# Patient Record
Sex: FEMALE | Race: WHITE | NOT HISPANIC OR LATINO | Employment: FULL TIME | URBAN - METROPOLITAN AREA
[De-identification: names, ages, dates, MRNs, and addresses within clinical notes are randomized per-mention and may not be internally consistent; named-entity substitution may affect disease eponyms.]

---

## 2017-02-13 ENCOUNTER — HOSPITAL ENCOUNTER (OUTPATIENT)
Dept: RADIOLOGY | Facility: HOSPITAL | Age: 52
Discharge: HOME | End: 2017-02-13
Attending: SPECIALIST
Payer: COMMERCIAL

## 2017-02-13 DIAGNOSIS — Z12.31 VISIT FOR SCREENING MAMMOGRAM: ICD-10-CM

## 2017-02-13 PROCEDURE — 77067 SCR MAMMO BI INCL CAD: CPT | Mod: TC

## 2017-02-13 PROCEDURE — 77067 SCR MAMMO BI INCL CAD: CPT | Mod: 26,,, | Performed by: RADIOLOGY

## 2017-02-13 PROCEDURE — 77063 BREAST TOMOSYNTHESIS BI: CPT | Mod: 26,,, | Performed by: RADIOLOGY

## 2017-05-04 ENCOUNTER — DOCUMENTATION ONLY (OUTPATIENT)
Dept: FAMILY MEDICINE | Facility: CLINIC | Age: 52
End: 2017-05-04

## 2017-05-04 NOTE — PROGRESS NOTES
Pre-Visit Chart Review  For Appointment Scheduled on 05/05/2017      Health Maintenance Due   Topic Date Due    Pap Smear  06/03/1986

## 2017-05-05 ENCOUNTER — OFFICE VISIT (OUTPATIENT)
Dept: FAMILY MEDICINE | Facility: CLINIC | Age: 52
End: 2017-05-05
Payer: COMMERCIAL

## 2017-05-05 ENCOUNTER — HOSPITAL ENCOUNTER (OUTPATIENT)
Dept: RADIOLOGY | Facility: HOSPITAL | Age: 52
Discharge: HOME | End: 2017-05-05
Attending: ORTHOPAEDIC SURGERY
Payer: COMMERCIAL

## 2017-05-05 ENCOUNTER — OFFICE VISIT (OUTPATIENT)
Dept: ORTHOPEDICS | Facility: CLINIC | Age: 52
End: 2017-05-05
Payer: COMMERCIAL

## 2017-05-05 VITALS
WEIGHT: 181 LBS | BODY MASS INDEX: 36.49 KG/M2 | DIASTOLIC BLOOD PRESSURE: 76 MMHG | HEART RATE: 62 BPM | SYSTOLIC BLOOD PRESSURE: 131 MMHG | HEIGHT: 59 IN

## 2017-05-05 VITALS
SYSTOLIC BLOOD PRESSURE: 122 MMHG | TEMPERATURE: 99 F | HEIGHT: 59 IN | HEART RATE: 62 BPM | DIASTOLIC BLOOD PRESSURE: 80 MMHG | BODY MASS INDEX: 36.49 KG/M2 | WEIGHT: 181 LBS

## 2017-05-05 DIAGNOSIS — M25.561 RIGHT KNEE PAIN, UNSPECIFIED CHRONICITY: ICD-10-CM

## 2017-05-05 DIAGNOSIS — M17.11 ARTHRITIS OF KNEE, RIGHT: Primary | ICD-10-CM

## 2017-05-05 DIAGNOSIS — L72.3 SEBACEOUS CYST: Primary | ICD-10-CM

## 2017-05-05 DIAGNOSIS — E66.01 SEVERE OBESITY (BMI 35.0-39.9): ICD-10-CM

## 2017-05-05 DIAGNOSIS — M25.561 RIGHT KNEE PAIN, UNSPECIFIED CHRONICITY: Primary | ICD-10-CM

## 2017-05-05 DIAGNOSIS — F41.8 MIXED ANXIETY DEPRESSIVE DISORDER: ICD-10-CM

## 2017-05-05 PROCEDURE — 1160F RVW MEDS BY RX/DR IN RCRD: CPT | Mod: S$GLB,,, | Performed by: ORTHOPAEDIC SURGERY

## 2017-05-05 PROCEDURE — 99999 PR PBB SHADOW E&M-EST. PATIENT-LVL III: CPT | Mod: PBBFAC,,, | Performed by: ORTHOPAEDIC SURGERY

## 2017-05-05 PROCEDURE — 73564 X-RAY EXAM KNEE 4 OR MORE: CPT | Mod: TC,PN,RT

## 2017-05-05 PROCEDURE — 73564 X-RAY EXAM KNEE 4 OR MORE: CPT | Mod: 26,RT,, | Performed by: RADIOLOGY

## 2017-05-05 PROCEDURE — 1160F RVW MEDS BY RX/DR IN RCRD: CPT | Mod: S$GLB,,, | Performed by: PHYSICIAN ASSISTANT

## 2017-05-05 PROCEDURE — 73562 X-RAY EXAM OF KNEE 3: CPT | Mod: 26,LT,, | Performed by: RADIOLOGY

## 2017-05-05 PROCEDURE — 99999 PR PBB SHADOW E&M-EST. PATIENT-LVL III: CPT | Mod: PBBFAC,,, | Performed by: PHYSICIAN ASSISTANT

## 2017-05-05 PROCEDURE — 99213 OFFICE O/P EST LOW 20 MIN: CPT | Mod: S$GLB,,, | Performed by: PHYSICIAN ASSISTANT

## 2017-05-05 PROCEDURE — 99203 OFFICE O/P NEW LOW 30 MIN: CPT | Mod: S$GLB,,, | Performed by: ORTHOPAEDIC SURGERY

## 2017-05-05 RX ORDER — VENLAFAXINE HYDROCHLORIDE 37.5 MG/1
37.5 CAPSULE, EXTENDED RELEASE ORAL DAILY
Qty: 30 CAPSULE | Refills: 0 | Status: SHIPPED | OUTPATIENT
Start: 2017-05-05 | End: 2017-06-01 | Stop reason: SDUPTHER

## 2017-05-05 NOTE — PATIENT INSTRUCTIONS
Weight Management: Getting Started  Healthy bodies come in all shapes and sizes. Not all bodies are made to be thin. For some people, a healthy weight is higher than the average weight listed on weight charts. Your healthcare provider can help you decide on a healthy weight for you.    Reasons to lose weight  Losing weight can help with some health problems, such as high blood pressure, heart disease, diabetes, sleep apnea, and arthritis. You may also feel more energy.  Set your long-term goal  Your goal doesn't even have to be a specific weight. You may decide on a fitness goal (such as being able to walk 10 miles a week), or a health goal (such as lowering your blood pressure). Choose a goal that is measurable and reasonable, so you know when you've reached it. A goal of reaching a BMI of less than 25 is not always reasonable (or possible).   Make an action plan  Habits dont change overnight. Setting your goals too high can leave you feeling discouraged if you cant reach them. Be realistic. Choose one or two small changes you can make now. Set an action plan for how you are going to make these changes. When you can stick to this plan, keep making a few more small changes. Taking small steps will help you stay on the path to success.  Track your progress  Write down your goals. Then, keep a daily record of your progress. Write down what you eat and how active you are. This record lets you look back on how much youve done. It may also help when youre feeling frustrated. Reward yourself for success. Even if you dont reach every goal, give yourself credit for what you do get done.  Get support  Encouragement from others can help make losing weight easier. Ask your family members and friends for support. They may even want to join you. Also look to your healthcare provider, registered dietitian, and  for help. Your local hospital can give you more information about nutrition, exercise, and  weight loss.  Date Last Reviewed: 1/31/2016 © 2000-2016 [employer]. [address] [place] [address], [place] [address]. All rights reserved. This information is not intended as a substitute for professional medical care. Always follow your healthcare professional's instructions.        Walking for Fitness  Fitness walking has something for everyone, even people who are already fit. Walking is one of the safest ways to condition your body aerobically. It can boost energy, help you lose weight, and reduce stress.    Physical benefits  · Walking strengthens your heart and lungs, and tones your muscles.  · When walking, your feet land with less impact than in other sports. This reduces chances of muscle, bone, and joint injury.  · Regular walking improves your cholesterol levels and lowers your risk of heart disease. And it helps you control your blood sugar if you have diabetes.  · Walking is a weight-bearing activity, which helps maintain bone density. This can help prevent osteoporosis.  Personal rewards  · Taking walks can help you relax and manage stress. And fitness walking may make you feel better about yourself.  · Walking can help you sleep better at night and make you less likely to be depressed.  · Regular walking may help maintain your memory as you get older.  · Walking is a great way to spend extra time with friends and family members. Be sure to invite your dog along!  Q&A about fitness walking  Q: Will walking keep me fit?  A: Yes. Regular walking at the right pace gives you all the benefits of other aerobic activities, such as jogging and swimming.  Q: Will walking help me lose weight and keep it off?  A: Yes. Per mile, walking can burn as many calories as jogging. Your health care provider can help work walking into your weight-loss plan.  Q: Is walking safe for my health?  A: Yes. Walking is safe if you have high blood pressure, diabetes, heart disease, or other conditions. Talk to your health  care provider before you start.  Date Last Reviewed: 5/9/2015  © 9390-4498 [employer]. [address] [place] [address], [place] [address]. All rights reserved. This information is not intended as a substitute for professional medical care. Always follow your healthcare professional's instructions.

## 2017-05-05 NOTE — PROGRESS NOTES
Subjective:       Patient ID: Faith Byers is a 51 y.o. female.    Chief Complaint: Depression and Mass (on scalp)    HPI   Patient is a 51 year old  female presenting to the clinic with concerns of increased agitation, stress, anxiety. She reports family members have urged her to get on an anti-depressant because she has been more easily agitated lately. Patient admits to having a lot on her plate- working three jobs, going to school part time, etc. She denies any concerns of anhedonia, increased sadness. She is more fatigued, but is only sleep 5-6 hours on average & reports this is restless. She reports having tried effexor in the past with good relief. She would like to try this again. She sees Dr. Lyons for her thyroid. She reports having recent labs done at [facility] & will bring them to her f/u appointment.    She is also concerned about small lesion on left frontal scalp area. She reports small increase in size. Her daughter has tried to drain the area a couple of times. There is no associated pain, drainage.  Review of Systems   Constitutional: Negative for activity change and unexpected weight change.   HENT: Negative for hearing loss, rhinorrhea and trouble swallowing.    Eyes: Negative for discharge and visual disturbance.   Respiratory: Negative for chest tightness and wheezing.    Cardiovascular: Negative for chest pain and palpitations.   Gastrointestinal: Negative for blood in stool, constipation, diarrhea and vomiting.   Endocrine: Negative for polydipsia and polyuria.   Genitourinary: Negative for difficulty urinating, dysuria, hematuria and menstrual problem.   Musculoskeletal: Positive for arthralgias and joint swelling.   Neurological: Negative for weakness and headaches.   Psychiatric/Behavioral: Positive for agitation, behavioral problems and sleep disturbance. Negative for confusion, decreased concentration, dysphoric mood, hallucinations, self-injury and suicidal ideas. The  patient is not hyperactive.        Objective:      Physical Exam   Constitutional: Vital signs are normal. She appears well-developed and well-nourished. No distress.   Cardiovascular: Normal rate, regular rhythm, S1 normal, S2 normal and normal heart sounds.  Exam reveals no gallop.    No murmur heard.  Pulses:       Radial pulses are 2+ on the right side, and 2+ on the left side.   <2sec cap refill fingers bilat     Pulmonary/Chest: Effort normal and breath sounds normal. No respiratory distress. She has no wheezes. She has no rhonchi.   Skin: Skin is warm and dry. She is not diaphoretic.        Appropriate skin turgor   Psychiatric: She has a normal mood and affect. Her speech is normal and behavior is normal. Judgment and thought content normal. Cognition and memory are normal.       Assessment:       1. Sebaceous cyst    2. Mixed anxiety depressive disorder    3. Severe obesity (BMI 35.0-39.9)        Plan:   Faith was seen today for depression and mass.    Diagnoses and all orders for this visit:    Sebaceous cyst  No evidence of infeciton  If cyst increases in size, drainage occurs, redness, warmth, needs to let us know for apt or possible gen surgery referral    Mixed anxiety depressive disorder  Trial of effexor; 4 week f/u scheduled  Patient will let us know if effexor not covered by insurance.  -     venlafaxine (EFFEXOR-XR) 37.5 MG 24 hr capsule; Take 1 capsule (37.5 mg total) by mouth once daily.    Severe obesity (BMI 35.0-39.9)  Patient readiness: acceptance and barriers:none    During the course of the visit the patient was educated and counseled about the following:     Obesity:   Regular aerobic exercise program discussed.    Goals: Obesity: Reduce calorie intake and BMI    Did patient meet goals/outcomes: No    The following self management tools provided: declined    Patient Instructions (the written plan) was given to the patient/family.     Time spent with patient: 20 minutes

## 2017-05-05 NOTE — MR AVS SNAPSHOT
[facility]  [address]  [place] [address]  Phone: [phone]  Fax: [phone]                  Faith Byers   2017 7:00 AM   Office Visit    Description:  Female : 1965   Provider:  GIANA Rojo   Department:  [facility]           Reason for Visit     Depression     Mass           Diagnoses this Visit        Comments    Sebaceous cyst    -  Primary     Mixed anxiety depressive disorder         Severe obesity (BMI 35.0-39.9)                To Do List           Future Appointments        Provider Department Dept Phone    2017 11:15 AM Huey Kiran MD [place] [facility] [phone]    2017 7:00 AM GIANA Rojo [facility] [phone]      Goals (5 Years of Data)     None      Follow-Up and Disposition     Return for 4 week f/u.       These Medications        Disp Refills Start End    venlafaxine (EFFEXOR-XR) 37.5 MG 24 hr capsule 30 capsule 0 2017    Take 1 capsule (37.5 mg total) by mouth once daily. - Oral    Pharmacy: [facility]/pharmacy #[id number] - [place], [place] - [address]  #: [phone]         [facility]     [facility] -  Assistance  Unless otherwise directed by your provider, please contact [facility], our nurse care line that is available for  assistance.     Registered nurses in the [facility] provide: appointment scheduling, clinical advisement, health education, and other advisory services.  Call: [phone] (toll free)               Medications           Message regarding Medications     Verify the changes and/or additions to your medication regime listed below are the same as discussed with your clinician today.  If any of these changes or additions are incorrect, please notify your healthcare provider.        START taking these NEW medications        Refills    venlafaxine (EFFEXOR-XR) 37.5 MG 24 hr capsule 0     "Sig: Take 1 capsule (37.5 mg total) by mouth once daily.    Class: Normal    Route: Oral           Verify that the below list of medications is an accurate representation of the medications you are currently taking.  If none reported, the list may be blank. If incorrect, please contact your healthcare provider. Carry this list with you in case of emergency.           Current Medications     ibuprofen (ADVIL,MOTRIN) 600 MG tablet Take 600 mg by mouth every 8 (eight) hours as needed.     levothyroxine (SYNTHROID) 112 MCG tablet Take 112 mcg by mouth once daily.      venlafaxine (EFFEXOR-XR) 37.5 MG 24 hr capsule Take 1 capsule (37.5 mg total) by mouth once daily.           Clinical Reference Information           Your Vitals Were     BP Pulse Temp Height Weight BMI    122/80 (BP Location: Right arm, Patient Position: Sitting, BP Method: Manual) 62 98.5 °F (36.9 °C) (Oral) 4' 11" (1.499 m) 82.1 kg (181 lb) 36.56 kg/m2      Blood Pressure          Most Recent Value    BP  122/80      Allergies as of 5/5/2017     No Known Allergies      Immunizations Administered on Date of Encounter - 5/5/2017     None      Instructions      Weight Management: Getting Started  Healthy bodies come in all shapes and sizes. Not all bodies are made to be thin. For some people, a healthy weight is higher than the average weight listed on weight charts. Your healthcare provider can help you decide on a healthy weight for you.    Reasons to lose weight  Losing weight can help with some health problems, such as high blood pressure, heart disease, diabetes, sleep apnea, and arthritis. You may also feel more energy.  Set your long-term goal  Your goal doesn't even have to be a specific weight. You may decide on a fitness goal (such as being able to walk 10 miles a week), or a health goal (such as lowering your blood pressure). Choose a goal that is measurable and reasonable, so you know when you've reached it. A goal of reaching a BMI of less " than 25 is not always reasonable (or possible).   Make an action plan  Habits dont change overnight. Setting your goals too high can leave you feeling discouraged if you cant reach them. Be realistic. Choose one or two small changes you can make now. Set an action plan for how you are going to make these changes. When you can stick to this plan, keep making a few more small changes. Taking small steps will help you stay on the path to success.  Track your progress  Write down your goals. Then, keep a daily record of your progress. Write down what you eat and how active you are. This record lets you look back on how much youve done. It may also help when youre feeling frustrated. Reward yourself for success. Even if you dont reach every goal, give yourself credit for what you do get done.  Get support  Encouragement from others can help make losing weight easier. Ask your family members and friends for support. They may even want to join you. Also look to your healthcare provider, registered dietitian, and  for help. Your local hospital can give you more information about nutrition, exercise, and weight loss.  Date Last Reviewed: 1/31/2016  © 4957-0338 [employer]. [address] [place] [address], [place] [address]. All rights reserved. This information is not intended as a substitute for professional medical care. Always follow your healthcare professional's instructions.        Walking for Fitness  Fitness walking has something for everyone, even people who are already fit. Walking is one of the safest ways to condition your body aerobically. It can boost energy, help you lose weight, and reduce stress.    Physical benefits  · Walking strengthens your heart and lungs, and tones your muscles.  · When walking, your feet land with less impact than in other sports. This reduces chances of muscle, bone, and joint injury.  · Regular walking improves your cholesterol levels and lowers your  risk of heart disease. And it helps you control your blood sugar if you have diabetes.  · Walking is a weight-bearing activity, which helps maintain bone density. This can help prevent osteoporosis.  Personal rewards  · Taking walks can help you relax and manage stress. And fitness walking may make you feel better about yourself.  · Walking can help you sleep better at night and make you less likely to be depressed.  · Regular walking may help maintain your memory as you get older.  · Walking is a great way to spend extra time with friends and family members. Be sure to invite your dog along!  Q&A about fitness walking  Q: Will walking keep me fit?  A: Yes. Regular walking at the right pace gives you all the benefits of other aerobic activities, such as jogging and swimming.  Q: Will walking help me lose weight and keep it off?  A: Yes. Per mile, walking can burn as many calories as jogging. Your health care provider can help work walking into your weight-loss plan.  Q: Is walking safe for my health?  A: Yes. Walking is safe if you have high blood pressure, diabetes, heart disease, or other conditions. Talk to your health care provider before you start.  Date Last Reviewed: 5/9/2015  © 2631-7086 [employer]. [address] [place] [address], [place] [address]. All rights reserved. This information is not intended as a substitute for professional medical care. Always follow your healthcare professional's instructions.             Language Assistance Services     ATTENTION: Language assistance services are available, free of charge. Please call [phone].      ATENCIÓN: Si habla español, tiene a valencia disposición servicios gratuitos de asistencia lingüística. Llame al [phone].     [facility] Ý: N?u b?n nói Ti?ng Vi?t, có các d?ch v? h? tr? ngôn ng? mi?n phí dành cho b?n. G?i s? [phone].         [facility] complies with applicable Federal civil rights laws and does not discriminate on the  basis of race, color, national origin, age, disability, or sex.

## 2017-05-08 DIAGNOSIS — M25.561 RIGHT KNEE PAIN, UNSPECIFIED CHRONICITY: Primary | ICD-10-CM

## 2017-05-08 NOTE — PROGRESS NOTES
Past Medical History:   Diagnosis Date    Thyroid disease        Past Surgical History:   Procedure Laterality Date    BREAST SURGERY  2001    right biopsy, benign    KNEE ARTHROSCOPY Right     THYROIDECTOMY  2011    complete       Current Outpatient Prescriptions   Medication Sig    ibuprofen (ADVIL,MOTRIN) 600 MG tablet Take 600 mg by mouth every 8 (eight) hours as needed.     levothyroxine (SYNTHROID) 112 MCG tablet Take 112 mcg by mouth once daily.      venlafaxine (EFFEXOR-XR) 37.5 MG 24 hr capsule Take 1 capsule (37.5 mg total) by mouth once daily.     No current facility-administered medications for this visit.        Review of patient's allergies indicates:  No Known Allergies    Family History   Problem Relation Age of Onset    Cancer Mother      breast and bone     Hyperlipidemia Father     ADD / ADHD Daughter      autism    Crohn's disease Paternal Uncle     Heart disease Paternal Uncle      heart transplant       Social History     Social History    Marital status:      Spouse name: N/A    Number of children: N/A    Years of education: N/A     Occupational History    Not on file.     Social History Main Topics    Smoking status: Never Smoker    Smokeless tobacco: Never Used    Alcohol use No    Drug use: No    Sexual activity: Not Currently     Other Topics Concern    Not on file     Social History Narrative       Chief Complaint:   Chief Complaint   Patient presents with    Right Knee - Pain       Consulting Physician: No ref. provider found    History of present illness:    This is a 51 y.o. year old female who complains of right knee pain.  She states that her pain is a 5 out of 10 and is worse with exercise or sitting and standing for long periods of time.  She's had this pain for years.  She reports having a knee scope done approximately 20 years ago.  It is worse when she bends and squats.  She does wear a brace when she does some exercises and reports that it  "helps.    Review of Systems:    Constitution: Denies chills, fever, and sweats.  HENT: Denies headaches or blurry vision.  Cardiovascular: Denies chest pain or irregular heart beat.  Respiratory: Denies cough or shortness of breath.  Gastrointestinal: Denies abdominal pain, nausea, or vomiting.  Musculoskeletal:  Denies muscle cramps.  Neurological: Denies dizziness or focal weakness.  Psychiatric/Behavioral: Normal mental status.  Hematologic/Lymphatic: Denies bleeding problem or easy bruising/bleeding.  Skin: Denies rash or suspicious lesions.    Examination:    Vital Signs:    Vitals:    05/05/17 1116   BP: 131/76   Pulse: 62   Weight: 82.1 kg (181 lb)   Height: 4' 11" (1.499 m)   PainSc:   5   PainLoc: Knee       Body mass index is 36.56 kg/(m^2).    This a well-developed, well nourished patient in no acute distress.    Alert and oriented and cooperative to examination.       Physical Exam: Right Knee Exam    Gait   Antalgic    Skin  Rash:   None  Scars:   None    Inspection  Erythema:  None  Bruising:  None  Effusion:  None  Masses:  None  Lymphadenopathy: None    Range of Motion: 0 to 130°    Medial Joint : n  Lateral Joint : y    Patellofemoral Tenderness: Yes  Patellofemoral Crepitus: Yes    Lachman:  Normal  Anterior Drawer: Normal  Posterior Drawer: Normal    Raffi's:  Negative  Apley's:  Negative    Varus Stress:  Stable  Valgus Stress:  Stable    Strength:  5/5    Pulses:  Palpable  Sensation:  Intact          Imaging: X-rays ordered and reviewed today of the right knee showed degenerative joint changes along the lateral aspect of the knee.        Assessment: Arthritis of knee, right        Plan:  We discussed treatment options today.  She like to proceed with Euflexxa series which we will get approved and start in 2 weeks.  She would also like to consider a lateral  brace.      DISCLAIMER: This note may have been dictated using voice recognition software and may contain " grammatical errors.     NOTE: Consult report sent to referring provider via [facility] EMR.

## 2017-06-01 RX ORDER — VENLAFAXINE HYDROCHLORIDE 37.5 MG/1
37.5 CAPSULE, EXTENDED RELEASE ORAL DAILY
Qty: 30 CAPSULE | Refills: 6 | Status: SHIPPED | OUTPATIENT
Start: 2017-06-01 | End: 2017-06-05 | Stop reason: DRUGHIGH

## 2017-06-05 ENCOUNTER — OFFICE VISIT (OUTPATIENT)
Dept: FAMILY MEDICINE | Facility: CLINIC | Age: 52
End: 2017-06-05
Payer: COMMERCIAL

## 2017-06-05 ENCOUNTER — PATIENT MESSAGE (OUTPATIENT)
Dept: FAMILY MEDICINE | Facility: CLINIC | Age: 52
End: 2017-06-05

## 2017-06-05 VITALS
HEIGHT: 59 IN | HEART RATE: 58 BPM | DIASTOLIC BLOOD PRESSURE: 71 MMHG | BODY MASS INDEX: 36.08 KG/M2 | TEMPERATURE: 98 F | SYSTOLIC BLOOD PRESSURE: 113 MMHG | WEIGHT: 179 LBS

## 2017-06-05 DIAGNOSIS — F41.1 GAD (GENERALIZED ANXIETY DISORDER): Primary | ICD-10-CM

## 2017-06-05 DIAGNOSIS — E66.9 OBESITY (BMI 30-39.9): ICD-10-CM

## 2017-06-05 PROCEDURE — 99213 OFFICE O/P EST LOW 20 MIN: CPT | Mod: S$GLB,,, | Performed by: PHYSICIAN ASSISTANT

## 2017-06-05 PROCEDURE — 99999 PR PBB SHADOW E&M-EST. PATIENT-LVL III: CPT | Mod: PBBFAC,,, | Performed by: PHYSICIAN ASSISTANT

## 2017-06-05 RX ORDER — VENLAFAXINE HYDROCHLORIDE 75 MG/1
75 CAPSULE, EXTENDED RELEASE ORAL DAILY
Qty: 30 CAPSULE | Refills: 2 | Status: SHIPPED | OUTPATIENT
Start: 2017-06-05 | End: 2017-09-07 | Stop reason: SDUPTHER

## 2017-06-05 NOTE — PATIENT INSTRUCTIONS
Walking for Fitness  Fitness walking has something for everyone, even people who are already fit. Walking is one of the safest ways to condition your body aerobically. It can boost energy, help you lose weight, and reduce stress.    Physical benefits  · Walking strengthens your heart and lungs, and tones your muscles.  · When walking, your feet land with less impact than in other sports. This reduces chances of muscle, bone, and joint injury.  · Regular walking improves your cholesterol levels and lowers your risk of heart disease. And it helps you control your blood sugar if you have diabetes.  · Walking is a weight-bearing activity, which helps maintain bone density. This can help prevent osteoporosis.  Personal rewards  · Taking walks can help you relax and manage stress. And fitness walking may make you feel better about yourself.  · Walking can help you sleep better at night and make you less likely to be depressed.  · Regular walking may help maintain your memory as you get older.  · Walking is a great way to spend extra time with friends and family members. Be sure to invite your dog along!  Q&A about fitness walking  Q: Will walking keep me fit?  A: Yes. Regular walking at the right pace gives you all the benefits of other aerobic activities, such as jogging and swimming.  Q: Will walking help me lose weight and keep it off?  A: Yes. Per mile, walking can burn as many calories as jogging. Your health care provider can help work walking into your weight-loss plan.  Q: Is walking safe for my health?  A: Yes. Walking is safe if you have high blood pressure, diabetes, heart disease, or other conditions. Talk to your health care provider before you start.  Date Last Reviewed: 5/9/2015  © 9535-8242 [employer]. [address] [place] [address], [place] [address]. All rights reserved. This information is not intended as a substitute for professional medical care. Always follow your healthcare professional's  instructions.

## 2017-06-05 NOTE — PROGRESS NOTES
Subjective:       Patient ID: Faith Byers is a 52 y.o. female.    Chief Complaint: Follow-up    HPI   Patient is a 52 year old  female presenting to the clinic for 1 month f/u after starting effexor-xr 37.5mg daily. She reports some improvement with her agitation, however, she feels like it could still be improved. She denies any adverse side effects. She is otherwise doing well with the medication, but willing to increase it some.     She is seeing Dr. Lyons for thyroid disease & has had other recent labs done for employment. She will get these scanned to us. We are requesting pap smear result from Dr. Moreno.  Review of Systems   Constitutional: Negative for activity change, appetite change, chills, diaphoresis, fatigue and fever.   HENT: Negative for congestion, postnasal drip and rhinorrhea.    Respiratory: Negative.  Negative for cough, shortness of breath and wheezing.    Cardiovascular: Negative.  Negative for chest pain.   Gastrointestinal: Negative for abdominal pain, blood in stool, constipation, diarrhea, nausea and vomiting.   Genitourinary: Negative for dysuria, frequency, hematuria and urgency.   Musculoskeletal: Negative.    Skin: Negative.  Negative for color change and rash.   Neurological: Negative for dizziness and syncope.   Psychiatric/Behavioral: Positive for agitation. Negative for behavioral problems and confusion.       Objective:      Physical Exam   Constitutional: Vital signs are normal. She appears well-developed and well-nourished. No distress.   Cardiovascular: Normal rate, regular rhythm, S1 normal, S2 normal and normal heart sounds.  Exam reveals no gallop.    No murmur heard.  Pulses:       Radial pulses are 2+ on the right side, and 2+ on the left side.   <2sec cap refill fingers bilat     Pulmonary/Chest: Effort normal and breath sounds normal. No respiratory distress. She has no wheezes. She has no rhonchi.   Skin: Skin is warm and dry. She is not  diaphoretic.   Appropriate skin turgor   Psychiatric: She has a normal mood and affect. Her speech is normal and behavior is normal. Judgment and thought content normal. Cognition and memory are normal.       Assessment:       1. CARINA (generalized anxiety disorder)    2. Obesity (BMI 30-39.9)        Plan:   Faith was seen today for follow-up.    Diagnoses and all orders for this visit:    CARINA (generalized anxiety disorder)  Improvement, but would like to try higher dose  -     venlafaxine (EFFEXOR-XR) 75 MG 24 hr capsule; Take 1 capsule (75 mg total) by mouth once daily.  I've explained to her that drugs of the SSRI class can have side effects such as weight gain, sexual dysfunction, insomnia, headache, nausea. These medications are generally effective at alleviating symptoms of anxiety and/or depression. Let me know if significant side effects do occur.      Obesity (BMI 30-39.9)  Patient readiness: acceptance and barriers:none    During the course of the visit the patient was educated and counseled about the following:     Obesity:   Regular aerobic exercise program discussed.    Goals: Obesity: Reduce calorie intake and BMI    Did patient meet goals/outcomes: No    The following self management tools provided: declined    Patient Instructions (the written plan) was given to the patient/family.     Time spent with patient: 20 minutes

## 2017-09-13 RX ORDER — VENLAFAXINE HYDROCHLORIDE 37.5 MG/1
37.5 CAPSULE, EXTENDED RELEASE ORAL DAILY
Qty: 30 CAPSULE | Refills: 2 | Status: SHIPPED | OUTPATIENT
Start: 2017-09-13 | End: 2017-12-14 | Stop reason: SDUPTHER

## 2017-09-13 NOTE — TELEPHONE ENCOUNTER
Patient advised she stopped the 75mg of effexor she advised that she was not caring about things. She would like to go back to the 37.5mg. Please send in rx.

## 2017-10-23 ENCOUNTER — OFFICE VISIT (OUTPATIENT)
Dept: FAMILY MEDICINE | Facility: CLINIC | Age: 52
End: 2017-10-23
Payer: COMMERCIAL

## 2017-10-23 ENCOUNTER — DOCUMENTATION ONLY (OUTPATIENT)
Dept: FAMILY MEDICINE | Facility: CLINIC | Age: 52
End: 2017-10-23

## 2017-10-23 VITALS
WEIGHT: 175.69 LBS | BODY MASS INDEX: 35.42 KG/M2 | HEART RATE: 87 BPM | HEIGHT: 59 IN | SYSTOLIC BLOOD PRESSURE: 118 MMHG | TEMPERATURE: 98 F | OXYGEN SATURATION: 97 % | DIASTOLIC BLOOD PRESSURE: 72 MMHG

## 2017-10-23 DIAGNOSIS — J06.9 URI, ACUTE: Primary | ICD-10-CM

## 2017-10-23 DIAGNOSIS — E66.9 OBESITY (BMI 30-39.9): ICD-10-CM

## 2017-10-23 PROCEDURE — 99999 PR PBB SHADOW E&M-EST. PATIENT-LVL III: CPT | Mod: PBBFAC,,, | Performed by: PHYSICIAN ASSISTANT

## 2017-10-23 PROCEDURE — 99213 OFFICE O/P EST LOW 20 MIN: CPT | Mod: 25,S$GLB,, | Performed by: PHYSICIAN ASSISTANT

## 2017-10-23 PROCEDURE — 96372 THER/PROPH/DIAG INJ SC/IM: CPT | Mod: S$GLB,,, | Performed by: PHYSICIAN ASSISTANT

## 2017-10-23 RX ORDER — AZITHROMYCIN 250 MG/1
TABLET, FILM COATED ORAL
Qty: 6 TABLET | Refills: 0 | Status: SHIPPED | OUTPATIENT
Start: 2017-10-23 | End: 2017-10-28

## 2017-10-23 RX ORDER — LEVOTHYROXINE SODIUM 125 UG/1
125 TABLET ORAL
Status: ON HOLD | COMMUNITY
End: 2019-02-26 | Stop reason: HOSPADM

## 2017-10-23 RX ORDER — BETAMETHASONE SODIUM PHOSPHATE AND BETAMETHASONE ACETATE 3; 3 MG/ML; MG/ML
6 INJECTION, SUSPENSION INTRA-ARTICULAR; INTRALESIONAL; INTRAMUSCULAR; SOFT TISSUE
Status: COMPLETED | OUTPATIENT
Start: 2017-10-23 | End: 2017-10-23

## 2017-10-23 RX ADMIN — BETAMETHASONE SODIUM PHOSPHATE AND BETAMETHASONE ACETATE 6 MG: 3; 3 INJECTION, SUSPENSION INTRA-ARTICULAR; INTRALESIONAL; INTRAMUSCULAR; SOFT TISSUE at 09:10

## 2017-10-23 NOTE — PROGRESS NOTES
Subjective:       Patient ID: Faith Byers is a 52 y.o. female.    Chief Complaint: head congestion and Cough    URI    This is a new problem. The current episode started in the past 7 days. Maximum temperature: 99.9F highest temp. Associated symptoms include congestion, coughing, headaches, rhinorrhea, sinus pain and a sore throat. Pertinent negatives include no abdominal pain, chest pain, diarrhea, dysuria, ear pain, joint pain, joint swelling, nausea, neck pain, plugged ear sensation, rash, sneezing, vomiting or wheezing. She has tried decongestant and NSAIDs for the symptoms. The treatment provided mild relief.     Review of Systems   Constitutional: Positive for fatigue. Negative for activity change and appetite change.   HENT: Positive for congestion, rhinorrhea, sinus pain and sore throat. Negative for ear pain and sneezing.    Eyes: Negative for visual disturbance.   Respiratory: Positive for cough. Negative for shortness of breath, wheezing and stridor.    Cardiovascular: Negative for chest pain.   Gastrointestinal: Negative for abdominal pain, diarrhea, nausea and vomiting.   Genitourinary: Negative for dysuria.   Musculoskeletal: Negative for joint pain and neck pain.   Skin: Negative for rash.   Neurological: Positive for headaches.       Objective:      Physical Exam   Constitutional: Vital signs are normal. She appears well-developed and well-nourished. No distress.   HENT:   Head: Normocephalic and atraumatic.   Right Ear: Hearing, tympanic membrane, external ear and ear canal normal.   Left Ear: Hearing, tympanic membrane, external ear and ear canal normal.   Nose: Rhinorrhea present.   Mouth/Throat: Uvula is midline and mucous membranes are normal. Posterior oropharyngeal erythema present.   Cardiovascular: Normal rate, regular rhythm, S1 normal, S2 normal and normal heart sounds.  Exam reveals no gallop.    No murmur heard.  Pulses:       Radial pulses are 2+ on the right side, and 2+  on the left side.   Pulmonary/Chest: Effort normal and breath sounds normal. No respiratory distress. She has no wheezes. She has no rhonchi.   Skin: Skin is warm and dry. She is not diaphoretic.   Psychiatric: She has a normal mood and affect. Her speech is normal and behavior is normal. Judgment and thought content normal. Cognition and memory are normal.       Assessment:       1. URI, acute    2. Obesity (BMI 30-39.9)        Plan:   Faith was seen today for head congestion and cough.    Diagnoses and all orders for this visit:    URI, acute  -     betamethasone acetate-betamethasone sodium phosphate injection 6 mg; Inject 1 mL (6 mg total) into the muscle one time.  Start OTC antistamine; can continue sudafed PRN    If no improvement in 3-5 days; start -     azithromycin (Z-MIRANDA) 250 MG tablet; Take 2 tablets by mouth on day 1; Take 1 tablet by mouth on days 2-5  Take antibiotics with food.  Increase fluid intake.  Call the clinic if symptoms worsen, new symptoms develop or if you are not any better after completion of your antibiotics.        Obesity (BMI 30-39.9)  Patient readiness: acceptance and barriers:none    During the course of the visit the patient was educated and counseled about the following:     Obesity:   Regular aerobic exercise program discussed.    Goals: Obesity: Reduce calorie intake and BMI    Did patient meet goals/outcomes: No    The following self management tools provided: declined    Patient Instructions (the written plan) was given to the patient/family.     Time spent with patient: 15 minutes

## 2017-10-23 NOTE — PROGRESS NOTES
Pre-Visit Chart Review  For Appointment Scheduled on 10/23/17    Health Maintenance Due   Topic Date Due    Influenza Vaccine  08/01/2017

## 2017-12-14 RX ORDER — VENLAFAXINE HYDROCHLORIDE 37.5 MG/1
CAPSULE, EXTENDED RELEASE ORAL
Qty: 30 CAPSULE | Refills: 2 | Status: SHIPPED | OUTPATIENT
Start: 2017-12-14 | End: 2018-03-23 | Stop reason: SDUPTHER

## 2017-12-14 NOTE — TELEPHONE ENCOUNTER
Called pt--she did increase to 75 mg, but did not like how she felt. She states that it was lowered at her last visit-she said that she does better on the 37.5 mg dose. Thanks, Stacey

## 2017-12-14 NOTE — TELEPHONE ENCOUNTER
We received refill request for effexor 37.5mg daily, but according to my last note, we were supposed to increase to 75mg daily. Did we go back down to 37.5mg? Please let me know so I can refill.

## 2018-01-15 ENCOUNTER — DOCUMENTATION ONLY (OUTPATIENT)
Dept: FAMILY MEDICINE | Facility: CLINIC | Age: 53
End: 2018-01-15

## 2018-01-15 ENCOUNTER — OFFICE VISIT (OUTPATIENT)
Dept: FAMILY MEDICINE | Facility: CLINIC | Age: 53
End: 2018-01-15
Payer: COMMERCIAL

## 2018-01-15 VITALS
DIASTOLIC BLOOD PRESSURE: 78 MMHG | WEIGHT: 181.88 LBS | TEMPERATURE: 98 F | HEIGHT: 59 IN | SYSTOLIC BLOOD PRESSURE: 133 MMHG | HEART RATE: 92 BPM | BODY MASS INDEX: 36.67 KG/M2

## 2018-01-15 DIAGNOSIS — J06.9 URI, ACUTE: Primary | ICD-10-CM

## 2018-01-15 DIAGNOSIS — E66.9 OBESITY (BMI 30-39.9): ICD-10-CM

## 2018-01-15 PROCEDURE — 99213 OFFICE O/P EST LOW 20 MIN: CPT | Mod: S$GLB,,, | Performed by: PHYSICIAN ASSISTANT

## 2018-01-15 PROCEDURE — 99999 PR PBB SHADOW E&M-EST. PATIENT-LVL III: CPT | Mod: PBBFAC,,, | Performed by: PHYSICIAN ASSISTANT

## 2018-01-15 NOTE — PROGRESS NOTES
Pre-Visit Chart Review  For Appointment Scheduled on 01/15/18    Health Maintenance Due   Topic Date Due    Influenza Vaccine  08/01/2017

## 2018-01-15 NOTE — PROGRESS NOTES
Subjective:       Patient ID: Faith Byers is a 52 y.o. female.    Chief Complaint: Cough; Sore Throat; and Sinus Problem    Cough   This is a new problem. The current episode started yesterday. The problem has been unchanged. Associated symptoms include ear congestion, nasal congestion and postnasal drip. Pertinent negatives include no chest pain, chills, ear pain, fever, headaches, rhinorrhea, sore throat, shortness of breath, weight loss or wheezing. Nothing aggravates the symptoms. Treatments tried: wal-angelique, advil, allergy medicine. The treatment provided no relief.     Review of Systems   Constitutional: Negative for chills, fever and weight loss.   HENT: Positive for congestion, postnasal drip, sinus pain, sinus pressure and trouble swallowing. Negative for drooling, ear discharge, ear pain, rhinorrhea and sore throat.    Eyes: Negative for visual disturbance.   Respiratory: Positive for cough and stridor. Negative for choking, shortness of breath and wheezing.    Cardiovascular: Negative for chest pain and palpitations.   Gastrointestinal: Negative for abdominal pain, diarrhea and vomiting.   Musculoskeletal: Negative for neck pain.   Neurological: Negative for headaches.       Objective:      Physical Exam   Constitutional: Vital signs are normal. She appears well-developed and well-nourished. No distress.   HENT:   Head: Normocephalic and atraumatic.   Right Ear: Hearing, tympanic membrane, external ear and ear canal normal.   Left Ear: Hearing, tympanic membrane, external ear and ear canal normal.   Nose: Rhinorrhea present.   Mouth/Throat: Uvula is midline, oropharynx is clear and moist and mucous membranes are normal.   Cardiovascular: Normal rate, regular rhythm, S1 normal, S2 normal and normal heart sounds.  Exam reveals no gallop.    No murmur heard.  Pulses:       Radial pulses are 2+ on the right side, and 2+ on the left side.   Pulmonary/Chest: Effort normal and breath sounds normal.  No respiratory distress. She has no wheezes. She has no rhonchi.   Skin: Skin is warm and dry. She is not diaphoretic.   Psychiatric: She has a normal mood and affect. Her speech is normal and behavior is normal. Judgment and thought content normal. Cognition and memory are normal.       Assessment:       1. URI, acute    2. Obesity (BMI 30-39.9)        Plan:       Faith was seen today for cough, sore throat and sinus problem.    Diagnoses and all orders for this visit:    URI, acute  Recommended OTC cough/cold preparations  Can take tylenol or advil PRN fever if warranted. Be careful not to duplicate tylenol if it is already in the cold preparation mediation you choose.  Increase water intake. Get plenty of rest.  Return to clinic if symptoms worsen or do not improve with treatment      Obesity (BMI 30-39.9)  Patient readiness: acceptance and barriers:none    During the course of the visit the patient was educated and counseled about the following:     Obesity:   Regular aerobic exercise program discussed.    Goals: Obesity: Reduce calorie intake and BMI    Did patient meet goals/outcomes: No    The following self management tools provided: declined    Patient Instructions (the written plan) was given to the patient/family.     Time spent with patient: 15 minutes    Barriers to medications present (no )    Adverse reactions to current medications (no)    Over the counter medications reviewed (Yes)

## 2018-01-17 ENCOUNTER — PATIENT MESSAGE (OUTPATIENT)
Dept: FAMILY MEDICINE | Facility: CLINIC | Age: 53
End: 2018-01-17

## 2018-01-17 RX ORDER — AMOXICILLIN 500 MG/1
500 TABLET, FILM COATED ORAL EVERY 12 HOURS
Qty: 20 TABLET | Refills: 0 | Status: SHIPPED | OUTPATIENT
Start: 2018-01-17 | End: 2018-01-27

## 2018-02-08 DIAGNOSIS — Z12.31 VISIT FOR SCREENING MAMMOGRAM: Primary | ICD-10-CM

## 2018-02-15 ENCOUNTER — HOSPITAL ENCOUNTER (OUTPATIENT)
Dept: RADIOLOGY | Facility: HOSPITAL | Age: 53
Discharge: HOME | End: 2018-02-15
Attending: SPECIALIST
Payer: COMMERCIAL

## 2018-02-15 DIAGNOSIS — Z12.31 VISIT FOR SCREENING MAMMOGRAM: ICD-10-CM

## 2018-02-15 PROCEDURE — 77067 SCR MAMMO BI INCL CAD: CPT | Mod: TC

## 2018-03-23 RX ORDER — VENLAFAXINE HYDROCHLORIDE 37.5 MG/1
CAPSULE, EXTENDED RELEASE ORAL
Qty: 30 CAPSULE | Refills: 2 | Status: SHIPPED | OUTPATIENT
Start: 2018-03-23 | End: 2018-06-01 | Stop reason: SDUPTHER

## 2018-05-17 ENCOUNTER — PATIENT MESSAGE (OUTPATIENT)
Dept: FAMILY MEDICINE | Facility: CLINIC | Age: 53
End: 2018-05-17

## 2018-05-24 ENCOUNTER — TELEPHONE (OUTPATIENT)
Dept: ORTHOPEDICS | Facility: CLINIC | Age: 53
End: 2018-05-24

## 2018-05-24 DIAGNOSIS — M25.561 RIGHT KNEE PAIN, UNSPECIFIED CHRONICITY: Primary | ICD-10-CM

## 2018-05-30 ENCOUNTER — OFFICE VISIT (OUTPATIENT)
Dept: ORTHOPEDICS | Facility: CLINIC | Age: 53
End: 2018-05-30
Payer: COMMERCIAL

## 2018-05-30 ENCOUNTER — HOSPITAL ENCOUNTER (OUTPATIENT)
Dept: RADIOLOGY | Facility: HOSPITAL | Age: 53
Discharge: HOME | End: 2018-05-30
Attending: ORTHOPAEDIC SURGERY
Payer: COMMERCIAL

## 2018-05-30 VITALS
HEIGHT: 59 IN | WEIGHT: 181.88 LBS | BODY MASS INDEX: 36.67 KG/M2 | DIASTOLIC BLOOD PRESSURE: 58 MMHG | SYSTOLIC BLOOD PRESSURE: 121 MMHG | HEART RATE: 62 BPM

## 2018-05-30 DIAGNOSIS — M25.561 RIGHT KNEE PAIN, UNSPECIFIED CHRONICITY: ICD-10-CM

## 2018-05-30 DIAGNOSIS — M25.561 RIGHT KNEE PAIN, UNSPECIFIED CHRONICITY: Primary | ICD-10-CM

## 2018-05-30 DIAGNOSIS — M17.11 ARTHRITIS OF KNEE, RIGHT: ICD-10-CM

## 2018-05-30 PROCEDURE — 3008F BODY MASS INDEX DOCD: CPT | Mod: CPTII,S$GLB,, | Performed by: ORTHOPAEDIC SURGERY

## 2018-05-30 PROCEDURE — 99999 PR PBB SHADOW E&M-EST. PATIENT-LVL III: CPT | Mod: PBBFAC,,, | Performed by: ORTHOPAEDIC SURGERY

## 2018-05-30 PROCEDURE — 73562 X-RAY EXAM OF KNEE 3: CPT | Mod: TC,PN,LT

## 2018-05-30 PROCEDURE — 73562 X-RAY EXAM OF KNEE 3: CPT | Mod: 26,XS,LT, | Performed by: RADIOLOGY

## 2018-05-30 PROCEDURE — 99213 OFFICE O/P EST LOW 20 MIN: CPT | Mod: S$GLB,,, | Performed by: ORTHOPAEDIC SURGERY

## 2018-05-30 PROCEDURE — 73564 X-RAY EXAM KNEE 4 OR MORE: CPT | Mod: 26,RT,, | Performed by: RADIOLOGY

## 2018-06-01 ENCOUNTER — LAB VISIT (OUTPATIENT)
Dept: LAB | Facility: HOSPITAL | Age: 53
End: 2018-06-01
Attending: NURSE PRACTITIONER
Payer: COMMERCIAL

## 2018-06-01 ENCOUNTER — OFFICE VISIT (OUTPATIENT)
Dept: FAMILY MEDICINE | Facility: CLINIC | Age: 53
End: 2018-06-01
Payer: COMMERCIAL

## 2018-06-01 VITALS
DIASTOLIC BLOOD PRESSURE: 69 MMHG | TEMPERATURE: 98 F | HEART RATE: 64 BPM | SYSTOLIC BLOOD PRESSURE: 118 MMHG | HEIGHT: 59 IN | WEIGHT: 189.13 LBS | BODY MASS INDEX: 38.13 KG/M2

## 2018-06-01 DIAGNOSIS — Z00.00 ANNUAL PHYSICAL EXAM: Primary | ICD-10-CM

## 2018-06-01 DIAGNOSIS — Z00.00 ANNUAL PHYSICAL EXAM: ICD-10-CM

## 2018-06-01 DIAGNOSIS — F41.1 GAD (GENERALIZED ANXIETY DISORDER): ICD-10-CM

## 2018-06-01 DIAGNOSIS — E03.9 HYPOTHYROIDISM, UNSPECIFIED TYPE: ICD-10-CM

## 2018-06-01 LAB
ALBUMIN SERPL BCP-MCNC: 3.5 G/DL
ALP SERPL-CCNC: 55 U/L
ALT SERPL W/O P-5'-P-CCNC: 25 U/L
ANION GAP SERPL CALC-SCNC: 6 MMOL/L
AST SERPL-CCNC: 23 U/L
BASOPHILS # BLD AUTO: 0.05 K/UL
BASOPHILS NFR BLD: 0.9 %
BILIRUB SERPL-MCNC: 0.4 MG/DL
BUN SERPL-MCNC: 21 MG/DL
CALCIUM SERPL-MCNC: 9.4 MG/DL
CHLORIDE SERPL-SCNC: 109 MMOL/L
CHOLEST SERPL-MCNC: 195 MG/DL
CHOLEST/HDLC SERPL: 3.1 {RATIO}
CO2 SERPL-SCNC: 25 MMOL/L
CREAT SERPL-MCNC: 0.7 MG/DL
DIFFERENTIAL METHOD: NORMAL
EOSINOPHIL # BLD AUTO: 0.1 K/UL
EOSINOPHIL NFR BLD: 1.6 %
ERYTHROCYTE [DISTWIDTH] IN BLOOD BY AUTOMATED COUNT: 12.8 %
EST. GFR  (AFRICAN AMERICAN): >60 ML/MIN/1.73 M^2
EST. GFR  (NON AFRICAN AMERICAN): >60 ML/MIN/1.73 M^2
ESTIMATED AVG GLUCOSE: 97 MG/DL
GLUCOSE SERPL-MCNC: 85 MG/DL
HBA1C MFR BLD HPLC: 5 %
HCT VFR BLD AUTO: 40.4 %
HDLC SERPL-MCNC: 63 MG/DL
HDLC SERPL: 32.3 %
HGB BLD-MCNC: 13 G/DL
IMM GRANULOCYTES # BLD AUTO: 0.01 K/UL
IMM GRANULOCYTES NFR BLD AUTO: 0.2 %
LDLC SERPL CALC-MCNC: 119.6 MG/DL
LYMPHOCYTES # BLD AUTO: 2.3 K/UL
LYMPHOCYTES NFR BLD: 40.3 %
MCH RBC QN AUTO: 30.2 PG
MCHC RBC AUTO-ENTMCNC: 32.2 G/DL
MCV RBC AUTO: 94 FL
MONOCYTES # BLD AUTO: 0.4 K/UL
MONOCYTES NFR BLD: 6 %
NEUTROPHILS # BLD AUTO: 3 K/UL
NEUTROPHILS NFR BLD: 51 %
NONHDLC SERPL-MCNC: 132 MG/DL
NRBC BLD-RTO: 0 /100 WBC
PLATELET # BLD AUTO: 235 K/UL
PMV BLD AUTO: 11 FL
POTASSIUM SERPL-SCNC: 4.6 MMOL/L
PROT SERPL-MCNC: 7 G/DL
RBC # BLD AUTO: 4.31 M/UL
SODIUM SERPL-SCNC: 140 MMOL/L
TRIGL SERPL-MCNC: 62 MG/DL
WBC # BLD AUTO: 5.8 K/UL

## 2018-06-01 PROCEDURE — 85025 COMPLETE CBC W/AUTO DIFF WBC: CPT

## 2018-06-01 PROCEDURE — 80053 COMPREHEN METABOLIC PANEL: CPT

## 2018-06-01 PROCEDURE — 83036 HEMOGLOBIN GLYCOSYLATED A1C: CPT

## 2018-06-01 PROCEDURE — 36415 COLL VENOUS BLD VENIPUNCTURE: CPT | Mod: PO

## 2018-06-01 PROCEDURE — 99396 PREV VISIT EST AGE 40-64: CPT | Mod: S$GLB,,, | Performed by: NURSE PRACTITIONER

## 2018-06-01 PROCEDURE — 80061 LIPID PANEL: CPT

## 2018-06-01 PROCEDURE — 99999 PR PBB SHADOW E&M-EST. PATIENT-LVL III: CPT | Mod: PBBFAC,,, | Performed by: NURSE PRACTITIONER

## 2018-06-01 RX ORDER — LEVOTHYROXINE SODIUM 112 UG/1
TABLET ORAL
Refills: 3 | COMMUNITY
Start: 2018-04-22 | End: 2018-09-27

## 2018-06-01 RX ORDER — VENLAFAXINE HYDROCHLORIDE 37.5 MG/1
37.5 CAPSULE, EXTENDED RELEASE ORAL DAILY
Qty: 30 CAPSULE | Refills: 11 | Status: SHIPPED | OUTPATIENT
Start: 2018-06-01 | End: 2019-01-23 | Stop reason: SDUPTHER

## 2018-06-01 NOTE — PROGRESS NOTES
Subjective:       Patient ID: Faith Byers is a 52 y.o. female.    Chief Complaint: Annual Exam    Ms. Lizama presents to the clinic today for annual exam.  She is due for labs.  She is having right knee pain which is being treated by Dr. Kiran.  She has no other new complaints today.  She sees Dr. Lyons for hypothyroidism.  She was not exercising up until 3 weeks ago. She eats Weight Watchers diet.       Review of Systems   Constitutional: Negative for chills and fever.   HENT: Negative for congestion, ear pain and sinus pressure.    Respiratory: Negative for cough and shortness of breath.    Cardiovascular: Negative for chest pain, palpitations and leg swelling.   Gastrointestinal: Negative for abdominal pain, constipation and diarrhea.   Musculoskeletal: Positive for arthralgias.   Psychiatric/Behavioral: Negative for dysphoric mood. The patient is not nervous/anxious.        Objective:      Physical Exam   Constitutional: She is oriented to person, place, and time. She appears well-nourished. No distress.   HENT:   Head: Normocephalic and atraumatic.   Right Ear: External ear normal.   Left Ear: External ear normal.   Mouth/Throat: Oropharynx is clear and moist. No oropharyngeal exudate.   Eyes: Pupils are equal, round, and reactive to light. Right eye exhibits no discharge. Left eye exhibits no discharge.   Neck: Neck supple. No thyromegaly present.   Cardiovascular: Normal rate and regular rhythm.  Exam reveals no gallop and no friction rub.    No murmur heard.  Pulmonary/Chest: Effort normal and breath sounds normal. No respiratory distress. She has no wheezes. She has no rales.   Abdominal: Soft. She exhibits no distension. There is no tenderness.   Lymphadenopathy:     She has no cervical adenopathy.   Neurological: She is alert and oriented to person, place, and time. Coordination normal.   Skin: Skin is warm and dry.   Psychiatric: She has a normal mood and affect. Her behavior is normal.  Thought content normal.   Vitals reviewed.          Current Outpatient Prescriptions:     levothyroxine (SYNTHROID) 125 MCG tablet, Take 125 mcg by mouth once daily., Disp: , Rfl:     SYNTHROID 112 mcg tablet, TAKE 1 TAB BY MOUTH ON TUES, THURS, SAT & SUN, Disp: , Rfl: 3    venlafaxine (EFFEXOR-XR) 37.5 MG 24 hr capsule, Take 1 capsule (37.5 mg total) by mouth once daily., Disp: 30 capsule, Rfl: 11  Assessment:       1. Annual physical exam    2. Hypothyroidism, unspecified type    3. CARINA (generalized anxiety disorder)        Plan:       Annual physical exam  Continue exercise and Weight Watchers.  -     CBC auto differential; Future; Expected date: 06/01/2018  -     Comprehensive metabolic panel; Future; Expected date: 06/01/2018  -     Lipid panel; Future; Expected date: 06/01/2018  -     Hemoglobin A1c; Future; Expected date: 06/01/2018  -     venlafaxine (EFFEXOR-XR) 37.5 MG 24 hr capsule; Take 1 capsule (37.5 mg total) by mouth once daily.  Dispense: 30 capsule; Refill: 11    Hypothyroidism, unspecified type  Stable, follow up Dr. Lyons.    CARINA (generalized anxiety disorder)  Stable, continue current medication.

## 2018-06-01 NOTE — PROGRESS NOTES
Past Medical History:   Diagnosis Date    Thyroid disease        Past Surgical History:   Procedure Laterality Date    BREAST BIOPSY      BREAST SURGERY  2001    right biopsy, benign    KNEE ARTHROSCOPY Right     THYROIDECTOMY  2011    complete       Current Outpatient Prescriptions   Medication Sig    levothyroxine (SYNTHROID) 125 MCG tablet Take 125 mcg by mouth once daily.    venlafaxine (EFFEXOR-XR) 37.5 MG 24 hr capsule TAKE ONE CAPSULE BY MOUTH EVERY DAY     No current facility-administered medications for this visit.        Review of patient's allergies indicates:  No Known Allergies    Family History   Problem Relation Age of Onset    Cancer Mother         breast and bone     Breast cancer Mother     Hyperlipidemia Father     ADD / ADHD Daughter         autism    Crohn's disease Paternal Uncle     Heart disease Paternal Uncle         heart transplant       Social History     Social History    Marital status:      Spouse name: N/A    Number of children: N/A    Years of education: N/A     Occupational History    Not on file.     Social History Main Topics    Smoking status: Never Smoker    Smokeless tobacco: Never Used    Alcohol use No    Drug use: No    Sexual activity: Not Currently     Other Topics Concern    Not on file     Social History Narrative    No narrative on file       Chief Complaint:   Chief Complaint   Patient presents with    Right Knee - Pain       Consulting Physician: No ref. provider found    History of present illness:    This is a 52 y.o. year old female who complains of right knee pain.  She states that her pain is a 6 out of 10 and is worse with exercise or sitting and standing for long periods of time.  She's had this pain for years.  She reports having a knee scope done approximately 20 years ago.  It is worse when she bends and squats.  She does wear a brace when she does some exercises and reports that it helps. Previous injections  "helpful.    Review of Systems:    Constitution: Denies chills, fever, and sweats.  HENT: Denies headaches or blurry vision.  Cardiovascular: Denies chest pain or irregular heart beat.  Respiratory: Denies cough or shortness of breath.  Gastrointestinal: Denies abdominal pain, nausea, or vomiting.  Musculoskeletal:  Denies muscle cramps.  Neurological: Denies dizziness or focal weakness.  Psychiatric/Behavioral: Normal mental status.  Hematologic/Lymphatic: Denies bleeding problem or easy bruising/bleeding.  Skin: Denies rash or suspicious lesions.    Examination:    Vital Signs:    Vitals:    05/30/18 1455   BP: (!) 121/58   Pulse: 62   Weight: 82.5 kg (181 lb 14.1 oz)   Height: 4' 11" (1.499 m)   PainSc: 0-No pain   PainLoc: Knee       Body mass index is 36.74 kg/m².    This a well-developed, well nourished patient in no acute distress.    Alert and oriented and cooperative to examination.       Physical Exam: Right Knee Exam    Gait   Antalgic    Skin  Rash:   None  Scars:   None    Inspection  Erythema:  None  Bruising:  None  Effusion:  None  Masses:  None  Lymphadenopathy: None    Range of Motion: 0 to 130°    Medial Joint : n  Lateral Joint : y    Patellofemoral Tenderness: Yes  Patellofemoral Crepitus: Yes    Lachman:  Normal  Anterior Drawer: Normal  Posterior Drawer: Normal    Raffi's:  Negative  Apley's:  Negative    Varus Stress:  Stable  Valgus Stress:  Stable    Strength:  5/5    Pulses:  Palpable  Sensation:  Intact          Imaging: X-rays ordered and reviewed today of the right knee showed degenerative joint changes along the lateral aspect of the knee.        Assessment: Right knee pain, unspecified chronicity    Arthritis of knee, right        Plan:  We discussed treatment options today. Since she continues to have pain we will MRI for likely lateral meniscal tear. Will also start Euflexxa next visit. Declined steroid.    DISCLAIMER: This note may have been dictated using " voice recognition software and may contain grammatical errors.     NOTE: Consult report sent to referring provider via [employer].

## 2018-06-04 ENCOUNTER — HOSPITAL ENCOUNTER (OUTPATIENT)
Dept: RADIOLOGY | Facility: HOSPITAL | Age: 53
Discharge: HOME | End: 2018-06-04
Attending: ORTHOPAEDIC SURGERY
Payer: COMMERCIAL

## 2018-06-04 DIAGNOSIS — M25.561 RIGHT KNEE PAIN, UNSPECIFIED CHRONICITY: ICD-10-CM

## 2018-06-04 PROCEDURE — 73721 MRI JNT OF LWR EXTRE W/O DYE: CPT | Mod: TC,RT

## 2018-06-04 PROCEDURE — 73721 MRI JNT OF LWR EXTRE W/O DYE: CPT | Mod: 26,RT,, | Performed by: RADIOLOGY

## 2018-06-14 DIAGNOSIS — M17.11 ARTHRITIS OF RIGHT KNEE: Primary | ICD-10-CM

## 2018-06-15 ENCOUNTER — OFFICE VISIT (OUTPATIENT)
Dept: ORTHOPEDICS | Facility: CLINIC | Age: 53
End: 2018-06-15
Payer: COMMERCIAL

## 2018-06-15 VITALS — HEIGHT: 59 IN | BODY MASS INDEX: 38.13 KG/M2 | WEIGHT: 189.13 LBS

## 2018-06-15 DIAGNOSIS — M25.561 RIGHT KNEE PAIN: ICD-10-CM

## 2018-06-15 DIAGNOSIS — M25.561 RIGHT KNEE PAIN, UNSPECIFIED CHRONICITY: Primary | ICD-10-CM

## 2018-06-15 PROCEDURE — 99999 PR PBB SHADOW E&M-EST. PATIENT-LVL III: CPT | Mod: PBBFAC,,, | Performed by: ORTHOPAEDIC SURGERY

## 2018-06-15 PROCEDURE — 3008F BODY MASS INDEX DOCD: CPT | Mod: CPTII,S$GLB,, | Performed by: ORTHOPAEDIC SURGERY

## 2018-06-15 PROCEDURE — 99214 OFFICE O/P EST MOD 30 MIN: CPT | Mod: S$GLB,,, | Performed by: ORTHOPAEDIC SURGERY

## 2018-06-15 RX ORDER — MUPIROCIN 20 MG/G
OINTMENT TOPICAL
Status: CANCELLED | OUTPATIENT
Start: 2018-06-15

## 2018-06-15 RX ORDER — SODIUM CHLORIDE 9 MG/ML
INJECTION, SOLUTION INTRAVENOUS CONTINUOUS
Status: CANCELLED | OUTPATIENT
Start: 2018-06-15

## 2018-06-17 RX ORDER — VENLAFAXINE HYDROCHLORIDE 37.5 MG/1
CAPSULE, EXTENDED RELEASE ORAL
Qty: 30 CAPSULE | Refills: 2 | OUTPATIENT
Start: 2018-06-17

## 2018-06-18 NOTE — PROGRESS NOTES
Past Medical History:   Diagnosis Date    Thyroid disease        Past Surgical History:   Procedure Laterality Date    BREAST BIOPSY      BREAST SURGERY  2001    right biopsy, benign    KNEE ARTHROSCOPY Right     THYROIDECTOMY  2011    complete       Current Outpatient Prescriptions   Medication Sig    levothyroxine (SYNTHROID) 125 MCG tablet Take 125 mcg by mouth once daily.    SYNTHROID 112 mcg tablet TAKE 1 TAB BY MOUTH ON TUES, THURS, SAT & SUN    venlafaxine (EFFEXOR-XR) 37.5 MG 24 hr capsule Take 1 capsule (37.5 mg total) by mouth once daily.     No current facility-administered medications for this visit.        Review of patient's allergies indicates:  No Known Allergies    Family History   Problem Relation Age of Onset    Cancer Mother         breast and bone     Breast cancer Mother     Hyperlipidemia Father     ADD / ADHD Daughter         autism    Crohn's disease Paternal Uncle     Heart disease Paternal Uncle         heart transplant       Social History     Social History    Marital status:      Spouse name: N/A    Number of children: N/A    Years of education: N/A     Occupational History    Not on file.     Social History Main Topics    Smoking status: Never Smoker    Smokeless tobacco: Never Used    Alcohol use No    Drug use: No    Sexual activity: Not Currently     Other Topics Concern    Not on file     Social History Narrative    No narrative on file       Chief Complaint:   Chief Complaint   Patient presents with    Injections     EUFLEXXA 1/3 RIGHT KNEE       Consulting Physician: Huey Kiran MD    History of present illness:    This is a 53 y.o. year old female who complains of right knee pain.  She states that her pain is a 6 out of 10 and is worse with exercise or sitting and standing for long periods of time.  She's had this pain for years.  She reports having a knee scope done approximately 20 years ago.  It is worse when she bends and squats.  She  "does wear a brace when she does some exercises and reports that it helps. Previous injections helpful.    Review of Systems:    Constitution: Denies chills, fever, and sweats.  HENT: Denies headaches or blurry vision.  Cardiovascular: Denies chest pain or irregular heart beat.  Respiratory: Denies cough or shortness of breath.  Gastrointestinal: Denies abdominal pain, nausea, or vomiting.  Musculoskeletal:  Denies muscle cramps.  Neurological: Denies dizziness or focal weakness.  Psychiatric/Behavioral: Normal mental status.  Hematologic/Lymphatic: Denies bleeding problem or easy bruising/bleeding.  Skin: Denies rash or suspicious lesions.    Examination:    Vital Signs:    Vitals:    06/15/18 0914   Weight: 85.8 kg (189 lb 2.5 oz)   Height: 4' 11" (1.499 m)   PainSc:   2   PainLoc: Knee       Body mass index is 38.2 kg/m².    This a well-developed, well nourished patient in no acute distress.    Alert and oriented and cooperative to examination.       Physical Exam: Right Knee Exam    Gait   Antalgic    Skin  Rash:   None  Scars:   None    Inspection  Erythema:  None  Bruising:  None  Effusion:  None  Masses:  None  Lymphadenopathy: None    Range of Motion: 0 to 130°    Medial Joint : n  Lateral Joint : y    Patellofemoral Tenderness: Yes  Patellofemoral Crepitus: Yes    Lachman:  Normal  Anterior Drawer: Normal  Posterior Drawer: Normal    Raffi's:  Negative  Apley's:  Negative    Varus Stress:  Stable  Valgus Stress:  Stable    Strength:  5/5    Pulses:  Palpable  Sensation:  Intact          Imaging: X-rays of the right knee showed degenerative joint changes along the lateral aspect of the knee. MRI reviewed with pt today shows complex lateral meniscal tear.       Assessment: Right knee pain, unspecified chronicity  -     Vital signs; Standing  -     Cleanse with Chlorhexidine (CHG); Standing  -     Diet NPO; Standing  -     0.9%  NaCl infusion; Inject into the vein continuous.  -     IP " VTE LOW RISK PATIENT; Standing  -     Place APRIL hose; Standing  -     Place sequential compression device; Standing  -     Chlorohexidine Gluconate Bath; Standing  -     mupirocin 2 % ointment; by Nasal route On call Procedure (surgery).  -     Case Request Operating Room: ARTHROSCOPY, KNEE, WITH MENISCECTOMY  -     Place in Outpatient; Standing  -     CEFAZOLIN 2G/20 ML SYRINGE (PYXIS) IV syringe 2 g 20 mL; Inject 20 mLs (2 g total) into the vein On call Procedure (Surgery).  -     Basic metabolic panel; Standing  -     CBC auto differential; Standing  -     Pregnancy, urine rapid; Standing  -     EKG 12-lead; Standing    Right knee pain        Plan:  We discussed MRI and treatment options today and she elected to proceed with a knee scope and cleanup. The risks, benefits, and alternatives to the procedure were explained to the patient including, but not limited to: incomplete pain relief, surgical failure, hardware failure, need for further procedures, damage to nerves, arteries, blood vessels and other structures, infection, Deep Vein Thrombosis (DVT), Pulmonary Embolus (PE), Complex Regional Pain Syndrome as well as general anesthetic complications including seizure, stroke, heart attack and even death. The patient understood these risks and wished to proceed and signed the informed consent. All questions were answered. No guarantees were implied or stated.    We will obtain the necessary clearances and schedule the patient for surgery.          DISCLAIMER: This note may have been dictated using voice recognition software and may contain grammatical errors.     NOTE: Consult report sent to referring provider via [employer].

## 2018-06-19 ENCOUNTER — TELEPHONE (OUTPATIENT)
Dept: ORTHOPEDICS | Facility: CLINIC | Age: 53
End: 2018-06-19

## 2018-06-19 NOTE — TELEPHONE ENCOUNTER
----- Message from Melvi Gibbs MA sent at 6/19/2018  9:47 AM CDT -----  Contact: Self  Surgery originally scheduled for June 28th and would like to reschedule for July 12th if possible.  Call Back# [phone]  Thanks

## 2018-06-29 ENCOUNTER — HOSPITAL ENCOUNTER (OUTPATIENT)
Dept: PREADMISSION TESTING | Facility: HOSPITAL | Age: 53
Discharge: HOME | End: 2018-06-29
Attending: ORTHOPAEDIC SURGERY
Payer: COMMERCIAL

## 2018-06-29 VITALS — WEIGHT: 180 LBS | BODY MASS INDEX: 37.79 KG/M2 | HEIGHT: 58 IN

## 2018-06-29 DIAGNOSIS — M25.561 RIGHT KNEE PAIN, UNSPECIFIED CHRONICITY: ICD-10-CM

## 2018-06-29 LAB
ANION GAP SERPL CALC-SCNC: 10 MMOL/L
BASOPHILS # BLD AUTO: 0 K/UL
BASOPHILS NFR BLD: 0.4 %
BUN SERPL-MCNC: 14 MG/DL
CALCIUM SERPL-MCNC: 9.5 MG/DL
CHLORIDE SERPL-SCNC: 108 MMOL/L
CO2 SERPL-SCNC: 25 MMOL/L
CREAT SERPL-MCNC: 0.8 MG/DL
DIFFERENTIAL METHOD: ABNORMAL
EOSINOPHIL # BLD AUTO: 0.1 K/UL
EOSINOPHIL NFR BLD: 1.4 %
ERYTHROCYTE [DISTWIDTH] IN BLOOD BY AUTOMATED COUNT: 13.7 %
EST. GFR  (AFRICAN AMERICAN): >60 ML/MIN/1.73 M^2
EST. GFR  (NON AFRICAN AMERICAN): >60 ML/MIN/1.73 M^2
GLUCOSE SERPL-MCNC: 81 MG/DL
HCT VFR BLD AUTO: 38.1 %
HGB BLD-MCNC: 12.9 G/DL
LYMPHOCYTES # BLD AUTO: 2.9 K/UL
LYMPHOCYTES NFR BLD: 42.2 %
MCH RBC QN AUTO: 30.3 PG
MCHC RBC AUTO-ENTMCNC: 33.8 G/DL
MCV RBC AUTO: 89 FL
MONOCYTES # BLD AUTO: 0.3 K/UL
MONOCYTES NFR BLD: 4.4 %
NEUTROPHILS # BLD AUTO: 3.6 K/UL
NEUTROPHILS NFR BLD: 51.6 %
PLATELET # BLD AUTO: 247 K/UL
PMV BLD AUTO: 8.9 FL
POTASSIUM SERPL-SCNC: 4.5 MMOL/L
RBC # BLD AUTO: 4.26 M/UL
SODIUM SERPL-SCNC: 143 MMOL/L
WBC # BLD AUTO: 6.9 K/UL

## 2018-06-29 PROCEDURE — 93010 ELECTROCARDIOGRAM REPORT: CPT | Mod: ,,, | Performed by: INTERNAL MEDICINE

## 2018-06-29 PROCEDURE — 99900104 DSU ONLY-NO CHARGE-EA ADD'L HR (STAT)

## 2018-06-29 PROCEDURE — 85025 COMPLETE CBC W/AUTO DIFF WBC: CPT

## 2018-06-29 PROCEDURE — 93005 ELECTROCARDIOGRAM TRACING: CPT

## 2018-06-29 PROCEDURE — 36415 COLL VENOUS BLD VENIPUNCTURE: CPT

## 2018-06-29 PROCEDURE — 99900103 DSU ONLY-NO CHARGE-INITIAL HR (STAT)

## 2018-06-29 PROCEDURE — 80048 BASIC METABOLIC PNL TOTAL CA: CPT

## 2018-06-29 RX ORDER — MULTIVITAMIN
1 TABLET ORAL DAILY
COMMUNITY
End: 2019-02-04

## 2018-07-11 ENCOUNTER — ANESTHESIA EVENT (OUTPATIENT)
Dept: SURGERY | Facility: HOSPITAL | Age: 53
End: 2018-07-11
Payer: COMMERCIAL

## 2018-07-12 ENCOUNTER — HOSPITAL ENCOUNTER (OUTPATIENT)
Facility: HOSPITAL | Age: 53
Discharge: HOME | End: 2018-07-12
Attending: ORTHOPAEDIC SURGERY | Admitting: ORTHOPAEDIC SURGERY
Payer: COMMERCIAL

## 2018-07-12 ENCOUNTER — SURGERY (OUTPATIENT)
Age: 53
End: 2018-07-12

## 2018-07-12 ENCOUNTER — ANESTHESIA (OUTPATIENT)
Dept: SURGERY | Facility: HOSPITAL | Age: 53
End: 2018-07-12
Payer: COMMERCIAL

## 2018-07-12 VITALS
OXYGEN SATURATION: 97 % | BODY MASS INDEX: 39.47 KG/M2 | DIASTOLIC BLOOD PRESSURE: 55 MMHG | HEART RATE: 89 BPM | WEIGHT: 188 LBS | SYSTOLIC BLOOD PRESSURE: 110 MMHG | RESPIRATION RATE: 16 BRPM | TEMPERATURE: 97 F | HEIGHT: 58 IN

## 2018-07-12 DIAGNOSIS — M25.561 RIGHT KNEE PAIN, UNSPECIFIED CHRONICITY: ICD-10-CM

## 2018-07-12 DIAGNOSIS — M25.561 RIGHT KNEE PAIN: ICD-10-CM

## 2018-07-12 PROCEDURE — 25000003 PHARM REV CODE 250: Performed by: ANESTHESIOLOGY

## 2018-07-12 PROCEDURE — D9220A PRA ANESTHESIA: Mod: CRNA,,,

## 2018-07-12 PROCEDURE — 63600175 PHARM REV CODE 636 W HCPCS: Performed by: ORTHOPAEDIC SURGERY

## 2018-07-12 PROCEDURE — 71000015 HC POSTOP RECOV 1ST HR: Performed by: ORTHOPAEDIC SURGERY

## 2018-07-12 PROCEDURE — 25000003 PHARM REV CODE 250: Performed by: ORTHOPAEDIC SURGERY

## 2018-07-12 PROCEDURE — 27201423 OPTIME MED/SURG SUP & DEVICES STERILE SUPPLY: Performed by: ORTHOPAEDIC SURGERY

## 2018-07-12 PROCEDURE — 99900104 DSU ONLY-NO CHARGE-EA ADD'L HR (STAT): Performed by: ORTHOPAEDIC SURGERY

## 2018-07-12 PROCEDURE — 63600175 PHARM REV CODE 636 W HCPCS: Performed by: NURSE ANESTHETIST, CERTIFIED REGISTERED

## 2018-07-12 PROCEDURE — 99900103 DSU ONLY-NO CHARGE-INITIAL HR (STAT): Performed by: ORTHOPAEDIC SURGERY

## 2018-07-12 PROCEDURE — 71000033 HC RECOVERY, INTIAL HOUR: Performed by: ORTHOPAEDIC SURGERY

## 2018-07-12 PROCEDURE — 37000009 HC ANESTHESIA EA ADD 15 MINS: Performed by: ORTHOPAEDIC SURGERY

## 2018-07-12 PROCEDURE — D9220A PRA ANESTHESIA: Mod: ANES,,, | Performed by: ANESTHESIOLOGY

## 2018-07-12 PROCEDURE — 29881 ARTHRS KNE SRG MNISECTMY M/L: CPT | Mod: RT,,, | Performed by: ORTHOPAEDIC SURGERY

## 2018-07-12 PROCEDURE — S0020 INJECTION, BUPIVICAINE HYDRO: HCPCS | Performed by: ORTHOPAEDIC SURGERY

## 2018-07-12 PROCEDURE — 71000039 HC RECOVERY, EACH ADD'L HOUR: Performed by: ORTHOPAEDIC SURGERY

## 2018-07-12 PROCEDURE — 36000710: Performed by: ORTHOPAEDIC SURGERY

## 2018-07-12 PROCEDURE — 37000008 HC ANESTHESIA 1ST 15 MINUTES: Performed by: ORTHOPAEDIC SURGERY

## 2018-07-12 PROCEDURE — 36000711: Performed by: ORTHOPAEDIC SURGERY

## 2018-07-12 RX ORDER — LIDOCAINE HYDROCHLORIDE 10 MG/ML
0.5 INJECTION, SOLUTION EPIDURAL; INFILTRATION; INTRACAUDAL; PERINEURAL ONCE
Status: COMPLETED | OUTPATIENT
Start: 2018-07-12 | End: 2018-07-12

## 2018-07-12 RX ORDER — OXYCODONE HYDROCHLORIDE 5 MG/1
5 TABLET ORAL
Status: DISCONTINUED | OUTPATIENT
Start: 2018-07-12 | End: 2018-07-12 | Stop reason: HOSPADM

## 2018-07-12 RX ORDER — OXYCODONE AND ACETAMINOPHEN 5; 325 MG/1; MG/1
1-2 TABLET ORAL EVERY 6 HOURS PRN
Qty: 30 TABLET | Refills: 0 | Status: SHIPPED | OUTPATIENT
Start: 2018-07-12 | End: 2018-10-19

## 2018-07-12 RX ORDER — IBUPROFEN 800 MG/1
800 TABLET ORAL 3 TIMES DAILY
Qty: 60 TABLET | Refills: 0 | Status: SHIPPED | OUTPATIENT
Start: 2018-07-12 | End: 2018-08-03 | Stop reason: SDUPTHER

## 2018-07-12 RX ORDER — SODIUM CHLORIDE, SODIUM LACTATE, POTASSIUM CHLORIDE, CALCIUM CHLORIDE 600; 310; 30; 20 MG/100ML; MG/100ML; MG/100ML; MG/100ML
75 INJECTION, SOLUTION INTRAVENOUS CONTINUOUS
Status: DISCONTINUED | OUTPATIENT
Start: 2018-07-12 | End: 2018-07-12 | Stop reason: HOSPADM

## 2018-07-12 RX ORDER — MEPERIDINE HYDROCHLORIDE 25 MG/ML
12.5 INJECTION INTRAMUSCULAR; INTRAVENOUS; SUBCUTANEOUS ONCE AS NEEDED
Status: DISCONTINUED | OUTPATIENT
Start: 2018-07-12 | End: 2018-07-12 | Stop reason: HOSPADM

## 2018-07-12 RX ORDER — FENTANYL CITRATE 50 UG/ML
INJECTION, SOLUTION INTRAMUSCULAR; INTRAVENOUS
Status: DISCONTINUED | OUTPATIENT
Start: 2018-07-12 | End: 2018-07-12

## 2018-07-12 RX ORDER — SODIUM CHLORIDE 0.9 % (FLUSH) 0.9 %
3 SYRINGE (ML) INJECTION
Status: DISCONTINUED | OUTPATIENT
Start: 2018-07-12 | End: 2018-07-12 | Stop reason: HOSPADM

## 2018-07-12 RX ORDER — DIPHENHYDRAMINE HYDROCHLORIDE 50 MG/ML
25 INJECTION INTRAMUSCULAR; INTRAVENOUS EVERY 6 HOURS PRN
Status: DISCONTINUED | OUTPATIENT
Start: 2018-07-12 | End: 2018-07-12 | Stop reason: HOSPADM

## 2018-07-12 RX ORDER — KETOROLAC TROMETHAMINE 30 MG/ML
INJECTION, SOLUTION INTRAMUSCULAR; INTRAVENOUS
Status: DISCONTINUED | OUTPATIENT
Start: 2018-07-12 | End: 2018-07-12

## 2018-07-12 RX ORDER — HYDROCODONE BITARTRATE AND ACETAMINOPHEN 10; 325 MG/1; MG/1
1 TABLET ORAL EVERY 4 HOURS PRN
Status: DISCONTINUED | OUTPATIENT
Start: 2018-07-12 | End: 2018-07-12 | Stop reason: HOSPADM

## 2018-07-12 RX ORDER — HYDROCODONE BITARTRATE AND ACETAMINOPHEN 5; 325 MG/1; MG/1
1 TABLET ORAL EVERY 4 HOURS PRN
Status: DISCONTINUED | OUTPATIENT
Start: 2018-07-12 | End: 2018-07-12 | Stop reason: HOSPADM

## 2018-07-12 RX ORDER — ONDANSETRON 2 MG/ML
INJECTION INTRAMUSCULAR; INTRAVENOUS
Status: DISCONTINUED | OUTPATIENT
Start: 2018-07-12 | End: 2018-07-12

## 2018-07-12 RX ORDER — EPINEPHRINE 1 MG/ML
INJECTION, SOLUTION INTRACARDIAC; INTRAMUSCULAR; INTRAVENOUS; SUBCUTANEOUS
Status: DISCONTINUED | OUTPATIENT
Start: 2018-07-12 | End: 2018-07-12 | Stop reason: HOSPADM

## 2018-07-12 RX ORDER — ONDANSETRON 2 MG/ML
4 INJECTION INTRAMUSCULAR; INTRAVENOUS ONCE
Status: DISCONTINUED | OUTPATIENT
Start: 2018-07-12 | End: 2018-07-12 | Stop reason: HOSPADM

## 2018-07-12 RX ORDER — IBUPROFEN 400 MG/1
800 TABLET ORAL 3 TIMES DAILY
Status: DISCONTINUED | OUTPATIENT
Start: 2018-07-12 | End: 2018-07-12 | Stop reason: HOSPADM

## 2018-07-12 RX ORDER — HYDROMORPHONE HYDROCHLORIDE 1 MG/ML
0.2 INJECTION, SOLUTION INTRAMUSCULAR; INTRAVENOUS; SUBCUTANEOUS EVERY 5 MIN PRN
Status: DISCONTINUED | OUTPATIENT
Start: 2018-07-12 | End: 2018-07-12 | Stop reason: HOSPADM

## 2018-07-12 RX ORDER — SODIUM CHLORIDE, SODIUM LACTATE, POTASSIUM CHLORIDE, CALCIUM CHLORIDE 600; 310; 30; 20 MG/100ML; MG/100ML; MG/100ML; MG/100ML
INJECTION, SOLUTION INTRAVENOUS CONTINUOUS
Status: DISCONTINUED | OUTPATIENT
Start: 2018-07-12 | End: 2018-07-12 | Stop reason: HOSPADM

## 2018-07-12 RX ORDER — LIDOCAINE HCL/PF 100 MG/5ML
SYRINGE (ML) INTRAVENOUS
Status: DISCONTINUED | OUTPATIENT
Start: 2018-07-12 | End: 2018-07-12

## 2018-07-12 RX ORDER — DEXAMETHASONE SODIUM PHOSPHATE 4 MG/ML
INJECTION, SOLUTION INTRA-ARTICULAR; INTRALESIONAL; INTRAMUSCULAR; INTRAVENOUS; SOFT TISSUE
Status: DISCONTINUED | OUTPATIENT
Start: 2018-07-12 | End: 2018-07-12

## 2018-07-12 RX ORDER — FENTANYL CITRATE 50 UG/ML
25 INJECTION, SOLUTION INTRAMUSCULAR; INTRAVENOUS EVERY 5 MIN PRN
Status: DISCONTINUED | OUTPATIENT
Start: 2018-07-12 | End: 2018-07-12 | Stop reason: HOSPADM

## 2018-07-12 RX ORDER — BUPIVACAINE HYDROCHLORIDE 5 MG/ML
INJECTION, SOLUTION EPIDURAL; INTRACAUDAL
Status: DISCONTINUED | OUTPATIENT
Start: 2018-07-12 | End: 2018-07-12 | Stop reason: HOSPADM

## 2018-07-12 RX ORDER — CEFAZOLIN SODIUM 2 G/50ML
2 SOLUTION INTRAVENOUS
Status: COMPLETED | OUTPATIENT
Start: 2018-07-12 | End: 2018-07-12

## 2018-07-12 RX ORDER — MIDAZOLAM HYDROCHLORIDE 1 MG/ML
INJECTION, SOLUTION INTRAMUSCULAR; INTRAVENOUS
Status: DISCONTINUED | OUTPATIENT
Start: 2018-07-12 | End: 2018-07-12

## 2018-07-12 RX ORDER — PROPOFOL 10 MG/ML
VIAL (ML) INTRAVENOUS
Status: DISCONTINUED | OUTPATIENT
Start: 2018-07-12 | End: 2018-07-12

## 2018-07-12 RX ORDER — SODIUM CHLORIDE 9 MG/ML
INJECTION, SOLUTION INTRAVENOUS CONTINUOUS
Status: DISCONTINUED | OUTPATIENT
Start: 2018-07-12 | End: 2018-07-12 | Stop reason: HOSPADM

## 2018-07-12 RX ORDER — MUPIROCIN 20 MG/G
OINTMENT TOPICAL
Status: DISCONTINUED | OUTPATIENT
Start: 2018-07-12 | End: 2018-07-12 | Stop reason: HOSPADM

## 2018-07-12 RX ADMIN — MIDAZOLAM 2 MG: 1 INJECTION INTRAMUSCULAR; INTRAVENOUS at 12:07

## 2018-07-12 RX ADMIN — OXYCODONE HYDROCHLORIDE 5 MG: 5 TABLET ORAL at 02:07

## 2018-07-12 RX ADMIN — FENTANYL CITRATE 50 MCG: 50 INJECTION, SOLUTION INTRAMUSCULAR; INTRAVENOUS at 01:07

## 2018-07-12 RX ADMIN — CEFAZOLIN SODIUM 2 G: 2 SOLUTION INTRAVENOUS at 01:07

## 2018-07-12 RX ADMIN — ONDANSETRON 4 MG: 2 INJECTION, SOLUTION INTRAMUSCULAR; INTRAVENOUS at 01:07

## 2018-07-12 RX ADMIN — KETOROLAC TROMETHAMINE 30 MG: 30 INJECTION, SOLUTION INTRAMUSCULAR; INTRAVENOUS at 01:07

## 2018-07-12 RX ADMIN — MUPIROCIN: 20 OINTMENT TOPICAL at 12:07

## 2018-07-12 RX ADMIN — EPINEPHRINE 0.5 MG: 1 INJECTION, SOLUTION INTRAMUSCULAR; SUBCUTANEOUS at 01:07

## 2018-07-12 RX ADMIN — SODIUM CHLORIDE, SODIUM LACTATE, POTASSIUM CHLORIDE, AND CALCIUM CHLORIDE: .6; .31; .03; .02 INJECTION, SOLUTION INTRAVENOUS at 11:07

## 2018-07-12 RX ADMIN — FENTANYL CITRATE 50 MCG: 50 INJECTION, SOLUTION INTRAMUSCULAR; INTRAVENOUS at 02:07

## 2018-07-12 RX ADMIN — DEXAMETHASONE SODIUM PHOSPHATE 4 MG: 4 INJECTION, SOLUTION INTRAMUSCULAR; INTRAVENOUS at 01:07

## 2018-07-12 RX ADMIN — LIDOCAINE HYDROCHLORIDE 5 MG: 10 INJECTION, SOLUTION EPIDURAL; INFILTRATION; INTRACAUDAL; PERINEURAL at 11:07

## 2018-07-12 RX ADMIN — BUPIVACAINE HYDROCHLORIDE 30 ML: 5 INJECTION, SOLUTION EPIDURAL; INTRACAUDAL; PERINEURAL at 01:07

## 2018-07-12 RX ADMIN — LIDOCAINE HYDROCHLORIDE 75 MG: 20 INJECTION, SOLUTION INTRAVENOUS at 01:07

## 2018-07-12 RX ADMIN — PROPOFOL 200 MG: 10 INJECTION, EMULSION INTRAVENOUS at 01:07

## 2018-07-12 NOTE — ANESTHESIA PREPROCEDURE EVALUATION
07/12/2018  Faith Byers is a 53 y.o., female.    Pre-op Assessment    I have reviewed the Patient Summary Reports.     I have reviewed the Nursing Notes.   I have reviewed the Medications.     Review of Systems  Anesthesia Hx:  No problems with previous Anesthesia    Social:  Smoking Status: Never Smoker  Smokeless Tobacco Status: Never Used  Alcohol use: Yes, unspecified volume  Drug use: No       Endocrine:   Hypothyroidism    Psych:   Psychiatric History anxiety               Anesthesia Plan  Type of Anesthesia, risks & benefits discussed:  Anesthesia Type:  general  Patient's Preference:   Intra-op Monitoring Plan: standard ASA monitors  Intra-op Monitoring Plan Comments:   Post Op Pain Control Plan:   Post Op Pain Control Plan Comments:   Induction:   IV  Beta Blocker:  Patient is not currently on a Beta-Blocker (No further documentation required).       Informed Consent: Patient understands risks and agrees with Anesthesia plan.  Questions answered. Anesthesia consent signed with patient.  ASA Score: 2     Day of Surgery Review of History & Physical: I have interviewed and examined the patient. I have reviewed the patient's H&P dated:  There are no significant changes.          Ready For Surgery From Anesthesia Perspective.

## 2018-07-12 NOTE — TRANSFER OF CARE
"Anesthesia Transfer of Care Note    Patient: Faith Byers    Procedure(s) Performed: Procedure(s) (LRB):  ARTHROSCOPY, KNEE, WITH Partial lateral MENISCECTOMY (Right)  CHONDROPLASTY,KNEE Medial Femoral (Right)    Patient location: PACU    Anesthesia Type: general    Transport from OR: Transported from OR on 2-3 L/min O2 by NC with adequate spontaneous ventilation    Post pain: adequate analgesia    Post assessment: no apparent anesthetic complications    Post vital signs: stable    Level of consciousness: awake    Nausea/Vomiting: no nausea/vomiting    Complications: none    Transfer of care protocol was followed      Last vitals:   Visit Vitals  /71 (BP Location: Left arm, Patient Position: Lying)   Pulse 66   Temp 36.9 °C (98.4 °F) (Skin)   Resp 18   Ht 4' 10" (1.473 m)   Wt 85.3 kg (188 lb)   SpO2 99%   Breastfeeding? No   BMI 39.29 kg/m²     "

## 2018-07-12 NOTE — BRIEF OP NOTE
[facility]  Brief Operative Note     SUMMARY     Surgery Date: 7/12/2018     Surgeon(s) and Role:     * Huey Kiran MD - Primary    Assisting Surgeon: None    Pre-op Diagnosis:  Right knee pain, unspecified chronicity [M25.561]    Post-op Diagnosis:  Post-Op Diagnosis Codes:     * Right knee pain, unspecified chronicity [M25.561]    Procedure(s) (LRB):  ARTHROSCOPY, KNEE, WITH Partial lateral MENISCECTOMY (Right)  CHONDROPLASTY,KNEE Medial Femoral (Right)    Anesthesia: General    Description of the findings of the procedure: right knee arthroscopy with medial chondroplasty and partial lateral menisectomy.    Findings/Key Components: See Dictation     Estimated Blood Loss: 5 mL         Specimens:   Specimen (12h ago through future)    None          Discharge Note    SUMMARY     Admit Date: 7/12/2018    Discharge Date and Time: 7/12/2018     Hospital Course : Patient Tolerated Procedure Well      Final Diagnosis: Post-Op Diagnosis Codes:     * Right knee pain, unspecified chronicity [M25.561]    Disposition: Home or Self Care    Follow Up/Patient Instructions:     Medications:  Reconciled Home Medications: Current Discharge Medication List      START taking these medications    Details   ibuprofen (ADVIL,MOTRIN) 800 MG tablet Take 1 tablet (800 mg total) by mouth 3 (three) times daily.  Qty: 60 tablet, Refills: 0      oxyCODONE-acetaminophen (PERCOCET) 5-325 mg per tablet Take 1-2 tablets by mouth every 6 (six) hours as needed for Pain.  Qty: 30 tablet, Refills: 0         CONTINUE these medications which have NOT CHANGED    Details   calcium carbonate (CALCIUM 600 ORAL) Take 1,200 mg by mouth every evening.      !! levothyroxine (SYNTHROID) 125 MCG tablet Take 125 mcg by mouth once daily.      multivitamin (ONE DAILY MULTIVITAMIN) per tablet Take 1 tablet by mouth once daily.      !! SYNTHROID 112 mcg tablet TAKE 1 TAB BY MOUTH ON TUES, THURS, SAT & SUN  Refills: 3      venlafaxine  "(EFFEXOR-XR) 37.5 MG 24 hr capsule Take 1 capsule (37.5 mg total) by mouth once daily.  Qty: 30 capsule, Refills: 11    Associated Diagnoses: CARINA (generalized anxiety disorder)       !! - Potential duplicate medications found. Please discuss with provider.          Discharge Procedure Orders  CRUTCHES FOR HOME USE   Order Specific Question Answer Comments   Type: Axillary    Height: 4' 10" (1.473 m)    Weight: 85.3 kg (188 lb)    Length of need (1-99 months): 1      Diet general     Ice to affected area     No driving, operating heavy equipment or signing legal documents while taking pain medication     Call MD for:  temperature >100.4     Call MD for:  persistent nausea and vomiting     Call MD for:  severe uncontrolled pain     Call MD for:  difficulty breathing, headache or visual disturbances     Call MD for:  redness, tenderness, or signs of infection (pain, swelling, redness, odor or green/yellow discharge around incision site)     Call MD for:  hives     Call MD for:  persistent dizziness or light-headedness     Call MD for:  extreme fatigue     Remove dressing in 48 hours     Shower on day dressing removed (No bath)       Follow-up Information     Huey Kiran MD In 2 weeks.    Specialties:  Sports Medicine, Orthopedic Surgery  Contact information:  [address] [place] [id number]  [phone]                   Dictation #1  MRN:[id number]  CSN:[id number]  [id number]  "

## 2018-07-12 NOTE — PLAN OF CARE
Discharge instructions given to pt and father who voice understanding.  Taking po fluids without nausea.  Pain 4/10 and tolerable per pt.  Surgical dressing, ace wrap, ice to RLE, intact.

## 2018-07-12 NOTE — ANESTHESIA POSTPROCEDURE EVALUATION
"Anesthesia Post Evaluation    Patient: Faith Byers    Procedure(s) Performed: Procedure(s) (LRB):  ARTHROSCOPY, KNEE, WITH Partial lateral MENISCECTOMY (Right)  CHONDROPLASTY,KNEE Medial Femoral (Right)    Final Anesthesia Type: general  Patient location during evaluation: PACU  Patient participation: Yes- Able to Participate  Level of consciousness: awake and alert and oriented  Post-procedure vital signs: reviewed and stable  Pain management: adequate  Airway patency: patent  PONV status at discharge: No PONV  Anesthetic complications: no      Cardiovascular status: blood pressure returned to baseline  Respiratory status: unassisted, spontaneous ventilation and room air  Hydration status: euvolemic  Follow-up not needed.        Visit Vitals  /71 (BP Location: Left arm, Patient Position: Lying)   Pulse 66   Temp 36.9 °C (98.4 °F) (Skin)   Resp 18   Ht 4' 10" (1.473 m)   Wt 85.3 kg (188 lb)   SpO2 99%   Breastfeeding? No   BMI 39.29 kg/m²       Pain/Deena Score: Pain Assessment Performed: Yes (7/12/2018 11:20 AM)  Presence of Pain: denies (7/12/2018 11:20 AM)      "

## 2018-07-12 NOTE — H&P (VIEW-ONLY)
Past Medical History:   Diagnosis Date    Thyroid disease        Past Surgical History:   Procedure Laterality Date    BREAST BIOPSY      BREAST SURGERY  2001    right biopsy, benign    KNEE ARTHROSCOPY Right     THYROIDECTOMY  2011    complete       Current Outpatient Prescriptions   Medication Sig    levothyroxine (SYNTHROID) 125 MCG tablet Take 125 mcg by mouth once daily.    SYNTHROID 112 mcg tablet TAKE 1 TAB BY MOUTH ON TUES, THURS, SAT & SUN    venlafaxine (EFFEXOR-XR) 37.5 MG 24 hr capsule Take 1 capsule (37.5 mg total) by mouth once daily.     No current facility-administered medications for this visit.        Review of patient's allergies indicates:  No Known Allergies    Family History   Problem Relation Age of Onset    Cancer Mother         breast and bone     Breast cancer Mother     Hyperlipidemia Father     ADD / ADHD Daughter         autism    Crohn's disease Paternal Uncle     Heart disease Paternal Uncle         heart transplant       Social History     Social History    Marital status:      Spouse name: N/A    Number of children: N/A    Years of education: N/A     Occupational History    Not on file.     Social History Main Topics    Smoking status: Never Smoker    Smokeless tobacco: Never Used    Alcohol use No    Drug use: No    Sexual activity: Not Currently     Other Topics Concern    Not on file     Social History Narrative    No narrative on file       Chief Complaint:   Chief Complaint   Patient presents with    Injections     EUFLEXXA 1/3 RIGHT KNEE       Consulting Physician: Huey Kiran MD    History of present illness:    This is a 53 y.o. year old female who complains of right knee pain.  She states that her pain is a 6 out of 10 and is worse with exercise or sitting and standing for long periods of time.  She's had this pain for years.  She reports having a knee scope done approximately 20 years ago.  It is worse when she bends and squats.  She  "does wear a brace when she does some exercises and reports that it helps. Previous injections helpful.    Review of Systems:    Constitution: Denies chills, fever, and sweats.  HENT: Denies headaches or blurry vision.  Cardiovascular: Denies chest pain or irregular heart beat.  Respiratory: Denies cough or shortness of breath.  Gastrointestinal: Denies abdominal pain, nausea, or vomiting.  Musculoskeletal:  Denies muscle cramps.  Neurological: Denies dizziness or focal weakness.  Psychiatric/Behavioral: Normal mental status.  Hematologic/Lymphatic: Denies bleeding problem or easy bruising/bleeding.  Skin: Denies rash or suspicious lesions.    Examination:    Vital Signs:    Vitals:    06/15/18 0914   Weight: 85.8 kg (189 lb 2.5 oz)   Height: 4' 11" (1.499 m)   PainSc:   2   PainLoc: Knee       Body mass index is 38.2 kg/m².    This a well-developed, well nourished patient in no acute distress.    Alert and oriented and cooperative to examination.       Physical Exam: Right Knee Exam    Gait   Antalgic    Skin  Rash:   None  Scars:   None    Inspection  Erythema:  None  Bruising:  None  Effusion:  None  Masses:  None  Lymphadenopathy: None    Range of Motion: 0 to 130°    Medial Joint : n  Lateral Joint : y    Patellofemoral Tenderness: Yes  Patellofemoral Crepitus: Yes    Lachman:  Normal  Anterior Drawer: Normal  Posterior Drawer: Normal    Raffi's:  Negative  Apley's:  Negative    Varus Stress:  Stable  Valgus Stress:  Stable    Strength:  5/5    Pulses:  Palpable  Sensation:  Intact          Imaging: X-rays of the right knee showed degenerative joint changes along the lateral aspect of the knee. MRI reviewed with pt today shows complex lateral meniscal tear.       Assessment: Right knee pain, unspecified chronicity  -     Vital signs; Standing  -     Cleanse with Chlorhexidine (CHG); Standing  -     Diet NPO; Standing  -     0.9%  NaCl infusion; Inject into the vein continuous.  -     IP " VTE LOW RISK PATIENT; Standing  -     Place APRIL hose; Standing  -     Place sequential compression device; Standing  -     Chlorohexidine Gluconate Bath; Standing  -     mupirocin 2 % ointment; by Nasal route On call Procedure (surgery).  -     Case Request Operating Room: ARTHROSCOPY, KNEE, WITH MENISCECTOMY  -     Place in Outpatient; Standing  -     CEFAZOLIN 2G/20 ML SYRINGE (PYXIS) IV syringe 2 g 20 mL; Inject 20 mLs (2 g total) into the vein On call Procedure (Surgery).  -     Basic metabolic panel; Standing  -     CBC auto differential; Standing  -     Pregnancy, urine rapid; Standing  -     EKG 12-lead; Standing    Right knee pain        Plan:  We discussed MRI and treatment options today and she elected to proceed with a knee scope and cleanup. The risks, benefits, and alternatives to the procedure were explained to the patient including, but not limited to: incomplete pain relief, surgical failure, hardware failure, need for further procedures, damage to nerves, arteries, blood vessels and other structures, infection, Deep Vein Thrombosis (DVT), Pulmonary Embolus (PE), Complex Regional Pain Syndrome as well as general anesthetic complications including seizure, stroke, heart attack and even death. The patient understood these risks and wished to proceed and signed the informed consent. All questions were answered. No guarantees were implied or stated.    We will obtain the necessary clearances and schedule the patient for surgery.          DISCLAIMER: This note may have been dictated using voice recognition software and may contain grammatical errors.     NOTE: Consult report sent to referring provider via Feedback.

## 2018-07-12 NOTE — DISCHARGE INSTRUCTIONS
Discharge Instructions for Knee Arthroscopy  You had knee arthroscopy. This surgical procedure uses small incisions to locate, identify, and treat problems inside the knee. These problems include loose bodies, meniscal tears, bone spurs, osteochondritis dissecans (OCD), and synovitis. Below are tips to help speed your recovery from surgery.  Activity  · Dont drive until your doctor says its OK. And never drive while taking opioid pain medicine.  · Remember to take pain medicines as directed; dont wait for the pain to get bad. And don't drink alcohol while taking pain medicines.  · Follow weight-bearing instructions given by your doctor. He or she may require you to use crutches to keep weight off your knee.  · Unless your doctor tells you otherwise, begin using the affected knee as much as you can tolerate 3 days after surgery.  · Slowly bend and straighten your affected leg as far as you can, unless your doctor tells you otherwise. Do this several times a day.  · Rest your knee by lying down and putting pillows under it for the first 3 days after surgery. Keep your ankle elevated above the level of your heart. This helps keep swelling down.  · Follow your doctors instructions about wearing and caring for a brace, immobilizer, or elastic dressing.  · Point and flex your foot, and rotate your ankle as much as possible during the first few weeks following surgery. Also, wiggle your toes as much as possible.  Incision care  · Check your incision daily for redness, tenderness, or drainage.  · Dont be alarmed if there is some bruising, slight swelling of the knee, or a small amount of blood on the bandage.  · Adjust the bandage or brace as needed. It should feel supportive on your knee, but not too tight.   · Dont soak your incision in water (no hot tubs, bathtubs, swimming pools) until your doctor says its OK.  · Wait 2 day(s) after your surgery to begin showering. Then shower as needed. Cover your knee with  plastic to keep the dressing or brace dry. Once your dressing is removed, follow your doctors instructions for care of the wound. And sit on a shower stool so that you dont fall while showering.  · Use an ice pack or bag of frozen peas--or something similar--wrapped in a thin towel to reduce the swelling. Keep the foot elevated while you ice the knee. Apply the ice pack for 20 minutes; then remove it for 20 minutes. Repeat as needed. Icing helps reduce swelling.  Other precautions  · Arrange your household to keep the items you need within reach.  · Remove throw rugs, electrical cords, and anything else that may cause you to fall.  · Use nonslip bath mats, grab bars, an elevated toilet seat, and a shower chair in your bathroom.  · Use a cane, crutches, a walker, or handrails until your balance, flexibility, and strength improve, and you can put weight on your leg. And remember to ask for help from others when you need it.  · Free up your hands so that you can use them to keep balance. Use a erin pack, apron, or pockets to carry things.  Follow-up  Make a follow-up appointment as directed by your doctor.     When to seek medical attention  Call 911 right away if you have any of the following:  · Chest pain  · Shortness of breath  · Severe nausea  Otherwise, call your doctor immediately if you have any of the following:  · Pain that is not relieved by medicine or rest  · Continued bleeding through the bandage  · Tingling, numbness, or coldness in your foot or leg  · Fever above 100.4°F (38.0°C) or shaking chills  · Excessive swelling, increased redness, or any drainage around the incision  · Swelling, tenderness, or pain in your leg      Date Last Reviewed: 11/16/2015  © 8143-1908 [employer]. [address] [place] [address], [place] [address]. All rights reserved. This information is not intended as a substitute for professional medical care. Always follow your healthcare professional's  instructions.      General Information:    1.  Do not drink alcoholic beverages including beer for 24 hours or as long as you are on pain medication..  2.  Do not drive a motor vehicle, operate machinery or power tools, or signs legal papers for 24 hours or as long as you are on pain medication.   3.  You may experience light-headedness, dizziness, and sleepiness following surgery. Please do not stay alone. A responsible adult should be with you for this 24 hour period.  4.  Go home and rest.    5. Progress slowly to a normal diet unless instructed.  Otherwise, begin with liquids such as soft drinks, then soup and crackers working up to solid foods. Drink plenty of nonalcoholic fluids.  6.  Certain anesthetics and pain medications produce nausea and vomiting in certain       individuals. If nausea becomes a problem at home, call you doctor.    7. A nurse will be calling you sometime after surgery. Do not be alarmed. This is our way of finding out how you are doing.    8. Several times every hour while you are awake, take 2-3 deep breaths and cough. If you had stomach surgery hold a pillow or rolled towel firmly against your stomach before you cough. This will help with any pain the cough might cause.  9. Several times every hour while you are awake, pump and flex your feet 5-6 times and do foot circles. This will help prevent blood clots.    10.Call your doctor for severe pain, bleeding, fever, or signs or symptoms of infection (pain, swelling, redness, foul odor, drainage).      Discharge Instructions: After Your Surgery/Procedure  Youve just had surgery. During surgery you were given medicine called anesthesia to keep you relaxed and free of pain. After surgery you may have some pain or nausea. This is common. Here are some tips for feeling better and getting well after surgery.     Stay on schedule with your medication.   Going home  Your doctor or nurse will show you how to take care of yourself when you go  "home. He or she will also answer your questions. Have an adult family member or friend drive you home.      For your safety we recommend these precaution for the first 24 hours after your procedure:  · Do not drive or use heavy equipment.  · Do not make important decisions or sign legal papers.  · Do not drink alcohol.  · Have someone stay with you, if needed. He or she can watch for problems and help keep you safe.  · Your concentration, balance, coordination, and judgement may be impaired for many hours after anesthesia.  Use caution when ambulating or standing up.     · You may feel weak and "washed out" after anesthesia and surgery.      Subtle residual effects of general anesthesia or sedation with regional / local anesthesia can last more than 24 hours.  Rest for the remainder of the day or longer if your Doctor/Surgeon has advised you to do so.  Although you may feel normal within the first 24 hours, your reflexes and mental ability may be impaired without you realizing it.  You may feel dizzy, lightheaded or sleepy for 24 hours or longer.      Be sure to go to all follow-up visits with your doctor. And rest after your surgery for as long as your doctor tells you to.  Coping with pain  If you have pain after surgery, pain medicine will help you feel better. Take it as told, before pain becomes severe. Also, ask your doctor or pharmacist about other ways to control pain. This might be with heat, ice, or relaxation. And follow any other instructions your surgeon or nurse gives you.  Tips for taking pain medicine  To get the best relief possible, remember these points:  · Pain medicines can upset your stomach. Taking them with a little food may help.  · Most pain relievers taken by mouth need at least 20 to 30 minutes to start to work.  · Taking medicine on a schedule can help you remember to take it. Try to time your medicine so that you can take it before starting an activity. This might be before you get " dressed, go for a walk, or sit down for dinner.  · Constipation is a common side effect of pain medicines. Call your doctor before taking any medicines such as laxatives or stool softeners to help ease constipation. Also ask if you should skip any foods. Drinking lots of fluids and eating foods such as fruits and vegetables that are high in fiber can also help. Remember, do not take laxatives unless your surgeon has prescribed them.  · Drinking alcohol and taking pain medicine can cause dizziness and slow your breathing. It can even be deadly. Do not drink alcohol while taking pain medicine.  · Pain medicine can make you react more slowly to things. Do not drive or run machinery while taking pain medicine.  Your health care provider may tell you to take acetaminophen to help ease your pain. Ask him or her how much you are supposed to take each day. Acetaminophen or other pain relievers may interact with your prescription medicines or other over-the-counter (OTC) drugs. Some prescription medicines have acetaminophen and other ingredients. Using both prescription and OTC acetaminophen for pain can cause you to overdose. Read the labels on your OTC medicines with care. This will help you to clearly know the list of ingredients, how much to take, and any warnings. It may also help you not take too much acetaminophen. If you have questions or do not understand the information, ask your pharmacist or health care provider to explain it to you before you take the OTC medicine.  Managing nausea  Some people have an upset stomach after surgery. This is often because of anesthesia, pain, or pain medicine, or the stress of surgery. These tips will help you handle nausea and eat healthy foods as you get better. If you were on a special food plan before surgery, ask your doctor if you should follow it while you get better. These tips may help:  · Do not push yourself to eat. Your body will tell you when to eat and how  much.  · Start off with clear liquids and soup. They are easier to digest.  · Next try semi-solid foods, such as mashed potatoes, applesauce, and gelatin, as you feel ready.  · Slowly move to solid foods. Dont eat fatty, rich, or spicy foods at first.  · Do not force yourself to have 3 large meals a day. Instead eat smaller amounts more often.  · Take pain medicines with a small amount of solid food, such as crackers or toast, to avoid nausea.     Call your surgeon if  · You still have pain an hour after taking medicine. The medicine may not be strong enough.  · You feel too sleepy, dizzy, or groggy. The medicine may be too strong.  · You have side effects like nausea, vomiting, or skin changes, such as rash, itching, or hives.       If you have obstructive sleep apnea  You were given anesthesia medicine during surgery to keep you comfortable and free of pain. After surgery, you may have more apnea spells because of this medicine and other medicines you were given. The spells may last longer than usual.   At home:  · Keep using the continuous positive airway pressure (CPAP) device when you sleep. Unless your health care provider tells you not to, use it when you sleep, day or night. CPAP is a common device used to treat obstructive sleep apnea.  · Talk with your provider before taking any pain medicine, muscle relaxants, or sedatives. Your provider will tell you about the possible dangers of taking these medicines.  © 3124-1429 [employer]. [address] [place] [address], [place] [address]. All rights reserved. This information is not intended as a substitute for professional medical care. Always follow your healthcare professional's instructions.        Post op instructions for prevention of DVT  What is deep vein thrombosis?  Deep vein thrombosis (DVT) is the medical term for blood clots in the deep veins of the leg.  These blood clots can be dangerous.  A DVT can block a blood vessel and keep blood from  getting where it needs to go.  Another problem is that the clot can travel to other parts of the body such as the lungs.  A clot that travels to the lungs is called a pulmonary embolus (PE) and can cause serious problems with breathing which can lead to death.  Am I at risk for DVT/PE?  If you are not very active, you are at risk of DVT.  Anyone confined to bed, sitting for long periods of time, recovering from surgery, etc. increases the risk of DVT.  Other risk factors are cancer diagnosis, certain medications, estrogen replacement in any form,older age, obesity, pregnancy, smoking, history of clotting disorders, and dehydration.  How will I know if I have a DVT?   Swelling in the lower leg   Pain   Warmth, redness, hardness or bulging of the vein  If you have any of these symptoms, call your doctors office right away.  Some people will not have any symptoms until the clot moves to the lungs.  What are the symptoms of a PE?   Panting, shortness of breath, or trouble breathing   Sharp, knife-like chest pain when you breathe   Coughing or coughing up blood   Rapid heartbeat  If you have any of these symptoms or get worse quickly, call 911 for emergency treatment.  How can I prevent a DVT?   Avoid long periods of inactivity and dont cross your legs--get up and walk around every hour or so.   Stay active--walking after surgery is highly encouraged.  This means you should get out of the house and walk in the neighborhood.  Going up and down stairs will not impair healing (unless advised against such activity by your doctor).     Drink plenty of noncaffeinated, nonalcoholic fluids each day to prevent dehydration.   Wear special support stockings, if they have been advised by your doctor.   If you travel, stop at least once an hour and walk around.   Avoid smoking (assistance with stopping is available through your healthcare provider)  Always notify your doctor if you are not able to follow the post  operative instructions that are given to you at the time of discharge.  It may be necessary to prescribe one of the medications available to prevent DVT.      Using Opioids for Pain Management     Your doctor has given instructions for you to take an opioid.  This is a drug for bad pain.  It helps control pain without causing bleeding and kidney problems.  Common opioid names are morphine, hydromorphone, oxycodone, and methadone. These drugs are called narcotics.    There are several safety concerns you need to know.     · It is against the law to give or sell this drug to another person.  You must keep this medicine safely locked.    · You may have side effects from taking this medication.  These include nausea, itching, sweating, sleepiness, a change in your ability to breathe, and depression.  · Do not take alcohol or sleeping pills opioids.    · Long-term opoid use may no longer giver you relief from pain.  It can cause you stomach pain, mental anxiety, and headaches.  Long-term opoid use can potentially lead to unlawful street drug abuse and reduce your ability to stay employed.    · Your body may become opioid tolerant if you need to take more to get relief.    · You must stop taking opioids if you begin having more pain as a result of the medicine.    · Opioid withdrawal occurs when you have to stop taking the drug.  It can cause you to have nausea, vomiting, diarrhea, stomach pain, anxiety, and dilated pupils in your eyes. This condition means you are opioid dependent.    · Addiction is a drug induced brain disease. It means there are changes in how your brain is working.  Children, teens, and young adults under 25 years old are more likely to get addicted to opioids.      · Addiction can happen with repeated opioid use.  It does not happen with short-term use of two weeks or less.       For more information, please speak with your doctor or pharmacist.

## 2018-07-13 NOTE — OP NOTE
DATE OF PROCEDURE:  07/12/2018.    PREOPERATIVE DIAGNOSES:  1.  Right knee lateral meniscus tear.  2.  Right knee osteoarthritis.    POSTOPERATIVE DIAGNOSES:  1.  Right knee lateral meniscus tear.  2.  Right knee osteoarthritis.    PROCEDURE:  1.  Right knee arthroscopy with partial lateral meniscectomy.  2.  Right knee arthroscopy with medial femoral chondroplasty.    SURGEON:  Huey Kiran M.D.    ASSISTANT:  Lucita Franz.    ANESTHESIA:  General with a local infiltrate.    HISTORY:  Ms. Byers is a 53-year-old woman who presented to our office   with a complaint of right knee pain.  Her imaging studies and examination were   consistent with osteoarthritis along with a complex lateral meniscus tear.  We   discussed different treatment options.  She elected to proceed with a knee   arthroscopy for cleanup of the meniscus and inspection of the arthritis.  The   risks and benefits of surgery including the potential for incomplete pain relief   were explained to the patient who expressed she understood and wished to   proceed.  Informed consent was obtained.    PROCEDURE IN DETAIL:  After verifying informed consent and correct site, the   patient was brought back to the Operating Room, placed on the OR table in supine   position.  Preoperative IV antibiotics were administered.  A timeout was   performed.  The patient's right lower extremity was then prepped and draped in   sterile fashion.  We began by creating a lateral portal.  Upon entering the   knee, we visualized the patella, which appeared to be in relatively good   condition.  We then visualized the trochlea, which had a wear groove down the   center of it, which was approximately grade II.  We then moved into the lateral   gutter where she had a large lateral spur.  We then proceeded into the medial   compartment.  The medial compartment meniscus appeared to be intact; however,   there was a large cartilage defect on the medial femoral condyle.  We  then   created a medial portal and used our probe and found that the cartilage over   this defect was loose and so we used a shaver to smooth down the cartilage   defect and ended up being approximately 1 cm wide x 2 to 3 cm in length on the   medial femoral condyle and when we debrided it down, it was approximately grade   III to grade IV.  We probed the medial meniscus and it was stable.  We then   proceeded into the notch.  The ACL was palpated and visualized and found to be   normal.  We then proceeded into the lateral compartment.  There was fraying of   the lateral meniscus and we did a cleanup of this using a shaver.  Once we had   cleaned up the meniscus, we realized that the majority of the meniscus in the   back had to be removed and so she had essentially a total meniscectomy for the   posterior half of the meniscus.  There was also grade IV cartilage change on the   tibial plateau for essentially the entire posterior half of the tibia.  There   was also small grade II to III cartilage defect on the lateral femoral condyle,   which we cleaned up and remove loose tissue from.  At this point, the equipment   was removed from the knee and the portals were closed.  Sterile dressing was   applied along with an Ace bandage from foot to thigh.  She was awoken from   anesthesia, extubated, and brought to the PACU in good condition.    TOTAL TOURNIQUET TIME:  None.    DRAINS:  None.    SPECIMENS:  None.    COMPLICATIONS:  None.    ESTIMATED BLOOD LOSS:  Minimal.    POSTOPERATIVE PLAN:  The patient will be discharged home later this afternoon   and follow up with us in clinic in 2 weeks' time.  She has been given complete   postop instructions and pain medication.      DOTTIE  dd: 07/12/2018 14:31:37 (CDT)  td: 07/12/2018 19:57:06 (IGNACIO)  Doc ID   #[id number]  Job ID #[id number]    CC:

## 2018-07-23 ENCOUNTER — PATIENT MESSAGE (OUTPATIENT)
Dept: ORTHOPEDICS | Facility: CLINIC | Age: 53
End: 2018-07-23

## 2018-07-26 DIAGNOSIS — M25.561 RIGHT KNEE PAIN, UNSPECIFIED CHRONICITY: Primary | ICD-10-CM

## 2018-07-27 ENCOUNTER — OFFICE VISIT (OUTPATIENT)
Dept: ORTHOPEDICS | Facility: CLINIC | Age: 53
End: 2018-07-27
Payer: COMMERCIAL

## 2018-07-27 ENCOUNTER — HOSPITAL ENCOUNTER (OUTPATIENT)
Dept: RADIOLOGY | Facility: HOSPITAL | Age: 53
Discharge: HOME | End: 2018-07-27
Attending: ORTHOPAEDIC SURGERY
Payer: COMMERCIAL

## 2018-07-27 VITALS
DIASTOLIC BLOOD PRESSURE: 57 MMHG | HEART RATE: 73 BPM | BODY MASS INDEX: 39.47 KG/M2 | SYSTOLIC BLOOD PRESSURE: 129 MMHG | HEIGHT: 58 IN | WEIGHT: 188.06 LBS

## 2018-07-27 DIAGNOSIS — M17.11 ARTHRITIS OF RIGHT KNEE: Primary | ICD-10-CM

## 2018-07-27 DIAGNOSIS — M25.561 RIGHT KNEE PAIN, UNSPECIFIED CHRONICITY: ICD-10-CM

## 2018-07-27 DIAGNOSIS — Z98.890 S/P RIGHT KNEE ARTHROSCOPY: ICD-10-CM

## 2018-07-27 DIAGNOSIS — M17.11 ARTHRITIS OF KNEE, RIGHT: ICD-10-CM

## 2018-07-27 PROCEDURE — 73560 X-RAY EXAM OF KNEE 1 OR 2: CPT | Mod: 26,RT,, | Performed by: RADIOLOGY

## 2018-07-27 PROCEDURE — 99999 PR PBB SHADOW E&M-EST. PATIENT-LVL III: CPT | Mod: PBBFAC,,, | Performed by: ORTHOPAEDIC SURGERY

## 2018-07-27 PROCEDURE — 99024 POSTOP FOLLOW-UP VISIT: CPT | Mod: S$GLB,,, | Performed by: ORTHOPAEDIC SURGERY

## 2018-07-27 PROCEDURE — 73560 X-RAY EXAM OF KNEE 1 OR 2: CPT | Mod: TC,PN,RT

## 2018-07-31 NOTE — PROGRESS NOTES
Past Medical History:   Diagnosis Date    Arthritis     Thyroid disease        Past Surgical History:   Procedure Laterality Date    BREAST BIOPSY      BREAST SURGERY  2001    right biopsy, benign    COLONOSCOPY      dental implant      KNEE ARTHROSCOPY Right     KNEE ARTHROSCOPY W/ MENISCECTOMY Right 7/12/2018    Procedure: ARTHROSCOPY, KNEE, WITH Partial lateral MENISCECTOMY;  Surgeon: Huey Kiran MD;  Location: [facility];  Service: Orthopedics;  Laterality: Right;    THYROIDECTOMY  2011    complete       Current Outpatient Prescriptions   Medication Sig    calcium carbonate (CALCIUM 600 ORAL) Take 1,200 mg by mouth every evening.    ibuprofen (ADVIL,MOTRIN) 800 MG tablet Take 1 tablet (800 mg total) by mouth 3 (three) times daily.    levothyroxine (SYNTHROID) 125 MCG tablet Take 125 mcg by mouth once daily.    multivitamin (ONE DAILY MULTIVITAMIN) per tablet Take 1 tablet by mouth once daily.    SYNTHROID 112 mcg tablet TAKE 1 TAB BY MOUTH ON TUES, THURS, SAT & SUN    venlafaxine (EFFEXOR-XR) 37.5 MG 24 hr capsule Take 1 capsule (37.5 mg total) by mouth once daily.    oxyCODONE-acetaminophen (PERCOCET) 5-325 mg per tablet Take 1-2 tablets by mouth every 6 (six) hours as needed for Pain.     No current facility-administered medications for this visit.        Review of patient's allergies indicates:  No Known Allergies    Family History   Problem Relation Age of Onset    Cancer Mother         breast and bone     Breast cancer Mother     Hyperlipidemia Father     ADD / ADHD Daughter         autism    Crohn's disease Paternal Uncle     Heart disease Paternal Uncle         heart transplant       Social History     Social History    Marital status:      Spouse name: N/A    Number of children: N/A    Years of education: N/A     Occupational History    Not on file.     Social History Main Topics    Smoking status: Never Smoker    Smokeless tobacco: Never Used    Alcohol use Yes       Comment: rarely    Drug use: No    Sexual activity: Not Currently     Other Topics Concern    Not on file     Social History Narrative    No narrative on file       Chief Complaint:   Chief Complaint   Patient presents with    Post-op Evaluation     S/P RT KNEE PARTIAL LATERAL MENSICECTOMY 7/12/18       Consulting Physician: No ref. provider found    History of present illness: This is a 53 y.o. female following up for right knee scope with partial lateral menisectomy and chondroplasty 7-12-18.      Review of Systems:    Constitution: Denies chills, fever, and sweats.    HENT: Denies headaches or blurry vision.    Cardiovascular: Denies chest pain or irregular heart beat.    Respiratory: Denies cough or shortness of breath.    Gastrointestinal: Denies abdominal pain, nausea, or vomiting.    Musculoskeletal:  Denies muscle cramps.    Neurological: Denies dizziness or focal weakness.    Psychiatric/Behavioral: Normal mental status.    Hematologic/Lymphatic: Denies bleeding problem or easy bruising/bleeding.    Skin: Denies rash or suspicious lesions.      Examination:    Vital Signs:    Vitals:    07/27/18 0934   BP: (!) 129/57   Pulse: 73       Body mass index is 39.3 kg/m².    This a well-developed, well nourished patient in no acute distress.    Alert and oriented and cooperative to examination.       Physical Exam: right knee    Inspection/Observation   Swelling:   mild  Erythema:   none  Bruising:   none  Wounds:   Clean and dry  Drainage:  None    Range of Motion   full    Muscle Strength   full    Other   Sensation:  normal  Pulse:    palpable    Imaging: Mild bilateral knee DJD.     Assessment: Arthritis of right knee    Arthritis of knee, right    S/P right knee arthroscopy        Plan:  Doing well and does not want PT. Back in 4 weeks to start euflexxa.      DISCLAIMER: This note may have been dictated using voice recognition software and may contain grammatical errors. Report sent to referring  provider as required.

## 2018-08-03 ENCOUNTER — OFFICE VISIT (OUTPATIENT)
Dept: FAMILY MEDICINE | Facility: CLINIC | Age: 53
End: 2018-08-03
Payer: COMMERCIAL

## 2018-08-03 VITALS
HEIGHT: 58 IN | TEMPERATURE: 98 F | BODY MASS INDEX: 40.08 KG/M2 | DIASTOLIC BLOOD PRESSURE: 69 MMHG | SYSTOLIC BLOOD PRESSURE: 114 MMHG | HEART RATE: 63 BPM | WEIGHT: 190.94 LBS

## 2018-08-03 DIAGNOSIS — L84 CORN OF FOOT: ICD-10-CM

## 2018-08-03 DIAGNOSIS — L25.9 CONTACT DERMATITIS, UNSPECIFIED CONTACT DERMATITIS TYPE, UNSPECIFIED TRIGGER: Primary | ICD-10-CM

## 2018-08-03 DIAGNOSIS — E66.01 SEVERE OBESITY (BMI 35.0-39.9): ICD-10-CM

## 2018-08-03 PROCEDURE — 99214 OFFICE O/P EST MOD 30 MIN: CPT | Mod: S$GLB,,, | Performed by: NURSE PRACTITIONER

## 2018-08-03 PROCEDURE — 99999 PR PBB SHADOW E&M-EST. PATIENT-LVL IV: CPT | Mod: PBBFAC,,, | Performed by: NURSE PRACTITIONER

## 2018-08-03 PROCEDURE — 3008F BODY MASS INDEX DOCD: CPT | Mod: CPTII,S$GLB,, | Performed by: NURSE PRACTITIONER

## 2018-08-03 RX ORDER — IBUPROFEN 800 MG/1
TABLET ORAL
Qty: 60 TABLET | Refills: 0 | Status: SHIPPED | OUTPATIENT
Start: 2018-08-03 | End: 2018-12-12

## 2018-08-03 RX ORDER — TRIAMCINOLONE ACETONIDE 1 MG/G
CREAM TOPICAL 2 TIMES DAILY
Qty: 80 G | Refills: 0 | Status: SHIPPED | OUTPATIENT
Start: 2018-08-03 | End: 2018-09-27

## 2018-08-03 NOTE — PROGRESS NOTES
"Subjective:       Patient ID: Faith Byers is a 53 y.o. female.    Chief Complaint: Rash (left elbow)    Ms. Lizama presents to the clinic today for new rash.  She also states she has a bump to the right lateral foot and she has had similar problem in the past which was "clogged sweat gland" and was removed by Dr. Jansen.  This is bothersome and rubs against her shoe.  She is still attempting to lose weight via diet and exercise.      Rash   This is a new problem. The current episode started in the past 7 days. The problem has been gradually worsening since onset. The affected locations include theright elbow. The rash is characterized by redness, swelling and itchiness. Associated with: new sunscreen. Pertinent negatives include no anorexia, congestion, cough, diarrhea, eye pain, facial edema, fatigue, fever, joint pain, nail changes, rhinorrhea, shortness of breath, sore throat or vomiting. Past treatments include anti-itch cream and antihistamine. The treatment provided no relief. There is no history of allergies, asthma, eczema or varicella.     Review of Systems   Constitutional: Negative for appetite change, chills, fatigue and fever.   HENT: Negative for congestion, rhinorrhea and sore throat.    Eyes: Negative for pain.   Respiratory: Negative for cough and shortness of breath.    Gastrointestinal: Negative for anorexia, diarrhea and vomiting.   Musculoskeletal: Negative for joint pain.        Bump to right lateral foot   Skin: Positive for rash. Negative for nail changes.       Objective:      Physical Exam   Constitutional: She is oriented to person, place, and time. She appears well-developed and well-nourished. No distress.   HENT:   Head: Normocephalic and atraumatic.   Right Ear: External ear normal.   Left Ear: External ear normal.   Eyes: Conjunctivae and EOM are normal.   Cardiovascular: Normal rate and regular rhythm.    Pulmonary/Chest: Effort normal.   Musculoskeletal: Normal range " of motion.   Neurological: She is alert and oriented to person, place, and time.   Skin: Skin is warm and dry. Rash noted. Rash is maculopapular.        Psychiatric: She has a normal mood and affect. Her behavior is normal.   Vitals reviewed.          Current Outpatient Prescriptions:     calcium carbonate (CALCIUM 600 ORAL), Take 1,200 mg by mouth every evening., Disp: , Rfl:     ibuprofen (ADVIL,MOTRIN) 800 MG tablet, Take 1 tablet (800 mg total) by mouth 3 (three) times daily., Disp: 60 tablet, Rfl: 0    levothyroxine (SYNTHROID) 125 MCG tablet, Take 125 mcg by mouth once daily., Disp: , Rfl:     multivitamin (ONE DAILY MULTIVITAMIN) per tablet, Take 1 tablet by mouth once daily., Disp: , Rfl:     oxyCODONE-acetaminophen (PERCOCET) 5-325 mg per tablet, Take 1-2 tablets by mouth every 6 (six) hours as needed for Pain., Disp: 30 tablet, Rfl: 0    SYNTHROID 112 mcg tablet, TAKE 1 TAB BY MOUTH ON TUES, THURS, SAT & SUN, Disp: , Rfl: 3    triamcinolone acetonide 0.1% (KENALOG) 0.1 % cream, Apply topically 2 (two) times daily. for 10 days, Disp: 80 g, Rfl: 0    venlafaxine (EFFEXOR-XR) 37.5 MG 24 hr capsule, Take 1 capsule (37.5 mg total) by mouth once daily., Disp: 30 capsule, Rfl: 11  Assessment:       1. Contact dermatitis, unspecified contact dermatitis type, unspecified trigger    2. Severe obesity (BMI 35.0-39.9)    3. Corn of foot        Plan:       Contact dermatitis, unspecified contact dermatitis type, unspecified trigger  Avoid heat.  Cool compresses for itching.  -     triamcinolone acetonide 0.1% (KENALOG) 0.1 % cream; Apply topically 2 (two) times daily. for 10 days  Dispense: 80 g; Refill: 0    Severe obesity (BMI 35.0-39.9)  Information given for [employer] [facility].    Corn of foot  Follow up with Podiatry for treatment.    Patient readiness: acceptance and barriers:none    During the course of the visit the patient was educated and counseled about the following:     Obesity:   General weight  loss/lifestyle modification strategies discussed (elicit support from others; identify saboteurs; non-food rewards, etc).  Diet interventions: moderate (500 kCal/d) deficit diet and qualitative changes (increase low-fat,  high-fiber foods).  Regular aerobic exercise program discussed.    Goals: Obesity: Reduce calorie intake and BMI    Did patient meet goals/outcomes: No    The following self management tools provided: declined    Patient Instructions (the written plan) was given to the patient/family.     Time spent with patient: 15 minutes    Barriers to medications present (no )    Adverse reactions to current medications (no)    Over the counter medications reviewed (Yes)

## 2018-09-06 ENCOUNTER — TELEPHONE (OUTPATIENT)
Dept: ORTHOPEDICS | Facility: CLINIC | Age: 53
End: 2018-09-06

## 2018-09-06 DIAGNOSIS — M17.11 PRIMARY OSTEOARTHRITIS OF RIGHT KNEE: Primary | ICD-10-CM

## 2018-09-21 ENCOUNTER — OFFICE VISIT (OUTPATIENT)
Dept: ORTHOPEDICS | Facility: CLINIC | Age: 53
End: 2018-09-21
Payer: COMMERCIAL

## 2018-09-21 VITALS — WEIGHT: 191.38 LBS | HEIGHT: 58 IN | BODY MASS INDEX: 40.17 KG/M2

## 2018-09-21 DIAGNOSIS — Z98.890 S/P RIGHT KNEE ARTHROSCOPY: ICD-10-CM

## 2018-09-21 DIAGNOSIS — M17.12 ARTHRITIS OF LEFT KNEE: Primary | ICD-10-CM

## 2018-09-21 DIAGNOSIS — M17.11 ARTHRITIS OF KNEE, RIGHT: ICD-10-CM

## 2018-09-21 DIAGNOSIS — M17.11 ARTHRITIS OF RIGHT KNEE: Primary | ICD-10-CM

## 2018-09-21 PROCEDURE — 3008F BODY MASS INDEX DOCD: CPT | Mod: CPTII,S$GLB,, | Performed by: ORTHOPAEDIC SURGERY

## 2018-09-21 PROCEDURE — 20610 DRAIN/INJ JOINT/BURSA W/O US: CPT | Mod: 58,RT,S$GLB, | Performed by: ORTHOPAEDIC SURGERY

## 2018-09-21 PROCEDURE — 99024 POSTOP FOLLOW-UP VISIT: CPT | Mod: S$GLB,,, | Performed by: ORTHOPAEDIC SURGERY

## 2018-09-21 PROCEDURE — 99999 PR PBB SHADOW E&M-EST. PATIENT-LVL II: CPT | Mod: PBBFAC,,, | Performed by: ORTHOPAEDIC SURGERY

## 2018-09-21 NOTE — PROCEDURES
Large Joint Aspiration/Injection: R knee  Date/Time: 9/21/2018 1:42 PM  Performed by: Huey Kiran MD  Authorized by: Huey Kiran MD     Consent Done?:  Yes (Verbal)  Indications:  Pain  Timeout: Prior to procedure the correct patient, procedure, and site was verified      Location:  Knee  Site:  R knee  Prep: Patient was prepped and draped in usual sterile fashion    Approach:  Anteromedial  Medications:  20 mg sodium hyaluronate (EUFLEXXA) 10 mg/mL(mw 2.4 -3.6 million)

## 2018-09-27 ENCOUNTER — OFFICE VISIT (OUTPATIENT)
Dept: FAMILY MEDICINE | Facility: CLINIC | Age: 53
End: 2018-09-27
Payer: COMMERCIAL

## 2018-09-27 VITALS
DIASTOLIC BLOOD PRESSURE: 77 MMHG | BODY MASS INDEX: 39.11 KG/M2 | WEIGHT: 186.31 LBS | TEMPERATURE: 98 F | HEIGHT: 58 IN | HEART RATE: 69 BPM | SYSTOLIC BLOOD PRESSURE: 121 MMHG

## 2018-09-27 DIAGNOSIS — R10.13 EPIGASTRIC PAIN: Primary | ICD-10-CM

## 2018-09-27 PROCEDURE — 90686 IIV4 VACC NO PRSV 0.5 ML IM: CPT | Mod: S$GLB,,, | Performed by: NURSE PRACTITIONER

## 2018-09-27 PROCEDURE — 99214 OFFICE O/P EST MOD 30 MIN: CPT | Mod: 25,S$GLB,, | Performed by: NURSE PRACTITIONER

## 2018-09-27 PROCEDURE — 3008F BODY MASS INDEX DOCD: CPT | Mod: CPTII,S$GLB,, | Performed by: NURSE PRACTITIONER

## 2018-09-27 PROCEDURE — 90471 IMMUNIZATION ADMIN: CPT | Mod: S$GLB,,, | Performed by: NURSE PRACTITIONER

## 2018-09-27 PROCEDURE — 99999 PR PBB SHADOW E&M-EST. PATIENT-LVL III: CPT | Mod: PBBFAC,,, | Performed by: NURSE PRACTITIONER

## 2018-09-27 RX ORDER — PANTOPRAZOLE SODIUM 40 MG/1
40 TABLET, DELAYED RELEASE ORAL DAILY
Qty: 30 TABLET | Refills: 0 | Status: SHIPPED | OUTPATIENT
Start: 2018-09-27 | End: 2018-10-25 | Stop reason: SDUPTHER

## 2018-09-27 NOTE — PROGRESS NOTES
Subjective:       Patient ID: Faith Byers is a 53 y.o. female.    Chief Complaint: No chief complaint on file.    Abdominal Pain   This is a new problem. The current episode started 1 to 4 weeks ago. The onset quality is gradual. The problem occurs constantly. The most recent episode lasted 12 hours. The pain is located in the generalized abdominal region. The pain is at a severity of 6/10. The pain is moderate. The quality of the pain is cramping, a sensation of fullness and colicky. Associated symptoms include anorexia, belching and nausea. Pertinent negatives include no arthralgias, constipation, diarrhea, dysuria, fever, flatus, frequency, headaches, hematochezia, hematuria, melena, myalgias, vomiting or weight loss. The pain is aggravated by eating. The pain is relieved by nothing. She has tried nothing for the symptoms. There is no history of abdominal surgery, colon cancer, Crohn's disease, gallstones, GERD, irritable bowel syndrome, pancreatitis, PUD or ulcerative colitis. Patient's medical history does not include kidney stones and UTI.     Review of Systems   Constitutional: Negative for chills, fever and weight loss.   Gastrointestinal: Positive for abdominal pain, anorexia and nausea. Negative for anal bleeding, blood in stool, constipation, diarrhea, flatus, hematochezia, melena and vomiting.   Genitourinary: Negative for dysuria, frequency and hematuria.   Musculoskeletal: Negative for arthralgias and myalgias.   Neurological: Negative for headaches.       Objective:      Physical Exam   Constitutional: She is oriented to person, place, and time. She appears well-nourished. No distress.   HENT:   Head: Normocephalic and atraumatic.   Right Ear: External ear normal.   Left Ear: External ear normal.   Mouth/Throat: Oropharynx is clear and moist. No oropharyngeal exudate.   Eyes: Pupils are equal, round, and reactive to light. Right eye exhibits no discharge. Left eye exhibits no discharge.    Neck: Neck supple. No thyromegaly present.   Cardiovascular: Normal rate and regular rhythm. Exam reveals no gallop and no friction rub.   No murmur heard.  Pulmonary/Chest: Effort normal and breath sounds normal. No respiratory distress. She has no wheezes. She has no rales.   Abdominal: Soft. She exhibits no distension. There is no tenderness.   Lymphadenopathy:     She has no cervical adenopathy.   Neurological: She is alert and oriented to person, place, and time. Coordination normal.   Skin: Skin is warm and dry.   Psychiatric: She has a normal mood and affect. Her behavior is normal. Thought content normal.   Vitals reviewed.          Current Outpatient Medications:     calcium carbonate (CALCIUM 600 ORAL), Take 1,200 mg by mouth every evening., Disp: , Rfl:      mg tablet, TAKE 1 TABLET BY MOUTH THREE TIMES A DAY, Disp: 60 tablet, Rfl: 0    levothyroxine (SYNTHROID) 125 MCG tablet, Take 125 mcg by mouth once daily., Disp: , Rfl:     multivitamin (ONE DAILY MULTIVITAMIN) per tablet, Take 1 tablet by mouth once daily., Disp: , Rfl:     venlafaxine (EFFEXOR-XR) 37.5 MG 24 hr capsule, Take 1 capsule (37.5 mg total) by mouth once daily., Disp: 30 capsule, Rfl: 11    oxyCODONE-acetaminophen (PERCOCET) 5-325 mg per tablet, Take 1-2 tablets by mouth every 6 (six) hours as needed for Pain., Disp: 30 tablet, Rfl: 0    pantoprazole (PROTONIX) 40 MG tablet, Take 1 tablet (40 mg total) by mouth once daily., Disp: 30 tablet, Rfl: 0  Assessment:       1. Epigastric pain        Plan:       Epigastric pain  Differential includes PUD, GERD, dyspepsia, cholelithiasis.  Trial of pantoprazole and RTC 2 weeks.  If no improvement will order labs and abdominal US.  -     pantoprazole (PROTONIX) 40 MG tablet; Take 1 tablet (40 mg total) by mouth once daily.  Dispense: 30 tablet; Refill: 0  Avoid greasy foods, spice foods, and large meals.

## 2018-09-27 NOTE — PATIENT INSTRUCTIONS

## 2018-10-01 ENCOUNTER — OFFICE VISIT (OUTPATIENT)
Dept: ORTHOPEDICS | Facility: CLINIC | Age: 53
End: 2018-10-01
Payer: COMMERCIAL

## 2018-10-01 VITALS — HEIGHT: 58 IN | BODY MASS INDEX: 39.11 KG/M2 | WEIGHT: 186.31 LBS

## 2018-10-01 DIAGNOSIS — M17.11 ARTHRITIS OF RIGHT KNEE: ICD-10-CM

## 2018-10-01 DIAGNOSIS — M17.12 ARTHRITIS OF LEFT KNEE: Primary | ICD-10-CM

## 2018-10-01 PROCEDURE — 20610 DRAIN/INJ JOINT/BURSA W/O US: CPT | Mod: 50,PBBFAC,PN | Performed by: ORTHOPAEDIC SURGERY

## 2018-10-01 PROCEDURE — 99999 PR PBB SHADOW E&M-EST. PATIENT-LVL II: CPT | Mod: PBBFAC,,, | Performed by: ORTHOPAEDIC SURGERY

## 2018-10-01 PROCEDURE — 99499 UNLISTED E&M SERVICE: CPT | Mod: S$PBB,,, | Performed by: ORTHOPAEDIC SURGERY

## 2018-10-01 RX ADMIN — Medication 20 MG: at 09:10

## 2018-10-01 NOTE — PROCEDURES
Large Joint Aspiration/Injection: R knee, L knee  Date/Time: 10/1/2018 9:25 AM  Performed by: Huey Kiran MD  Authorized by: Huey Kiran MD     Consent Done?:  Yes (Verbal)  Indications:  Pain  Timeout: Prior to procedure the correct patient, procedure, and site was verified      Location:  Knee  Site:  R knee and L knee  Prep: Patient was prepped and draped in usual sterile fashion    Approach:  Anteromedial  Medications:  20 mg sodium hyaluronate (EUFLEXXA) 10 mg/mL(mw 2.4 -3.6 million); 20 mg sodium hyaluronate (EUFLEXXA) 10 mg/mL(mw 2.4 -3.6 million)

## 2018-10-01 NOTE — PROGRESS NOTES
Past Medical History:   Diagnosis Date    Arthritis     Thyroid disease        Past Surgical History:   Procedure Laterality Date    ARTHROSCOPY, KNEE, WITH Partial lateral MENISCECTOMY Right 7/12/2018    Per[facility] at [facility]    BREAST BIOPSY      BREAST SURGERY  2001    right biopsy, benign    CHONDROPLASTY,KNEE Medial Femoral Right 7/12/2018    Performed by [facility] at [facility]    COLONOSCOPY      COLONOSCOPY N/A 12/5/2014    Performed by iSxto Barrera MD at [facility] ENDO    dental implant      KNEE ARTHROSCOPY Right     KNEE ARTHROSCOPY W/ MENISCECTOMY Right 7/12/2018    Procedure: ARTHROSCOPY, KNEE, WITH Partial lateral MENISCECTOMY;  Surgeon: Huey Kiran MD;  Location: [facility] OR;  Service: Orthopedics;  Laterality: Right;    THYROIDECTOMY  2011    complete       Current Outpatient Medications   Medication Sig    calcium carbonate (CALCIUM 600 ORAL) Take 1,200 mg by mouth every evening.     mg tablet TAKE 1 TABLET BY MOUTH THREE TIMES A DAY    levothyroxine (SYNTHROID) 125 MCG tablet Take 125 mcg by mouth once daily.    multivitamin (ONE DAILY MULTIVITAMIN) per tablet Take 1 tablet by mouth once daily.    oxyCODONE-acetaminophen (PERCOCET) 5-325 mg per tablet Take 1-2 tablets by mouth every 6 (six) hours as needed for Pain.    pantoprazole (PROTONIX) 40 MG tablet Take 1 tablet (40 mg total) by mouth once daily.    venlafaxine (EFFEXOR-XR) 37.5 MG 24 hr capsule Take 1 capsule (37.5 mg total) by mouth once daily.     No current facility-administered medications for this visit.        Review of patient's allergies indicates:  No Known Allergies    Family History   Problem Relation Age of Onset    Cancer Mother         breast and bone     Breast cancer Mother     Hyperlipidemia Father     ADD / ADHD Daughter         autism    Crohn's disease Paternal Uncle     Heart disease Paternal Uncle         heart transplant       Social History      Socioeconomic History    Marital status:      Spouse name: Not on file    Number of children: Not on file    Years of education: Not on file    Highest education level: Not on file   Social Needs    Financial resource strain: Not on file    Food insecurity - worry: Not on file    Food insecurity - inability: Not on file    Transportation needs - medical: Not on file    Transportation needs - non-medical: Not on file   Occupational History    Not on file   Tobacco Use    Smoking status: Never Smoker    Smokeless tobacco: Never Used   Substance and Sexual Activity    Alcohol use: Yes     Comment: rarely    Drug use: No    Sexual activity: Not Currently   Other Topics Concern    Not on file   Social History Narrative    Not on file       Chief Complaint:   Chief Complaint   Patient presents with    Injections     EUFLEXXA 2/3 RT KNEE AND 1/3 LEFT KNEE       Consulting Physician: No ref. provider found    History of present illness: This is a 53 y.o. female following up for right knee scope with partial lateral menisectomy and chondroplasty 7-12-18.  She also has arthritis of the left knee.      Review of Systems:    Constitution: Denies chills, fever, and sweats.    HENT: Denies headaches or blurry vision.    Cardiovascular: Denies chest pain or irregular heart beat.    Respiratory: Denies cough or shortness of breath.    Gastrointestinal: Denies abdominal pain, nausea, or vomiting.    Musculoskeletal:  Denies muscle cramps.    Neurological: Denies dizziness or focal weakness.    Psychiatric/Behavioral: Normal mental status.    Hematologic/Lymphatic: Denies bleeding problem or easy bruising/bleeding.    Skin: Denies rash or suspicious lesions.      Examination:    Vital Signs:    There were no vitals filed for this visit.    Body mass index is 38.93 kg/m².    This a well-developed, well nourished patient in no acute distress.    Alert and oriented and cooperative to examination.        Physical Exam: right knee    Inspection/Observation   Swelling:   mild  Erythema:   none  Bruising:   none  Wounds:   Clean and dry  Drainage:  None    Range of Motion   full    Muscle Strength   full    Other   Sensation:  normal  Pulse:    Palpable    Left Knee Exam    Gait   Normal    Skin  Rash:   None  Scars:   None    Inspection  Erythema:  None  Bruising:  None  Effusion:  mild  Masses:  None  Lymphadenopathy: None    Range of Motion: 0 to 130°    Medial Joint : yes  Lateral Joint : None    Patellofemoral Tenderness: None  Patellofemoral Crepitus: None    Lachman:  Normal  Anterior Drawer: Normal  Posterior Drawer: Normal    Raffi's:  Negative  Apley's:  Negative    Varus Stress:  Stable  Valgus Stress:  Stable    Strength:  5/5    Pulses:  Palpable distal    Sensation:  Intact          Imaging: Mild bilateral knee DJD.     Assessment: Arthritis of left knee  -     Large Joint Aspiration/Injection: R knee, L knee    Arthritis of right knee  -     Large Joint Aspiration/Injection: R knee, L knee        Plan:  Euflexxa    DISCLAIMER: This note may have been dictated using voice recognition software and may contain grammatical errors. Report sent to referring provider as required.

## 2018-10-05 ENCOUNTER — OFFICE VISIT (OUTPATIENT)
Dept: ORTHOPEDICS | Facility: CLINIC | Age: 53
End: 2018-10-05
Payer: COMMERCIAL

## 2018-10-05 VITALS — WEIGHT: 186.31 LBS | BODY MASS INDEX: 39.11 KG/M2 | HEIGHT: 58 IN

## 2018-10-05 DIAGNOSIS — M17.11 ARTHRITIS OF RIGHT KNEE: ICD-10-CM

## 2018-10-05 DIAGNOSIS — M17.12 ARTHRITIS OF LEFT KNEE: Primary | ICD-10-CM

## 2018-10-05 PROCEDURE — 99999 PR PBB SHADOW E&M-EST. PATIENT-LVL II: CPT | Mod: PBBFAC,,, | Performed by: ORTHOPAEDIC SURGERY

## 2018-10-05 PROCEDURE — 99499 UNLISTED E&M SERVICE: CPT | Mod: S$PBB,,, | Performed by: ORTHOPAEDIC SURGERY

## 2018-10-05 PROCEDURE — 20610 DRAIN/INJ JOINT/BURSA W/O US: CPT | Mod: 50,PBBFAC,PN | Performed by: ORTHOPAEDIC SURGERY

## 2018-10-05 RX ADMIN — Medication 20 MG: at 08:10

## 2018-10-06 NOTE — PROCEDURES
Large Joint Aspiration/Injection: R knee, L knee  Date/Time: 10/5/2018 8:11 PM  Performed by: Huey Kiran MD  Authorized by: Huey Kiran MD     Consent Done?:  Yes (Verbal)  Indications:  Pain  Timeout: Prior to procedure the correct patient, procedure, and site was verified      Location:  Knee  Site:  R knee and L knee  Prep: Patient was prepped and draped in usual sterile fashion    Approach:  Anteromedial  Medications:  20 mg sodium hyaluronate (EUFLEXXA) 10 mg/mL(mw 2.4 -3.6 million); 20 mg sodium hyaluronate (EUFLEXXA) 10 mg/mL(mw 2.4 -3.6 million)

## 2018-10-06 NOTE — PROGRESS NOTES
Past Medical History:   Diagnosis Date    Arthritis     Thyroid disease        Past Surgical History:   Procedure Laterality Date    ARTHROSCOPY, KNEE, WITH Partial lateral MENISCECTOMY Right 7/12/2018    Performed by [facility] Kiran MD at [facility] OR    BREAST BIOPSY      BREAST SURGERY  2001    right biopsy, benign    CHONDROPLASTY,KNEE Medial Femoral Right 7/12/2018    Performed by [facility] Kiran MD at [facility] OR    COLONOSCOPY      COLONOSCOPY N/A 12/5/2014    Performed by Sixto Barrera MD at [facility] ENDO    dental implant      KNEE ARTHROSCOPY Right     KNEE ARTHROSCOPY W/ MENISCECTOMY Right 7/12/2018    Procedure: ARTHROSCOPY, KNEE, WITH Partial lateral MENISCECTOMY;  Surgeon: Huey Kiran MD;  Location: [facility] OR;  Service: Orthopedics;  Laterality: Right;    THYROIDECTOMY  2011    complete       Current Outpatient Medications   Medication Sig    calcium carbonate (CALCIUM 600 ORAL) Take 1,200 mg by mouth every evening.     mg tablet TAKE 1 TABLET BY MOUTH THREE TIMES A DAY    levothyroxine (SYNTHROID) 125 MCG tablet Take 125 mcg by mouth once daily.    multivitamin (ONE DAILY MULTIVITAMIN) per tablet Take 1 tablet by mouth once daily.    oxyCODONE-acetaminophen (PERCOCET) 5-325 mg per tablet Take 1-2 tablets by mouth every 6 (six) hours as needed for Pain.    pantoprazole (PROTONIX) 40 MG tablet Take 1 tablet (40 mg total) by mouth once daily.    venlafaxine (EFFEXOR-XR) 37.5 MG 24 hr capsule Take 1 capsule (37.5 mg total) by mouth once daily.     No current facility-administered medications for this visit.        Review of patient's allergies indicates:  No Known Allergies    Family History   Problem Relation Age of Onset    Cancer Mother         breast and bone     Breast cancer Mother     Hyperlipidemia Father     ADD / ADHD Daughter         autism    Crohn's disease Paternal Uncle     Heart disease Paternal Uncle         heart transplant       Social History      Socioeconomic History    Marital status:      Spouse name: Not on file    Number of children: Not on file    Years of education: Not on file    Highest education level: Not on file   Social Needs    Financial resource strain: Not on file    Food insecurity - worry: Not on file    Food insecurity - inability: Not on file    Transportation needs - medical: Not on file    Transportation needs - non-medical: Not on file   Occupational History    Not on file   Tobacco Use    Smoking status: Never Smoker    Smokeless tobacco: Never Used   Substance and Sexual Activity    Alcohol use: Yes     Comment: rarely    Drug use: No    Sexual activity: Not Currently   Other Topics Concern    Not on file   Social History Narrative    Not on file       Chief Complaint:   Chief Complaint   Patient presents with    Injections     EUFLEXXA 3/3 RIGHT KNEE AND 2/3 LEFT KNEE       Consulting Physician: No ref. provider found    History of present illness: This is a 53 y.o. female following up for right knee scope with partial lateral menisectomy and chondroplasty 7-12-18.  She also has arthritis of the left knee.      Review of Systems:    Constitution: Denies chills, fever, and sweats.    HENT: Denies headaches or blurry vision.    Cardiovascular: Denies chest pain or irregular heart beat.    Respiratory: Denies cough or shortness of breath.    Gastrointestinal: Denies abdominal pain, nausea, or vomiting.    Musculoskeletal:  Denies muscle cramps.    Neurological: Denies dizziness or focal weakness.    Psychiatric/Behavioral: Normal mental status.    Hematologic/Lymphatic: Denies bleeding problem or easy bruising/bleeding.    Skin: Denies rash or suspicious lesions.      Examination:    Vital Signs:    There were no vitals filed for this visit.    Body mass index is 38.93 kg/m².    This a well-developed, well nourished patient in no acute distress.    Alert and oriented and cooperative to examination.        Physical Exam: right knee    Inspection/Observation   Swelling:   mild  Erythema:   none  Bruising:   none  Wounds:   Clean and dry  Drainage:  None    Range of Motion   full    Muscle Strength   full    Other   Sensation:  normal  Pulse:    Palpable    Left Knee Exam    Gait   Normal    Skin  Rash:   None  Scars:   None    Inspection  Erythema:  None  Bruising:  None  Effusion:  mild  Masses:  None  Lymphadenopathy: None    Range of Motion: 0 to 130°    Medial Joint : yes  Lateral Joint : None    Patellofemoral Tenderness: None  Patellofemoral Crepitus: None    Lachman:  Normal  Anterior Drawer: Normal  Posterior Drawer: Normal    Raffi's:  Negative  Apley's:  Negative    Varus Stress:  Stable  Valgus Stress:  Stable    Strength:  5/5    Pulses:  Palpable distal    Sensation:  Intact          Imaging: Mild bilateral knee DJD.     Assessment: Arthritis of left knee  -     Large Joint Aspiration/Injection: R knee, L knee    Arthritis of right knee  -     Large Joint Aspiration/Injection: R knee, L knee        Plan:  Euflexxa    DISCLAIMER: This note may have been dictated using voice recognition software and may contain grammatical errors. Report sent to referring provider as required.

## 2018-10-12 ENCOUNTER — OFFICE VISIT (OUTPATIENT)
Dept: ORTHOPEDICS | Facility: CLINIC | Age: 53
End: 2018-10-12
Payer: COMMERCIAL

## 2018-10-12 VITALS — BODY MASS INDEX: 39.11 KG/M2 | WEIGHT: 186.31 LBS | HEIGHT: 58 IN

## 2018-10-12 DIAGNOSIS — M17.12 ARTHRITIS OF LEFT KNEE: Primary | ICD-10-CM

## 2018-10-12 PROCEDURE — 99999 PR PBB SHADOW E&M-EST. PATIENT-LVL II: CPT | Mod: PBBFAC,,, | Performed by: ORTHOPAEDIC SURGERY

## 2018-10-12 PROCEDURE — 99499 UNLISTED E&M SERVICE: CPT | Mod: S$PBB,,, | Performed by: ORTHOPAEDIC SURGERY

## 2018-10-12 PROCEDURE — 20610 DRAIN/INJ JOINT/BURSA W/O US: CPT | Mod: PBBFAC,PN | Performed by: ORTHOPAEDIC SURGERY

## 2018-10-12 RX ADMIN — Medication 20 MG: at 04:10

## 2018-10-12 NOTE — PROGRESS NOTES
Past Medical History:   Diagnosis Date    Arthritis     Thyroid disease        Past Surgical History:   Procedure Laterality Date    ARTHROSCOPY, KNEE, WITH Partial lateral MENISCECTOMY Right 7/12/2018    Per[facility]an [facility] at [facility] OR    BREAST BIOPSY      BREAST SURGERY  2001    right biopsy, benign    CHONDROPLASTY,KNEE Medial Femoral Right 7/12/2018    Per[facility] by [facility] at [facility] OR    COLONOSCOPY      COLONOSCOPY N/A 12/5/2014    Performed by Sixto Barrera MD at [facility] ENDO    dental implant      KNEE ARTHROSCOPY Right     KNEE ARTHROSCOPY W/ MENISCECTOMY Right 7/12/2018    Procedure: ARTHROSCOPY, KNEE, WITH Partial lateral MENISCECTOMY;  Surgeon: Huey Kiran MD;  Location: [facility] OR;  Service: Orthopedics;  Laterality: Right;    THYROIDECTOMY  2011    complete       Current Outpatient Medications   Medication Sig    calcium carbonate (CALCIUM 600 ORAL) Take 1,200 mg by mouth every evening.     mg tablet TAKE 1 TABLET BY MOUTH THREE TIMES A DAY    levothyroxine (SYNTHROID) 125 MCG tablet Take 125 mcg by mouth once daily.    multivitamin (ONE DAILY MULTIVITAMIN) per tablet Take 1 tablet by mouth once daily.    oxyCODONE-acetaminophen (PERCOCET) 5-325 mg per tablet Take 1-2 tablets by mouth every 6 (six) hours as needed for Pain.    pantoprazole (PROTONIX) 40 MG tablet Take 1 tablet (40 mg total) by mouth once daily.    venlafaxine (EFFEXOR-XR) 37.5 MG 24 hr capsule Take 1 capsule (37.5 mg total) by mouth once daily.     No current facility-administered medications for this visit.        Review of patient's allergies indicates:  No Known Allergies    Family History   Problem Relation Age of Onset    Cancer Mother         breast and bone     Breast cancer Mother     Hyperlipidemia Father     ADD / ADHD Daughter         autism    Crohn's disease Paternal Uncle     Heart disease Paternal Uncle         heart transplant       Social History      Socioeconomic History    Marital status:      Spouse name: Not on file    Number of children: Not on file    Years of education: Not on file    Highest education level: Not on file   Social Needs    Financial resource strain: Not on file    Food insecurity - worry: Not on file    Food insecurity - inability: Not on file    Transportation needs - medical: Not on file    Transportation needs - non-medical: Not on file   Occupational History    Not on file   Tobacco Use    Smoking status: Never Smoker    Smokeless tobacco: Never Used   Substance and Sexual Activity    Alcohol use: Yes     Comment: rarely    Drug use: No    Sexual activity: Not Currently   Other Topics Concern    Not on file   Social History Narrative    Not on file       Chief Complaint:   Chief Complaint   Patient presents with    Injections     EUFLEXXA 3/3 LEFT KNEE       Consulting Physician: No ref. provider found    History of present illness: This is a 53 y.o. female following up for right knee scope with partial lateral menisectomy and chondroplasty 7-12-18.  She also has arthritis of the left knee.      Review of Systems:    Constitution: Denies chills, fever, and sweats.    HENT: Denies headaches or blurry vision.    Cardiovascular: Denies chest pain or irregular heart beat.    Respiratory: Denies cough or shortness of breath.    Gastrointestinal: Denies abdominal pain, nausea, or vomiting.    Musculoskeletal:  Denies muscle cramps.    Neurological: Denies dizziness or focal weakness.    Psychiatric/Behavioral: Normal mental status.    Hematologic/Lymphatic: Denies bleeding problem or easy bruising/bleeding.    Skin: Denies rash or suspicious lesions.      Examination:    Vital Signs:    There were no vitals filed for this visit.    Body mass index is 38.93 kg/m².    This a well-developed, well nourished patient in no acute distress.    Alert and oriented and cooperative to examination.       Physical Exam: right  knee    Inspection/Observation   Swelling:   mild  Erythema:   none  Bruising:   none  Wounds:   Clean and dry  Drainage:  None    Range of Motion   full    Muscle Strength   full    Other   Sensation:  normal  Pulse:    Palpable    Left Knee Exam    Gait   Normal    Skin  Rash:   None  Scars:   None    Inspection  Erythema:  None  Bruising:  None  Effusion:  mild  Masses:  None  Lymphadenopathy: None    Range of Motion: 0 to 130°    Medial Joint : yes  Lateral Joint : None    Patellofemoral Tenderness: None  Patellofemoral Crepitus: None    Lachman:  Normal  Anterior Drawer: Normal  Posterior Drawer: Normal    Raffi's:  Negative  Apley's:  Negative    Varus Stress:  Stable  Valgus Stress:  Stable    Strength:  5/5    Pulses:  Palpable distal    Sensation:  Intact          Imaging: Mild bilateral knee DJD.     Assessment: Arthritis of left knee  -     Large Joint Aspiration/Injection: L knee        Plan:  Euflexxa    DISCLAIMER: This note may have been dictated using voice recognition software and may contain grammatical errors. Report sent to referring provider as required.

## 2018-10-12 NOTE — PROCEDURES
Large Joint Aspiration/Injection: L knee  Date/Time: 10/12/2018 4:39 PM  Performed by: Huey Kiran MD  Authorized by: Huey Kiran MD     Consent Done?:  Yes (Verbal)  Indications:  Pain  Timeout: Prior to procedure the correct patient, procedure, and site was verified      Location:  Knee  Site:  L knee  Prep: Patient was prepped and draped in usual sterile fashion    Approach:  Anteromedial  Medications:  20 mg sodium hyaluronate (EUFLEXXA) 10 mg/mL(mw 2.4 -3.6 million)

## 2018-10-19 ENCOUNTER — OFFICE VISIT (OUTPATIENT)
Dept: FAMILY MEDICINE | Facility: CLINIC | Age: 53
End: 2018-10-19
Payer: COMMERCIAL

## 2018-10-19 ENCOUNTER — LAB VISIT (OUTPATIENT)
Dept: LAB | Facility: HOSPITAL | Age: 53
End: 2018-10-19
Attending: NURSE PRACTITIONER
Payer: COMMERCIAL

## 2018-10-19 VITALS
WEIGHT: 178.56 LBS | BODY MASS INDEX: 37.48 KG/M2 | SYSTOLIC BLOOD PRESSURE: 106 MMHG | HEIGHT: 58 IN | HEART RATE: 85 BPM | TEMPERATURE: 98 F | DIASTOLIC BLOOD PRESSURE: 69 MMHG

## 2018-10-19 DIAGNOSIS — D64.9 ANEMIA, UNSPECIFIED TYPE: Primary | ICD-10-CM

## 2018-10-19 DIAGNOSIS — R10.13 EPIGASTRIC PAIN: ICD-10-CM

## 2018-10-19 DIAGNOSIS — R10.13 EPIGASTRIC PAIN: Primary | ICD-10-CM

## 2018-10-19 LAB
ALBUMIN SERPL BCP-MCNC: 3.1 G/DL
ALP SERPL-CCNC: 76 U/L
ALT SERPL W/O P-5'-P-CCNC: 13 U/L
ANION GAP SERPL CALC-SCNC: 8 MMOL/L
AST SERPL-CCNC: 14 U/L
BASOPHILS # BLD AUTO: 0.05 K/UL
BASOPHILS NFR BLD: 0.5 %
BILIRUB SERPL-MCNC: 0.4 MG/DL
BUN SERPL-MCNC: 17 MG/DL
CALCIUM SERPL-MCNC: 9.6 MG/DL
CHLORIDE SERPL-SCNC: 106 MMOL/L
CO2 SERPL-SCNC: 26 MMOL/L
CREAT SERPL-MCNC: 0.8 MG/DL
DIFFERENTIAL METHOD: ABNORMAL
EOSINOPHIL # BLD AUTO: 0.1 K/UL
EOSINOPHIL NFR BLD: 0.8 %
ERYTHROCYTE [DISTWIDTH] IN BLOOD BY AUTOMATED COUNT: 12.1 %
EST. GFR  (AFRICAN AMERICAN): >60 ML/MIN/1.73 M^2
EST. GFR  (NON AFRICAN AMERICAN): >60 ML/MIN/1.73 M^2
GLUCOSE SERPL-MCNC: 86 MG/DL
HCT VFR BLD AUTO: 38.1 %
HGB BLD-MCNC: 11.5 G/DL
IMM GRANULOCYTES # BLD AUTO: 0.03 K/UL
IMM GRANULOCYTES NFR BLD AUTO: 0.3 %
LIPASE SERPL-CCNC: 21 U/L
LYMPHOCYTES # BLD AUTO: 2.1 K/UL
LYMPHOCYTES NFR BLD: 20.9 %
MCH RBC QN AUTO: 28.7 PG
MCHC RBC AUTO-ENTMCNC: 30.2 G/DL
MCV RBC AUTO: 95 FL
MONOCYTES # BLD AUTO: 0.7 K/UL
MONOCYTES NFR BLD: 7 %
NEUTROPHILS # BLD AUTO: 7 K/UL
NEUTROPHILS NFR BLD: 70.5 %
NRBC BLD-RTO: 0 /100 WBC
PLATELET # BLD AUTO: 370 K/UL
PMV BLD AUTO: 11.2 FL
POTASSIUM SERPL-SCNC: 4.5 MMOL/L
PROT SERPL-MCNC: 7 G/DL
RBC # BLD AUTO: 4.01 M/UL
SODIUM SERPL-SCNC: 140 MMOL/L
WBC # BLD AUTO: 9.92 K/UL

## 2018-10-19 PROCEDURE — 99214 OFFICE O/P EST MOD 30 MIN: CPT | Mod: S$GLB,,, | Performed by: NURSE PRACTITIONER

## 2018-10-19 PROCEDURE — 36415 COLL VENOUS BLD VENIPUNCTURE: CPT | Mod: PO

## 2018-10-19 PROCEDURE — 83690 ASSAY OF LIPASE: CPT

## 2018-10-19 PROCEDURE — 80053 COMPREHEN METABOLIC PANEL: CPT

## 2018-10-19 PROCEDURE — 99999 PR PBB SHADOW E&M-EST. PATIENT-LVL III: CPT | Mod: PBBFAC,,, | Performed by: NURSE PRACTITIONER

## 2018-10-19 PROCEDURE — 85025 COMPLETE CBC W/AUTO DIFF WBC: CPT

## 2018-10-19 PROCEDURE — 99213 OFFICE O/P EST LOW 20 MIN: CPT | Mod: PBBFAC,PO | Performed by: NURSE PRACTITIONER

## 2018-10-19 NOTE — PROGRESS NOTES
Subjective:       Patient ID: Faith Byers is a 53 y.o. female.    Chief Complaint: Abdominal Pain    Has had only mild improvement in epigastric pain with PPI.        Abdominal Pain   This is a recurrent problem. The current episode started 1 to 4 weeks ago. The onset quality is gradual. The problem occurs daily. The most recent episode lasted 3 weeks. The problem has been gradually improving. The pain is located in the RUQ and epigastric region. The pain is at a severity of 5/10. The pain is moderate. The quality of the pain is aching and cramping. Associated symptoms include anorexia and nausea. Pertinent negatives include no arthralgias, belching, constipation, diarrhea, dysuria, fever, flatus, frequency, headaches, hematochezia, hematuria, melena, myalgias, vomiting or weight loss. Exacerbated by: large meals. The pain is relieved by nothing. She has tried proton pump inhibitors for the symptoms. The treatment provided mild relief. There is no history of abdominal surgery, colon cancer, Crohn's disease, gallstones, GERD, irritable bowel syndrome, pancreatitis, PUD or ulcerative colitis. Patient's medical history does not include kidney stones and UTI.     Review of Systems   Constitutional: Negative for chills, fever and weight loss.   Gastrointestinal: Positive for abdominal pain, anorexia and nausea. Negative for constipation, diarrhea, flatus, hematochezia, melena and vomiting.   Genitourinary: Negative for dysuria, frequency and hematuria.   Musculoskeletal: Negative for arthralgias and myalgias.   Neurological: Negative for headaches.       Objective:      Physical Exam   Constitutional: She is oriented to person, place, and time. She appears well-nourished. No distress.   HENT:   Head: Normocephalic and atraumatic.   Right Ear: External ear normal.   Left Ear: External ear normal.   Mouth/Throat: Oropharynx is clear and moist. No oropharyngeal exudate.   Eyes: Right eye exhibits no discharge.  Left eye exhibits no discharge.   Neck: Neck supple. No thyromegaly present.   Cardiovascular: Normal rate and regular rhythm. Exam reveals no gallop and no friction rub.   No murmur heard.  Pulmonary/Chest: Effort normal and breath sounds normal. No respiratory distress. She has no wheezes. She has no rales.   Abdominal: Soft. She exhibits no distension and no mass. There is no tenderness. There is no rebound and no guarding.   Lymphadenopathy:     She has no cervical adenopathy.   Neurological: She is alert and oriented to person, place, and time. Coordination normal.   Skin: Skin is warm and dry.   Psychiatric: She has a normal mood and affect. Her behavior is normal. Thought content normal.   Vitals reviewed.          Current Outpatient Medications:     calcium carbonate (CALCIUM 600 ORAL), Take 1,200 mg by mouth every evening., Disp: , Rfl:      mg tablet, TAKE 1 TABLET BY MOUTH THREE TIMES A DAY, Disp: 60 tablet, Rfl: 0    levothyroxine (SYNTHROID) 125 MCG tablet, Take 125 mcg by mouth once daily., Disp: , Rfl:     multivitamin (ONE DAILY MULTIVITAMIN) per tablet, Take 1 tablet by mouth once daily., Disp: , Rfl:     pantoprazole (PROTONIX) 40 MG tablet, Take 1 tablet (40 mg total) by mouth once daily., Disp: 30 tablet, Rfl: 0    venlafaxine (EFFEXOR-XR) 37.5 MG 24 hr capsule, Take 1 capsule (37.5 mg total) by mouth once daily., Disp: 30 capsule, Rfl: 11  Assessment:       1. Epigastric pain        Plan:       Epigastric pain  Continue pantoprazole.  Avoid large meals and spicy foods.  -     CBC auto differential; Future; Expected date: 10/19/2018  -     Comprehensive metabolic panel; Future; Expected date: 10/19/2018  -     Lipase; Future; Expected date: 10/19/2018  -     US Abdomen Complete; Future; Expected date: 10/19/2018    She will make 6 month follow up with her PCP.

## 2018-10-25 ENCOUNTER — HOSPITAL ENCOUNTER (OUTPATIENT)
Dept: RADIOLOGY | Facility: CLINIC | Age: 53
Discharge: HOME | End: 2018-10-25
Attending: NURSE PRACTITIONER
Payer: COMMERCIAL

## 2018-10-25 ENCOUNTER — TELEPHONE (OUTPATIENT)
Dept: FAMILY MEDICINE | Facility: CLINIC | Age: 53
End: 2018-10-25

## 2018-10-25 DIAGNOSIS — K82.8 GALLBLADDER MASS: Primary | ICD-10-CM

## 2018-10-25 DIAGNOSIS — R10.13 EPIGASTRIC PAIN: ICD-10-CM

## 2018-10-25 PROCEDURE — 76700 US EXAM ABDOM COMPLETE: CPT | Mod: 26,,, | Performed by: RADIOLOGY

## 2018-10-25 PROCEDURE — 76700 US EXAM ABDOM COMPLETE: CPT | Mod: TC,PO

## 2018-10-25 RX ORDER — PANTOPRAZOLE SODIUM 40 MG/1
TABLET, DELAYED RELEASE ORAL
Qty: 30 TABLET | Refills: 5 | Status: SHIPPED | OUTPATIENT
Start: 2018-10-25 | End: 2018-11-07

## 2018-10-25 NOTE — TELEPHONE ENCOUNTER
Notified patient of abdominal ultrasound results.  She would like to schedule CT abdomen and pelvis tomorrow morning if possible.

## 2018-10-31 ENCOUNTER — TELEPHONE (OUTPATIENT)
Dept: FAMILY MEDICINE | Facility: CLINIC | Age: 53
End: 2018-10-31

## 2018-10-31 ENCOUNTER — HOSPITAL ENCOUNTER (OUTPATIENT)
Dept: RADIOLOGY | Facility: HOSPITAL | Age: 53
Discharge: HOME | End: 2018-10-31
Attending: NURSE PRACTITIONER
Payer: COMMERCIAL

## 2018-10-31 DIAGNOSIS — K82.8 GALLBLADDER MASS: ICD-10-CM

## 2018-10-31 DIAGNOSIS — R10.13 EPIGASTRIC PAIN: ICD-10-CM

## 2018-10-31 PROCEDURE — 74177 CT ABD & PELVIS W/CONTRAST: CPT | Mod: TC

## 2018-10-31 PROCEDURE — 74177 CT ABD & PELVIS W/CONTRAST: CPT | Mod: 26,,, | Performed by: RADIOLOGY

## 2018-10-31 PROCEDURE — 25500020 PHARM REV CODE 255

## 2018-10-31 RX ORDER — SODIUM CHLORIDE 9 MG/ML
INJECTION, SOLUTION INTRAVENOUS
Status: DISPENSED
Start: 2018-10-31 | End: 2018-10-31

## 2018-10-31 RX ADMIN — IOHEXOL 75 ML: 350 INJECTION, SOLUTION INTRAVENOUS at 06:10

## 2018-10-31 NOTE — TELEPHONE ENCOUNTER
Notified patient of CT abdomen results.  Sent message to [facility] surgeon Dr. Garcia to see if he would be able to biopsy this area.  I will let the patient know as soon as I get a response.

## 2018-11-01 ENCOUNTER — PATIENT MESSAGE (OUTPATIENT)
Dept: FAMILY MEDICINE | Facility: CLINIC | Age: 53
End: 2018-11-01

## 2018-11-01 DIAGNOSIS — K82.8 GALLBLADDER MASS: Primary | ICD-10-CM

## 2018-11-02 NOTE — TELEPHONE ENCOUNTER
I have not had a response yet but I will go ahead and place a referral so we can schedule an appointment.

## 2018-11-05 ENCOUNTER — TELEPHONE (OUTPATIENT)
Dept: FAMILY MEDICINE | Facility: CLINIC | Age: 53
End: 2018-11-05

## 2018-11-07 ENCOUNTER — INITIAL CONSULT (OUTPATIENT)
Dept: SURGERY | Facility: CLINIC | Age: 53
End: 2018-11-07
Payer: COMMERCIAL

## 2018-11-07 ENCOUNTER — LAB VISIT (OUTPATIENT)
Dept: LAB | Facility: HOSPITAL | Age: 53
End: 2018-11-07
Attending: SURGERY
Payer: COMMERCIAL

## 2018-11-07 VITALS
HEIGHT: 58 IN | TEMPERATURE: 97 F | DIASTOLIC BLOOD PRESSURE: 79 MMHG | WEIGHT: 175.25 LBS | SYSTOLIC BLOOD PRESSURE: 122 MMHG | BODY MASS INDEX: 36.79 KG/M2 | HEART RATE: 80 BPM

## 2018-11-07 DIAGNOSIS — C23 GALLBLADDER CANCER: ICD-10-CM

## 2018-11-07 DIAGNOSIS — C23 GALLBLADDER CANCER: Primary | ICD-10-CM

## 2018-11-07 LAB
ALBUMIN SERPL BCP-MCNC: 3 G/DL
ALP SERPL-CCNC: 87 U/L
ALT SERPL W/O P-5'-P-CCNC: 21 U/L
ANION GAP SERPL CALC-SCNC: 9 MMOL/L
AST SERPL-CCNC: 21 U/L
BASOPHILS # BLD AUTO: 0.07 K/UL
BASOPHILS NFR BLD: 0.6 %
BILIRUB SERPL-MCNC: 0.3 MG/DL
BUN SERPL-MCNC: 11 MG/DL
CALCIUM SERPL-MCNC: 9.5 MG/DL
CANCER AG19-9 SERPL-ACNC: 478 U/ML
CEA SERPL-MCNC: 2.9 NG/ML
CHLORIDE SERPL-SCNC: 105 MMOL/L
CO2 SERPL-SCNC: 25 MMOL/L
CREAT SERPL-MCNC: 0.7 MG/DL
DIFFERENTIAL METHOD: ABNORMAL
EOSINOPHIL # BLD AUTO: 0.1 K/UL
EOSINOPHIL NFR BLD: 0.8 %
ERYTHROCYTE [DISTWIDTH] IN BLOOD BY AUTOMATED COUNT: 12.1 %
EST. GFR  (AFRICAN AMERICAN): >60 ML/MIN/1.73 M^2
EST. GFR  (NON AFRICAN AMERICAN): >60 ML/MIN/1.73 M^2
GLUCOSE SERPL-MCNC: 95 MG/DL
HCT VFR BLD AUTO: 36.2 %
HGB BLD-MCNC: 11.3 G/DL
IMM GRANULOCYTES # BLD AUTO: 0.05 K/UL
IMM GRANULOCYTES NFR BLD AUTO: 0.4 %
LYMPHOCYTES # BLD AUTO: 2.4 K/UL
LYMPHOCYTES NFR BLD: 21 %
MCH RBC QN AUTO: 28.4 PG
MCHC RBC AUTO-ENTMCNC: 31.2 G/DL
MCV RBC AUTO: 91 FL
MONOCYTES # BLD AUTO: 0.8 K/UL
MONOCYTES NFR BLD: 7.1 %
NEUTROPHILS # BLD AUTO: 7.8 K/UL
NEUTROPHILS NFR BLD: 70.1 %
NRBC BLD-RTO: 0 /100 WBC
PLATELET # BLD AUTO: 369 K/UL
PMV BLD AUTO: 10.3 FL
POTASSIUM SERPL-SCNC: 4.3 MMOL/L
PROT SERPL-MCNC: 7.3 G/DL
RBC # BLD AUTO: 3.98 M/UL
SODIUM SERPL-SCNC: 139 MMOL/L
WBC # BLD AUTO: 11.19 K/UL

## 2018-11-07 PROCEDURE — 99204 OFFICE O/P NEW MOD 45 MIN: CPT | Mod: S$GLB,,, | Performed by: SURGERY

## 2018-11-07 PROCEDURE — 80053 COMPREHEN METABOLIC PANEL: CPT

## 2018-11-07 PROCEDURE — 82378 CARCINOEMBRYONIC ANTIGEN: CPT

## 2018-11-07 PROCEDURE — 3008F BODY MASS INDEX DOCD: CPT | Mod: CPTII,S$GLB,, | Performed by: SURGERY

## 2018-11-07 PROCEDURE — 36415 COLL VENOUS BLD VENIPUNCTURE: CPT

## 2018-11-07 PROCEDURE — 99999 PR PBB SHADOW E&M-EST. PATIENT-LVL III: CPT | Mod: PBBFAC,,, | Performed by: SURGERY

## 2018-11-07 PROCEDURE — 86301 IMMUNOASSAY TUMOR CA 19-9: CPT

## 2018-11-07 PROCEDURE — 85025 COMPLETE CBC W/AUTO DIFF WBC: CPT

## 2018-11-07 NOTE — PROGRESS NOTES
"History & Physical  Surgical Oncology      SUBJECTIVE:     Chief Complaint/Reason for Admission: Gallbladder Mass    History of Present Illness: Faith Byers is a 53 y.o. female with history of crampy abdominal that started in early October. She went to see her PCP in mid October who ordered LFTs and an US. LFTs showed low albumin and US showed a possible gallbladder mass. This prompted a CTabdomen and referral to clinic.  She does not associate the pain with food but does has a decreased appetite. The pain is RUQ crampy but intermittently sharp feeling like a "twinge". She has not had any decrease in activity level. She reports increased fatigue but is able to perform her work and activities of daily living. Her stools have become smaller in size but otherwise have not changed. She has lost unintentionally about 10 lbs in the past month and reports night sweats. She denies any history of liver disease or personal history of cancer.      Current Outpatient Medications on File Prior to Visit   Medication Sig    calcium carbonate (CALCIUM 600 ORAL) Take 1,200 mg by mouth every evening.     mg tablet TAKE 1 TABLET BY MOUTH THREE TIMES A DAY    levothyroxine (SYNTHROID) 125 MCG tablet Take 125 mcg by mouth once daily.    multivitamin (ONE DAILY MULTIVITAMIN) per tablet Take 1 tablet by mouth once daily.    venlafaxine (EFFEXOR-XR) 37.5 MG 24 hr capsule Take 1 capsule (37.5 mg total) by mouth once daily.    [DISCONTINUED] pantoprazole (PROTONIX) 40 MG tablet TAKE 1 TABLET BY MOUTH EVERY DAY     No current facility-administered medications on file prior to visit.        Review of patient's allergies indicates:  No Known Allergies    Past Medical History:   Diagnosis Date    Arthritis     Thyroid disease      Past Surgical History:   Procedure Laterality Date    ARTHROSCOPY, KNEE, WITH Partial lateral MENISCECTOMY Right 7/12/2018    [facility]    BREAST BIOPSY "      BREAST SURGERY  2001    right biopsy, benign    CHONDROPLASTY,KNEE Medial Femoral Right 7/12/2018    Performed by [facility]an [facility] at [facility] OR    COLONOSCOPY      COLONOSCOPY N/A 12/5/2014    Performed by Sixto Barrera MD at [facility] ENDO    dental implant      KNEE ARTHROSCOPY Right     KNEE ARTHROSCOPY W/ MENISCECTOMY Right 7/12/2018    Procedure: ARTHROSCOPY, KNEE, WITH Partial lateral MENISCECTOMY;  Surgeon: Huey Kiran MD;  Location: [facility] OR;  Service: Orthopedics;  Laterality: Right;    THYROIDECTOMY  2011    complete     Family History   Problem Relation Age of Onset    Cancer Mother         breast and bone     Breast cancer Mother     Hyperlipidemia Father     ADD / ADHD Daughter         autism    Crohn's disease Paternal Uncle     Heart disease Paternal Uncle         heart transplant     Social History     Tobacco Use    Smoking status: Never Smoker    Smokeless tobacco: Never Used   Substance Use Topics    Alcohol use: Yes     Comment: rarely    Drug use: No        Review of Systems   Constitutional: Positive for appetite change, fatigue and unexpected weight change. Negative for activity change.        Night sweats   HENT: Negative for congestion and rhinorrhea.    Respiratory: Negative for shortness of breath and wheezing.    Cardiovascular: Negative for chest pain and palpitations.   Gastrointestinal: Positive for abdominal pain (RUQ). Negative for abdominal distention, constipation and nausea.        Going to the restroom less frequently and in smaller quantity   Genitourinary: Positive for frequency (Darker color). Negative for difficulty urinating.   Musculoskeletal: Negative for arthralgias and myalgias.   Skin: Negative for color change and pallor.   Neurological: Negative for light-headedness and headaches.   Hematological: Negative for adenopathy.     OBJECTIVE:     Vital Signs (Most Recent)  Temp: 97 °F (36.1 °C) (11/07/18 0902)  Pulse: 80 (11/07/18 0902)  BP:  122/79 (11/07/18 0902)    Physical Exam   Constitutional: She is oriented to person, place, and time. She appears well-developed. No distress.   Eyes: EOM are normal.   No scleral icterus   Cardiovascular: Normal rate and regular rhythm.   Pulmonary/Chest: Effort normal and breath sounds normal.   Abdominal: Soft. Bowel sounds are normal. She exhibits no distension. There is no tenderness.   Musculoskeletal: Normal range of motion.   Neurological: She is alert and oriented to person, place, and time.   Skin: Skin is warm and dry.   No jaundice   Psychiatric: She has a normal mood and affect. Her behavior is normal.   Vitals reviewed.        Diagnostic Results:  US: Reviewed  CT: Reviewed    ASSESSMENT/PLAN:     A/P: Patient is a 54 yo F referred to clinic for a gallbladder mass.    Mass is concerning for cancer. Had a long discussion with patient about the possible treatment options including neoadjuvant chemo, biopsy, and surgery. She wishes to proceed with biopsy first and discuss options after having a definitive diagnosis.    CMP      CEA  IR CT guided biopsy ASAP  F/u in clinic after biopsy results finalized

## 2018-11-07 NOTE — PROGRESS NOTES
Patient seen with Dr Sanders, history obtained, records reviewed, and CT reviewed in detail. Long talk with Mrs Byers. 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 The magnolia hepatis does not appear to be invaded and the PV and HA are not encased/involved. There are two small low density lesions in the liver (in segment 8 and segment 6) which may be cysts but might also be liver metastases.   We discussed two options:    1. Up front surgery with resection (assuming the lesions in seg 8 and 6 are not mets) with postop 'adjuvant' chemotherapy    2. IR CT-guided biopsy to make the diagnosis, followed by neoadjuvant chemotherapy for 4-6 cycles ( cis/[place], FOLFOX, or FOLFIRINOX most likely) and then reassessment for surgical resection.     Se discussed the pros and cons of each approach, with some emphasis on the fact that the cure rate for surgical resection of locally-advanced gallbladder cancer historically has not been good, that up to half of patients undergoing extensive complex UGI cancer surgery are not able to proceed with postop adjuvant therapy in a timely fashion because of postop problems, and that we do not have a strong database of RCTs to guide our management of GB cancer, and to a certain extent have to extrapolate from other tumor sites when making treatment decisions. She understands all of these.   Both she and I are in favor of initial IR biopsy, followed by neoadjuvant chemotherapy. Will set up biopsy and also Med Onc referral (she would like to be seen in [place])

## 2018-11-07 NOTE — LETTER
November 7, 2018      [facility]  [address]  [facility] [address]           [facility]  [address]  [facility] [address]  Phone: [phone]          Patient: Faith Byers   MR Number: [id number]   YOB: 1965   Date of Visit: 11/7/2018       Dear Mary Lou Jimenez:    Thank you for referring Faith Byers to me for evaluation. Attached you will find relevant portions of my assessment and plan of care.    If you have questions, please do not hesitate to call me. I look forward to following Faith Byers along with you.    Sincerely,    Deon Garcia MD    Enclosure  CC:  No Recipients    If you would like to receive this communication electronically, please contact [email or web address] or [phone] to request more information on [facility] access.    For providers and/or their staff who would like to refer a patient to [facility], please contact us through our one-stop-shop provider referral line, [facility], at [phone].    If you feel you have received this communication in error or would no longer like to receive these types of communications, please e-mail [email or web address]

## 2018-11-07 NOTE — PROGRESS NOTES
1.  Labs today (CBC, CMP, Ca 19-9, CEA)    2.  Referral to Hematology-Oncology    3.  Interventional Radiology request for liver/gallbladder biopsy

## 2018-11-08 ENCOUNTER — TELEPHONE (OUTPATIENT)
Dept: HEMATOLOGY/ONCOLOGY | Facility: CLINIC | Age: 53
End: 2018-11-08

## 2018-11-08 NOTE — TELEPHONE ENCOUNTER
Patient notified that she has a referral in for hematology oncology int he system and needs to be schedule. Patient stated that she wants to wait to be scheduled until after her biopsy.

## 2018-11-08 NOTE — TELEPHONE ENCOUNTER
----- Message from Wander Sorenson RN sent at 11/8/2018 11:02 AM CST -----  Good morning Jania,    Just kind of wanted to give you heads-up on the above pt who is d/t see and Oncologist after her biopsy results. Dx: Gallbladder CA. She thought they sent the referral to you guys but it appears to be in our queue. Just wanted to make sure you were aware.    Thanks,  Wander LU

## 2018-11-09 ENCOUNTER — TELEPHONE (OUTPATIENT)
Dept: SURGERY | Facility: CLINIC | Age: 53
End: 2018-11-09

## 2018-11-09 DIAGNOSIS — C23 GALLBLADDER CANCER: Primary | ICD-10-CM

## 2018-11-15 DIAGNOSIS — C23 GALLBLADDER CANCER: Primary | ICD-10-CM

## 2018-11-16 ENCOUNTER — HOSPITAL ENCOUNTER (OUTPATIENT)
Facility: HOSPITAL | Age: 53
Discharge: HOME | End: 2018-11-16
Attending: SURGERY | Admitting: SURGERY
Payer: COMMERCIAL

## 2018-11-16 VITALS
WEIGHT: 169 LBS | RESPIRATION RATE: 16 BRPM | DIASTOLIC BLOOD PRESSURE: 50 MMHG | OXYGEN SATURATION: 100 % | TEMPERATURE: 98 F | SYSTOLIC BLOOD PRESSURE: 109 MMHG | HEART RATE: 90 BPM | HEIGHT: 58 IN | BODY MASS INDEX: 35.48 KG/M2

## 2018-11-16 DIAGNOSIS — Z85.09 H/O CANCER OF GALL BLADDER: ICD-10-CM

## 2018-11-16 DIAGNOSIS — C23 GALLBLADDER CANCER: ICD-10-CM

## 2018-11-16 PROCEDURE — 88307 TISSUE EXAM BY PATHOLOGIST: CPT | Performed by: PATHOLOGY

## 2018-11-16 PROCEDURE — 63600175 PHARM REV CODE 636 W HCPCS: Performed by: RADIOLOGY

## 2018-11-16 PROCEDURE — 88333 PATH CONSLTJ SURG CYTO XM 1: CPT | Mod: 26,,, | Performed by: PATHOLOGY

## 2018-11-16 PROCEDURE — 88307 TISSUE EXAM BY PATHOLOGIST: CPT | Mod: 26,,, | Performed by: PATHOLOGY

## 2018-11-16 PROCEDURE — 88341 IMHCHEM/IMCYTCHM EA ADD ANTB: CPT | Mod: 26,,, | Performed by: PATHOLOGY

## 2018-11-16 PROCEDURE — 88342 IMHCHEM/IMCYTCHM 1ST ANTB: CPT | Mod: 26,,, | Performed by: PATHOLOGY

## 2018-11-16 PROCEDURE — 88342 IMHCHEM/IMCYTCHM 1ST ANTB: CPT | Performed by: PATHOLOGY

## 2018-11-16 RX ORDER — MIDAZOLAM HYDROCHLORIDE 1 MG/ML
1 INJECTION INTRAMUSCULAR; INTRAVENOUS
Status: DISCONTINUED | OUTPATIENT
Start: 2018-11-16 | End: 2018-11-16 | Stop reason: HOSPADM

## 2018-11-16 RX ORDER — FENTANYL CITRATE 50 UG/ML
50 INJECTION, SOLUTION INTRAMUSCULAR; INTRAVENOUS
Status: DISCONTINUED | OUTPATIENT
Start: 2018-11-16 | End: 2018-11-16 | Stop reason: HOSPADM

## 2018-11-16 RX ORDER — MIDAZOLAM HYDROCHLORIDE 1 MG/ML
INJECTION INTRAMUSCULAR; INTRAVENOUS CODE/TRAUMA/SEDATION MEDICATION
Status: COMPLETED | OUTPATIENT
Start: 2018-11-16 | End: 2018-11-16

## 2018-11-16 RX ORDER — SODIUM CHLORIDE 9 MG/ML
500 INJECTION, SOLUTION INTRAVENOUS ONCE
Status: DISCONTINUED | OUTPATIENT
Start: 2018-11-16 | End: 2018-11-16 | Stop reason: HOSPADM

## 2018-11-16 RX ORDER — FENTANYL CITRATE 50 UG/ML
INJECTION, SOLUTION INTRAMUSCULAR; INTRAVENOUS CODE/TRAUMA/SEDATION MEDICATION
Status: COMPLETED | OUTPATIENT
Start: 2018-11-16 | End: 2018-11-16

## 2018-11-16 RX ADMIN — MIDAZOLAM HYDROCHLORIDE 0.5 MG: 1 INJECTION, SOLUTION INTRAMUSCULAR; INTRAVENOUS at 01:11

## 2018-11-16 RX ADMIN — FENTANYL CITRATE 25 MCG: 50 INJECTION, SOLUTION INTRAMUSCULAR; INTRAVENOUS at 01:11

## 2018-11-16 RX ADMIN — MIDAZOLAM HYDROCHLORIDE 1 MG: 1 INJECTION, SOLUTION INTRAMUSCULAR; INTRAVENOUS at 01:11

## 2018-11-16 RX ADMIN — FENTANYL CITRATE 50 MCG: 50 INJECTION, SOLUTION INTRAMUSCULAR; INTRAVENOUS at 01:11

## 2018-11-16 NOTE — DISCHARGE SUMMARY
Radiology Discharge Summary      Hospital Course: No complications    Admit Date: 11/16/2018  Discharge Date: 11/16/2018     Instructions Given to Patient: Yes  Diet: Resume prior diet  Activity: activity as tolerated and no driving for today    Description of Condition on Discharge: Stable  Vital Signs (Most Recent): Temp: 97.7 °F (36.5 °C) (11/16/18 1400)  Pulse: 90 (11/16/18 1530)  Resp: 16 (11/16/18 1530)  BP: (!) 109/50 (11/16/18 1530)  SpO2: 100 % (11/16/18 1530)    Discharge Disposition: Home    Discharge Diagnosis: liver/gallbladder mass s/p CT guided bx     Follow-up: with Dr. Garcia for biopsy results    Asaf Alford MD  Staff Radiologist  Department of Radiology  Pager: [phone]

## 2018-11-16 NOTE — PROGRESS NOTES
Procedure complete. Pt tolerated well. Procedure site dry, intact, no bleeding, no hematoma. If no complications, pt can discharge in 2 hours, at 1600. Pt to recover in ROCU. Report to be given to nurse at bedside.

## 2018-11-16 NOTE — H&P
Radiology History & Physical      SUBJECTIVE:     Chief Complaint: Gallbladder mass.    History of Present Illness:  Faith Byers is a 53 y.o. female who presents for Gallbladder mass biopsy.  Past Medical History:   Diagnosis Date    Arthritis     Thyroid disease      Past Surgical History:   Procedure Laterality Date    ARTHROSCOPY, KNEE, WITH Partial lateral MENISCECTOMY Right 7/12/2018    Per[facility] by [facility]an [facility] OR    BREAST BIOPSY      BREAST SURGERY  2001    right biopsy, benign    CHONDROPLASTY,KNEE Medial Femoral Right 7/12/2018    [facility] at [facility] OR    COLONOSCOPY      COLONOSCOPY N/A 12/5/2014    Performed by Sixto Barrera [facility] at [facility] ENDO    dental implant      KNEE ARTHROSCOPY Right     KNEE ARTHROSCOPY W/ MENISCECTOMY Right 7/12/2018    Procedure: ARTHROSCOPY, KNEE, WITH Partial lateral MENISCECTOMY;  Surgeon: Huey Kiran MD;  Location: [facility] OR;  Service: Orthopedics;  Laterality: Right;    THYROIDECTOMY  2011    complete       Home Meds:   Prior to Admission medications    Medication Sig Start Date End Date Taking? Authorizing Provider   levothyroxine (SYNTHROID) 125 MCG tablet Take 125 mcg by mouth once daily.   Yes Historical Provider, MD   venlafaxine (EFFEXOR-XR) 37.5 MG 24 hr capsule Take 1 capsule (37.5 mg total) by mouth once daily. 6/1/18  Yes KOBE Palmer   calcium carbonate (CALCIUM 600 ORAL) Take 1,200 mg by mouth every evening.    Historical Provider, MD    mg tablet TAKE 1 TABLET BY MOUTH THREE TIMES A DAY 8/3/18   Huey Kiran MD   multivitamin (ONE DAILY MULTIVITAMIN) per tablet Take 1 tablet by mouth once daily.    Historical Provider, MD     Anticoagulants/Antiplatelets: no anticoagulation    Allergies: Review of patient's allergies indicates:  No Known Allergies  Sedation History:  no adverse reactions    Review of Systems:   Hematological: no known coagulopathies  Respiratory: no  shortness of breath  Cardiovascular: no chest pain  Gastrointestinal: no abdominal pain  Genito-Urinary: no dysuria  Musculoskeletal: negative  Neurological: no TIA or stroke symptoms         OBJECTIVE:     Vital Signs (Most Recent)  Temp: 97.9 °F (36.6 °C) (11/16/18 1109)  Pulse: 73 (11/16/18 1109)  Resp: 20 (11/16/18 1109)  BP: 115/72 (11/16/18 1109)  SpO2: 100 % (11/16/18 1109)    Physical Exam:  ASA: 3  Mallampati: 2    General: no acute distress  Mental Status: alert and oriented to person, place and time  HEENT: normocephalic, atraumatic  Chest: unlabored breathing  Heart: regular heart rate  Abdomen: nondistended  Extremity: moves all extremities    Laboratory  Lab Results   Component Value Date    INR 1.0 11/16/2018       Lab Results   Component Value Date    WBC 11.19 11/07/2018    HGB 11.3 (L) 11/07/2018    HCT 36.2 (L) 11/07/2018    MCV 91 11/07/2018     (H) 11/07/2018      Lab Results   Component Value Date    GLU 95 11/07/2018     11/07/2018    K 4.3 11/07/2018     11/07/2018    CO2 25 11/07/2018    BUN 11 11/07/2018    CREATININE 0.7 11/07/2018    CALCIUM 9.5 11/07/2018    ALT 21 11/07/2018    AST 21 11/07/2018    ALBUMIN 3.0 (L) 11/07/2018    BILITOT 0.3 11/07/2018       ASSESSMENT/PLAN:     Sedation Plan: moderate  Patient will undergo gallbladder mass biopsy.    URMILA JENKINS  PGY-IV Radiology Resident  [address] [facility]  Pager: [phone]

## 2018-11-16 NOTE — PROCEDURES
Radiology Post-Procedure Note    Pre Op Diagnosis: right lobe liver/gallbladder mass    Post Op Diagnosis: same     Procedure: right lobe liver biopsy    Procedure performed by: Asaf Alford MD    Written Informed Consent Obtained: Yes    Specimen Removed: YES 3 cores    Estimated Blood Loss: Minimal    Findings:   Using CT guidance a 19g sheath needle was placed into a right liver mass. 20g monopty biopsy gun used to take 3 core biopsy samples. Specimen sent to pathology.     Patient tolerated procedure well.    Asaf Alford MD  Staff Radiologist  Department of Radiology  Pager: [phone]

## 2018-11-20 ENCOUNTER — PATIENT MESSAGE (OUTPATIENT)
Dept: SURGERY | Facility: CLINIC | Age: 53
End: 2018-11-20

## 2018-11-23 ENCOUNTER — TELEPHONE (OUTPATIENT)
Dept: SURGERY | Facility: CLINIC | Age: 53
End: 2018-11-23

## 2018-11-26 ENCOUNTER — TREATMENT PLANNING (OUTPATIENT)
Dept: SURGERY | Facility: CLINIC | Age: 53
End: 2018-11-26

## 2018-11-26 ENCOUNTER — PATIENT MESSAGE (OUTPATIENT)
Dept: SURGERY | Facility: CLINIC | Age: 53
End: 2018-11-26

## 2018-11-26 NOTE — LETTER
November 26, 2018        Genevieve Alarcon MD  [address]  [place] [address]             [facility]  [address]  [facility] [address]  Phone: [phone]   Patient: Faith Byers   MR Number: [id number]   YOB: 1965   Date of Visit: 11/26/2018       Dear Dr. Alarcon:    Thank you for referring Faith Byers to me for evaluation. Attached you will find relevant portions of my assessment and plan of care.    If you have questions, please do not hesitate to call me. I look forward to following Faith Byers along with you.    Sincerely,      Deon Garcia MD            CC  No Recipients    Enclosure

## 2018-11-26 NOTE — PROGRESS NOTES
Phoned patient with IR path report, which showed invasive carcinoma (squamous morphology). Will arrange for her to see Dr Genevieve Alarcon in [place].     Copy Dr Alarcon

## 2018-11-27 ENCOUNTER — TELEPHONE (OUTPATIENT)
Dept: HEMATOLOGY/ONCOLOGY | Facility: CLINIC | Age: 53
End: 2018-11-27

## 2018-11-27 NOTE — TELEPHONE ENCOUNTER
----- Message from Yeimi Mccabe RN sent at 11/27/2018 10:48 AM CST -----  The above patient had a positive biopsy and needs a return appt to see Dr Alarcon.    There is a treatment note in her chart from Dr Garcia.    Thanks,,  Dilma

## 2018-11-28 ENCOUNTER — TELEPHONE (OUTPATIENT)
Dept: FAMILY MEDICINE | Facility: CLINIC | Age: 53
End: 2018-11-28

## 2018-11-28 NOTE — TELEPHONE ENCOUNTER
Spoke with patient to see how she is handling cancer diagnosis.  She is doing okay and will bring sister to Oncology appointment.  She will call if she needs anything from Primary Care.

## 2018-12-05 ENCOUNTER — INITIAL CONSULT (OUTPATIENT)
Dept: HEMATOLOGY/ONCOLOGY | Facility: CLINIC | Age: 53
End: 2018-12-05
Payer: COMMERCIAL

## 2018-12-05 VITALS
HEIGHT: 58 IN | DIASTOLIC BLOOD PRESSURE: 66 MMHG | SYSTOLIC BLOOD PRESSURE: 122 MMHG | TEMPERATURE: 98 F | WEIGHT: 166.25 LBS | RESPIRATION RATE: 16 BRPM | HEART RATE: 98 BPM | BODY MASS INDEX: 34.9 KG/M2

## 2018-12-05 DIAGNOSIS — E02 SUBCLINICAL IODINE-DEFICIENCY HYPOTHYROIDISM: Primary | ICD-10-CM

## 2018-12-05 DIAGNOSIS — C23 GALLBLADDER CANCER: Primary | ICD-10-CM

## 2018-12-05 DIAGNOSIS — C23 GALLBLADDER CANCER: ICD-10-CM

## 2018-12-05 PROCEDURE — 99999 PR PBB SHADOW E&M-EST. PATIENT-LVL III: CPT | Mod: PBBFAC,,, | Performed by: INTERNAL MEDICINE

## 2018-12-05 PROCEDURE — 3008F BODY MASS INDEX DOCD: CPT | Mod: CPTII,S$GLB,, | Performed by: INTERNAL MEDICINE

## 2018-12-05 PROCEDURE — 99205 OFFICE O/P NEW HI 60 MIN: CPT | Mod: S$GLB,,, | Performed by: INTERNAL MEDICINE

## 2018-12-05 NOTE — PROGRESS NOTES
Subjective:       Patient ID: Faith Byers is a 53 y.o. female.    Chief Complaint:  Sent by Dr. Garcia for evaluation of gallbladder CA for neoadjuvant chemoradiation therapy  HPI:   FINAL PATHOLOGIC DIAGNOSIS  GALLBLADDER/LIVER MASS, BIOPSY:  -Positive for malignancy, invasive squamous cell carcinoma, see note.    Social History     Socioeconomic History    Marital status:      Spouse name: None    Number of children: None    Years of education: None    Highest education level: None   Social Needs    Financial resource strain: None    Food insecurity - worry: None    Food insecurity - inability: None    Transportation needs - medical: None    Transportation needs - non-medical: None   Occupational History    None   Tobacco Use    Smoking status: Never Smoker    Smokeless tobacco: Never Used   Substance and Sexual Activity    Alcohol use: Yes     Comment: rarely    Drug use: No    Sexual activity: Not Currently   Other Topics Concern    None   Social History Narrative    None     Family History   Problem Relation Age of Onset    Cancer Mother         breast and bone     Breast cancer Mother     Hyperlipidemia Father     ADD / ADHD Daughter         autism    Crohn's disease Paternal Uncle     Heart disease Paternal Uncle         heart transplant     Past Surgical History:   Procedure Laterality Date    ARTHROSCOPY, KNEE, WITH Partial lateral MENISCECTOMY Right 7/12/2018    [facility]    Cqzkmv-nxcqqo-yw N/A 11/16/2018    Per[facility] OR 2ND FLR    BREAST BIOPSY      BREAST SURGERY  2001    right biopsy, benign    CHONDROPLASTY,KNEE Medial Femoral Right 7/12/2018    [facility] at [facility] OR    COLONOSCOPY      COLONOSCOPY N/A 12/5/2014    Performed by Sixto [facility]rera [facility] at [facility] ENDO    dental implant      KNEE ARTHROSCOPY Right     KNEE ARTHROSCOPY W/ MENISCECTOMY Right  7/12/2018    Procedure: ARTHROSCOPY, KNEE, WITH Partial lateral MENISCECTOMY;  Surgeon: Huey Kiran MD;  Location: [facility];  Service: Orthopedics;  Laterality: Right;    THYROIDECTOMY  2011    complete     Past Medical History:   Diagnosis Date    Arthritis     Thyroid disease        Current Outpatient Medications:     calcium carbonate (CALCIUM 600 ORAL), Take 1,200 mg by mouth every evening., Disp: , Rfl:      mg tablet, TAKE 1 TABLET BY MOUTH THREE TIMES A DAY, Disp: 60 tablet, Rfl: 0    levothyroxine (SYNTHROID) 125 MCG tablet, Take 125 mcg by mouth once daily., Disp: , Rfl:     multivitamin (ONE DAILY MULTIVITAMIN) per tablet, Take 1 tablet by mouth once daily., Disp: , Rfl:     venlafaxine (EFFEXOR-XR) 37.5 MG 24 hr capsule, Take 1 capsule (37.5 mg total) by mouth once daily., Disp: 30 capsule, Rfl: 11  Review of patient's allergies indicates:  No Known Allergies      REVIEW OF SYSTEMS:     CONSTITUTIONAL:  Loss of nausea for food is not tasting well she is having noticed for drinks either   SKIN: Denies rash, issues with nails, non-healing sores, bleeding, blotching    skin or abnormal bruising. Denies new moles or changes to existing moles.      BREASTS: There is no swelling around breasts or nipple discharge.    EYES: Denies eye pain, blurred vision, swelling, redness or discharge.      ENT AND MOUTH: Denies runny nose, stuffiness, sinus trouble or sores. Denies    nosebleeds. Denies, hoarseness, change in voice or swelling in front of the    neck.      CARDIOVASCULAR: Denies chest pain, discomfort or palpitations. Denies neck    swelling or episodes of passing out.      RESPIRATORY: Denies cough, sputum production, blood in sputum, and denies    shortness of breath.      GI: Denies trouble swallowing, indigestion, heartburn, abdominal pain, nausea,    vomiting, diarrhea, has altered bowel habits, no blood in stool, discoloration of    stools, change in nature of stool, bloating,  increased abdominal girth.      GENITOURINARY: No discharge. No pelvic pain or lumps. No rash around groin or  lesions. No urinary frequency, hesitation, painful urination or blood in    urine. Denies incontinence. No problems with intercourse.      MUSCULOSKELETAL: Denies neck or back pain. Denies weakness in arms or legs,    joint problems or distended inflamed veins in legs. Denies swelling or abnormal  glands.      NEUROLOGICAL: Denies tingling, numbness, altered mentation changes to nerve    function in the face, weakness to one or both of the body. Denies changes to    gait and denies multiple falls or accidents.      PSYCHIATRIC: Denies nervousness, anxiety, hallucinations, depression, suicidal    ideation, trouble sleeping or changes in behavior noticed by family.      The patient denies recent foreign travel or recent exposure to chemicals or    products of concern or infectious diseases.     PHYSICAL EXAM:     Vitals:    12/05/18 1259   BP: 122/66   Pulse: 98   Resp: 16   Temp: 98.1 °F (36.7 °C)       GENERAL: Comfortable looking patient. Patient is in no distress.  Awake, alert and oriented to time, person and place.  No anxiety, or agitation.      HEENT: Normal conjunctivae and eyelids. WNL.  PERRLA 3 to 4 mm. No icterus, no pallor, no congestion, and no discharge noted.     NECK:  Supple. Trachea is central.  No crepitus.  No JVD or masses.    RESPIRATORY:  No intercostal retractions.  No dullness to percussion.  Chest is clear to auscultation.  No rales, rhonchi or wheezes.  No crepitus.  Good air entry bilaterally.    CARDIOVASCULAR:  S1 and S2 are normally heard without murmurs or gallops.  All peripheral pulses are present.    ABDOMEN:  Normal abdomen.  No hepatosplenomegaly.  No free fluid.  Bowel sounds are present.  No hernia noted. No masses.  No rebound or tenderness.  No guarding or rigidity.  Umbilicus is midline.    LYMPHATICS:  No axillary, cervical, supraclavicular, submental, or inguinal  lymphadenopathy.    SKIN/MUSCULOSKELETAL:  There is no evidence of excoriation marks or ecchmosis.  No rashes.  No cyanosis.  No clubbing.  No joint or skeletal deformities noted.  Normal range of motion.    NEUROLOGIC:  Higher functions are appropriate.  No cranial nerve deficits.  Normal jody.  Normal strength.  Motor and sensory functions are normal.  Deep tendon reflexes are normal.    GENITAL/RECTAL:  Exams are deferred.      Laboratory:     CBC:  Lab Results   Component Value Date    WBC 11.19 11/07/2018    RBC 3.98 (L) 11/07/2018    HGB 11.3 (L) 11/07/2018    HCT 36.2 (L) 11/07/2018    MCV 91 11/07/2018    MCH 28.4 11/07/2018    MCHC 31.2 (L) 11/07/2018    RDW 12.1 11/07/2018     (H) 11/07/2018    MPV 10.3 11/07/2018    GRAN 7.8 (H) 11/07/2018    GRAN 70.1 11/07/2018    LYMPH 2.4 11/07/2018    LYMPH 21.0 11/07/2018    MONO 0.8 11/07/2018    MONO 7.1 11/07/2018    EOS 0.1 11/07/2018    BASO 0.07 11/07/2018    EOSINOPHIL 0.8 11/07/2018    BASOPHIL 0.6 11/07/2018       BMP: BMP  Lab Results   Component Value Date     11/07/2018    K 4.3 11/07/2018     11/07/2018    CO2 25 11/07/2018    BUN 11 11/07/2018    CREATININE 0.7 11/07/2018    CALCIUM 9.5 11/07/2018    ANIONGAP 9 11/07/2018    ESTGFRAFRICA >60.0 11/07/2018    EGFRNONAA >60.0 11/07/2018       LFT:   Lab Results   Component Value Date    ALT 21 11/07/2018    AST 21 11/07/2018    ALKPHOS 87 11/07/2018    BILITOT 0.3 11/07/2018 October 2018  Impression       Gallbladder mass compatible with gallbladder carcinoma.  Findings are present concerning for direct invasion of the hepatic parenchyma.  Invasion of the hepatic flexure of the colon also cannot be excluded.  Mild intrahepatic and extrahepatic biliary ductal dilatation is present.    Scattered hepatic hypodensities which are nonspecific however suggestive for small cysts.  Attention on follow-up for stability recommended.    This report was flagged in Epic as abnormal.          Assessment/Plan:       Squamous cell CA of gallbladder:  This is an unusual tissue type we will proceed with further workup I have message to Dr. Garcia will obtain PET scan will refer patient to Radiation Oncology evaluation as well I would like to understand further as to what regimen neoadjuvantly can be given with radiation therapy concurrently as this is not classic adenocarcinoma but a squamous cell CA as reported  Patient is hypothyroid continue medication

## 2018-12-05 NOTE — LETTER
December 5, 2018      Deon Garcia MD  [address]  [facility] [address]           [facility]ematology [facility]cology  [address], [address]  [facility] [address]  Phone: [phone]          Patient: Faith Byers   MR Number: [id number]   YOB: 1965   Date of Visit: 12/5/2018       Dear Dr. Deon Garcia:    Thank you for referring Faith Byers to me for evaluation. Attached you will find relevant portions of my assessment and plan of care.    If you have questions, please do not hesitate to call me. I look forward to following Faith Byers along with you.    Sincerely,    Genevieve Alarcon MD    Enclosure  CC:  No Recipients    If you would like to receive this communication electronically, please contact [email or web address] or [phone] to request more information on [employer] access.    For providers and/or their staff who would like to refer a patient to [facility], please contact us through our one-stop-shop provider referral line, [facility], at [phone].    If you feel you have received this communication in error or would no longer like to receive these types of communications, please e-mail [email or web address]

## 2018-12-09 ENCOUNTER — HOSPITAL ENCOUNTER (EMERGENCY)
Facility: HOSPITAL | Age: 53
Discharge: HOME | End: 2018-12-09
Attending: EMERGENCY MEDICINE
Payer: COMMERCIAL

## 2018-12-09 VITALS
WEIGHT: 164 LBS | RESPIRATION RATE: 17 BRPM | SYSTOLIC BLOOD PRESSURE: 111 MMHG | HEIGHT: 58 IN | OXYGEN SATURATION: 100 % | HEART RATE: 100 BPM | TEMPERATURE: 98 F | DIASTOLIC BLOOD PRESSURE: 56 MMHG | BODY MASS INDEX: 34.43 KG/M2

## 2018-12-09 DIAGNOSIS — S59.911A RIGHT FOREARM INJURY, INITIAL ENCOUNTER: ICD-10-CM
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
## 2018-12-09 PROCEDURE — 90715 TDAP VACCINE 7 YRS/> IM: CPT | Performed by: EMERGENCY MEDICINE

## 2018-12-09 PROCEDURE — 63600175 PHARM REV CODE 636 W HCPCS: Performed by: EMERGENCY MEDICINE

## 2018-12-09 PROCEDURE — 90471 IMMUNIZATION ADMIN: CPT | Performed by: EMERGENCY MEDICINE

## 2018-12-09 PROCEDURE — 25000003 PHARM REV CODE 250: Performed by: EMERGENCY MEDICINE

## 2018-12-09 PROCEDURE — 99284 EMERGENCY DEPT VISIT MOD MDM: CPT

## 2018-12-09 RX ORDER — AMOXICILLIN AND CLAVULANATE POTASSIUM 875; 125 MG/1; MG/1
1 TABLET, FILM COATED ORAL 2 TIMES DAILY
Qty: 9 TABLET | Refills: 0 | Status: SHIPPED | OUTPATIENT
Start: 2018-12-09 | End: 2018-12-14

## 2018-12-09 RX ORDER — AMOXICILLIN AND CLAVULANATE POTASSIUM 875; 125 MG/1; MG/1
1 TABLET, FILM COATED ORAL
Status: COMPLETED | OUTPATIENT
Start: 2018-12-09 | End: 2018-12-09

## 2018-12-09 RX ADMIN — CLOSTRIDIUM TETANI TOXOID ANTIGEN (FORMALDEHYDE INACTIVATED), CORYNEBACTERIUM DIPHTHERIAE TOXOID ANTIGEN (FORMALDEHYDE INACTIVATED), BORDETELLA PERTUSSIS TOXOID ANTIGEN (GLUTARALDEHYDE INACTIVATED), BORDETELLA PERTUSSIS FILAMENTOUS HEMAGGLUTININ ANTIGEN (FORMALDEHYDE INACTIVATED), BORDETELLA PERTUSSIS PERTACTIN ANTIGEN, AND BORDETELLA PERTUSSIS FIMBRIAE 2/3 ANTIGEN 0.5 ML: 5; 2; 2.5; 5; 3; 5 INJECTION, SUSPENSION INTRAMUSCULAR at 06:12

## 2018-12-09 RX ADMIN — AMOXICILLIN AND CLAVULANATE POTASSIUM 1 TABLET: 875; 125 TABLET, FILM COATED ORAL at 06:12

## 2018-12-09 NOTE — ED PROVIDER NOTES
Chief complaint:  Animal Bite (dog bite to right forearm from home pet)      HPI:  Faiht Byers is a 53 y.o. female presenting with dog bite to the right forearm.  She reports dog at home is vaccinating and acting normally.  She was sleeping in bed with the dogs with a began to fight and her arm was interposed between the 2 dogs.  Both her vaccinated for rabies.  She is uncertain of her last tetanus vaccination.  She denies any numbness or weakness.  She has minor pain and limited bleeding from the site with 3 wounds noted to the right forearm region.  She has no difficulty moving her arm or hand.    ROS: As per HPI and below:  No numbness, weakness, syncope, head injury.    Review of patient's allergies indicates:  No Known Allergies       Medication List      START taking these medications    amoxicillin-clavulanate 875-125mg 875-125 mg per tablet  Commonly known as:  AUGMENTIN  Take 1 tablet by mouth 2 (two) times daily. for 5 days        ASK your doctor about these medications    CALCIUM 600 ORAL      MG tablet  Generic drug:  ibuprofen  TAKE 1 TABLET BY MOUTH THREE TIMES A DAY     levothyroxine 125 MCG tablet  Commonly known as:  SYNTHROID     ONE DAILY MULTIVITAMIN per tablet  Generic drug:  multivitamin     venlafaxine 37.5 MG 24 hr capsule  Commonly known as:  EFFEXOR-XR  Take 1 capsule (37.5 mg total) by mouth once daily.           Where to Get Your Medications      You can get these medications from any pharmacy    Bring a paper prescription for each of these medications  · amoxicillin-clavulanate 875-125mg 875-125 mg per tablet         PMH:  As per HPI and below:  Past Medical History:   Diagnosis Date    Arthritis     Cancer     Thyroid disease      Past Surgical History:   Procedure Laterality Date    ARTHROSCOPY, KNEE, WITH Partial lateral MENISCECTOMY Right 7/12/2018    [facility] OR    Eltyqs-foyhuc-yn N/A 11/16/2018    Performed by [facility] 2ND FLR    BREAST BIOPSY      BREAST SURGERY  2001    right biopsy, benign    CHONDROPLASTY,KNEE Medial Femoral Right 7/12/2018    Performed [facility] at [facility] OR    COLONOSCOPY      COLONOSCOPY N/A 12/5/2014    Performed by [facility]rera [facility] at [facility] ENDO    dental implant      KNEE ARTHROSCOPY Right     KNEE ARTHROSCOPY W/ MENISCECTOMY Right 7/12/2018    Procedure: ARTHROSCOPY, KNEE, WITH Partial lateral MENISCECTOMY;  Surgeon: Huey Kiran MD;  Location[facility];  Service: Orthopedics;  Laterality: Right;    THYROIDECTOMY  2011    complete       Social History     Socioeconomic History    Marital status:      Spouse name: None    Number of children: None    Years of education: None    Highest education level: None   Social Needs    Financial resource strain: None    Food insecurity - worry: None    Food insecurity - inability: None    Transportation needs - medical: None    Transportation needs - non-medical: None   Occupational History    None   Tobacco Use    Smoking status: Never Smoker    Smokeless tobacco: Never Used   Substance and Sexual Activity    Alcohol use: Yes     Comment: rarely    Drug use: No    Sexual activity: Not Currently   Other Topics Concern    None   Social History Narrative    None       Family History   Problem Relation Age of Onset    Cancer Mother         breast and bone     Breast cancer Mother     Hyperlipidemia Father     ADD / ADHD Daughter         autism    Crohn's disease Paternal Uncle     Heart disease Paternal Uncle         heart transplant       Physical Exam:    Vitals:    12/09/18 0617   BP: (!) 111/56   Pulse: 100   Resp: 17   Temp: 97.8 °F (36.6 °C)     GENERAL:  No apparent distress.  Alert.    HEENT:  Moist mucous membranes.  Normocephalic and atraumatic.    NECK:  No swelling.  Midline trachea.   CARDIOVASCULAR:  Regular rate and rhythm.  2+ radial pulses.    PULMONARY:  Lungs  clear to auscultation bilaterally.  No wheezes, rales, or rhonci.    EXTREMITIES:  Warm and well perfused.  Brisk capillary refill.  Patient has intact flexion and extension assessed both at the elbow as well as at the wrist.  NEUROLOGICAL:  Normal mental status.  Appropriate and conversant.  5/5 strength and sensation.  Radial, median, and ulnar nerve distributions tested individually and compared bilaterally.  SKIN:  Three distinct wounds noted to the right forearm region with fat layer exposure.  No visualized foreign body.  No active bleeding.  Minimal tenderness around each site.  There are 2 on the volar forearm and 1 on the posterior aspect of the forearm.                  Labs Reviewed - No data to display    Current Discharge Medication List      START taking these medications    Details   amoxicillin-clavulanate 875-125mg (AUGMENTIN) 875-125 mg per tablet Take 1 tablet by mouth 2 (two) times daily. for 5 days  Qty: 9 tablet, Refills: 0         CONTINUE these medications which have NOT CHANGED    Details   calcium carbonate (CALCIUM 600 ORAL) Take 1,200 mg by mouth every evening.       mg tablet TAKE 1 TABLET BY MOUTH THREE TIMES A DAY  Qty: 60 tablet, Refills: 0      levothyroxine (SYNTHROID) 125 MCG tablet Take 125 mcg by mouth once daily.      multivitamin (ONE DAILY MULTIVITAMIN) per tablet Take 1 tablet by mouth once daily.      venlafaxine (EFFEXOR-XR) 37.5 MG 24 hr capsule Take 1 capsule (37.5 mg total) by mouth once daily.  Qty: 30 capsule, Refills: 11    Associated Diagnoses: CARINA (generalized anxiety disorder)             Orders Placed This Encounter   Procedures    X-Ray Forearm Right       Imaging Results          X-Ray Forearm Right (In process)                 ED Course as of Dec 09 0650   Sun Dec 09, 2018   0648 XR R forearm:  SC air from bite noted.  No FB.  No sign of fx or dislocation. (my read)  [MR]      ED Course User Index  [MR] Rhys Thakkar MD       MDM:    53 y.o.  female with right forearm dog bite with 3 wounds.  There is exposure of the fat layer.  I did discuss healing by secondary intention versus loose closure versus simple interrupted closure.  I did discuss also increased risk of infection with any closure versus increased healing time and scarring without closure.  Patient chooses no closure to minimize risk of infection.  Copious irrigation performed here by RN with tetanus vaccine updated.  Prophylactic Augmentin initiated.  X-ray ordered to rule out foreign body or underlying fracture.  There is no sign of tendon injury. I doubt joint penetration.  There is no distal neurovascular compromise detected.  Close outpatient follow-up for reassessment recommended.  Return precautions reviewed.      Diagnoses:    1. Dog bite right forearm with multiple lacerations     Rhys Thakkar MD  12/09/18 4259

## 2018-12-10 ENCOUNTER — OFFICE VISIT (OUTPATIENT)
Dept: SURGERY | Facility: CLINIC | Age: 53
End: 2018-12-10
Payer: COMMERCIAL

## 2018-12-10 ENCOUNTER — TELEPHONE (OUTPATIENT)
Dept: FAMILY MEDICINE | Facility: CLINIC | Age: 53
End: 2018-12-10

## 2018-12-10 VITALS
SYSTOLIC BLOOD PRESSURE: 109 MMHG | RESPIRATION RATE: 16 BRPM | HEART RATE: 94 BPM | HEIGHT: 58 IN | DIASTOLIC BLOOD PRESSURE: 59 MMHG | BODY MASS INDEX: 34.99 KG/M2 | WEIGHT: 166.69 LBS | TEMPERATURE: 98 F

## 2018-12-10 DIAGNOSIS — C23 CARCINOMA OF GALL BLADDER: Primary | ICD-10-CM

## 2018-12-10 DIAGNOSIS — I87.2 PERIPHERAL VENOUS INSUFFICIENCY: Primary | ICD-10-CM

## 2018-12-10 DIAGNOSIS — C23 GALLBLADDER CANCER: ICD-10-CM

## 2018-12-10 PROCEDURE — 3008F BODY MASS INDEX DOCD: CPT | Mod: CPTII,S$GLB,, | Performed by: SURGERY

## 2018-12-10 PROCEDURE — 99214 OFFICE O/P EST MOD 30 MIN: CPT | Mod: S$GLB,,, | Performed by: SURGERY

## 2018-12-10 PROCEDURE — 99999 PR PBB SHADOW E&M-EST. PATIENT-LVL III: CPT | Mod: PBBFAC,,, | Performed by: SURGERY

## 2018-12-10 RX ORDER — ONDANSETRON 8 MG/1
8 TABLET, ORALLY DISINTEGRATING ORAL EVERY 12 HOURS PRN
Qty: 30 TABLET | Refills: 1 | Status: SHIPPED | OUTPATIENT
Start: 2018-12-10 | End: 2019-01-23 | Stop reason: SDUPTHER

## 2018-12-10 RX ORDER — PROCHLORPERAZINE MALEATE 10 MG
10 TABLET ORAL EVERY 6 HOURS PRN
Qty: 30 TABLET | Refills: 1 | Status: SHIPPED | OUTPATIENT
Start: 2018-12-10 | End: 2019-06-03

## 2018-12-10 NOTE — PROGRESS NOTES
Subjective:       Patient ID: Faith Byers is a 53 y.o. female.    Chief Complaint: No chief complaint on file.    52 yo female with diagnosis of gall bladder cancer (although path report is squamous cell.)    Seen by Dr. Garcia and recommendation for adjuvant chemo +/- radiation treatment and then re-evaluation for surgical resection.    Only complaint is mild RUQ discomfort.     Referred by Dr. Alarcon for port placement.     Past Medical History:  No date: Arthritis  No date: Cancer  No date: Thyroid disease  Past Surgical History:  7/12/2018: ARTHROSCOPY, KNEE, WITH Partial lateral MENISCECTOMY; Right      Comment:  Performed by Huey Kiran MD at [facility] OR  11/16/2018: [id number]; N/A      Comment:  Performed by [facility] Diagnostic Provider at [facility] OR [address] [place] [address]  No date: BREAST BIOPSY  2001: BREAST SURGERY      Comment:  right biopsy, benign  7/12/2018: CHONDROPLASTY,KNEE Medial Femoral; Right      Comment:  Performed by Huey Kiran MD at [facility] OR  No date: COLONOSCOPY  12/5/2014: COLONOSCOPY; N/A      Comment:  Performed by Sixto Barrera MD at [facility] ENDO  No date: dental implant  No date: KNEE ARTHROSCOPY; Right  7/12/2018: KNEE ARTHROSCOPY W/ MENISCECTOMY; Right      Comment:  Procedure: ARTHROSCOPY, KNEE, WITH Partial lateral                MENISCECTOMY;  Surgeon: Huey Kiran MD;  Location:                [facility] OR;  Service: Orthopedics;  Laterality: Right;  2011: THYROIDECTOMY      Comment:  complete      Current Outpatient Medications:   amoxicillin-clavulanate 875-125mg (AUGMENTIN) 875-125 mg per tablet, Take 1 tablet by mouth 2 (two) times daily. for 5 days, Disp: 9 tablet, Rfl: 0  levothyroxine (SYNTHROID) 125 MCG tablet, Take 125 mcg by mouth once daily., Disp: , Rfl:   venlafaxine (EFFEXOR-XR) 37.5 MG 24 hr capsule, Take 1 capsule (37.5 mg total) by mouth once daily., Disp: 30 capsule, Rfl: 11  calcium carbonate (CALCIUM 600 ORAL), Take 1,200 mg by mouth every  evening., Disp: , Rfl:    mg tablet, TAKE 1 TABLET BY MOUTH THREE TIMES A DAY, Disp: 60 tablet, Rfl: 0  multivitamin (ONE DAILY MULTIVITAMIN) per tablet, Take 1 tablet by mouth once daily., Disp: , Rfl:     Review of patient's allergies indicates:  No Known Allergies    Review of patient's family history indicates:  Problem: Cancer      Relation: Mother          Age of Onset: (Not Specified)          Comment: breast and bone   Problem: Breast cancer      Relation: Mother          Age of Onset: (Not Specified)  Problem: Hyperlipidemia      Relation: Father          Age of Onset: (Not Specified)  Problem: ADD / ADHD      Relation: Daughter          Age of Onset: (Not Specified)          Comment: autism  Problem: Crohn's disease      Relation: Paternal Uncle          Age of Onset: (Not Specified)  Problem: Heart disease      Relation: Paternal Uncle          Age of Onset: (Not Specified)          Comment: heart transplant    Social History    Socioeconomic History      Marital status:       Spouse name: Not on file      Number of children: Not on file      Years of education: Not on file      Highest education level: Not on file    Social Needs      Financial resource strain: Not on file      Food insecurity - worry: Not on file      Food insecurity - inability: Not on file      Transportation needs - medical: Not on file      Transportation needs - non-medical: Not on file    Occupational History      Not on file    Tobacco Use      Smoking status: Never Smoker      Smokeless tobacco: Never Used    Substance and Sexual Activity      Alcohol use: Yes        Comment: rarely      Drug use: No      Sexual activity: Not Currently    Other Topics      Concerns:        Not on file    Social History Narrative      Not on file        Review of Systems   Constitutional: Negative for appetite change, chills, diaphoresis, fatigue, fever and unexpected weight change.   HENT: Negative for hearing loss, sore throat,  trouble swallowing and voice change.    Eyes: Negative for visual disturbance.   Respiratory: Negative for cough, shortness of breath and wheezing.    Cardiovascular: Negative for chest pain, palpitations and leg swelling.   Gastrointestinal: Positive for abdominal pain. Negative for abdominal distention, anal bleeding, blood in stool, constipation, diarrhea, nausea, rectal pain and vomiting.   Genitourinary: Negative for difficulty urinating, dysuria, flank pain, frequency, hematuria, menstrual problem and urgency.   Musculoskeletal: Negative for arthralgias, back pain, joint swelling, myalgias and neck pain.   Skin: Negative for pallor and rash.   Neurological: Negative for dizziness, syncope, weakness and headaches.   Hematological: Negative for adenopathy. Does not bruise/bleed easily.   Psychiatric/Behavioral: Negative for suicidal ideas. The patient is not nervous/anxious.        Objective:      Physical Exam   Constitutional: She is oriented to person, place, and time. She appears well-developed and well-nourished. No distress.   HENT:   Head: Normocephalic and atraumatic.   Right Ear: External ear normal.   Left Ear: External ear normal.   Eyes: Conjunctivae are normal. Pupils are equal, round, and reactive to light. Right eye exhibits no discharge. Left eye exhibits no discharge.   Neck: No tracheal deviation present. No thyromegaly present.   Cardiovascular: Normal rate and regular rhythm.   Pulmonary/Chest: Effort normal. No respiratory distress.   Abdominal: Soft. She exhibits no distension. There is no guarding.   Musculoskeletal: She exhibits no edema or tenderness.   Lymphadenopathy:     She has no cervical adenopathy.   Neurological: She is alert and oriented to person, place, and time. No cranial nerve deficit.   Skin: Skin is warm and dry. No rash noted. She is not diaphoretic. No pallor.   Psychiatric: She has a normal mood and affect. Her behavior is normal. Judgment and thought content normal.    Nursing note and vitals reviewed.      Assessment:       1. Peripheral venous insufficiency    2. Gallbladder cancer        Plan:       For port placement on 12/14/18.  All aspects of procedure including risks and possible complications were discussed in detail with the patient who agrees to proceed with procedure.  All questions were answered and consent was obtained. Preop orders were written.

## 2018-12-10 NOTE — TELEPHONE ENCOUNTER
----- Message from Funmi Vanegas sent at 12/10/2018  9:02 AM CST -----  Pt is requesting a [place] er / Follow up visit from 12/09 for dog bites ... To check for any infections / requesting an appt for Friday 12-14  ..call pt at work # [phone]

## 2018-12-10 NOTE — TELEPHONE ENCOUNTER
Called number below was given another number to call [phone] Spoke to patient notified no available appointments on Friday. Appointment scheduled for Monday patient confirmed appointment

## 2018-12-10 NOTE — LETTER
December 10, 2018      Genevieve Alarcon MD  [address]  [facility] [address]           [facility]  [address]  [place] [address]  Phone: [phone]          Patient: Faith Byers   MR Number: [id number]   YOB: 1965   Date of Visit: 12/10/2018       Dear Dr. Genevieve Alarcon:    Thank you for referring Faith Byers to me for evaluation. Attached you will find relevant portions of my assessment and plan of care.    If you have questions, please do not hesitate to call me. I look forward to following Faith Byers along with you.    Sincerely,    Jurgen Toro MD    Enclosure  CC:  No Recipients    If you would like to receive this communication electronically, please contact [email or web address] or [phone] to request more information on [facility] access.    For providers and/or their staff who would like to refer a patient to [facility], please contact us through our one-stop-shop provider referral line, [facility], at [phone].    If you feel you have received this communication in error or would no longer like to receive these types of communications, please e-mail [email or web address]

## 2018-12-10 NOTE — H&P (VIEW-ONLY)
Subjective:       Patient ID: Faith Byers is a 53 y.o. female.    Chief Complaint: No chief complaint on file.    52 yo female with diagnosis of gall bladder cancer (although path report is squamous cell.)    Seen by Dr. Garcia and recommendation for adjuvant chemo +/- radiation treatment and then re-evaluation for surgical resection.    Only complaint is mild RUQ discomfort.     Referred by Dr. Alarcon for port placement.     Past Medical History:  No date: Arthritis  No date: Cancer  No date: Thyroid disease  Past Surgical History:  7/12/2018: ARTHROSCOPY, KNEE, WITH Partial lateral MENISCECTOMY; Right      Comment:  Performed by Huey Kiran MD at [facility] OR  11/16/2018: [id number]; N/A      Comment:  Performed by [facility] Diagnostic Provider at [facility] OR [address] [place] [address]  No date: BREAST BIOPSY  2001: BREAST SURGERY      Comment:  right biopsy, benign  7/12/2018: CHONDROPLASTY,KNEE Medial Femoral; Right      Comment:  Performed by Huey Kiran MD at [facility] OR  No date: COLONOSCOPY  12/5/2014: COLONOSCOPY; N/A      Comment:  Performed by Sixto Barrera MD at [facility] ENDO  No date: dental implant  No date: KNEE ARTHROSCOPY; Right  7/12/2018: KNEE ARTHROSCOPY W/ MENISCECTOMY; Right      Comment:  Procedure: ARTHROSCOPY, KNEE, WITH Partial lateral                MENISCECTOMY;  Surgeon: Huey Kiran MD;  Location:                [facility] OR;  Service: Orthopedics;  Laterality: Right;  2011: THYROIDECTOMY      Comment:  complete      Current Outpatient Medications:   amoxicillin-clavulanate 875-125mg (AUGMENTIN) 875-125 mg per tablet, Take 1 tablet by mouth 2 (two) times daily. for 5 days, Disp: 9 tablet, Rfl: 0  levothyroxine (SYNTHROID) 125 MCG tablet, Take 125 mcg by mouth once daily., Disp: , Rfl:   venlafaxine (EFFEXOR-XR) 37.5 MG 24 hr capsule, Take 1 capsule (37.5 mg total) by mouth once daily., Disp: 30 capsule, Rfl: 11  calcium carbonate (CALCIUM 600 ORAL), Take 1,200 mg by mouth every  evening., Disp: , Rfl:    mg tablet, TAKE 1 TABLET BY MOUTH THREE TIMES A DAY, Disp: 60 tablet, Rfl: 0  multivitamin (ONE DAILY MULTIVITAMIN) per tablet, Take 1 tablet by mouth once daily., Disp: , Rfl:     Review of patient's allergies indicates:  No Known Allergies    Review of patient's family history indicates:  Problem: Cancer      Relation: Mother          Age of Onset: (Not Specified)          Comment: breast and bone   Problem: Breast cancer      Relation: Mother          Age of Onset: (Not Specified)  Problem: Hyperlipidemia      Relation: Father          Age of Onset: (Not Specified)  Problem: ADD / ADHD      Relation: Daughter          Age of Onset: (Not Specified)          Comment: autism  Problem: Crohn's disease      Relation: Paternal Uncle          Age of Onset: (Not Specified)  Problem: Heart disease      Relation: Paternal Uncle          Age of Onset: (Not Specified)          Comment: heart transplant    Social History    Socioeconomic History      Marital status:       Spouse name: Not on file      Number of children: Not on file      Years of education: Not on file      Highest education level: Not on file    Social Needs      Financial resource strain: Not on file      Food insecurity - worry: Not on file      Food insecurity - inability: Not on file      Transportation needs - medical: Not on file      Transportation needs - non-medical: Not on file    Occupational History      Not on file    Tobacco Use      Smoking status: Never Smoker      Smokeless tobacco: Never Used    Substance and Sexual Activity      Alcohol use: Yes        Comment: rarely      Drug use: No      Sexual activity: Not Currently    Other Topics      Concerns:        Not on file    Social History Narrative      Not on file        Review of Systems   Constitutional: Negative for appetite change, chills, diaphoresis, fatigue, fever and unexpected weight change.   HENT: Negative for hearing loss, sore throat,  trouble swallowing and voice change.    Eyes: Negative for visual disturbance.   Respiratory: Negative for cough, shortness of breath and wheezing.    Cardiovascular: Negative for chest pain, palpitations and leg swelling.   Gastrointestinal: Positive for abdominal pain. Negative for abdominal distention, anal bleeding, blood in stool, constipation, diarrhea, nausea, rectal pain and vomiting.   Genitourinary: Negative for difficulty urinating, dysuria, flank pain, frequency, hematuria, menstrual problem and urgency.   Musculoskeletal: Negative for arthralgias, back pain, joint swelling, myalgias and neck pain.   Skin: Negative for pallor and rash.   Neurological: Negative for dizziness, syncope, weakness and headaches.   Hematological: Negative for adenopathy. Does not bruise/bleed easily.   Psychiatric/Behavioral: Negative for suicidal ideas. The patient is not nervous/anxious.        Objective:      Physical Exam   Constitutional: She is oriented to person, place, and time. She appears well-developed and well-nourished. No distress.   HENT:   Head: Normocephalic and atraumatic.   Right Ear: External ear normal.   Left Ear: External ear normal.   Eyes: Conjunctivae are normal. Pupils are equal, round, and reactive to light. Right eye exhibits no discharge. Left eye exhibits no discharge.   Neck: No tracheal deviation present. No thyromegaly present.   Cardiovascular: Normal rate and regular rhythm.   Pulmonary/Chest: Effort normal. No respiratory distress.   Abdominal: Soft. She exhibits no distension. There is no guarding.   Musculoskeletal: She exhibits no edema or tenderness.   Lymphadenopathy:     She has no cervical adenopathy.   Neurological: She is alert and oriented to person, place, and time. No cranial nerve deficit.   Skin: Skin is warm and dry. No rash noted. She is not diaphoretic. No pallor.   Psychiatric: She has a normal mood and affect. Her behavior is normal. Judgment and thought content normal.    Nursing note and vitals reviewed.      Assessment:       1. Peripheral venous insufficiency    2. Gallbladder cancer        Plan:       For port placement on 12/14/18.  All aspects of procedure including risks and possible complications were discussed in detail with the patient who agrees to proceed with procedure.  All questions were answered and consent was obtained. Preop orders were written.

## 2018-12-11 DIAGNOSIS — C23 GALLBLADDER CANCER: Primary | ICD-10-CM

## 2018-12-11 RX ORDER — LIDOCAINE HYDROCHLORIDE 10 MG/ML
1 INJECTION, SOLUTION EPIDURAL; INFILTRATION; INTRACAUDAL; PERINEURAL ONCE
Status: CANCELLED | OUTPATIENT
Start: 2018-12-14

## 2018-12-11 RX ORDER — SODIUM CHLORIDE 9 MG/ML
INJECTION, SOLUTION INTRAVENOUS CONTINUOUS
Status: CANCELLED | OUTPATIENT
Start: 2018-12-14

## 2018-12-12 NOTE — OR NURSING
Phone pre-op was done.  Patient verbalized understanding of instructions and education.  Lyn Abbasi

## 2018-12-13 ENCOUNTER — ANESTHESIA EVENT (OUTPATIENT)
Dept: SURGERY | Facility: HOSPITAL | Age: 53
End: 2018-12-13
Payer: COMMERCIAL

## 2018-12-14 ENCOUNTER — ANESTHESIA (OUTPATIENT)
Dept: SURGERY | Facility: HOSPITAL | Age: 53
End: 2018-12-14
Payer: COMMERCIAL

## 2018-12-14 ENCOUNTER — HOSPITAL ENCOUNTER (OUTPATIENT)
Facility: HOSPITAL | Age: 53
Discharge: HOME | End: 2018-12-14
Attending: SURGERY | Admitting: SURGERY
Payer: COMMERCIAL

## 2018-12-14 VITALS
BODY MASS INDEX: 34.22 KG/M2 | DIASTOLIC BLOOD PRESSURE: 51 MMHG | HEIGHT: 58 IN | SYSTOLIC BLOOD PRESSURE: 99 MMHG | OXYGEN SATURATION: 97 % | RESPIRATION RATE: 18 BRPM | TEMPERATURE: 98 F | HEART RATE: 90 BPM | WEIGHT: 163 LBS

## 2018-12-14 DIAGNOSIS — Z85.09 H/O CANCER OF GALL BLADDER: ICD-10-CM

## 2018-12-14 DIAGNOSIS — I87.2 PERIPHERAL VENOUS INSUFFICIENCY: Primary | ICD-10-CM

## 2018-12-14 DIAGNOSIS — C23 GALLBLADDER CANCER: ICD-10-CM

## 2018-12-14 PROCEDURE — 99900104 DSU ONLY-NO CHARGE-EA ADD'L HR (STAT): Performed by: SURGERY

## 2018-12-14 PROCEDURE — 63600175 PHARM REV CODE 636 W HCPCS: Performed by: SURGERY

## 2018-12-14 PROCEDURE — 36561 INSERT TUNNELED CV CATH: CPT | Mod: RT,,, | Performed by: SURGERY

## 2018-12-14 PROCEDURE — 37000009 HC ANESTHESIA EA ADD 15 MINS: Performed by: SURGERY

## 2018-12-14 PROCEDURE — 63600175 PHARM REV CODE 636 W HCPCS: Performed by: NURSE ANESTHETIST, CERTIFIED REGISTERED

## 2018-12-14 PROCEDURE — 71000033 HC RECOVERY, INTIAL HOUR: Performed by: SURGERY

## 2018-12-14 PROCEDURE — 25000003 PHARM REV CODE 250: Performed by: SURGERY

## 2018-12-14 PROCEDURE — 25000003 PHARM REV CODE 250: Performed by: ANESTHESIOLOGY

## 2018-12-14 PROCEDURE — 99900103 DSU ONLY-NO CHARGE-INITIAL HR (STAT): Performed by: SURGERY

## 2018-12-14 PROCEDURE — C1788 PORT, INDWELLING, IMP: HCPCS | Performed by: SURGERY

## 2018-12-14 PROCEDURE — D9220A PRA ANESTHESIA: Mod: ANES,,, | Performed by: ANESTHESIOLOGY

## 2018-12-14 PROCEDURE — D9220A PRA ANESTHESIA: Mod: CRNA,,, | Performed by: NURSE ANESTHETIST, CERTIFIED REGISTERED

## 2018-12-14 PROCEDURE — 77001 FLUOROGUIDE FOR VEIN DEVICE: CPT | Mod: 26,,, | Performed by: SURGERY

## 2018-12-14 PROCEDURE — 71000015 HC POSTOP RECOV 1ST HR: Performed by: SURGERY

## 2018-12-14 PROCEDURE — 37000008 HC ANESTHESIA 1ST 15 MINUTES: Performed by: SURGERY

## 2018-12-14 PROCEDURE — 36000707: Performed by: SURGERY

## 2018-12-14 PROCEDURE — 27200651 HC AIRWAY, LMA: Performed by: NURSE ANESTHETIST, CERTIFIED REGISTERED

## 2018-12-14 PROCEDURE — 36000706: Performed by: SURGERY

## 2018-12-14 DEVICE — KIT POWERPORT SINGLE 8FR: Type: IMPLANTABLE DEVICE | Site: CHEST | Status: FUNCTIONAL

## 2018-12-14 RX ORDER — SODIUM CHLORIDE 9 MG/ML
INJECTION, SOLUTION INTRAVENOUS CONTINUOUS
Status: DISCONTINUED | OUTPATIENT
Start: 2018-12-14 | End: 2018-12-14 | Stop reason: HOSPADM

## 2018-12-14 RX ORDER — LIDOCAINE HCL/PF 100 MG/5ML
SYRINGE (ML) INTRAVENOUS
Status: DISCONTINUED | OUTPATIENT
Start: 2018-12-14 | End: 2018-12-14

## 2018-12-14 RX ORDER — SODIUM CHLORIDE, SODIUM LACTATE, POTASSIUM CHLORIDE, CALCIUM CHLORIDE 600; 310; 30; 20 MG/100ML; MG/100ML; MG/100ML; MG/100ML
INJECTION, SOLUTION INTRAVENOUS CONTINUOUS
Status: DISCONTINUED | OUTPATIENT
Start: 2018-12-14 | End: 2018-12-14 | Stop reason: HOSPADM

## 2018-12-14 RX ORDER — BUPIVACAINE HYDROCHLORIDE AND EPINEPHRINE 5; 5 MG/ML; UG/ML
INJECTION, SOLUTION EPIDURAL; INTRACAUDAL; PERINEURAL
Status: DISCONTINUED | OUTPATIENT
Start: 2018-12-14 | End: 2018-12-14 | Stop reason: HOSPADM

## 2018-12-14 RX ORDER — CEFAZOLIN SODIUM 2 G/50ML
2 SOLUTION INTRAVENOUS
Status: COMPLETED | OUTPATIENT
Start: 2018-12-14 | End: 2018-12-14

## 2018-12-14 RX ORDER — ACETAMINOPHEN 10 MG/ML
INJECTION, SOLUTION INTRAVENOUS
Status: DISCONTINUED | OUTPATIENT
Start: 2018-12-14 | End: 2018-12-14

## 2018-12-14 RX ORDER — DEXAMETHASONE SODIUM PHOSPHATE 4 MG/ML
INJECTION, SOLUTION INTRA-ARTICULAR; INTRALESIONAL; INTRAMUSCULAR; INTRAVENOUS; SOFT TISSUE
Status: DISCONTINUED | OUTPATIENT
Start: 2018-12-14 | End: 2018-12-14

## 2018-12-14 RX ORDER — HYDROMORPHONE HYDROCHLORIDE 2 MG/ML
1 INJECTION, SOLUTION INTRAMUSCULAR; INTRAVENOUS; SUBCUTANEOUS EVERY 4 HOURS PRN
Status: DISCONTINUED | OUTPATIENT
Start: 2018-12-14 | End: 2018-12-14 | Stop reason: HOSPADM

## 2018-12-14 RX ORDER — FENTANYL CITRATE 50 UG/ML
25 INJECTION, SOLUTION INTRAMUSCULAR; INTRAVENOUS EVERY 5 MIN PRN
Status: DISCONTINUED | OUTPATIENT
Start: 2018-12-14 | End: 2018-12-14 | Stop reason: HOSPADM

## 2018-12-14 RX ORDER — HEPARIN 100 UNIT/ML
SYRINGE INTRAVENOUS
Status: DISCONTINUED | OUTPATIENT
Start: 2018-12-14 | End: 2018-12-14 | Stop reason: HOSPADM

## 2018-12-14 RX ORDER — FENTANYL CITRATE 50 UG/ML
INJECTION, SOLUTION INTRAMUSCULAR; INTRAVENOUS
Status: DISCONTINUED | OUTPATIENT
Start: 2018-12-14 | End: 2018-12-14

## 2018-12-14 RX ORDER — LIDOCAINE HYDROCHLORIDE 10 MG/ML
1 INJECTION, SOLUTION EPIDURAL; INFILTRATION; INTRACAUDAL; PERINEURAL ONCE
Status: COMPLETED | OUTPATIENT
Start: 2018-12-14 | End: 2018-12-14

## 2018-12-14 RX ORDER — PROPOFOL 10 MG/ML
VIAL (ML) INTRAVENOUS
Status: DISCONTINUED | OUTPATIENT
Start: 2018-12-14 | End: 2018-12-14

## 2018-12-14 RX ORDER — MIDAZOLAM HYDROCHLORIDE 1 MG/ML
INJECTION, SOLUTION INTRAMUSCULAR; INTRAVENOUS
Status: DISCONTINUED | OUTPATIENT
Start: 2018-12-14 | End: 2018-12-14

## 2018-12-14 RX ORDER — OXYCODONE HYDROCHLORIDE 5 MG/1
5 TABLET ORAL ONCE AS NEEDED
Status: COMPLETED | OUTPATIENT
Start: 2018-12-14 | End: 2018-12-14

## 2018-12-14 RX ORDER — ONDANSETRON HYDROCHLORIDE 2 MG/ML
INJECTION, SOLUTION INTRAMUSCULAR; INTRAVENOUS
Status: DISCONTINUED | OUTPATIENT
Start: 2018-12-14 | End: 2018-12-14

## 2018-12-14 RX ADMIN — LIDOCAINE HYDROCHLORIDE 75 MG: 20 INJECTION, SOLUTION INTRAVENOUS at 07:12

## 2018-12-14 RX ADMIN — OXYCODONE HYDROCHLORIDE 5 MG: 5 TABLET ORAL at 08:12

## 2018-12-14 RX ADMIN — SODIUM CHLORIDE, SODIUM LACTATE, POTASSIUM CHLORIDE, AND CALCIUM CHLORIDE: .6; .31; .03; .02 INJECTION, SOLUTION INTRAVENOUS at 06:12

## 2018-12-14 RX ADMIN — DEXAMETHASONE SODIUM PHOSPHATE 4 MG: 4 INJECTION, SOLUTION INTRAMUSCULAR; INTRAVENOUS at 07:12

## 2018-12-14 RX ADMIN — PROPOFOL 150 MG: 10 INJECTION, EMULSION INTRAVENOUS at 07:12

## 2018-12-14 RX ADMIN — ACETAMINOPHEN 1000 MG: 10 INJECTION, SOLUTION INTRAVENOUS at 07:12

## 2018-12-14 RX ADMIN — LIDOCAINE HYDROCHLORIDE 10 MG: 10 INJECTION, SOLUTION EPIDURAL; INFILTRATION; INTRACAUDAL; PERINEURAL at 06:12

## 2018-12-14 RX ADMIN — CEFAZOLIN SODIUM 2 G: 2 SOLUTION INTRAVENOUS at 07:12

## 2018-12-14 RX ADMIN — FENTANYL CITRATE 50 MCG: 50 INJECTION, SOLUTION INTRAMUSCULAR; INTRAVENOUS at 07:12

## 2018-12-14 RX ADMIN — ONDANSETRON 4 MG: 2 INJECTION, SOLUTION INTRAMUSCULAR; INTRAVENOUS at 07:12

## 2018-12-14 RX ADMIN — MIDAZOLAM 2 MG: 1 INJECTION INTRAMUSCULAR; INTRAVENOUS at 07:12

## 2018-12-14 NOTE — DISCHARGE INSTRUCTIONS

## 2018-12-14 NOTE — ANESTHESIA POSTPROCEDURE EVALUATION
"Anesthesia Post Evaluation    Patient: Faith Byers    Procedure(s) Performed: Procedure(s) (LRB):  [id number] (Right)    Final Anesthesia Type: general  Patient location during evaluation: PACU  Patient participation: Yes- Able to Participate  Level of consciousness: awake and alert  Post-procedure vital signs: reviewed and stable  Pain management: adequate  Airway patency: patent  PONV status at discharge: No PONV  Anesthetic complications: no      Cardiovascular status: hemodynamically stable and blood pressure returned to baseline  Respiratory status: unassisted, spontaneous ventilation and room air  Hydration status: euvolemic  Follow-up not needed.        Visit Vitals  BP (!) 92/52 (BP Location: Left arm, Patient Position: Sitting)   Pulse 100   Temp 36.4 °C (97.5 °F) (Skin)   Resp 16   Ht 4' 10" (1.473 m)   Wt 73.9 kg (163 lb)   SpO2 95%   Breastfeeding? No   BMI 34.07 kg/m²       Pain/Deena Score: Pain Rating Prior to Med Admin: 3 (12/14/2018  8:31 AM)  Pain Rating Post Med Admin: 0 (12/14/2018  8:45 AM)  Deena Score: 10 (12/14/2018  9:00 AM)        "

## 2018-12-14 NOTE — PLAN OF CARE
Pt aaox4, denies pain and nausea, tolerating clear liquids, vss, dressing cdi, father updated, pt released by anesthesia to transfer to phase II, report given to Bin Baker

## 2018-12-14 NOTE — DISCHARGE SUMMARY
Discharge Summary  General Surgery      Admit Date: 12/14/2018    Discharge Date :12/14/2018    Attending Physician: Jurgen Toro     Discharge Physician: Jurgen Toro    Discharged Condition: good    Discharge Diagnosis: Peripheral venous insufficiency [I87.2]  Gallbladder cancer [C23]    Treatments/Procedures: Procedure(s) (LRB):  [id number] (Right)    Hospital Course: Uneventful; Discharged home from Recovery    Significant Diagnostic Studies: none    Disposition: Home or Self Care    Diet: Regular    Follow up: Office 10-14 days    Activity: No heavy lifting till seen in office.    Patient Instructions:   Current Discharge Medication List      CONTINUE these medications which have NOT CHANGED    Details   amoxicillin-clavulanate 875-125mg (AUGMENTIN) 875-125 mg per tablet Take 1 tablet by mouth 2 (two) times daily. for 5 days  Qty: 9 tablet, Refills: 0      calcium carbonate (CALCIUM 600 ORAL) Take 1,200 mg by mouth every evening.      levothyroxine (SYNTHROID) 125 MCG tablet Take 125 mcg by mouth once daily.      multivitamin (ONE DAILY MULTIVITAMIN) per tablet Take 1 tablet by mouth once daily.      ondansetron (ZOFRAN-ODT) 8 MG TbDL Take 1 tablet (8 mg total) by mouth every 12 (twelve) hours as needed.  Qty: 30 tablet, Refills: 1    Associated Diagnoses: Carcinoma of gall bladder      venlafaxine (EFFEXOR-XR) 37.5 MG 24 hr capsule Take 1 capsule (37.5 mg total) by mouth once daily.  Qty: 30 capsule, Refills: 11    Associated Diagnoses: CARINA (generalized anxiety disorder)      prochlorperazine (COMPAZINE) 10 MG tablet Take 1 tablet (10 mg total) by mouth every 6 (six) hours as needed.  Qty: 30 tablet, Refills: 1    Associated Diagnoses: Carcinoma of gall bladder             Discharge Procedure Orders   Diet general     Lifting restrictions     Call MD for:  temperature >100.4     Call MD for:  persistent nausea and vomiting     Call MD for:  severe uncontrolled pain     Call MD for:  difficulty  breathing, headache or visual disturbances     Call MD for:  redness, tenderness, or signs of infection (pain, swelling, redness, odor or green/yellow discharge around incision site)     Call MD for:  hives     Call MD for:  persistent dizziness or light-headedness     Call MD for:  extreme fatigue     Remove dressing in 48 hours     Shower on day dressing removed (No bath)

## 2018-12-14 NOTE — OP NOTE
DATE: 12/14/2018    PRE OP DX: Insufficient venous access for chemotherapy    POST OF DX: Same    PROCEDURE: Insertion of right Subclavian PowerPort    SURGEON: Jurgen Toro M.D.    ANESTHESIA: General    PROCEDURE AND FINDINGS:  With the patient in the supine trendelenburg position under satisfactory anesthesia the chest and neck were prepped and draped in the usual manner for port insertion.  The skin and subcutaneous tissue in the right subclavicular area was infiltrated with 0.25% Marcaine w/epinephrine.  Venepuncture was performed into the right subclavian vein and guide wire was advanced under fluoroscopic control into the SVC.    Skin incision was made and pocked created for the Power port.  The catheter was cut to 15 cm in length and connected to the port in the standard manner.  It was flushed with heparinized saline.  The catheter was introduced using the supplied peel-away sheath introducer and advanced to the SVC-RA junction using fluoroscopy..  Good flow was noted in both directions.  The port was sutured to the pectoralis fascia using 2-0 vicryl sutures.  Hemostasis was secure and the subcutaneous tissue was approximated with 3-0 vicryl.  The skin incision was closed with a 4-0 monocryl subcuticular stitch.  Steri strips and sterile dressing was applied.    Patient tolerated the procedure well and was sent to recovery room in satisfactory condition.    EBL: 10 cc's    Complications: none    Drains: none    Specimens: none

## 2018-12-14 NOTE — INTERVAL H&P NOTE
The patient has been examined and the H&P has been reviewed:    I concur with the findings and no changes have occurred since H&P was written.    Anesthesia/Surgery risks, benefits and alternative options discussed and understood by patient/family.          Active Hospital Problems    Diagnosis  POA    Peripheral venous insufficiency [I87.2]  Yes      Resolved Hospital Problems   No resolved problems to display.

## 2018-12-14 NOTE — ANESTHESIA PREPROCEDURE EVALUATION
12/14/2018  Faith Byers is a 53 y.o., female.    Anesthesia Evaluation      I have reviewed the Medications.     Review of Systems  Anesthesia Hx:  No problems with previous Anesthesia   Social:  Non-Smoker, No Alcohol Use    Hematology/Oncology:        Current/Recent Cancer. Oncology Comments: Gallbladder CA   Cardiovascular:  Cardiovascular Normal     Pulmonary:  Pulmonary Normal    Renal/:  Renal/ Normal     Hepatic/GI:  Hepatic/GI Normal    Neurological:  Neurology Normal    Endocrine:   Hypothyroidism    Psych:   anxiety          Physical Exam  General:  Obesity    Airway/Jaw/Neck:  Airway Findings: Mouth Opening: Normal Tongue: Normal  General Airway Assessment: Adult, Average  Mallampati: II  Jaw/Neck Findings:  Neck ROM: Normal ROM       Chest/Lungs:  Chest/Lungs Findings: Clear to auscultation, Normal Respiratory Rate     Heart/Vascular:  Heart Findings: Rate: Normal  Rhythm: Regular Rhythm  Sounds: Normal  Heart murmur: negative       Mental Status:  Mental Status Findings:  Cooperative, Alert and Oriented         Anesthesia Plan  Type of Anesthesia, risks & benefits discussed:  Anesthesia Type:  general  Patient's Preference:   Intra-op Monitoring Plan:   Intra-op Monitoring Plan Comments:   Post Op Pain Control Plan:   Post Op Pain Control Plan Comments:   Induction:   IV  Beta Blocker:  Patient is not currently on a Beta-Blocker (No further documentation required).       Informed Consent: Patient understands risks and agrees with Anesthesia plan.  Questions answered. Anesthesia consent signed with patient.  ASA Score: 3     Day of Surgery Review of History & Physical:        Anesthesia Plan Notes: LMA general        Ready For Surgery From Anesthesia Perspective.

## 2018-12-14 NOTE — PLAN OF CARE
Pt has a dog bite to the right forearm/treated in [facility] ED/ dressing is dry and intact/Dr Toro is aware per pt/ She was prescribed Amoxicillin.

## 2018-12-14 NOTE — TRANSFER OF CARE
"Anesthesia Transfer of Care Note    Patient: Faith Byers    Procedure(s) Performed: Procedure(s) (LRB):  [id number] (Right)    Patient location: PACU    Anesthesia Type: general    Transport from OR: Transported from OR on 2-3 L/min O2 by NC with adequate spontaneous ventilation    Post pain: adequate analgesia    Post assessment: no apparent anesthetic complications and tolerated procedure well    Post vital signs: stable    Level of consciousness: awake, alert and oriented    Nausea/Vomiting: no nausea/vomiting    Complications: none    Transfer of care protocol was followed      Last vitals:   Visit Vitals  BP (!) 90/51   Pulse 106   Temp 36.9 °C (98.4 °F) (Skin)   Resp 16   Ht 4' 10" (1.473 m)   Wt 73.9 kg (163 lb)   SpO2 99%   Breastfeeding? No   BMI 34.07 kg/m²     "

## 2018-12-17 ENCOUNTER — DOCUMENTATION ONLY (OUTPATIENT)
Dept: RADIATION ONCOLOGY | Facility: CLINIC | Age: 53
End: 2018-12-17

## 2018-12-17 ENCOUNTER — PATIENT MESSAGE (OUTPATIENT)
Dept: HEMATOLOGY/ONCOLOGY | Facility: CLINIC | Age: 53
End: 2018-12-17

## 2018-12-17 ENCOUNTER — TELEPHONE (OUTPATIENT)
Dept: HEMATOLOGY/ONCOLOGY | Facility: CLINIC | Age: 53
End: 2018-12-17

## 2018-12-17 ENCOUNTER — OFFICE VISIT (OUTPATIENT)
Dept: FAMILY MEDICINE | Facility: CLINIC | Age: 53
End: 2018-12-17
Payer: COMMERCIAL

## 2018-12-17 VITALS
DIASTOLIC BLOOD PRESSURE: 76 MMHG | TEMPERATURE: 98 F | HEART RATE: 100 BPM | BODY MASS INDEX: 35.05 KG/M2 | HEIGHT: 58 IN | WEIGHT: 167 LBS | SYSTOLIC BLOOD PRESSURE: 128 MMHG

## 2018-12-17 DIAGNOSIS — C67.9 MALIGNANT NEOPLASM OF URINARY BLADDER, UNSPECIFIED SITE: Primary | ICD-10-CM

## 2018-12-17 DIAGNOSIS — F41.1 GAD (GENERALIZED ANXIETY DISORDER): ICD-10-CM

## 2018-12-17 DIAGNOSIS — C23 GALLBLADDER CANCER: ICD-10-CM
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
## 2018-12-17 DIAGNOSIS — J01.00 ACUTE NON-RECURRENT MAXILLARY SINUSITIS: ICD-10-CM

## 2018-12-17 DIAGNOSIS — S41.151D DOG BITE OF RIGHT UPPER EXTREMITY, SUBSEQUENT ENCOUNTER: Primary | ICD-10-CM

## 2018-12-17 DIAGNOSIS — C22.9 MALIGNANT NEOPLASM OF LIVER, UNSPECIFIED LIVER MALIGNANCY TYPE: ICD-10-CM

## 2018-12-17 DIAGNOSIS — E89.0 POSTOPERATIVE HYPOTHYROIDISM: ICD-10-CM

## 2018-12-17 DIAGNOSIS — R10.11 RUQ ABDOMINAL PAIN: ICD-10-CM

## 2018-12-17 DIAGNOSIS — R63.0 DECREASE IN APPETITE: ICD-10-CM

## 2018-12-17 PROCEDURE — 99214 OFFICE O/P EST MOD 30 MIN: CPT | Mod: S$GLB,,, | Performed by: NURSE PRACTITIONER

## 2018-12-17 PROCEDURE — 3008F BODY MASS INDEX DOCD: CPT | Mod: CPTII,S$GLB,, | Performed by: NURSE PRACTITIONER

## 2018-12-17 PROCEDURE — 99999 PR PBB SHADOW E&M-EST. PATIENT-LVL IV: CPT | Mod: PBBFAC,,, | Performed by: NURSE PRACTITIONER

## 2018-12-17 RX ORDER — LEVOTHYROXINE SODIUM 112 UG/1
112 TABLET ORAL DAILY
Qty: 30 TABLET | Refills: 11
Start: 2018-12-17 | End: 2019-02-25 | Stop reason: CLARIF

## 2018-12-17 RX ORDER — AZITHROMYCIN 250 MG/1
TABLET, FILM COATED ORAL
Qty: 6 TABLET | Refills: 0 | Status: SHIPPED | OUTPATIENT
Start: 2018-12-17 | End: 2018-12-22

## 2018-12-17 RX ORDER — MUPIROCIN 20 MG/G
OINTMENT TOPICAL 3 TIMES DAILY
Qty: 22 G | Refills: 0 | Status: SHIPPED | OUTPATIENT
Start: 2018-12-17 | End: 2019-02-04

## 2018-12-17 NOTE — TELEPHONE ENCOUNTER
----- Message from Sujey Mcmahon sent at 12/17/2018  1:06 PM CST -----  Type: Needs Medical Advice    Who Called:  [facility]  Best Call Back Number: [phone]  Additional Information: Needs to speak with nurse (Jania)needs to know if should cancel patient's PET Scan for tomorrow due to insurance denied/please call back to advise.

## 2018-12-17 NOTE — TELEPHONE ENCOUNTER
Dominga with [facility] infusion notified that it is ok to cancel pet ct. That Ct scans will be ordered.

## 2018-12-17 NOTE — PROGRESS NOTES
Subjective:       Patient ID: Faith Byers is a 53 y.o. female.    Chief Complaint: Hospital Follow Up    Chief Complaint  Chief Complaint   Patient presents with    Hospital Follow Up       HPI  Faith Byers is a 53 y.o. female with medical diagnoses as listed in the medical history and problem list that presents for emergency room follow up of a dog bite from 12/9/18. Wounds were cleansed, patient received TD vaccine and was provided a prescription for Augmentin for 5 days which she completed. Two of the bite wounds are scabbed, one to the right antecubital area is open with clear drainage noted. Mild eryhtema noted to areas surrounding wounds.  Patient is regularly treated for hypothyroidism and depression/anxiety.   Patient has recently been diagnosed with gall bladder and liver cancer. Port a cath has been placed Friday 12/14/18 by Dr. Toro and patient is scheduled to start chemotherapy with Dr. Alarcon oncology following a PET scan scheduled tomorrow. Patient is c/o nasal congestion and runny nose today and would like sinuses checked. Blood pressure today is 128/76. BMI is 34.90 and patient has lost 11 pounds since last visit. Established patient with last clinic appointment 10/19/2018.        PAST MEDICAL HISTORY:  Past Medical History:   Diagnosis Date    Arthritis     Cancer     gallbadder/ liver spots/biopsy    Thyroid disease        PAST SURGICAL HISTORY:  Past Surgical History:   Procedure Laterality Date    ARTHROSCOPY, KNEE, WITH Partial lateral MENISCECTOMY Right 7/12/2018    Per[facility]an [facility] at [facility] OR    [id number]-[id number]-hh N/A 11/16/2018    Performed by [facility] OR [facility]    BREAST BIOPSY      BREAST SURGERY  2001    right biopsy, benign    CHONDROPLASTY,KNEE Medial Femoral Right 7/12/2018    Per[facility] at [facility] OR    COLONOSCOPY      COLONOSCOPY N/A 12/5/2014    Performed by Sixto Barrera MD at  [facility]O    dental implant      KNEE ARTHROSCOPY Right     KNEE ARTHROSCOPY W/ MENISCECTOMY Right 7/12/2018    Procedure: ARTHROSCOPY, KNEE, WITH Partial lateral MENISCECTOMY;  Surgeon: Huey Kiran MD;  Location: [facility];  Service: Orthopedics;  Laterality: Right;    LIVER BIOPSY  11/16/2018    THYROIDECTOMY  2011    complete       SOCIAL HISTORY:  Social History     Socioeconomic History    Marital status:      Spouse name: Not on file    Number of children: Not on file    Years of education: Not on file    Highest education level: Not on file   Social Needs    Financial resource strain: Not on file    Food insecurity - worry: Not on file    Food insecurity - inability: Not on file    Transportation needs - medical: Not on file    Transportation needs - non-medical: Not on file   Occupational History    Not on file   Tobacco Use    Smoking status: Never Smoker    Smokeless tobacco: Never Used   Substance and Sexual Activity    Alcohol use: Yes     Comment: rarely    Drug use: No    Sexual activity: Not Currently   Other Topics Concern    Not on file   Social History Narrative    Not on file       FAMILY HISTORY:  Family History   Problem Relation Age of Onset    Cancer Mother         breast and bone     Breast cancer Mother     Hyperlipidemia Father     ADD / ADHD Daughter         autism    Crohn's disease Paternal Uncle     Heart disease Paternal Uncle         heart transplant       ALLERGIES AND MEDICATIONS: updated and reviewed.  Review of patient's allergies indicates:  No Known Allergies  Current Outpatient Medications   Medication Sig Dispense Refill    calcium carbonate (CALCIUM 600 ORAL) Take 1,200 mg by mouth every evening.      levothyroxine (SYNTHROID) 125 MCG tablet Take 125 mcg by mouth once daily. Mon, Tues, Thurs, Fri, Sat, brand name only      multivitamin (ONE DAILY MULTIVITAMIN) per tablet Take 1 tablet by mouth once daily.      ondansetron  (ZOFRAN-ODT) 8 MG TbDL Take 1 tablet (8 mg total) by mouth every 12 (twelve) hours as needed. 30 tablet 1    prochlorperazine (COMPAZINE) 10 MG tablet Take 1 tablet (10 mg total) by mouth every 6 (six) hours as needed. 30 tablet 1    venlafaxine (EFFEXOR-XR) 37.5 MG 24 hr capsule Take 1 capsule (37.5 mg total) by mouth once daily. 30 capsule 11    azithromycin (Z-MIRANDA) 250 MG tablet Take 2 tablets by mouth on day 1; Take 1 tablet by mouth on days 2-5 6 tablet 0    mupirocin (BACTROBAN) 2 % ointment Apply topically 3 (three) times daily. To affected areas until healed 22 g 0    SYNTHROID 112 mcg tablet Take 1 tablet (112 mcg total) by mouth once daily. On Wed, Sun 30 tablet 11     No current facility-administered medications for this visit.        I have reviewed the patient's medical history in detail and updated the computerized patient record.    Review of Systems   Constitutional: Positive for appetite change, chills and fever. Negative for activity change, fatigue and unexpected weight change.        Appetite is decreased because eating causes abdominal pain, and sense of taste seems to be off. Patient has some night time chills with fever and chemotherapy school told her she may be having tumor fever.    HENT: Positive for congestion, postnasal drip, rhinorrhea, sinus pressure, sinus pain and sore throat. Negative for ear pain, hearing loss and trouble swallowing.         C/O sinus congestion, post nasal drip, runny nose and mild sinus pressure/pain, slight sore throat.   Eyes: Negative for discharge and visual disturbance.        Vision corrected with glasses   Respiratory: Positive for cough. Negative for chest tightness, shortness of breath and wheezing.         Slight cough, non-productive   Cardiovascular: Negative for chest pain and palpitations.   Gastrointestinal: Positive for abdominal pain. Negative for blood in stool, constipation, diarrhea and vomiting.        RUQ abdominal pain that comes and  "goes, achy.    Endocrine: Negative for polydipsia and polyuria.   Genitourinary: Negative for difficulty urinating, dysuria, hematuria and menstrual problem.        Recently had Mirena IUD removed. Post-menopausal.   Musculoskeletal: Negative for arthralgias, joint swelling and neck pain.   Skin: Positive for wound.        Dog bite wound to right forearm, 3 bite marks   Neurological: Negative for dizziness, weakness, numbness and headaches.   Psychiatric/Behavioral: Positive for sleep disturbance. Negative for confusion and dysphoric mood. The patient is not nervous/anxious.         Not sleeping well at night, falls asleep easily but wakes frequently through the night         Objective:      Vitals:    12/17/18 1046   BP: 128/76   BP Location: Left arm   Patient Position: Sitting   BP Method: Medium (Manual)   Pulse: 100   Temp: 98 °F (36.7 °C)   TempSrc: Oral   Weight: 75.8 kg (167 lb)   Height: 4' 10" (1.473 m)     Physical Exam   Constitutional: She is oriented to person, place, and time. Vital signs are normal. She appears well-developed. No distress.   Blood pressure 128/76   HENT:   Head: Normocephalic and atraumatic.   Right Ear: External ear and ear canal normal. Tympanic membrane is bulging.   Left Ear: External ear and ear canal normal. Tympanic membrane is erythematous and bulging.   Nose: Mucosal edema and rhinorrhea present. Right sinus exhibits maxillary sinus tenderness. Right sinus exhibits no frontal sinus tenderness. Left sinus exhibits maxillary sinus tenderness. Left sinus exhibits no frontal sinus tenderness.   Mouth/Throat: Mucous membranes are normal. Oropharyngeal exudate present.   Left TM with bulging and redness noted. Right TM opaque, yellow white with bulging. Bilateral nares with erythema and edema noted to nasal mucosa. Bilateral maxillary tenderness with palpation. Throat with clear post nasal drip noted.    Eyes: Conjunctivae and lids are normal. Pupils are equal, round, and reactive " to light. Right eye exhibits no discharge. Left eye exhibits no discharge. Right conjunctiva is not injected. Left conjunctiva is not injected.   Neck: Normal range of motion. Neck supple. Normal carotid pulses present. Carotid bruit is not present. No thyromegaly present.   Cardiovascular: Normal rate, regular rhythm, S1 normal, S2 normal, normal heart sounds, intact distal pulses and normal pulses.   No murmur heard.  Pulses:       Carotid pulses are 2+ on the right side, and 2+ on the left side.       Radial pulses are 2+ on the right side, and 2+ on the left side.   Pulmonary/Chest: Effort normal and breath sounds normal. No respiratory distress. She has no decreased breath sounds. She has no wheezes. She has no rhonchi. She has no rales.   Musculoskeletal: Normal range of motion.   Lymphadenopathy:     She has no cervical adenopathy.        Right: No supraclavicular adenopathy present.        Left: No supraclavicular adenopathy present.   Neurological: She is alert and oriented to person, place, and time. She has normal strength.   Skin: Skin is warm and dry. Lesion noted. She is not diaphoretic. No pallor.   2 bite wounds to right forearm with scabs noted. One bite wound to right anticubital area is open with some clear drainage noted. Port a cath incision site D&I with steri strips in place.    Psychiatric: She has a normal mood and affect. Her speech is normal and behavior is normal. Judgment and thought content normal. Cognition and memory are normal.   Nursing note and vitals reviewed.        Assessment:       1. Dog bite of right upper extremity, subsequent encounter    2. Acute non-recurrent maxillary sinusitis    3. Gallbladder cancer    4. Malignant neoplasm of liver, unspecified liver malignancy type    5. Postoperative hypothyroidism    6. CARINA (generalized anxiety disorder)    7. Decrease in appetite    8. RUQ abdominal pain    9. BMI 34.0-34.9,adult          Plan:       Faith was seen today for  hospital follow up.  Patient will ask Dr. Alarcon if she can have a pneumovax 23 prior to starting chemo.   Diagnoses and all orders for this visit:    Dog bite of right upper extremity, subsequent encounter  -     mupirocin (BACTROBAN) 2 % ointment; Apply topically 3 (three) times daily. To affected areas until healed  - Dog bite healing with 2 areas scabbed, one open, gauze with ace wrap applied    Acute non-recurrent maxillary sinusitis  -     azithromycin (Z-MIRANDA) 250 MG tablet; Take 2 tablets by mouth on day 1; Take 1 tablet by mouth on days 2-5    Gallbladder cancer   Continue follow up with Dr. Alarcon for treatment plan   Malignant neoplasm of liver, unspecified liver malignancy type   Continue follow up with Dr. Alarcon for treatment plan  Postoperative hypothyroidism  -     SYNTHROID 112 mcg tablet; Take 1 tablet (112 mcg total) by mouth once daily. On Wed, Sun  - Managed by Dr. Lyons, endocrinology    CARINA (generalized anxiety disorder)   Continue effexor as prescribed, medication with effect  Decrease in appetite    Related to abdominal pain due to cancer  RUQ abdominal pain   Related to gal bladder and liver cancer  BMI 34.0-34.9,adult   BMI today is 34.90 and patient has lost 11 pounds since last office visit 10/19/18   Weight loss not recommended at this time due to cancer diagnosis and will be starting chemotherapy in near future    Follow-up if symptoms worsen or fail to improve.      Patient readiness: acceptance and barriers:none    During the course of the visit the patient was educated and counseled about the following:     Obesity:   Diet interventions: patient advised not to try to lose weight at this time because beginning chemotherapy and possible radiation for gall bladder and liver cancer in next few weeks.  Informal exercise measures discussed, e.g. taking stairs instead of elevator.  Regular aerobic exercise program discussed.    Goals: Obesity: Reduce calorie intake and BMI    Did patient  meet goals/outcomes: Yes    The following self management tools provided: declined    Patient Instructions (the written plan) was given to the patient/family.     Time spent with patient: 30 minutes    Barriers to medications present (no )    Adverse reactions to current medications (no)    Over the counter medications reviewed (Yes)

## 2018-12-18 ENCOUNTER — HOSPITAL ENCOUNTER (OUTPATIENT)
Dept: RADIOLOGY | Facility: HOSPITAL | Age: 53
Discharge: HOME | End: 2018-12-18
Attending: INTERNAL MEDICINE
Payer: COMMERCIAL

## 2018-12-18 ENCOUNTER — PATIENT MESSAGE (OUTPATIENT)
Dept: HEMATOLOGY/ONCOLOGY | Facility: CLINIC | Age: 53
End: 2018-12-18

## 2018-12-18 DIAGNOSIS — C67.9 MALIGNANT NEOPLASM OF URINARY BLADDER, UNSPECIFIED SITE: ICD-10-CM

## 2018-12-18 PROCEDURE — 71260 CT THORAX DX C+: CPT | Mod: 26,,, | Performed by: RADIOLOGY

## 2018-12-18 PROCEDURE — 25500020 PHARM REV CODE 255

## 2018-12-18 PROCEDURE — 74177 CT ABD & PELVIS W/CONTRAST: CPT | Mod: 26,,, | Performed by: RADIOLOGY

## 2018-12-18 PROCEDURE — 74177 CT ABD & PELVIS W/CONTRAST: CPT | Mod: TC

## 2018-12-18 PROCEDURE — 25500020 PHARM REV CODE 255: Performed by: INTERNAL MEDICINE

## 2018-12-18 RX ADMIN — IOHEXOL 30 ML: 350 INJECTION, SOLUTION INTRAVENOUS at 08:12

## 2018-12-18 RX ADMIN — IOHEXOL 75 ML: 350 INJECTION, SOLUTION INTRAVENOUS at 08:12

## 2018-12-19 ENCOUNTER — PATIENT MESSAGE (OUTPATIENT)
Dept: HEMATOLOGY/ONCOLOGY | Facility: CLINIC | Age: 53
End: 2018-12-19

## 2018-12-20 ENCOUNTER — DOCUMENTATION ONLY (OUTPATIENT)
Dept: RADIATION ONCOLOGY | Facility: CLINIC | Age: 53
End: 2018-12-20

## 2018-12-20 ENCOUNTER — OFFICE VISIT (OUTPATIENT)
Dept: RADIATION ONCOLOGY | Facility: CLINIC | Age: 53
End: 2018-12-20
Payer: COMMERCIAL

## 2018-12-20 VITALS
TEMPERATURE: 99 F | DIASTOLIC BLOOD PRESSURE: 67 MMHG | OXYGEN SATURATION: 97 % | HEART RATE: 104 BPM | SYSTOLIC BLOOD PRESSURE: 108 MMHG | BODY MASS INDEX: 35.03 KG/M2 | HEIGHT: 58 IN | RESPIRATION RATE: 18 BRPM | WEIGHT: 166.88 LBS

## 2018-12-20 DIAGNOSIS — C23 MALIGNANT NEOPLASM OF GALLBLADDER: ICD-10-CM

## 2018-12-20 LAB — B-HCG UR QL: NEGATIVE

## 2018-12-20 PROCEDURE — 99205 OFFICE O/P NEW HI 60 MIN: CPT | Mod: ,,, | Performed by: RADIOLOGY

## 2018-12-20 PROCEDURE — 3008F BODY MASS INDEX DOCD: CPT | Mod: ,,, | Performed by: RADIOLOGY

## 2018-12-20 NOTE — PROGRESS NOTES
Faith Byers  [id number]  1965  12/20/2018  Genevieve Alarcon Md  [address] [place] [address]    REASON FOR CONSULTATION: Gallbladder cancer  TREATMENT GOAL: neoadjuvant    HISTORY OF PRESENT ILLNESS:   53-year-old patient presented with a 3 to four-month history of increasing upper abdominal cramping and burning reflux symptoms. She was originally placed on proton pump inhibitors, with some relief. She eventually underwent an ultrasound which revealed a mass in the gallbladder encroaching into the liver. The mass was measuring approximate 5 cm.    The CT report was as follows:There is a heterogeneous mass of the midbody and fundus of the gallbladder, measuring 5.2 by 5.1 by 4.9 cm.  There is no distinct margin between the mass and segment 5 of the liver, and there is subtle ill-defined hypoattenuation within the hepatic parenchyma adjacent to the mass, concerning for direct invasion of the hepatic parenchyma.  The coronal images also demonstrate fat stranding along the inferior margin of the mass extending 2 the the the superior margin of the hepatic flexure of the colon, with the colon wall itself nonthickened however invasion of the colon cannot be excluded.  The cystic duct appears dilated to 15 mm.  The common bile duct is mildly dilated for patient age at 7 mm, and there is very mild intrahepatic biliary ductal dilatation.    She underwent a CT-guided biopsy on 11/16/2018 making the diagnosis of invasive squamous cell carcinoma. This was from the gallbladder and liver mass itself.    She underwent a repeat CAT scan which unfortunately is shown an increase in size of the mass to 9 cm. There was an adjacent 8 mm lymph node in the portal region which is a new finding as well.    She's been seen by Dr. Deon Garcia and is being evaluated for neoadjuvant radiation chemotherapy.    Clinically at the present time she still has the mild upper abdominal cramping and reflux pain. She is maintained  her weight. She's had no evidence of jaundice, nausea vomiting or diarrhea        Review of Systems   Constitutional: Negative for appetite change and unexpected weight change.   HENT:   Negative for sore throat and voice change.    Eyes: Negative for eye problems and icterus.   Respiratory: Negative for shortness of breath and wheezing.    Cardiovascular: Negative for chest pain and leg swelling.   Gastrointestinal: Positive for abdominal pain. Negative for diarrhea and vomiting.   Musculoskeletal: Negative for flank pain and neck pain.   Skin: Negative for itching and rash.   Neurological: Negative for extremity weakness and seizures.   Hematological: Negative for adenopathy. Does not bruise/bleed easily.   Psychiatric/Behavioral: Negative for confusion. The patient is not nervous/anxious.      Past Medical History:   Diagnosis Date    Arthritis     Cancer     gallbadder/ liver spots/biopsy    Thyroid disease      Past Surgical History:   Procedure Laterality Date    ARTHROSCOPY, KNEE, WITH Partial lateral MENISCECTOMY Right 7/12/2018    [facility] at [facility] OR    [id number] N/A 11/16/2018    Performed by [facility] Diagnostic Provider at [facility] OR [address] [place] [address]    BREAST BIOPSY      BREAST SURGERY  2001    right biopsy, benign    CHONDROPLASTY,KNEE Medial Femoral Right 7/12/2018    Performed by [facility] Kiran [facility] at [facility] OR    COLONOSCOPY      COLONOSCOPY N/A 12/5/2014    Performed by Sixto Barrera MD at [facility] ENDO    dental implant      INSERTION OF TUNNELED CENTRAL VENOUS CATHETER (CVC) WITH SUBCUTANEOUS PORT Right 12/14/2018    Procedure: [id number];  Surgeon: Jurgen Toro MD;  Location: [facility] OR;  Service: General;  Laterality: Right;    [id number]-[id number] Right 12/14/2018    Performed by Jurgen Toro MD at [facility] OR    KNEE ARTHROSCOPY Right     KNEE ARTHROSCOPY W/ MENISCECTOMY Right 7/12/2018    Procedure: ARTHROSCOPY, KNEE, WITH Partial lateral MENISCECTOMY;   Surgeon: Huey Kiran MD;  Location: [facility];  Service: Orthopedics;  Laterality: Right;    LIVER BIOPSY  11/16/2018    THYROIDECTOMY  2011    complete     Social History     Socioeconomic History    Marital status:      Spouse name: None    Number of children: None    Years of education: None    Highest education level: None   Social Needs    Financial resource strain: None    Food insecurity - worry: None    Food insecurity - inability: None    Transportation needs - medical: None    Transportation needs - non-medical: None   Occupational History    None   Tobacco Use    Smoking status: Never Smoker    Smokeless tobacco: Never Used   Substance and Sexual Activity    Alcohol use: Yes     Comment: rarely    Drug use: No    Sexual activity: Not Currently   Other Topics Concern    None   Social History Narrative    None     Family History   Problem Relation Age of Onset    Cancer Mother         breast and bone     Breast cancer Mother     Hyperlipidemia Father     ADD / ADHD Daughter         autism    Crohn's disease Paternal Uncle     Heart disease Paternal Uncle         heart transplant       PRIOR HISTORY OF CHEMOTHERAPY OR RADIOTHERAPY: Please see HPI for patients prior oncologic history.       Medication List           Accurate as of 12/20/18  8:30 AM. If you have any questions, ask your nurse or doctor.               CONTINUE taking these medications    azithromycin 250 MG tablet  Commonly known as:  Z-MIRANDA  Take 2 tablets by mouth on day 1; Take 1 tablet by mouth on days 2-5     CALCIUM 600 ORAL     * levothyroxine 125 MCG tablet  Commonly known as:  SYNTHROID     * SYNTHROID 112 MCG tablet  Generic drug:  levothyroxine  Take 1 tablet (112 mcg total) by mouth once daily. On Wed, Sun     mupirocin 2 % ointment  Commonly known as:  BACTROBAN  Apply topically 3 (three) times daily. To affected areas until healed     ondansetron 8 MG Tbdl  Commonly known as:  ZOFRAN-ODT  Take 1  "tablet (8 mg total) by mouth every 12 (twelve) hours as needed.     ONE DAILY MULTIVITAMIN per tablet  Generic drug:  multivitamin     prochlorperazine 10 MG tablet  Commonly known as:  COMPAZINE  Take 1 tablet (10 mg total) by mouth every 6 (six) hours as needed.     venlafaxine 37.5 MG 24 hr capsule  Commonly known as:  EFFEXOR-XR  Take 1 capsule (37.5 mg total) by mouth once daily.         * This list has 2 medication(s) that are the same as other medications prescribed for you. Read the directions carefully, and ask your doctor or other care provider to review them with you.              Review of patient's allergies indicates:  No Known Allergies    QUALITY OF LIFE: 80%- Normal Activity with Effort: Some Symptoms of Disease    Vitals:    12/20/18 0812 12/20/18 0814   BP: 108/67    Pulse: 104    Resp: 18    Temp: 98.8 °F (37.1 °C)    TempSrc: Oral    SpO2:  97%   Weight: 75.7 kg (166 lb 14.4 oz)    Height: 4' 10" (1.473 m)    PainSc: 0-No pain        PHYSICAL EXAM: Oriented alert  GENERAL: alert; in no apparent distress.   HEAD: normocephalic, atraumatic.  EYES: pupils are equal, round, reactive to light and accommodation. Sclera anicteric. Conjunctiva not injected.   NOSE/THROAT: no nasal erythema or rhinorrhea. Oropharynx pink, without erythema, ulcerations or thrush.   NECK: no cervical motion rigidity; supple with no masses.  CHEST: clear to auscultation bilaterally; no wheezes, crackles or rubs. Patient is speaking comfortably on room air with normal work of breathing without using accessory muscles of respiration.  CARDIOVASCULAR: regular rate and rhythm; no murmurs, rubs or gallops.  ABDOMEN: Nondistended, no guarding, palpable hardened liver in the upper quadrant right  MUSCULOSKELETAL: no tenderness to palpation along the spine or scapulae. Normal range of motion.  NEUROLOGIC: cranial nerves II-XII intact bilaterally. Strength 5/5 in bilateral upper and lower extremities. No sensory deficits " appreciated. Reflexes globally intact. No cerebellar signs. Normal gait.  LYMPHATIC: no cervical, supraclavicular or axillary adenopathy appreciated bilaterally.   EXTREMITIES: no clubbing, cyanosis, edema.  SKIN: no erythema, rashes, ulcerations noted.     REVIEW OF IMAGING/PATHOLOGY/LABS: Please see HPI. All images reviewed personally by dictating physician.     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 Told that radiation could be used to control this mass. She is being considered for neoadjuvant treatment, and also shrink in the mass making it more resectable.    In terms of radiation i therapy would recommend a dose of 45 gray at 1.8 gray per day.  I explained her that this hopefully will shrink the mass and make it more resectable. She'll also receive concomitant chemotherapy and she has been to chemotherapy school already.    I discloses side effects and possible, cases of treatment. All questions were addressed..    Because of increase in size of the mass also for simulation tomorrow, with anticipated treatment to begin early next week.          We discussed the risks and benefits of the above treatment and have gone over in detail the acute and late toxicities of radiation therapy to the abdomen. The patient expressed  understanding and has signed a consent form which is included in the patients chart. The patient has our contact information and understands that they are free to contact us at any time with questions or concerns regarding radiation therapy.    DISPOSITION: RTC FOR CT SIM    TIME SPENT WITH PATIENT: I have personally seen and evaluated this patient. Greater than 50% of this time was spent discussing coordination of care and/or counseling.     PHYSICIAN: Stevo Rios,  MD

## 2018-12-20 NOTE — PROGRESS NOTES
Faith Byers  [id number]  1965  12/20/2018  No referring provider defined for this encounter.    DIAGNOSIS: Squamous cell carcinoma of the gallbladder  TREATMENT SITE(S): Gallbladder and liver mass    INTENT: CURATIVE    TREATMENT SETTING: NEOADJUVANT     MODALITY: PHOTON    TECHNIQUE:  INTENSITY MODULATED RADIOTHERAPY (IMRT)    IMRT MEDICAL NECESSITY:IMRT MEDICAL NECESSITY: Target volume irregular shape/proximate to critical structures and Narrow margins needed to protect adjacent structures     HPI: 53-year-old patient with a diagnosis of gallbladder squamous cell carcinoma with a  9 c m tumor invading into adjacent liver, small 8mm adjacent lymph node, no evidence of metastatic disease. KPS 80    I have personally performed treatment planning for the patient, reviewing relevant history/physical and imaging. I have defined GTV, CTV, PTV and organs at risk.     In order to accomplish this plan, I am ordering:  SIMULATION: CT SIMULATION FOR PLACEMENT OF TREATMENT FIELDS    CONTRAST: IV    TO ACCOMPLISH REPRODUCIBLE POSITION: VACLOC and EMPTY STOMACH    DEVICES FOR BEAM SHAPING: CUSTOMIZED MLC    CUSTOMIZED BOLUS: none    IMAGING: CBCT DAILY    I have ordered a weekly physics check.  SPECIAL PHYSICS CONSULT: NO  REASON: N/A    SPECIAL TREATMENT CIRCUMSTANCE: NO   Concurrent or recent administration of chemotherapeutic agents which are  known potent radiosensitizers and thus will require vigilant monitoring for  exaggerated radiation toxicities.    LABS: NONE    ANTICIPATED PRESCRIPTION: 45 gray in 25 fractions    TREATMENT: DAILY    PHYSICIAN: Stevo Rios MD

## 2018-12-20 NOTE — PATIENT INSTRUCTIONS
Instructions given on radiation therapy to the abdomen and skin care handout.  MIGUE booklet given.

## 2018-12-23 ENCOUNTER — PATIENT MESSAGE (OUTPATIENT)
Dept: SURGERY | Facility: CLINIC | Age: 53
End: 2018-12-23

## 2018-12-24 ENCOUNTER — TELEPHONE (OUTPATIENT)
Dept: SURGERY | Facility: CLINIC | Age: 53
End: 2018-12-24

## 2018-12-24 ENCOUNTER — PATIENT MESSAGE (OUTPATIENT)
Dept: SURGERY | Facility: CLINIC | Age: 53
End: 2018-12-24

## 2018-12-24 DIAGNOSIS — Z95.828 PORT-A-CATH IN PLACE: Primary | ICD-10-CM

## 2018-12-24 NOTE — TELEPHONE ENCOUNTER
I spoke with patient about her port and advised that she needs to have a port study done to check it.  As far as her xrays it appears to be in the correct place.  I will get it booked for her and let her know.

## 2018-12-26 ENCOUNTER — HOSPITAL ENCOUNTER (OUTPATIENT)
Dept: RADIOLOGY | Facility: HOSPITAL | Age: 53
Discharge: HOME | End: 2018-12-26
Attending: SURGERY
Payer: COMMERCIAL

## 2018-12-26 DIAGNOSIS — Z95.828 PORT-A-CATH IN PLACE: ICD-10-CM

## 2018-12-26 PROCEDURE — 25500020 PHARM REV CODE 255

## 2018-12-26 PROCEDURE — 63600175 PHARM REV CODE 636 W HCPCS

## 2018-12-26 PROCEDURE — 77001 FLUOROGUIDE FOR VEIN DEVICE: CPT | Mod: TC

## 2018-12-26 RX ORDER — HEPARIN 100 UNIT/ML
SYRINGE INTRAVENOUS
Status: COMPLETED
Start: 2018-12-26 | End: 2018-12-26

## 2018-12-26 RX ORDER — HEPARIN 100 UNIT/ML
5 SYRINGE INTRAVENOUS ONCE
Status: COMPLETED | OUTPATIENT
Start: 2018-12-26 | End: 2018-12-26

## 2018-12-26 RX ADMIN — IOHEXOL 5 ML: 350 INJECTION, SOLUTION INTRAVENOUS at 02:12

## 2018-12-26 RX ADMIN — HEPARIN SODIUM (PORCINE) LOCK FLUSH IV SOLN 100 UNIT/ML 500 UNITS: 100 SOLUTION at 02:12

## 2018-12-26 RX ADMIN — HEPARIN 500 UNITS: 100 SYRINGE at 02:12

## 2018-12-26 NOTE — NURSING
Chest wall port accessed  with a 25g 1in power injectable collins needle suitable for CT using sterile technique. Blood return present, flushed with 10cc NS. Following study port was flushed with 20cc NS and heparinized saline instilled per protocol. Occlusive dressing applied.

## 2018-12-28 ENCOUNTER — TELEPHONE (OUTPATIENT)
Dept: HEMATOLOGY/ONCOLOGY | Facility: CLINIC | Age: 53
End: 2018-12-28

## 2018-12-28 NOTE — TELEPHONE ENCOUNTER
Message left instructing patient to call [phone] to schedule f/u appointment and chemotherapy.

## 2018-12-31 ENCOUNTER — TELEPHONE (OUTPATIENT)
Dept: HEMATOLOGY/ONCOLOGY | Facility: CLINIC | Age: 53
End: 2018-12-31

## 2018-12-31 DIAGNOSIS — C23 GALLBLADDER CANCER: Primary | ICD-10-CM

## 2018-12-31 NOTE — TELEPHONE ENCOUNTER
----- Message from Yudelka Carlson sent at 12/31/2018  9:37 AM CST -----  Contact: Patient  Type: Needs Medical Advice    Who Called:  Patient  Best Call Back Number:  Additional Information: Calling to schedule a f/u appt on 1/7/19. Please advise.

## 2019-01-04 ENCOUNTER — PATIENT MESSAGE (OUTPATIENT)
Dept: HEMATOLOGY/ONCOLOGY | Facility: CLINIC | Age: 54
End: 2019-01-04

## 2019-01-04 DIAGNOSIS — C23 GALLBLADDER CANCER: Primary | ICD-10-CM

## 2019-01-07 ENCOUNTER — OFFICE VISIT (OUTPATIENT)
Dept: SURGICAL ONCOLOGY | Facility: CLINIC | Age: 54
End: 2019-01-07
Payer: COMMERCIAL

## 2019-01-07 ENCOUNTER — LAB VISIT (OUTPATIENT)
Dept: LAB | Facility: HOSPITAL | Age: 54
End: 2019-01-07
Attending: INTERNAL MEDICINE
Payer: COMMERCIAL

## 2019-01-07 VITALS
WEIGHT: 162.25 LBS | BODY MASS INDEX: 34.06 KG/M2 | HEART RATE: 104 BPM | DIASTOLIC BLOOD PRESSURE: 60 MMHG | SYSTOLIC BLOOD PRESSURE: 125 MMHG | HEIGHT: 58 IN

## 2019-01-07 DIAGNOSIS — C23 GALLBLADDER CANCER: Primary | ICD-10-CM

## 2019-01-07 DIAGNOSIS — C23 GALLBLADDER CANCER: ICD-10-CM

## 2019-01-07 DIAGNOSIS — G89.3 NEOPLASM RELATED PAIN: Primary | ICD-10-CM

## 2019-01-07 DIAGNOSIS — C23 MALIGNANT NEOPLASM OF GALLBLADDER: Primary | ICD-10-CM

## 2019-01-07 LAB
ALBUMIN SERPL BCP-MCNC: 3.3 G/DL
ALP SERPL-CCNC: 178 U/L
ALT SERPL W/O P-5'-P-CCNC: 35 U/L
ANION GAP SERPL CALC-SCNC: 9 MMOL/L
AST SERPL-CCNC: 40 U/L
BASOPHILS # BLD AUTO: 0.07 K/UL
BASOPHILS NFR BLD: 0.3 %
BILIRUB SERPL-MCNC: 0.5 MG/DL
BUN SERPL-MCNC: 7 MG/DL
CALCIUM SERPL-MCNC: 9.3 MG/DL
CHLORIDE SERPL-SCNC: 99 MMOL/L
CO2 SERPL-SCNC: 26 MMOL/L
CREAT SERPL-MCNC: 0.6 MG/DL
DIFFERENTIAL METHOD: ABNORMAL
EOSINOPHIL # BLD AUTO: 0 K/UL
EOSINOPHIL NFR BLD: 0.2 %
ERYTHROCYTE [DISTWIDTH] IN BLOOD BY AUTOMATED COUNT: 14.2 %
EST. GFR  (AFRICAN AMERICAN): >60 ML/MIN/1.73 M^2
EST. GFR  (NON AFRICAN AMERICAN): >60 ML/MIN/1.73 M^2
GLUCOSE SERPL-MCNC: 156 MG/DL
HCT VFR BLD AUTO: 30.9 %
HGB BLD-MCNC: 9.2 G/DL
LYMPHOCYTES # BLD AUTO: 2 K/UL
LYMPHOCYTES NFR BLD: 9.8 %
MCH RBC QN AUTO: 24.4 PG
MCHC RBC AUTO-ENTMCNC: 29.8 G/DL
MCV RBC AUTO: 82 FL
MONOCYTES # BLD AUTO: 1 K/UL
MONOCYTES NFR BLD: 4.8 %
NEUTROPHILS # BLD AUTO: 17 K/UL
NEUTROPHILS NFR BLD: 84.9 %
NRBC BLD-RTO: 0 /100 WBC
PLATELET # BLD AUTO: 590 K/UL
PMV BLD AUTO: 8.8 FL
POTASSIUM SERPL-SCNC: 3.9 MMOL/L
PROT SERPL-MCNC: 6.8 G/DL
RBC # BLD AUTO: 3.77 M/UL
SODIUM SERPL-SCNC: 134 MMOL/L
WBC # BLD AUTO: 20.05 K/UL

## 2019-01-07 PROCEDURE — 85025 COMPLETE CBC W/AUTO DIFF WBC: CPT | Mod: PN

## 2019-01-07 PROCEDURE — 3008F PR BODY MASS INDEX (BMI) DOCUMENTED: ICD-10-PCS | Mod: CPTII,S$GLB,, | Performed by: SURGERY

## 2019-01-07 PROCEDURE — 80053 COMPREHEN METABOLIC PANEL: CPT

## 2019-01-07 PROCEDURE — 99999 PR PBB SHADOW E&M-EST. PATIENT-LVL III: CPT | Mod: PBBFAC,,, | Performed by: SURGERY

## 2019-01-07 PROCEDURE — 85025 COMPLETE CBC W/AUTO DIFF WBC: CPT

## 2019-01-07 PROCEDURE — 80053 COMPREHEN METABOLIC PANEL: CPT | Mod: PN

## 2019-01-07 PROCEDURE — 36415 COLL VENOUS BLD VENIPUNCTURE: CPT | Mod: PN

## 2019-01-07 PROCEDURE — 3008F BODY MASS INDEX DOCD: CPT | Mod: CPTII,S$GLB,, | Performed by: SURGERY

## 2019-01-07 PROCEDURE — 99214 OFFICE O/P EST MOD 30 MIN: CPT | Mod: S$GLB,,, | Performed by: SURGERY

## 2019-01-07 PROCEDURE — 99214 PR OFFICE/OUTPT VISIT, EST, LEVL IV, 30-39 MIN: ICD-10-PCS | Mod: S$GLB,,, | Performed by: SURGERY

## 2019-01-07 PROCEDURE — 99999 PR PBB SHADOW E&M-EST. PATIENT-LVL III: ICD-10-PCS | Mod: PBBFAC,,, | Performed by: SURGERY

## 2019-01-07 RX ORDER — OXYCODONE AND ACETAMINOPHEN 5; 325 MG/1; MG/1
1 TABLET ORAL EVERY 8 HOURS PRN
COMMUNITY
Start: 2019-01-07 | End: 2019-01-07 | Stop reason: SDUPTHER

## 2019-01-07 RX ORDER — OXYCODONE AND ACETAMINOPHEN 5; 325 MG/1; MG/1
1 TABLET ORAL EVERY 4 HOURS PRN
Qty: 30 TABLET | Refills: 0 | Status: SHIPPED | OUTPATIENT
Start: 2019-01-07 | End: 2019-01-23 | Stop reason: SDUPTHER

## 2019-01-07 RX ORDER — OXYCODONE AND ACETAMINOPHEN 5; 325 MG/1; MG/1
1 TABLET ORAL EVERY 8 HOURS PRN
Qty: 42 TABLET | Refills: 0 | Status: SHIPPED | OUTPATIENT
Start: 2019-01-07 | End: 2019-01-21

## 2019-01-07 NOTE — LETTER
January 7, 2019        Genevieve Alarcon MD  [address] [facility] [address]             [facility]  [facility] [facility], [address]  [facility] [address]  Phone: [phone]  Fax: [phone]   Patient: Faith Byers   MR Number: [id number]   YOB: 1965   Date of Visit: 1/7/2019       Dear Dr. Alarcon:    Thank you for referring Faith Byers to me for evaluation. Attached you will find relevant portions of my assessment and plan of care.    If you have questions, please do not hesitate to call me. I look forward to following Faith Byers along with you.    Sincerely,      Deon Garcia MD            CC  No Recipients    Enclosure

## 2019-01-07 NOTE — PROGRESS NOTES
"Patient seen by me two months ago with locally advanced (T4) gallbladder cancer. 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 She has seen Dr Alarcon, but to date has not started neoadjuvant therapy. Repeat CT scan 12/18/18 shows some increase in the size of the primary mass. Fortunately, several sub-centimeter focal low density lesions in the liver, including segments 6 and 8, have not changed and are most likely benign cysts.   Today, she is very frustrated by the fact that she has not started therapy. In [facility], her RUQ pain has gotten progressively worse, and she has begun taking some Percocet 5's which she had left over from her knee surgery last summer. She is taking one q 8 hrs with pretty good relief.   Reports no recent fever. Appetite is decreased, but she is trying to keep up with her po intake but finds that eating makes her pain worse.  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 Awaiting the start of 'neoadjuvant' therapy in the hope that this may render the tumor resectable.    Rec:  Refill Percocet 5  Will Epic message Dr Alarcon for an update on the plan for neoadjuvant therapy  Emotional support provided and questions answered.     Copy Gail Alarcon and Gabriel    "

## 2019-01-08 RX ORDER — OXYCODONE AND ACETAMINOPHEN 5; 325 MG/1; MG/1
1 TABLET ORAL 3 TIMES DAILY PRN
Qty: 90 TABLET | Refills: 0 | OUTPATIENT
Start: 2019-01-08 | End: 2019-02-07

## 2019-01-09 ENCOUNTER — OFFICE VISIT (OUTPATIENT)
Dept: HEMATOLOGY/ONCOLOGY | Facility: CLINIC | Age: 54
End: 2019-01-09
Payer: COMMERCIAL

## 2019-01-09 VITALS
WEIGHT: 161.38 LBS | BODY MASS INDEX: 33.87 KG/M2 | DIASTOLIC BLOOD PRESSURE: 59 MMHG | RESPIRATION RATE: 22 BRPM | HEART RATE: 100 BPM | SYSTOLIC BLOOD PRESSURE: 106 MMHG | HEIGHT: 58 IN | TEMPERATURE: 97 F

## 2019-01-09 DIAGNOSIS — E03.2 HYPOTHYROIDISM DUE TO MEDICATION: ICD-10-CM

## 2019-01-09 DIAGNOSIS — K82.9 GALL BLADDER DISEASE: Primary | ICD-10-CM

## 2019-01-09 DIAGNOSIS — C23 GALLBLADDER CANCER: ICD-10-CM

## 2019-01-09 PROCEDURE — 3008F PR BODY MASS INDEX (BMI) DOCUMENTED: ICD-10-PCS | Mod: CPTII,S$GLB,, | Performed by: INTERNAL MEDICINE

## 2019-01-09 PROCEDURE — 99999 PR PBB SHADOW E&M-EST. PATIENT-LVL IV: ICD-10-PCS | Mod: PBBFAC,,, | Performed by: INTERNAL MEDICINE

## 2019-01-09 PROCEDURE — 3008F BODY MASS INDEX DOCD: CPT | Mod: CPTII,S$GLB,, | Performed by: INTERNAL MEDICINE

## 2019-01-09 PROCEDURE — 99215 OFFICE O/P EST HI 40 MIN: CPT | Mod: S$GLB,,, | Performed by: INTERNAL MEDICINE

## 2019-01-09 PROCEDURE — 99999 PR PBB SHADOW E&M-EST. PATIENT-LVL IV: CPT | Mod: PBBFAC,,, | Performed by: INTERNAL MEDICINE

## 2019-01-09 PROCEDURE — 99215 PR OFFICE/OUTPT VISIT, EST, LEVL V, 40-54 MIN: ICD-10-PCS | Mod: S$GLB,,, | Performed by: INTERNAL MEDICINE

## 2019-01-09 RX ORDER — SODIUM CHLORIDE 0.9 % (FLUSH) 0.9 %
10 SYRINGE (ML) INJECTION
Status: CANCELLED | OUTPATIENT
Start: 2019-01-09

## 2019-01-09 RX ORDER — HEPARIN 100 UNIT/ML
500 SYRINGE INTRAVENOUS
Status: CANCELLED | OUTPATIENT
Start: 2019-01-09

## 2019-01-11 ENCOUNTER — PATIENT MESSAGE (OUTPATIENT)
Dept: HEMATOLOGY/ONCOLOGY | Facility: CLINIC | Age: 54
End: 2019-01-11

## 2019-01-11 ENCOUNTER — DOCUMENTATION ONLY (OUTPATIENT)
Dept: RADIATION ONCOLOGY | Facility: CLINIC | Age: 54
End: 2019-01-11

## 2019-01-11 ENCOUNTER — TELEPHONE (OUTPATIENT)
Dept: HEMATOLOGY/ONCOLOGY | Facility: CLINIC | Age: 54
End: 2019-01-11

## 2019-01-11 RX ORDER — SODIUM CHLORIDE 0.9 % (FLUSH) 0.9 %
10 SYRINGE (ML) INJECTION
Status: CANCELLED | OUTPATIENT
Start: 2019-01-16

## 2019-01-11 RX ORDER — DIPHENHYDRAMINE HYDROCHLORIDE 50 MG/ML
50 INJECTION INTRAMUSCULAR; INTRAVENOUS ONCE AS NEEDED
Status: CANCELLED | OUTPATIENT
Start: 2019-01-14 | End: 2019-01-14

## 2019-01-11 RX ORDER — FLUOROURACIL 50 MG/ML
400 INJECTION, SOLUTION INTRAVENOUS
Status: CANCELLED | OUTPATIENT
Start: 2019-01-14

## 2019-01-11 RX ORDER — HEPARIN 100 UNIT/ML
500 SYRINGE INTRAVENOUS
Status: CANCELLED | OUTPATIENT
Start: 2019-01-14

## 2019-01-11 RX ORDER — ATROPINE SULFATE 0.4 MG/ML
0.4 INJECTION, SOLUTION ENDOTRACHEAL; INTRAMEDULLARY; INTRAMUSCULAR; INTRAVENOUS; SUBCUTANEOUS ONCE AS NEEDED
Status: CANCELLED | OUTPATIENT
Start: 2019-01-14 | End: 2019-01-14

## 2019-01-11 RX ORDER — SODIUM CHLORIDE 0.9 % (FLUSH) 0.9 %
10 SYRINGE (ML) INJECTION
Status: CANCELLED | OUTPATIENT
Start: 2019-01-14

## 2019-01-11 RX ORDER — EPINEPHRINE 0.3 MG/.3ML
0.3 INJECTION SUBCUTANEOUS ONCE AS NEEDED
Status: CANCELLED | OUTPATIENT
Start: 2019-01-14 | End: 2019-01-14

## 2019-01-11 RX ORDER — HEPARIN 100 UNIT/ML
500 SYRINGE INTRAVENOUS
Status: CANCELLED | OUTPATIENT
Start: 2019-01-16

## 2019-01-11 NOTE — PROGRESS NOTES
After discussions with Kt Alarcon and Jose we will hold further radiotherapy in favor of starting FOLFIRINOX, given concern for distant spread of disease. We'll revisit neoadjuvant chemoradiotherapy for local disease after completion of this course.

## 2019-01-11 NOTE — PROGRESS NOTES
Subjective:       Patient ID: Faith Byers is a 53 y.o. female.    Chief Complaint:  Sent by Dr. Garcia for evaluation of gallbladder CA for neoadjuvant chemoradiation therapy  HPI:   FINAL PATHOLOGIC DIAGNOSIS  GALLBLADDER/LIVER MASS, BIOPSY:  -Positive for malignancy, invasive squamous cell carcinoma, Patient has loss of concerns and confusion about the regimen she has seen Radiation Oncology in the meantime and apparently has started radiation today according to her for palliation of pain    Social History     Socioeconomic History    Marital status:      Spouse name: None    Number of children: None    Years of education: None    Highest education level: None   Social Needs    Financial resource strain: None    Food insecurity - worry: None    Food insecurity - inability: None    Transportation needs - medical: None    Transportation needs - non-medical: None   Occupational History    None   Tobacco Use    Smoking status: Never Smoker    Smokeless tobacco: Never Used   Substance and Sexual Activity    Alcohol use: Yes     Comment: rarely    Drug use: No    Sexual activity: Not Currently   Other Topics Concern    None   Social History Narrative    None     Family History   Problem Relation Age of Onset    Cancer Mother         breast and bone     Breast cancer Mother     Hyperlipidemia Father     ADD / ADHD Daughter         autism    Crohn's disease Paternal Uncle     Heart disease Paternal Uncle         heart transplant     Past Surgical History:   Procedure Laterality Date    ARTHROSCOPY, KNEE, WITH Partial lateral MENISCECTOMY Right 7/12/2018    Performed [facility] at [facility] OR    Yspfcb-impaik-wb N/A 11/16/2018    Performed by [facility] OR [address] [place] [address]    BREAST BIOPSY      BREAST SURGERY  2001    right biopsy, benign    CHONDROPLASTY,KNEE Medial Femoral Right 7/12/2018    Per[facility] at [facility] OR     COLONOSCOPY      COLONOSCOPY N/A 12/5/2014    Per[facility]    dental implant      LMGFLSXWE-GXNY-S-CATH Right 12/14/2018    Per[facility] at [facility] OR    KNEE ARTHROSCOPY Right     LIVER BIOPSY  11/16/2018    THYROIDECTOMY  2011    complete     Past Medical History:   Diagnosis Date    Arthritis     Cancer     gallbadder/ liver spots/biopsy    Thyroid disease        Current Outpatient Medications:     calcium carbonate (CALCIUM 600 ORAL), Take 1,200 mg by mouth every evening., Disp: , Rfl:     levothyroxine (SYNTHROID) 125 MCG tablet, Take 125 mcg by mouth once daily. Mon, Tues, Thurs, Fri, Sat, brand name only, Disp: , Rfl:     multivitamin (ONE DAILY MULTIVITAMIN) per tablet, Take 1 tablet by mouth once daily., Disp: , Rfl:     mupirocin (BACTROBAN) 2 % ointment, Apply topically 3 (three) times daily. To affected areas until healed, Disp: 22 g, Rfl: 0    ondansetron (ZOFRAN-ODT) 8 MG TbDL, Take 1 tablet (8 mg total) by mouth every 12 (twelve) hours as needed., Disp: 30 tablet, Rfl: 1    oxyCODONE-acetaminophen (PERCOCET) 5-325 mg per tablet, Take 1 tablet by mouth every 4 (four) hours as needed for Pain., Disp: 30 tablet, Rfl: 0    oxyCODONE-acetaminophen (PERCOCET) 5-325 mg per tablet, Take 1 tablet by mouth every 8 (eight) hours as needed for Pain., Disp: 42 tablet, Rfl: 0    prochlorperazine (COMPAZINE) 10 MG tablet, Take 1 tablet (10 mg total) by mouth every 6 (six) hours as needed., Disp: 30 tablet, Rfl: 1    SYNTHROID 112 mcg tablet, Take 1 tablet (112 mcg total) by mouth once daily. On Wed, Sun, Disp: 30 tablet, Rfl: 11    venlafaxine (EFFEXOR-XR) 37.5 MG 24 hr capsule, Take 1 capsule (37.5 mg total) by mouth once daily., Disp: 30 capsule, Rfl: 11  Review of patient's allergies indicates:  No Known Allergies      REVIEW OF SYSTEMS:     CONSTITUTIONAL:   Some nausea   concern about oxaliplatin causing cold intolerance   SKIN: Denies rash,  issues with nails, non-healing sores, bleeding, blotching    skin or abnormal bruising. Denies new moles or changes to existing moles.      BREASTS: There is no swelling around breasts or nipple discharge.    EYES: Denies eye pain, blurred vision, swelling, redness or discharge.      ENT AND MOUTH: Denies runny nose, stuffiness, sinus trouble or sores. Denies    nosebleeds. Denies, hoarseness, change in voice or swelling in front of the    neck.      CARDIOVASCULAR: Denies chest pain, discomfort or palpitations. Denies neck    swelling or episodes of passing out.      RESPIRATORY: Denies cough, sputum production, blood in sputum, and denies    shortness of breath.      GI: Denies trouble swallowing, indigestion, heartburn, abdominal pain, nausea,    vomiting, diarrhea, has altered bowel habits, no blood in stool, discoloration of    stools, change in nature of stool, bloating, increased abdominal girth.      GENITOURINARY: No discharge. No pelvic pain or lumps. No rash around groin or  lesions. No urinary frequency, hesitation, painful urination or blood in    urine. Denies incontinence. No problems with intercourse.      MUSCULOSKELETAL:  Some amount of knee pain    NEUROLOGICAL: Denies tingling, numbness, altered mentation changes to nerve    function in the face, weakness to one or both of the body. Denies changes to    gait and denies multiple falls or accidents.      PSYCHIATRIC: Denies nervousness, anxiety, hallucinations, depression, suicidal    ideation, trouble sleeping or changes in behavior noticed by family.      The patient denies recent foreign travel or recent exposure to chemicals or    products of concern or infectious diseases.     PHYSICAL EXAM:     Vitals:    01/09/19 1008   BP: (!) 106/59   Pulse: 100   Resp: (!) 22   Temp: 97.3 °F (36.3 °C)       GENERAL: Comfortable looking patient. Patient is in no distress.  Awake, alert and oriented to time, person and place.  No anxiety, or agitation.       HEENT: Normal conjunctivae and eyelids. WNL.  PERRLA 3 to 4 mm. No icterus, no pallor, no congestion, and no discharge noted.     NECK:  Supple. Trachea is central.  No crepitus.  No JVD or masses.    RESPIRATORY:  No intercostal retractions.  No dullness to percussion.  Chest is clear to auscultation.  No rales, rhonchi or wheezes.  No crepitus.  Good air entry bilaterally.    CARDIOVASCULAR:  S1 and S2 are normally heard without murmurs or gallops.  All peripheral pulses are present.    ABDOMEN:  Normal abdomen.  No hepatosplenomegaly.  No free fluid.  Bowel sounds are present.  No hernia noted. No masses.  No rebound or tenderness.  No guarding or rigidity.  Umbilicus is midline.    LYMPHATICS:  No axillary, cervical, supraclavicular, submental, or inguinal lymphadenopathy.    SKIN/MUSCULOSKELETAL:  There is no evidence of excoriation marks or ecchmosis.  No rashes.  No cyanosis.  No clubbing.  No joint or skeletal deformities noted.  Normal range of motion.    NEUROLOGIC:  Higher functions are appropriate.  No cranial nerve deficits.  Normal jody.  Normal strength.  Motor and sensory functions are normal.  Deep tendon reflexes are normal.    GENITAL/RECTAL:  Exams are deferred.      Laboratory:     CBC:  Lab Results   Component Value Date    WBC 20.05 (H) 01/07/2019    RBC 3.77 (L) 01/07/2019    HGB 9.2 (L) 01/07/2019    HCT 30.9 (L) 01/07/2019    MCV 82 01/07/2019    MCH 24.4 (L) 01/07/2019    MCHC 29.8 (L) 01/07/2019    RDW 14.2 01/07/2019     (H) 01/07/2019    MPV 8.8 (L) 01/07/2019    GRAN 17.0 (H) 01/07/2019    GRAN 84.9 (H) 01/07/2019    LYMPH 2.0 01/07/2019    LYMPH 9.8 (L) 01/07/2019    MONO 1.0 01/07/2019    MONO 4.8 01/07/2019    EOS 0.0 01/07/2019    BASO 0.07 01/07/2019    EOSINOPHIL 0.2 01/07/2019    BASOPHIL 0.3 01/07/2019       BMP: BMP  Lab Results   Component Value Date     (L) 01/07/2019    K 3.9 01/07/2019    CL 99 01/07/2019    CO2 26 01/07/2019    BUN 7 01/07/2019     CREATININE 0.60 01/07/2019    CALCIUM 9.3 01/07/2019    ANIONGAP 9 01/07/2019    ESTGFRAFRICA >60 01/07/2019    EGFRNONAA >60 01/07/2019       LFT:   Lab Results   Component Value Date    ALT 35 01/07/2019    AST 40 (H) 01/07/2019    ALKPHOS 178 (H) 01/07/2019    BILITOT 0.5 01/07/2019 December 2018 CT chest abdomen pelvis  Impression       Gallbladder mass with invasion of the hepatic parenchyma compatible with the patient's known gallbladder neoplasm, and moderately increased in size.  Possible extension to the hepatic flexure of the colon.  Unchanged mild the biliary dilatation.    Development a suspicious lymph node subjacent to the mass.    3 mm left upper lobe nodule which is nonspecific.  Attention on 6-12 month follow-up recommended.    Unchanged hepatic hypodensities, suggestive for cysts.  Continued attention on follow-up recommended           Assessment/Plan:       Squamous cell CA of gallbladder:   Patient has been given chemotherapy class for FOLFIRINOX however at the same time started radiation and therefore FOLFIRINOX  regimen has been now changed to infusional 5 FU during radiation  I have talked to  Radiation therapy, I believe this would have been due the pain she has complained of I have also discussed this with Dr. Garcia her surgeon we would like to about the radiation and proceed with FOLFIRINOX as originally planned due to the bulk of mass and the risk of distant metastases on his paste for FOLFIRINOX start on Monday patient has appointment on the 18th with MD Sahu which I have encouraged her to keep proceed with FOLFIRINOX and return to clinic in 2 weeks CBC CMP for ongoing therapy after 4 cycles of combined FOLFIRINOX regimen she will   proceed  with radiation with 5 FU and then receive 4 more cycles of FOLFIRINOX

## 2019-01-11 NOTE — TELEPHONE ENCOUNTER
Patient notified per Dr. Alarcon that her regimen will change and that Dr. Alarcon will call her to notify her of changes today. Tierra in XRT and Shivani in infusion notified of change in treatment.

## 2019-01-12 ENCOUNTER — PATIENT MESSAGE (OUTPATIENT)
Dept: HEMATOLOGY/ONCOLOGY | Facility: CLINIC | Age: 54
End: 2019-01-12

## 2019-01-14 DIAGNOSIS — Z85.09 H/O CANCER OF GALL BLADDER: Primary | ICD-10-CM

## 2019-01-14 NOTE — PROGRESS NOTES
Reaction to irinotecan: nausea developed, infusion was stopped, VS were stable   Infusion was restarted and  pt now with garbled speech   Pat stating it is as if her tonsils are swelling, VS stable, pt not in distress no evidence of stroke like symptoms  Proceed with 150 mg of solumedrol and stop   DO NOT rechallenge   She will see DR Alarcon for discussion for next step

## 2019-01-15 ENCOUNTER — TELEPHONE (OUTPATIENT)
Dept: HEMATOLOGY/ONCOLOGY | Facility: CLINIC | Age: 54
End: 2019-01-15

## 2019-01-15 RX ORDER — PANTOPRAZOLE SODIUM 20 MG/1
20 TABLET, DELAYED RELEASE ORAL DAILY
Qty: 30 TABLET | Refills: 11 | Status: ON HOLD | OUTPATIENT
Start: 2019-01-15 | End: 2019-01-26 | Stop reason: SDUPTHER

## 2019-01-15 NOTE — TELEPHONE ENCOUNTER
Infusion nurse called while pt was getting her chemo. Nurse stated that pt had a reaction to the chemo, I informed Dr. Lassiter of said reaction, Dr. Lassiter continued the telephone call with the infusion nurse.

## 2019-01-15 NOTE — TELEPHONE ENCOUNTER
Called and spoke to pt to inform her of her lab work that needed to be completed before her apt with Dr. Alarcon tomorrow. Pt confirmed apts and verbalized understanding.

## 2019-01-16 ENCOUNTER — OFFICE VISIT (OUTPATIENT)
Dept: HEMATOLOGY/ONCOLOGY | Facility: CLINIC | Age: 54
End: 2019-01-16
Payer: COMMERCIAL

## 2019-01-16 VITALS
HEIGHT: 58 IN | RESPIRATION RATE: 22 BRPM | DIASTOLIC BLOOD PRESSURE: 57 MMHG | BODY MASS INDEX: 33.73 KG/M2 | TEMPERATURE: 98 F | HEART RATE: 108 BPM | SYSTOLIC BLOOD PRESSURE: 112 MMHG | WEIGHT: 160.69 LBS

## 2019-01-16 DIAGNOSIS — C23 CARCINOMA OF GALL BLADDER: Primary | ICD-10-CM

## 2019-01-16 DIAGNOSIS — E02 SUBCLINICAL IODINE-DEFICIENCY HYPOTHYROIDISM: ICD-10-CM

## 2019-01-16 LAB
ALBUMIN SERPL-MCNC: 2.2 G/DL (ref 3.1–4.7)
ALP SERPL-CCNC: 144 IU/L (ref 40–104)
ALT (SGPT): 24 IU/L (ref 3–33)
AST SERPL-CCNC: 47 IU/L (ref 10–40)
BASOPHILS NFR BLD: 0.1 K/UL (ref 0–0.2)
BASOPHILS NFR BLD: 0.2 %
BILIRUB SERPL-MCNC: 0.7 MG/DL (ref 0.3–1)
BUN SERPL-MCNC: 18 MG/DL (ref 8–20)
CALCIUM SERPL-MCNC: 8.6 MG/DL (ref 7.7–10.4)
CHLORIDE: 97 MMOL/L (ref 98–110)
CO2 SERPL-SCNC: 25.7 MMOL/L (ref 22.8–31.6)
CREATININE: 0.57 MG/DL (ref 0.6–1.4)
EOSINOPHIL NFR BLD: 0 K/UL (ref 0–0.7)
EOSINOPHIL NFR BLD: 0.1 %
ERYTHROCYTE [DISTWIDTH] IN BLOOD BY AUTOMATED COUNT: 14.9 % (ref 11.7–14.9)
GLUCOSE: 116 MG/DL (ref 70–99)
GRAN #: 28.1 K/UL (ref 1.4–6.5)
GRAN%: 92.8 %
HCT VFR BLD AUTO: 30.2 % (ref 36–48)
HGB BLD-MCNC: 9.1 G/DL (ref 12–15)
IMMATURE GRANS (ABS): 0.5 K/UL (ref 0–1)
IMMATURE GRANULOCYTES: 1.7 %
LYMPH #: 1.4 K/UL (ref 1.2–3.4)
LYMPH%: 4.5 %
MCH RBC QN AUTO: 24.3 PG (ref 25–35)
MCHC RBC AUTO-ENTMCNC: 30.1 G/DL (ref 31–36)
MCV RBC AUTO: 80.7 FL (ref 79–98)
MONO #: 0.2 K/UL (ref 0.1–0.6)
MONO%: 0.7 %
NUCLEATED RBCS: 0 %
PLATELET # BLD AUTO: 713 K/UL (ref 140–440)
PMV BLD AUTO: 9.3 FL (ref 8.8–12.7)
POTASSIUM SERPL-SCNC: 3.5 MMOL/L (ref 3.5–5)
PROT SERPL-MCNC: 6.4 G/DL (ref 6–8.2)
RBC # BLD AUTO: 3.74 M/UL (ref 3.5–5.5)
SODIUM: 134 MMOL/L (ref 134–144)
WBC # BLD AUTO: 30.3 K/UL (ref 5–10)

## 2019-01-16 PROCEDURE — 3008F PR BODY MASS INDEX (BMI) DOCUMENTED: ICD-10-PCS | Mod: CPTII,S$GLB,, | Performed by: INTERNAL MEDICINE

## 2019-01-16 PROCEDURE — 99999 PR PBB SHADOW E&M-EST. PATIENT-LVL IV: CPT | Mod: PBBFAC,,, | Performed by: INTERNAL MEDICINE

## 2019-01-16 PROCEDURE — 3008F BODY MASS INDEX DOCD: CPT | Mod: CPTII,S$GLB,, | Performed by: INTERNAL MEDICINE

## 2019-01-16 PROCEDURE — 99214 OFFICE O/P EST MOD 30 MIN: CPT | Mod: S$GLB,,, | Performed by: INTERNAL MEDICINE

## 2019-01-16 PROCEDURE — 99999 PR PBB SHADOW E&M-EST. PATIENT-LVL IV: ICD-10-PCS | Mod: PBBFAC,,, | Performed by: INTERNAL MEDICINE

## 2019-01-16 PROCEDURE — 99214 PR OFFICE/OUTPT VISIT, EST, LEVL IV, 30-39 MIN: ICD-10-PCS | Mod: S$GLB,,, | Performed by: INTERNAL MEDICINE

## 2019-01-16 NOTE — PROGRESS NOTES
Subjective:       Patient ID: Faith Byers is a 53 y.o. female.    Chief Complaint:  Started sandwich mode of therapy with FOLFIRINOX on Monday  HPI:   FINAL PATHOLOGIC DIAGNOSIS  GALLBLADDER/LIVER MASS, BIOPSY:  -Positive for malignancy, invasive squamous cell carcinoma, Patient has loss of concerns and confusion about the regimen she has seen Radiation Oncology in the meantime and apparently has started radiation today according to her for palliation of pain    Social History     Socioeconomic History    Marital status:      Spouse name: Not on file    Number of children: Not on file    Years of education: Not on file    Highest education level: Not on file   Social Needs    Financial resource strain: Not on file    Food insecurity - worry: Not on file    Food insecurity - inability: Not on file    Transportation needs - medical: Not on file    Transportation needs - non-medical: Not on file   Occupational History    Not on file   Tobacco Use    Smoking status: Never Smoker    Smokeless tobacco: Never Used   Substance and Sexual Activity    Alcohol use: Yes     Comment: rarely    Drug use: No    Sexual activity: Not Currently   Other Topics Concern    Not on file   Social History Narrative    Not on file     Family History   Problem Relation Age of Onset    Cancer Mother         breast and bone     Breast cancer Mother     Hyperlipidemia Father     ADD / ADHD Daughter         autism    Crohn's disease Paternal Uncle     Heart disease Paternal Uncle         heart transplant     Past Surgical History:   Procedure Laterality Date    ARTHROSCOPY, KNEE, WITH Partial lateral MENISCECTOMY Right 7/12/2018    [facility]    [id number]-[id number]-ld N/A 11/16/2018    Performed by [facility]    BREAST BIOPSY      BREAST SURGERY  2001    right biopsy, benign    CHONDROPLASTY,KNEE Medial Femoral Right 7/12/2018     [facility] at [facility] OR    COLONOSCOPY      COLONOSCOPY N/A 12/5/2014    Performed by Sixto Barrera MD at [facility] ENDO    dental implant      [id number]-[id number]-CATH Right 12/14/2018    Performed by Jurgen [facility] at [facility] OR    KNEE ARTHROSCOPY Right     LIVER BIOPSY  11/16/2018    THYROIDECTOMY  2011    complete     Past Medical History:   Diagnosis Date    Arthritis     Cancer     gallbadder/ liver spots/biopsy    Thyroid disease        Current Outpatient Medications:     calcium carbonate (CALCIUM 600 ORAL), Take 1,200 mg by mouth every evening., Disp: , Rfl:     levothyroxine (SYNTHROID) 125 MCG tablet, Take 125 mcg by mouth once daily. Mon, Tues, Thurs, Fri, Sat, brand name only, Disp: , Rfl:     multivitamin (ONE DAILY MULTIVITAMIN) per tablet, Take 1 tablet by mouth once daily., Disp: , Rfl:     mupirocin (BACTROBAN) 2 % ointment, Apply topically 3 (three) times daily. To affected areas until healed, Disp: 22 g, Rfl: 0    ondansetron (ZOFRAN-ODT) 8 MG TbDL, Take 1 tablet (8 mg total) by mouth every 12 (twelve) hours as needed., Disp: 30 tablet, Rfl: 1    oxyCODONE-acetaminophen (PERCOCET) 5-325 mg per tablet, Take 1 tablet by mouth every 4 (four) hours as needed for Pain., Disp: 30 tablet, Rfl: 0    oxyCODONE-acetaminophen (PERCOCET) 5-325 mg per tablet, Take 1 tablet by mouth every 8 (eight) hours as needed for Pain., Disp: 42 tablet, Rfl: 0    pantoprazole (PROTONIX) 20 MG tablet, Take 1 tablet (20 mg total) by mouth once daily., Disp: 30 tablet, Rfl: 11    prochlorperazine (COMPAZINE) 10 MG tablet, Take 1 tablet (10 mg total) by mouth every 6 (six) hours as needed., Disp: 30 tablet, Rfl: 1    SYNTHROID 112 mcg tablet, Take 1 tablet (112 mcg total) by mouth once daily. On Wed, Sun, Disp: 30 tablet, Rfl: 11    venlafaxine (EFFEXOR-XR) 37.5 MG 24 hr capsule, Take 1 capsule (37.5 mg total) by mouth once daily., Disp: 30 capsule, Rfl: 11  Review of patient's  allergies indicates:  No Known Allergies      REVIEW OF SYSTEMS:     CONSTITUTIONAL:   Some nausea   Had what appeared to be significant side effects to irinotecan with severe diarrhea and discomfort.   SKIN: Denies rash, issues with nails, non-healing sores, bleeding, blotching    skin or abnormal bruising. Denies new moles or changes to existing moles.      BREASTS: There is no swelling around breasts or nipple discharge.    EYES: Denies eye pain, blurred vision, swelling, redness or discharge.      ENT AND MOUTH: Denies runny nose, stuffiness, sinus trouble or sores. Denies    nosebleeds. Denies, hoarseness, change in voice or swelling in front of the    neck.      CARDIOVASCULAR: Denies chest pain, discomfort or palpitations. Denies neck    swelling or episodes of passing out.      RESPIRATORY: Denies cough, sputum production, blood in sputum, and denies    shortness of breath.      GI: Denies trouble swallowing, indigestion, heartburn, abdominal pain, nausea,    vomiting, diarrhea, has altered bowel habits, no blood in stool, discoloration of    stools, change in nature of stool, bloating, increased abdominal girth.      GENITOURINARY: No discharge. No pelvic pain or lumps. No rash around groin or  lesions. No urinary frequency, hesitation, painful urination or blood in    urine. Denies incontinence. No problems with intercourse.      MUSCULOSKELETAL:  Some amount of knee pain    NEUROLOGICAL: Denies tingling, numbness, altered mentation changes to nerve    function in the face, weakness to one or both of the body. Denies changes to    gait and denies multiple falls or accidents.      PSYCHIATRIC: Denies nervousness, anxiety, hallucinations, depression, suicidal    ideation, trouble sleeping or changes in behavior noticed by family.      The patient denies recent foreign travel or recent exposure to chemicals or    products of concern or infectious diseases.     PHYSICAL EXAM:     Wt Readings from Last 3  Encounters:   01/16/19 72.9 kg (160 lb 11.5 oz)   01/09/19 73.2 kg (161 lb 6 oz)   01/07/19 73.6 kg (162 lb 4.1 oz)     Temp Readings from Last 3 Encounters:   01/16/19 97.5 °F (36.4 °C)   01/09/19 97.3 °F (36.3 °C)   12/20/18 98.8 °F (37.1 °C) (Oral)     BP Readings from Last 3 Encounters:   01/16/19 (!) 112/57   01/09/19 (!) 106/59   01/07/19 125/60     Pulse Readings from Last 3 Encounters:   01/16/19 108   01/09/19 100   01/07/19 104     GENERAL: Comfortable looking patient. Patient is in no distress.  Awake, alert and oriented to time, person and place.  No anxiety, or agitation.      HEENT: Normal conjunctivae and eyelids. WNL.  PERRLA 3 to 4 mm. No icterus, no pallor, no congestion, and no discharge noted.     NECK:  Supple. Trachea is central.  No crepitus.  No JVD or masses.    RESPIRATORY:  No intercostal retractions.  No dullness to percussion.  Chest is clear to auscultation.  No rales, rhonchi or wheezes.  No crepitus.  Good air entry bilaterally.    CARDIOVASCULAR:  S1 and S2 are normally heard without murmurs or gallops.  All peripheral pulses are present.    ABDOMEN:  Normal abdomen.  No hepatosplenomegaly.  No free fluid.  Bowel sounds are present.  No hernia noted. No masses.  No rebound or tenderness.  No guarding or rigidity.  Umbilicus is midline.    LYMPHATICS:  No axillary, cervical, supraclavicular, submental, or inguinal lymphadenopathy.    SKIN/MUSCULOSKELETAL:  There is no evidence of excoriation marks or ecchmosis.  No rashes.  No cyanosis.  No clubbing.  No joint or skeletal deformities noted.  Normal range of motion.    NEUROLOGIC:  Higher functions are appropriate.  No cranial nerve deficits.  Normal jody.  Normal strength.  Motor and sensory functions are normal.  Deep tendon reflexes are normal.    GENITAL/RECTAL:  Exams are deferred.      Laboratory:     CBC:  Lab Results   Component Value Date    WBC 20.05 (H) 01/07/2019    RBC 3.77 (L) 01/07/2019    HGB 9.2 (L) 01/07/2019    HCT  30.9 (L) 01/07/2019    MCV 82 01/07/2019    MCH 24.4 (L) 01/07/2019    MCHC 29.8 (L) 01/07/2019    RDW 14.2 01/07/2019     (H) 01/07/2019    MPV 8.8 (L) 01/07/2019    GRAN 17.0 (H) 01/07/2019    GRAN 84.9 (H) 01/07/2019    LYMPH 2.0 01/07/2019    LYMPH 9.8 (L) 01/07/2019    MONO 1.0 01/07/2019    MONO 4.8 01/07/2019    EOS 0.0 01/07/2019    BASO 0.07 01/07/2019    EOSINOPHIL 0.2 01/07/2019    BASOPHIL 0.3 01/07/2019       BMP: BMP  Lab Results   Component Value Date     (L) 01/07/2019    K 3.9 01/07/2019    CL 99 01/07/2019    CO2 26 01/07/2019    BUN 7 01/07/2019    CREATININE 0.60 01/07/2019    CALCIUM 9.3 01/07/2019    ANIONGAP 9 01/07/2019    ESTGFRAFRICA >60 01/07/2019    EGFRNONAA >60 01/07/2019       LFT:   Lab Results   Component Value Date    ALT 35 01/07/2019    AST 40 (H) 01/07/2019    ALKPHOS 178 (H) 01/07/2019    BILITOT 0.5 01/07/2019 December 2018 CT chest abdomen pelvis  Impression       Gallbladder mass with invasion of the hepatic parenchyma compatible with the patient's known gallbladder neoplasm, and moderately increased in size.  Possible extension to the hepatic flexure of the colon.  Unchanged mild the biliary dilatation.    Development a suspicious lymph node subjacent to the mass.    3 mm left upper lobe nodule which is nonspecific.  Attention on 6-12 month follow-up recommended.    Unchanged hepatic hypodensities, suggestive for cysts.  Continued attention on follow-up recommended           Assessment/Plan:       Received 1st course of FOLFIRINOX with seemingly side effects of a significant to irinotecan  Will discontinue irinotecan  Proceed with 5 FU and oxaliplatin as ordered  Return to clinic for 2 weeks on Monday for ongoing therapy  Extensive counseling for patient because she is very tearful about whole as she is able to do  Continue hypothyroidism management with PCP

## 2019-01-17 ENCOUNTER — TELEPHONE (OUTPATIENT)
Dept: HEMATOLOGY/ONCOLOGY | Facility: CLINIC | Age: 54
End: 2019-01-17

## 2019-01-17 LAB — CANCER AG19-9 SERPL-ACNC: 3178 U/ML (ref 0–35)

## 2019-01-17 NOTE — TELEPHONE ENCOUNTER
Shivani at [facility] infusion notified that Irinotecan has been removed from treatment plan.

## 2019-01-21 ENCOUNTER — PATIENT MESSAGE (OUTPATIENT)
Dept: HEMATOLOGY/ONCOLOGY | Facility: CLINIC | Age: 54
End: 2019-01-21

## 2019-01-23 ENCOUNTER — OFFICE VISIT (OUTPATIENT)
Dept: HEMATOLOGY/ONCOLOGY | Facility: CLINIC | Age: 54
End: 2019-01-23
Payer: COMMERCIAL

## 2019-01-23 ENCOUNTER — TELEPHONE (OUTPATIENT)
Dept: HEMATOLOGY/ONCOLOGY | Facility: CLINIC | Age: 54
End: 2019-01-23

## 2019-01-23 VITALS
BODY MASS INDEX: 31.79 KG/M2 | RESPIRATION RATE: 12 BRPM | DIASTOLIC BLOOD PRESSURE: 79 MMHG | HEIGHT: 58 IN | HEART RATE: 94 BPM | WEIGHT: 151.44 LBS | TEMPERATURE: 98 F | SYSTOLIC BLOOD PRESSURE: 143 MMHG

## 2019-01-23 DIAGNOSIS — C23 CARCINOMA GALLBLADDER: Primary | ICD-10-CM

## 2019-01-23 DIAGNOSIS — C23 GALLBLADDER CANCER: ICD-10-CM

## 2019-01-23 DIAGNOSIS — C23 CARCINOMA OF GALL BLADDER: Primary | ICD-10-CM

## 2019-01-23 DIAGNOSIS — E02 SUBCLINICAL IODINE-DEFICIENCY HYPOTHYROIDISM: ICD-10-CM

## 2019-01-23 DIAGNOSIS — F41.1 GAD (GENERALIZED ANXIETY DISORDER): ICD-10-CM

## 2019-01-23 DIAGNOSIS — C23 MALIGNANT NEOPLASM OF GALLBLADDER: ICD-10-CM

## 2019-01-23 PROCEDURE — 99215 OFFICE O/P EST HI 40 MIN: CPT | Mod: S$GLB,,, | Performed by: INTERNAL MEDICINE

## 2019-01-23 PROCEDURE — 3008F PR BODY MASS INDEX (BMI) DOCUMENTED: ICD-10-PCS | Mod: CPTII,S$GLB,, | Performed by: INTERNAL MEDICINE

## 2019-01-23 PROCEDURE — 99999 PR PBB SHADOW E&M-EST. PATIENT-LVL IV: CPT | Mod: PBBFAC,,, | Performed by: INTERNAL MEDICINE

## 2019-01-23 PROCEDURE — 99215 PR OFFICE/OUTPT VISIT, EST, LEVL V, 40-54 MIN: ICD-10-PCS | Mod: S$GLB,,, | Performed by: INTERNAL MEDICINE

## 2019-01-23 PROCEDURE — 3008F BODY MASS INDEX DOCD: CPT | Mod: CPTII,S$GLB,, | Performed by: INTERNAL MEDICINE

## 2019-01-23 PROCEDURE — 99999 PR PBB SHADOW E&M-EST. PATIENT-LVL IV: ICD-10-PCS | Mod: PBBFAC,,, | Performed by: INTERNAL MEDICINE

## 2019-01-23 RX ORDER — OXYCODONE AND ACETAMINOPHEN 5; 325 MG/1; MG/1
1 TABLET ORAL
COMMUNITY
Start: 2019-01-07 | End: 2019-02-04

## 2019-01-23 RX ORDER — FLUOROURACIL 50 MG/ML
400 INJECTION, SOLUTION INTRAVENOUS
Status: CANCELLED | OUTPATIENT
Start: 2019-01-28

## 2019-01-23 RX ORDER — HEPARIN 100 UNIT/ML
500 SYRINGE INTRAVENOUS
Status: CANCELLED | OUTPATIENT
Start: 2019-01-28

## 2019-01-23 RX ORDER — SODIUM CHLORIDE 0.9 % (FLUSH) 0.9 %
10 SYRINGE (ML) INJECTION
Status: CANCELLED | OUTPATIENT
Start: 2019-01-28

## 2019-01-23 RX ORDER — DIPHENHYDRAMINE HYDROCHLORIDE 50 MG/ML
50 INJECTION INTRAMUSCULAR; INTRAVENOUS ONCE AS NEEDED
Status: CANCELLED | OUTPATIENT
Start: 2019-01-28 | End: 2019-01-28

## 2019-01-23 RX ORDER — EPINEPHRINE 0.3 MG/.3ML
0.3 INJECTION SUBCUTANEOUS ONCE AS NEEDED
Status: CANCELLED | OUTPATIENT
Start: 2019-01-28 | End: 2019-01-28

## 2019-01-23 RX ORDER — ONDANSETRON 8 MG/1
8 TABLET, ORALLY DISINTEGRATING ORAL EVERY 12 HOURS PRN
COMMUNITY
Start: 2018-12-10 | End: 2019-08-16

## 2019-01-23 RX ORDER — HEPARIN 100 UNIT/ML
500 SYRINGE INTRAVENOUS
Status: CANCELLED | OUTPATIENT
Start: 2019-01-30

## 2019-01-23 RX ORDER — ATROPINE SULFATE 0.4 MG/ML
0.4 INJECTION, SOLUTION ENDOTRACHEAL; INTRAMEDULLARY; INTRAMUSCULAR; INTRAVENOUS; SUBCUTANEOUS ONCE AS NEEDED
Status: CANCELLED | OUTPATIENT
Start: 2019-01-28 | End: 2019-01-28

## 2019-01-23 RX ORDER — VENLAFAXINE HYDROCHLORIDE 37.5 MG/1
1 CAPSULE, EXTENDED RELEASE ORAL EVERY MORNING
COMMUNITY
Start: 2017-06-01 | End: 2019-06-07 | Stop reason: SDUPTHER

## 2019-01-23 RX ORDER — SODIUM CHLORIDE 0.9 % (FLUSH) 0.9 %
10 SYRINGE (ML) INJECTION
Status: CANCELLED | OUTPATIENT
Start: 2019-01-30

## 2019-01-23 NOTE — TELEPHONE ENCOUNTER
Left voice message on home phone and cell phone informing patient that her lab appointment date has been changed from 2/1/19 to 2/4/19. Instructed patient to call .

## 2019-01-23 NOTE — TELEPHONE ENCOUNTER
Patient called to schedule Ct scan. Message left notifying patient of appointment time and to call [phone] to r/s if the time is not good for her,

## 2019-01-23 NOTE — PROGRESS NOTES
Subjective:       Patient ID: Faith Byers is a 53 y.o. female.    Chief Complaint:  Started sandwich mode of therapy with FOLFIRINOX on Monday for invasive squamous cell CA of gallbladder which is an unusual tissue type there was a confusion with the patient starting radiation prior to initiation of systemic chemotherapy patient has seen Dr. Garcia surgeon who had recommended a sandwich more of therapy in the hope of resection  HPI:   FINAL PATHOLOGIC DIAGNOSIS  GALLBLADDER/LIVER MASS, BIOPSY:  -Positive for malignancy, invasive squamous cell carcinoma,     Social History     Socioeconomic History    Marital status:      Spouse name: None    Number of children: None    Years of education: None    Highest education level: None   Social Needs    Financial resource strain: None    Food insecurity - worry: None    Food insecurity - inability: None    Transportation needs - medical: None    Transportation needs - non-medical: None   Occupational History    None   Tobacco Use    Smoking status: Never Smoker    Smokeless tobacco: Never Used   Substance and Sexual Activity    Alcohol use: Yes     Comment: rarely    Drug use: No    Sexual activity: Not Currently   Other Topics Concern    None   Social History Narrative    None     Family History   Problem Relation Age of Onset    Cancer Mother         breast and bone     Breast cancer Mother     Hyperlipidemia Father     ADD / ADHD Daughter         autism    Crohn's disease Paternal Uncle     Heart disease Paternal Uncle         heart transplant     Past Surgical History:   Procedure Laterality Date    ARTHROSCOPY, KNEE, WITH Partial lateral MENISCECTOMY Right 7/12/2018    [facility]    Nuofxe-htjzgz-ye N/A 11/16/2018    Performed by [facility]    BREAST BIOPSY      BREAST SURGERY  2001    right biopsy, benign    CHONDROPLASTY,KNEE Medial Femoral Right  7/12/2018    [facility] OR    COLONOSCOPY      COLONOSCOPY N/A 12/5/2014    Performed by [facility]o [facility]rera [facility] at [facility] ENDO    dental implant      CWVSLQBXZ-FUES-N-CATH Right 12/14/2018    Performed by [facility] at [facility] OR    KNEE ARTHROSCOPY Right     LIVER BIOPSY  11/16/2018    THYROIDECTOMY  2011    complete     Past Medical History:   Diagnosis Date    Arthritis     Cancer     gallbadder/ liver spots/biopsy    Thyroid disease        Current Outpatient Medications:     calcium carbonate (CALCIUM 600 ORAL), Take 1,200 mg by mouth every evening., Disp: , Rfl:     levothyroxine (SYNTHROID) 125 MCG tablet, Take 125 mcg by mouth once daily. Mon, Tues, Thurs, Fri, Sat, brand name only, Disp: , Rfl:     multivitamin (ONE DAILY MULTIVITAMIN) per tablet, Take 1 tablet by mouth once daily., Disp: , Rfl:     mupirocin (BACTROBAN) 2 % ointment, Apply topically 3 (three) times daily. To affected areas until healed, Disp: 22 g, Rfl: 0    ondansetron (ZOFRAN-ODT) 8 MG TbDL, Take 8 mg by mouth., Disp: , Rfl:     oxyCODONE-acetaminophen (PERCOCET) 5-325 mg per tablet, Take 1 tablet by mouth., Disp: , Rfl:     pantoprazole (PROTONIX) 20 MG tablet, Take 1 tablet (20 mg total) by mouth once daily., Disp: 30 tablet, Rfl: 11    prochlorperazine (COMPAZINE) 10 MG tablet, Take 1 tablet (10 mg total) by mouth every 6 (six) hours as needed., Disp: 30 tablet, Rfl: 1    ranitidine (ZANTAC) 150 MG tablet, Take 150 mg by mouth once daily., Disp: , Rfl:     SYNTHROID 112 mcg tablet, Take 1 tablet (112 mcg total) by mouth once daily. On Wed, Sun, Disp: 30 tablet, Rfl: 11    venlafaxine (EFFEXOR) 37.5 MG Tab, Take 1 tablet by mouth., Disp: , Rfl:   Review of patient's allergies indicates:  No Known Allergies      REVIEW OF SYSTEMS:     CONSTITUTIONAL:   Some nausea   Had what appeared to be significant side effects to irinotecan with severe diarrhea and discomfort.   SKIN: Denies rash,  issues with nails, non-healing sores, bleeding, blotching    skin or abnormal bruising. Denies new moles or changes to existing moles.      BREASTS: There is no swelling around breasts or nipple discharge.    EYES: Denies eye pain, blurred vision, swelling, redness or discharge.      ENT AND MOUTH: Denies runny nose, stuffiness, sinus trouble or sores. Denies    nosebleeds. Denies, hoarseness, change in voice or swelling in front of the    neck.      CARDIOVASCULAR: Denies chest pain, discomfort or palpitations. Denies neck    swelling or episodes of passing out.      RESPIRATORY: Denies cough, sputum production, blood in sputum, and denies    shortness of breath.      GI: Denies trouble swallowing, indigestion, heartburn, abdominal pain, nausea,    vomiting, diarrhea, has altered bowel habits, no blood in stool, discoloration of    stools, change in nature of stool, bloating, increased abdominal girth.      GENITOURINARY: No discharge. No pelvic pain or lumps. No rash around groin or  lesions. No urinary frequency, hesitation, painful urination or blood in    urine. Denies incontinence. No problems with intercourse.      MUSCULOSKELETAL:  Some amount of knee pain    NEUROLOGICAL: Denies tingling, numbness, altered mentation changes to nerve    function in the face, weakness to one or both of the body. Denies changes to    gait and denies multiple falls or accidents.      PSYCHIATRIC: Denies nervousness, anxiety, hallucinations, depression, suicidal    ideation, trouble sleeping or changes in behavior noticed by family.      The patient denies recent foreign travel or recent exposure to chemicals or    products of concern or infectious diseases.     PHYSICAL EXAM:     Wt Readings from Last 3 Encounters:   01/23/19 68.7 kg (151 lb 7.3 oz)   01/16/19 72.9 kg (160 lb 11.5 oz)   01/09/19 73.2 kg (161 lb 6 oz)     Temp Readings from Last 3 Encounters:   01/23/19 97.6 °F (36.4 °C)   01/16/19 97.5 °F (36.4 °C)   01/09/19  97.3 °F (36.3 °C)     BP Readings from Last 3 Encounters:   01/23/19 (!) 143/79   01/16/19 (!) 112/57   01/09/19 (!) 106/59     Pulse Readings from Last 3 Encounters:   01/23/19 94   01/16/19 108   01/09/19 100     GENERAL: Comfortable looking patient. Patient is in no distress.  Awake, alert and oriented to time, person and place.  No anxiety, or agitation.      HEENT: Normal conjunctivae and eyelids. WNL.  PERRLA 3 to 4 mm. No icterus, no pallor, no congestion, and no discharge noted.     NECK:  Supple. Trachea is central.  No crepitus.  No JVD or masses.    RESPIRATORY:  No intercostal retractions.  No dullness to percussion.  Chest is clear to auscultation.  No rales, rhonchi or wheezes.  No crepitus.  Good air entry bilaterally.    CARDIOVASCULAR:  S1 and S2 are normally heard without murmurs or gallops.  All peripheral pulses are present.    ABDOMEN:  Normal abdomen.  No hepatosplenomegaly.  No free fluid.  Bowel sounds are present.  No hernia noted. No masses.  No rebound or tenderness.  No guarding or rigidity.  Umbilicus is midline.    LYMPHATICS:  No axillary, cervical, supraclavicular, submental, or inguinal lymphadenopathy.    SKIN/MUSCULOSKELETAL:  There is no evidence of excoriation marks or ecchmosis.  No rashes.  No cyanosis.  No clubbing.  No joint or skeletal deformities noted.  Normal range of motion.    NEUROLOGIC:  Higher functions are appropriate.  No cranial nerve deficits.  Normal jody.  Normal strength.  Motor and sensory functions are normal.  Deep tendon reflexes are normal.    GENITAL/RECTAL:  Exams are deferred.      Laboratory:     CBC:  Lab Results   Component Value Date    WBC 3.90 01/23/2019    RBC 3.60 (L) 01/23/2019    HGB 8.7 (L) 01/23/2019    HCT 27.1 (L) 01/23/2019    MCV 75 (L) 01/23/2019    MCH 24.2 (L) 01/23/2019    MCHC 32.1 01/23/2019    RDW 15.6 (H) 01/23/2019     01/23/2019    MPV 7.2 (L) 01/23/2019    GRAN 2.5 01/23/2019    GRAN 63.7 01/23/2019    LYMPH 1.1  01/23/2019    LYMPH 29.2 01/23/2019    MONO 0.2 (L) 01/23/2019    MONO 4.4 01/23/2019    EOS 0.1 01/23/2019    BASO 0.00 01/23/2019    EOSINOPHIL 2.6 01/23/2019    BASOPHIL 0.1 01/23/2019       BMP: BMP  Lab Results   Component Value Date     (L) 01/23/2019    K 3.5 01/23/2019     01/23/2019    CO2 22 (L) 01/23/2019    BUN 6 01/23/2019    CREATININE 0.6 01/23/2019    CALCIUM 9.0 01/23/2019    ANIONGAP 9 01/23/2019    ESTGFRAFRICA >60 01/23/2019    EGFRNONAA >60 01/23/2019       LFT:   Lab Results   Component Value Date    ALT 17 01/23/2019    AST 24 01/23/2019    ALKPHOS 131 01/23/2019    BILITOT 0.2 01/23/2019 December 2018 CT chest abdomen pelvis  Impression       Gallbladder mass with invasion of the hepatic parenchyma compatible with the patient's known gallbladder neoplasm, and moderately increased in size.  Possible extension to the hepatic flexure of the colon.  Unchanged mild the biliary dilatation.    Development a suspicious lymph node subjacent to the mass.    3 mm left upper lobe nodule which is nonspecific.  Attention on 6-12 month follow-up recommended.    Unchanged hepatic hypodensities, suggestive for cysts.  Continued attention on follow-up recommended           Assessment/Plan:       Received 1st course of FOLFIRINOX with seemingly side effects of a significant nature to irinotecan with slurred speech I had discontinued irinotecan for the next cycle cycle 2.  Okay patient exhibits significant growth of the mass within a couple months as noted on last CT of December 2018  Patient has been to [facility] where recommendations have been made for 4 cycles of FOLFIRINOX followed by CT scan also baseline CT scan now has been recommended due to the aggressive growth noted within a short period on prior scan.  MD [facility] does not recommend a sandwich mode of therapy at this point due to the size and aggression seen  Patient reports she got 3 days of radiation prior to start  Proceed with 5  FU and oxaliplatin as ordered  Return to clinic for 2 weeks on Monday for ongoing therapy  She continues to work I have urged her to continue eating and keeping her nourishment and rest  Continue hypothyroidism management with PCP

## 2019-01-24 ENCOUNTER — HOSPITAL ENCOUNTER (INPATIENT)
Facility: HOSPITAL | Age: 54
LOS: 1 days | Discharge: HOME | DRG: 175 | End: 2019-01-26
Attending: EMERGENCY MEDICINE | Admitting: HOSPITALIST
Payer: COMMERCIAL

## 2019-01-24 ENCOUNTER — HOSPITAL ENCOUNTER (OUTPATIENT)
Dept: RADIOLOGY | Facility: HOSPITAL | Age: 54
Discharge: HOME | DRG: 175 | End: 2019-01-24
Attending: INTERNAL MEDICINE
Payer: COMMERCIAL

## 2019-01-24 DIAGNOSIS — C23 CARCINOMA OF GALL BLADDER: ICD-10-CM

## 2019-01-24 DIAGNOSIS — I26.99 BILATERAL PULMONARY EMBOLISM: Primary | ICD-10-CM

## 2019-01-24 DIAGNOSIS — E87.6 HYPOKALEMIA: ICD-10-CM

## 2019-01-24 DIAGNOSIS — F41.1 GAD (GENERALIZED ANXIETY DISORDER): ICD-10-CM

## 2019-01-24 DIAGNOSIS — Z85.09 H/O CANCER OF GALL BLADDER: ICD-10-CM

## 2019-01-24 DIAGNOSIS — C23 GALLBLADDER CARCINOMA: ICD-10-CM

## 2019-01-24 DIAGNOSIS — Q25.79 PULMONARY ARTERY ABNORMALITY: ICD-10-CM

## 2019-01-24 DIAGNOSIS — D64.9 ANEMIA, UNSPECIFIED TYPE: ICD-10-CM

## 2019-01-24 DIAGNOSIS — E02 SUBCLINICAL IODINE-DEFICIENCY HYPOTHYROIDISM: ICD-10-CM

## 2019-01-24 LAB
ANION GAP SERPL CALC-SCNC: 12 MMOL/L
BASOPHILS # BLD AUTO: 0 K/UL
BASOPHILS NFR BLD: 0.4 %
BUN SERPL-MCNC: 9 MG/DL
CALCIUM SERPL-MCNC: 9.1 MG/DL
CHLORIDE SERPL-SCNC: 102 MMOL/L
CO2 SERPL-SCNC: 22 MMOL/L
CREAT SERPL-MCNC: 0.6 MG/DL
DIFFERENTIAL METHOD: ABNORMAL
EOSINOPHIL # BLD AUTO: 0.1 K/UL
EOSINOPHIL NFR BLD: 3 %
ERYTHROCYTE [DISTWIDTH] IN BLOOD BY AUTOMATED COUNT: 15.8 %
EST. GFR  (AFRICAN AMERICAN): >60 ML/MIN/1.73 M^2
EST. GFR  (NON AFRICAN AMERICAN): >60 ML/MIN/1.73 M^2
GLUCOSE SERPL-MCNC: 91 MG/DL
HCT VFR BLD AUTO: 27.7 %
HGB BLD-MCNC: 8.7 G/DL
LYMPHOCYTES # BLD AUTO: 1.3 K/UL
LYMPHOCYTES NFR BLD: 35.3 %
MCH RBC QN AUTO: 24 PG
MCHC RBC AUTO-ENTMCNC: 31.5 G/DL
MCV RBC AUTO: 76 FL
MONOCYTES # BLD AUTO: 0.3 K/UL
MONOCYTES NFR BLD: 7.4 %
NEUTROPHILS # BLD AUTO: 2 K/UL
NEUTROPHILS NFR BLD: 53.9 %
PLATELET # BLD AUTO: 368 K/UL
PMV BLD AUTO: 6.7 FL
POTASSIUM SERPL-SCNC: 3.2 MMOL/L
RBC # BLD AUTO: 3.63 M/UL
SODIUM SERPL-SCNC: 136 MMOL/L
WBC # BLD AUTO: 3.6 K/UL

## 2019-01-24 PROCEDURE — 85025 COMPLETE CBC W/AUTO DIFF WBC: CPT

## 2019-01-24 PROCEDURE — 74177 CT ABD & PELVIS W/CONTRAST: CPT | Mod: TC

## 2019-01-24 PROCEDURE — 71260 CT THORAX DX C+: CPT | Mod: 26,,, | Performed by: RADIOLOGY

## 2019-01-24 PROCEDURE — 36415 COLL VENOUS BLD VENIPUNCTURE: CPT

## 2019-01-24 PROCEDURE — 74177 CT CHEST ABDOMEN PELVIS WITH CONTRAST (XPD): ICD-10-PCS | Mod: 26,,, | Performed by: RADIOLOGY

## 2019-01-24 PROCEDURE — 71260 CT CHEST ABDOMEN PELVIS WITH CONTRAST (XPD): ICD-10-PCS | Mod: 26,,, | Performed by: RADIOLOGY

## 2019-01-24 PROCEDURE — 99285 EMERGENCY DEPT VISIT HI MDM: CPT | Mod: 25

## 2019-01-24 PROCEDURE — 74177 CT ABD & PELVIS W/CONTRAST: CPT | Mod: 26,,, | Performed by: RADIOLOGY

## 2019-01-24 PROCEDURE — 80048 BASIC METABOLIC PNL TOTAL CA: CPT

## 2019-01-24 PROCEDURE — 25500020 PHARM REV CODE 255

## 2019-01-24 PROCEDURE — G0378 HOSPITAL OBSERVATION PER HR: HCPCS

## 2019-01-24 PROCEDURE — 63600175 PHARM REV CODE 636 W HCPCS: Performed by: NURSE PRACTITIONER

## 2019-01-24 PROCEDURE — 96372 THER/PROPH/DIAG INJ SC/IM: CPT | Mod: 59

## 2019-01-24 PROCEDURE — 96374 THER/PROPH/DIAG INJ IV PUSH: CPT

## 2019-01-24 PROCEDURE — 93005 ELECTROCARDIOGRAM TRACING: CPT

## 2019-01-24 PROCEDURE — 63600175 PHARM REV CODE 636 W HCPCS: Performed by: EMERGENCY MEDICINE

## 2019-01-24 RX ORDER — LEVOTHYROXINE SODIUM 125 UG/1
125 TABLET ORAL
Status: DISCONTINUED | OUTPATIENT
Start: 2019-01-26 | End: 2019-01-26 | Stop reason: HOSPADM

## 2019-01-24 RX ORDER — LEVOTHYROXINE SODIUM 125 UG/1
125 TABLET ORAL
Status: DISCONTINUED | OUTPATIENT
Start: 2019-01-25 | End: 2019-01-26 | Stop reason: HOSPADM

## 2019-01-24 RX ORDER — LEVOTHYROXINE SODIUM 112 UG/1
112 TABLET ORAL
Status: DISCONTINUED | OUTPATIENT
Start: 2019-01-27 | End: 2019-01-26 | Stop reason: HOSPADM

## 2019-01-24 RX ORDER — PANTOPRAZOLE SODIUM 40 MG/1
40 TABLET, DELAYED RELEASE ORAL DAILY
Status: DISCONTINUED | OUTPATIENT
Start: 2019-01-25 | End: 2019-01-26 | Stop reason: HOSPADM

## 2019-01-24 RX ORDER — LANOLIN ALCOHOL/MO/W.PET/CERES
800 CREAM (GRAM) TOPICAL
Status: DISCONTINUED | OUTPATIENT
Start: 2019-01-24 | End: 2019-01-26 | Stop reason: HOSPADM

## 2019-01-24 RX ORDER — POTASSIUM CHLORIDE 20 MEQ/15ML
60 SOLUTION ORAL
Status: DISCONTINUED | OUTPATIENT
Start: 2019-01-24 | End: 2019-01-26 | Stop reason: HOSPADM

## 2019-01-24 RX ORDER — CALCIUM CARBONATE 500(1250)
1000 TABLET ORAL NIGHTLY
Status: DISCONTINUED | OUTPATIENT
Start: 2019-01-24 | End: 2019-01-26 | Stop reason: HOSPADM

## 2019-01-24 RX ORDER — SODIUM CHLORIDE 0.9 % (FLUSH) 0.9 %
5 SYRINGE (ML) INJECTION
Status: DISCONTINUED | OUTPATIENT
Start: 2019-01-24 | End: 2019-01-26 | Stop reason: HOSPADM

## 2019-01-24 RX ORDER — LEVOTHYROXINE SODIUM 125 UG/1
125 TABLET ORAL
Status: DISCONTINUED | OUTPATIENT
Start: 2019-01-25 | End: 2019-01-24

## 2019-01-24 RX ORDER — PANTOPRAZOLE SODIUM 20 MG/1
20 TABLET, DELAYED RELEASE ORAL DAILY
Status: DISCONTINUED | OUTPATIENT
Start: 2019-01-25 | End: 2019-01-26 | Stop reason: HOSPADM

## 2019-01-24 RX ORDER — ENOXAPARIN SODIUM 100 MG/ML
1 INJECTION SUBCUTANEOUS
Status: COMPLETED | OUTPATIENT
Start: 2019-01-24 | End: 2019-01-24

## 2019-01-24 RX ORDER — IBUPROFEN 200 MG
16 TABLET ORAL
Status: DISCONTINUED | OUTPATIENT
Start: 2019-01-24 | End: 2019-01-26 | Stop reason: HOSPADM

## 2019-01-24 RX ORDER — AMOXICILLIN 250 MG
1 CAPSULE ORAL 2 TIMES DAILY
Status: DISCONTINUED | OUTPATIENT
Start: 2019-01-24 | End: 2019-01-26 | Stop reason: HOSPADM

## 2019-01-24 RX ORDER — ONDANSETRON 2 MG/ML
8 INJECTION INTRAMUSCULAR; INTRAVENOUS EVERY 8 HOURS PRN
Status: DISCONTINUED | OUTPATIENT
Start: 2019-01-24 | End: 2019-01-26 | Stop reason: HOSPADM

## 2019-01-24 RX ORDER — IBUPROFEN 200 MG
24 TABLET ORAL
Status: DISCONTINUED | OUTPATIENT
Start: 2019-01-24 | End: 2019-01-26 | Stop reason: HOSPADM

## 2019-01-24 RX ORDER — RAMELTEON 8 MG/1
8 TABLET ORAL NIGHTLY PRN
Status: DISCONTINUED | OUTPATIENT
Start: 2019-01-24 | End: 2019-01-26 | Stop reason: HOSPADM

## 2019-01-24 RX ORDER — ACETAMINOPHEN 500 MG
1000 TABLET ORAL EVERY 6 HOURS PRN
Status: DISCONTINUED | OUTPATIENT
Start: 2019-01-24 | End: 2019-01-26 | Stop reason: HOSPADM

## 2019-01-24 RX ORDER — LEVOTHYROXINE SODIUM 112 UG/1
112 TABLET ORAL
Status: DISCONTINUED | OUTPATIENT
Start: 2019-01-30 | End: 2019-01-26 | Stop reason: HOSPADM

## 2019-01-24 RX ORDER — VENLAFAXINE 37.5 MG/1
37.5 TABLET ORAL 2 TIMES DAILY
Status: DISCONTINUED | OUTPATIENT
Start: 2019-01-24 | End: 2019-01-26 | Stop reason: HOSPADM

## 2019-01-24 RX ORDER — ENOXAPARIN SODIUM 100 MG/ML
1 INJECTION SUBCUTANEOUS
Status: DISCONTINUED | OUTPATIENT
Start: 2019-01-25 | End: 2019-01-26 | Stop reason: HOSPADM

## 2019-01-24 RX ORDER — SODIUM CHLORIDE 9 MG/ML
INJECTION, SOLUTION INTRAVENOUS CONTINUOUS
Status: DISCONTINUED | OUTPATIENT
Start: 2019-01-24 | End: 2019-01-26 | Stop reason: HOSPADM

## 2019-01-24 RX ORDER — POTASSIUM CHLORIDE 20 MEQ/15ML
40 SOLUTION ORAL
Status: DISCONTINUED | OUTPATIENT
Start: 2019-01-24 | End: 2019-01-26 | Stop reason: HOSPADM

## 2019-01-24 RX ORDER — GLUCAGON 1 MG
1 KIT INJECTION
Status: DISCONTINUED | OUTPATIENT
Start: 2019-01-24 | End: 2019-01-26 | Stop reason: HOSPADM

## 2019-01-24 RX ADMIN — IOHEXOL 30 ML: 350 INJECTION, SOLUTION INTRAVENOUS at 03:01

## 2019-01-24 RX ADMIN — ENOXAPARIN SODIUM 70 MG: 100 INJECTION SUBCUTANEOUS at 10:01

## 2019-01-24 RX ADMIN — IOHEXOL 75 ML: 350 INJECTION, SOLUTION INTRAVENOUS at 03:01

## 2019-01-24 RX ADMIN — ONDANSETRON 8 MG: 2 INJECTION INTRAMUSCULAR; INTRAVENOUS at 11:01

## 2019-01-25 ENCOUNTER — DOCUMENTATION ONLY (OUTPATIENT)
Dept: HEMATOLOGY/ONCOLOGY | Facility: CLINIC | Age: 54
End: 2019-01-25

## 2019-01-25 ENCOUNTER — TELEPHONE (OUTPATIENT)
Dept: HEMATOLOGY/ONCOLOGY | Facility: CLINIC | Age: 54
End: 2019-01-25

## 2019-01-25 PROBLEM — E44.0 MODERATE MALNUTRITION: Status: ACTIVE | Noted: 2019-01-25

## 2019-01-25 PROBLEM — D64.9 ANEMIA: Status: ACTIVE | Noted: 2019-01-25

## 2019-01-25 PROBLEM — E87.6 HYPOKALEMIA: Status: ACTIVE | Noted: 2019-01-25

## 2019-01-25 PROBLEM — E43 SEVERE MALNUTRITION: Status: ACTIVE | Noted: 2019-01-25

## 2019-01-25 LAB
ALBUMIN SERPL BCP-MCNC: 2.4 G/DL
ALP SERPL-CCNC: 112 U/L
ALT SERPL W/O P-5'-P-CCNC: 13 U/L
ANION GAP SERPL CALC-SCNC: 9 MMOL/L
AST SERPL-CCNC: 18 U/L
BASOPHILS # BLD AUTO: 0 K/UL
BASOPHILS NFR BLD: 0.4 %
BILIRUB SERPL-MCNC: 0.1 MG/DL
BUN SERPL-MCNC: 7 MG/DL
CALCIUM SERPL-MCNC: 8.7 MG/DL
CHLORIDE SERPL-SCNC: 106 MMOL/L
CO2 SERPL-SCNC: 23 MMOL/L
CREAT SERPL-MCNC: 0.6 MG/DL
DIFFERENTIAL METHOD: ABNORMAL
EOSINOPHIL # BLD AUTO: 0.1 K/UL
EOSINOPHIL NFR BLD: 3.4 %
ERYTHROCYTE [DISTWIDTH] IN BLOOD BY AUTOMATED COUNT: 16.5 %
EST. GFR  (AFRICAN AMERICAN): >60 ML/MIN/1.73 M^2
EST. GFR  (NON AFRICAN AMERICAN): >60 ML/MIN/1.73 M^2
FOLATE SERPL-MCNC: 12.1 NG/ML
GLUCOSE SERPL-MCNC: 94 MG/DL
HCT VFR BLD AUTO: 24.5 %
HGB BLD-MCNC: 7.9 G/DL
IRON SERPL-MCNC: 25 UG/DL
LYMPHOCYTES # BLD AUTO: 1.3 K/UL
LYMPHOCYTES NFR BLD: 48.1 %
MAGNESIUM SERPL-MCNC: 2 MG/DL
MCH RBC QN AUTO: 24.4 PG
MCHC RBC AUTO-ENTMCNC: 32.2 G/DL
MCV RBC AUTO: 76 FL
MONOCYTES # BLD AUTO: 0.3 K/UL
MONOCYTES NFR BLD: 9.2 %
NEUTROPHILS # BLD AUTO: 1.1 K/UL
NEUTROPHILS NFR BLD: 38.9 %
PHOSPHATE SERPL-MCNC: 3.2 MG/DL
PLATELET # BLD AUTO: 294 K/UL
PMV BLD AUTO: 7 FL
POTASSIUM SERPL-SCNC: 3.4 MMOL/L
PROT SERPL-MCNC: 5.6 G/DL
RBC # BLD AUTO: 3.24 M/UL
SATURATED IRON: 8 %
SODIUM SERPL-SCNC: 138 MMOL/L
TOTAL IRON BINDING CAPACITY: 305 UG/DL
TRANSFERRIN SERPL-MCNC: 206 MG/DL
VIT B12 SERPL-MCNC: 1081 PG/ML
WBC # BLD AUTO: 2.7 K/UL

## 2019-01-25 PROCEDURE — 82746 ASSAY OF FOLIC ACID SERUM: CPT

## 2019-01-25 PROCEDURE — 80053 COMPREHEN METABOLIC PANEL: CPT

## 2019-01-25 PROCEDURE — 82607 VITAMIN B-12: CPT

## 2019-01-25 PROCEDURE — 97802 MEDICAL NUTRITION INDIV IN: CPT

## 2019-01-25 PROCEDURE — 25000003 PHARM REV CODE 250: Performed by: NURSE PRACTITIONER

## 2019-01-25 PROCEDURE — 83540 ASSAY OF IRON: CPT

## 2019-01-25 PROCEDURE — 83735 ASSAY OF MAGNESIUM: CPT

## 2019-01-25 PROCEDURE — 25500020 PHARM REV CODE 255: Performed by: INTERNAL MEDICINE

## 2019-01-25 PROCEDURE — 85025 COMPLETE CBC W/AUTO DIFF WBC: CPT

## 2019-01-25 PROCEDURE — 63600175 PHARM REV CODE 636 W HCPCS: Performed by: NURSE PRACTITIONER

## 2019-01-25 PROCEDURE — 25000003 PHARM REV CODE 250: Performed by: INTERNAL MEDICINE

## 2019-01-25 PROCEDURE — 84100 ASSAY OF PHOSPHORUS: CPT

## 2019-01-25 PROCEDURE — 12000002 HC ACUTE/MED SURGE SEMI-PRIVATE ROOM

## 2019-01-25 PROCEDURE — 36415 COLL VENOUS BLD VENIPUNCTURE: CPT

## 2019-01-25 PROCEDURE — 94761 N-INVAS EAR/PLS OXIMETRY MLT: CPT

## 2019-01-25 PROCEDURE — 25000003 PHARM REV CODE 250: Performed by: HOSPITALIST

## 2019-01-25 RX ORDER — FAMOTIDINE 20 MG/1
20 TABLET, FILM COATED ORAL 2 TIMES DAILY PRN
Status: DISCONTINUED | OUTPATIENT
Start: 2019-01-25 | End: 2019-01-26 | Stop reason: HOSPADM

## 2019-01-25 RX ORDER — POTASSIUM CHLORIDE 20 MEQ/1
40 TABLET, EXTENDED RELEASE ORAL ONCE
Status: COMPLETED | OUTPATIENT
Start: 2019-01-25 | End: 2019-01-25

## 2019-01-25 RX ADMIN — ACETAMINOPHEN 1000 MG: 500 TABLET ORAL at 08:01

## 2019-01-25 RX ADMIN — SODIUM CHLORIDE: 0.9 INJECTION, SOLUTION INTRAVENOUS at 11:01

## 2019-01-25 RX ADMIN — VENLAFAXINE 37.5 MG: 37.5 TABLET ORAL at 12:01

## 2019-01-25 RX ADMIN — LEVOTHYROXINE SODIUM 125 MCG: 125 TABLET ORAL at 06:01

## 2019-01-25 RX ADMIN — STANDARDIZED SENNA CONCENTRATE AND DOCUSATE SODIUM 1 TABLET: 8.6; 5 TABLET, FILM COATED ORAL at 12:01

## 2019-01-25 RX ADMIN — ENOXAPARIN SODIUM 70 MG: 100 INJECTION SUBCUTANEOUS at 10:01

## 2019-01-25 RX ADMIN — IOHEXOL 75 ML: 350 INJECTION, SOLUTION INTRAVENOUS at 04:01

## 2019-01-25 RX ADMIN — ACETAMINOPHEN 1000 MG: 500 TABLET ORAL at 12:01

## 2019-01-25 RX ADMIN — STANDARDIZED SENNA CONCENTRATE AND DOCUSATE SODIUM 1 TABLET: 8.6; 5 TABLET, FILM COATED ORAL at 08:01

## 2019-01-25 RX ADMIN — FAMOTIDINE 20 MG: 20 TABLET, FILM COATED ORAL at 12:01

## 2019-01-25 RX ADMIN — PANTOPRAZOLE SODIUM 20 MG: 20 TABLET, DELAYED RELEASE ORAL at 08:01

## 2019-01-25 RX ADMIN — VENLAFAXINE 37.5 MG: 37.5 TABLET ORAL at 08:01

## 2019-01-25 RX ADMIN — POTASSIUM CHLORIDE 40 MEQ: 20 TABLET, EXTENDED RELEASE ORAL at 10:01

## 2019-01-25 RX ADMIN — SODIUM CHLORIDE: 0.9 INJECTION, SOLUTION INTRAVENOUS at 12:01

## 2019-01-25 RX ADMIN — PANTOPRAZOLE SODIUM 40 MG: 40 TABLET, DELAYED RELEASE ORAL at 08:01

## 2019-01-25 NOTE — ED PROVIDER NOTES
"Encounter Date: 1/24/2019    SCRIBE #1 NOTE: I, Vanessa Barrera, am scribing for, and in the presence of, Dr. Thakkar .       History     Chief Complaint   Patient presents with    Abnormal Ct Scan     sent by doctor for CT scan of chest done today showing PE       Time seen by provider: 10:00 PM on 01/24/2019    Faith Byers is a 53 y.o. female with a hx of metastatic Gallbladder squamous cell CA and Thyroid disease who presents to the ED for further evaluation after an abnormal CT scan. Pt had a abd CT scan with contrast done today concerning for PE. The CT was done for reassessment of disease. Pt recently had a treatment of chemo therapy and is scheduled to have another in 4 days. She notes she is not on blood thinners but is on "a low dose" hydrocodone. Pt reports nausea, heart burn and chronic abd pain unchanged from baseline. CP and vomiting denied. NDKA noted.       The history is provided by the patient.     Review of patient's allergies indicates:  No Known Allergies  Past Medical History:   Diagnosis Date    Arthritis     Cancer     gallbadder/ liver spots/biopsy    Thyroid disease      Past Surgical History:   Procedure Laterality Date    ARTHROSCOPY, KNEE, WITH Partial lateral MENISCECTOMY Right 7/12/2018    Performed by [facility]an [facility] at [facility] OR    Zymfjp-lolbmt-ij N/A 11/16/2018    Performed by [facility] at [facility] OR 2ND FLR    BREAST BIOPSY      BREAST SURGERY  2001    right biopsy, benign    CHONDROPLASTY,KNEE Medial Femoral Right 7/12/2018    Performed by [facility] Kiran [facility] at [facility] OR    COLONOSCOPY      COLONOSCOPY N/A 12/5/2014    Performed by iSxto Barrera MD at [facility] ENDO    dental implant      SFQUYKYQF-HGGF-M-CATH Right 12/14/2018    Performed by Jurgen Toro MD at [facility] OR    KNEE ARTHROSCOPY Right     LIVER BIOPSY  11/16/2018    THYROIDECTOMY  2011    complete     Family History   Problem Relation Age of Onset    Cancer Mother         " "breast and bone     Breast cancer Mother     Hyperlipidemia Father     ADD / ADHD Daughter         autism    Crohn's disease Paternal Uncle     Heart disease Paternal Uncle         heart transplant     Social History     Tobacco Use    Smoking status: Never Smoker    Smokeless tobacco: Never Used   Substance Use Topics    Alcohol use: Yes     Comment: rarely    Drug use: No     Review of Systems   Constitutional: Negative for fever.        + for "heart burn"   HENT: Negative for sore throat.    Eyes: Negative for redness.   Respiratory: Negative for shortness of breath.    Cardiovascular: Negative for chest pain.   Gastrointestinal: Positive for abdominal pain (unchanged from baseline) and nausea. Negative for vomiting.   Genitourinary: Negative for dysuria.   Musculoskeletal: Negative for back pain.   Skin: Negative for rash.   Neurological: Negative for weakness.   Hematological: Does not bruise/bleed easily.       Physical Exam     Initial Vitals [01/24/19 1943]   BP Pulse Resp Temp SpO2   133/67 93 16 97.6 °F (36.4 °C) 96 %      MAP       --         Physical Exam    Nursing note and vitals reviewed.  Constitutional: She appears well-developed and well-nourished. She is not diaphoretic. No distress.   HENT:   Head: Normocephalic and atraumatic.   Mouth/Throat: Oropharynx is clear and moist.   Eyes: Conjunctivae are normal.   Neck: Neck supple.   Cardiovascular: Normal rate, regular rhythm, normal heart sounds and intact distal pulses. Exam reveals no gallop and no friction rub.    No murmur heard.  Pulses:       Radial pulses are 2+ on the right side, and 2+ on the left side.        Dorsalis pedis pulses are 2+ on the right side, and 2+ on the left side.        Posterior tibial pulses are 2+ on the right side, and 2+ on the left side.   Pulmonary/Chest: Breath sounds normal. She has no wheezes. She has no rhonchi. She has no rales.   Abdominal: Soft. She exhibits no distension. There is tenderness in the " right upper quadrant. There is no guarding.   Minimal tenderness.   Musculoskeletal: Normal range of motion.   No peripheral edema.   Neurological: She is alert and oriented to person, place, and time.   Skin: No rash noted. No erythema.   Psychiatric: Her speech is normal.         ED Course   Procedures  Labs Reviewed   CBC W/ AUTO DIFFERENTIAL - Abnormal; Notable for the following components:       Result Value    WBC 3.60 (*)     RBC 3.63 (*)     Hemoglobin 8.7 (*)     Hematocrit 27.7 (*)     MCV 76 (*)     MCH 24.0 (*)     MCHC 31.5 (*)     RDW 15.8 (*)     Platelets 368 (*)     MPV 6.7 (*)     All other components within normal limits   BASIC METABOLIC PANEL - Abnormal; Notable for the following components:    Potassium 3.2 (*)     CO2 22 (*)     All other components within normal limits          Imaging Results    None          Medical Decision Making:   History:   Old Medical Records: I decided to obtain old medical records.  Clinical Tests:   Lab Tests: Ordered and Reviewed  Medical Tests: Ordered and Reviewed            Scribe Attestation:   Scribe #1: I performed the above scribed service and the documentation accurately describes the services I performed. I attest to the accuracy of the note.    I, Dr. Rhys Thakkar, personally performed the services described in this documentation. All medical record entries made by the scribe were at my direction and in my presence.  I have reviewed the chart and agree that the record reflects my personal performance and is accurate and complete. Rhys Thakkar MD.  1:04 AM 01/25/2019    Faith Byers is a 53 y.o. female presenting with abnormal CT of the abdomen and pelvis suggestive of possible filling defects in the lower submental pulmonary arteries.  Patient requires more definitive chest imaging but recently received IV contrast load.  CT chest with contrast would be best study over V/Q this evening.  She will be admitted with temporary  anticoagulation pending definitive study to rule out PE.  She has no symptoms or other signs of PE at this point.  I have discussed with hospital medicine who will assume care.        ED Course as of Jan 25 0105   Thu Jan 24, 2019   2245 EKG:  Normal sinus rhythm at a rate of 83.  Normal intervals.  Normal axis.  No significant ST or T wave changes suggesting acute ischemia or infarction.  No S1Q3T3 or other sign of right heart strain.  [MR]      ED Course User Index  [MR] Rhys Thakkar MD     Clinical Impression:     1. Gallbladder carcinoma    2. Pulmonary artery abnormality          Disposition:   Disposition: Admitted                        Rhys Thakkar MD  01/25/19 0105

## 2019-01-25 NOTE — TELEPHONE ENCOUNTER
Called and left message for pt to inform her that  spoke with the hospitalist and they will be sending her home this weekend and she should keep her chemo scheduled on Monday as planned, she should be very compliant with the blood thinner they send her home on. I informed pt that I will try to call her back before I leave clinic at 4:30. I instructed pt to call .

## 2019-01-25 NOTE — ASSESSMENT & PLAN NOTE
Secondary to chemotherapy.  Stable.  Monitor H/H closely and transfuse for hemodynamic instability and/or H/H <7/21.

## 2019-01-25 NOTE — PROGRESS NOTES
LATE ENTRY - 1/24/2019 6:30pm     Per message from Dr. Arceo- called pt to inform of Bloot clots in lungs found on CT . Advised pt to proceed to ER, if any SOB, overwhelmign sense of anxiety , rapid HR call ambulance and do not try to drive to ER. Also likely to be admitted for more than a day , advised pt to pack a bag in case. Pt verbalized understanding and thanked us for contacting her .

## 2019-01-25 NOTE — NURSING
Telephone call earlier from Dr. Alarcon, stating she wants pt discharged b/c she has chemo Monday.  Info given to Dr. Eldridge.  Again Spoke with Dr. Eldridge.  States he contacted Dr. Alarcon, oncology.  Pt to discharged tomorrow.

## 2019-01-25 NOTE — PROGRESS NOTES
Progress Note  Hospital Medicine  Patient Name:Faith Byers  MRN:  [id number]  Patient Class: OP- Observation  Admit Date: 1/24/2019  Length of Stay: 0 days  Expected Discharge Date:   Attending Physician: Josef Eldridge MD  Primary Care Provider:  Melanie Moreno MD    SUBJECTIVE:     Principal Problem: Bilateral pulmonary embolism  Initial history of present illness: Faith Byers is a 54 yo female with PMHx significant for Gallbladder and Liver CA, arthritis, and Thyroid disease.  She presented to the ED for further evaluation after an abnormal CT scan. CT scan was ordered as a baseline for tumor sizing for comparison post chemotherapy and radiation.  The CT scan was performed with oral contrast with results concerning for PE. She recently had a treatment of chemotherapy and is scheduled to have another in 4 days. She reports nausea and was actively vomiting upon assessment.  She has chronic heart burn and abdominal pain unchanged from baseline. She denied fever, sob, cough, chest pain, diarrhea, leg swelling, dysuria, or any other symptoms at this time.     PMH/PSH/SH/FH/Meds: reviewed.    Symptoms/Review of Systems: No complaints. Denied any leg pains. No shortness of breath, cough, chest pain or headache, fever or abdominal pain.     Diet:  Adequate intake.    Activity level: Normal.    Pain:  Patient reports no pain.       OBJECTIVE:   Vital Signs (Most Recent):      Temp: 97.2 °F (36.2 °C) (01/25/19 0440)  Pulse: 69 (01/25/19 0729)  Resp: 18 (01/25/19 0729)  BP: (!) 113/58 (01/25/19 0729)  SpO2: 100 % (01/25/19 0729)       Vital Signs Range (Last 24H):  Temp:  [97.2 °F (36.2 °C)-97.9 °F (36.6 °C)]   Pulse:  [69-93]   Resp:  [16-20]   BP: (106-133)/(58-87)   SpO2:  [96 %-100 %]     Weight: 69.1 kg (152 lb 5.4 oz)  Body mass index is 31.84 kg/m².  No intake or output data in the 24 hours ending 01/25/19 0903  Physical Examination:  Constitutional: She is oriented to person, place, and time.  She appears well-developed and well-nourished. No distress.   HENT:   Head: Normocephalic and atraumatic.   Mouth/Throat: Oropharynx is clear and moist.   Eyes: Conjunctivae and EOM are normal. Pupils are equal, round, and reactive to light. No scleral icterus.   Neck: Normal range of motion. Neck supple. No JVD present. No tracheal deviation present. No thyromegaly present.   Cardiovascular: Normal rate, regular rhythm, normal heart sounds and intact distal pulses. Exam reveals no gallop and no friction rub.   No murmur heard.  Pulses:       Dorsalis pedis pulses are 2+ on the right side, and 2+ on the left side.   Pulmonary/Chest: Effort normal and breath sounds normal. No accessory muscle usage. No respiratory distress. She has no wheezes. She has no rhonchi. She has no rales.   Abdominal: Soft. Bowel sounds are normal. She exhibits no distension and no mass. There is no tenderness. There is no rebound and no guarding.   Musculoskeletal: Normal range of motion. She exhibits no edema, tenderness or deformity.   Neurological: She is alert and oriented to person, place, and time. She has normal strength. Coordination normal.   Skin: Skin is warm, dry and intact. Capillary refill takes less than 2 seconds. No rash noted. She is not diaphoretic. No erythema.   Psychiatric: She has a normal mood and affect. Her speech is normal and behavior is normal. Judgment and thought content normal.   Vitals reviewed.  CRANIAL NERVES   CN III, IV, VI   Pupils are equal, round, and reactive to light.  Extraocular motions are normal.     CBC:  Recent Labs   Lab 01/23/19 0744 01/24/19 2243 01/25/19  0538   WBC 3.90 3.60* 2.70*   RBC 3.60* 3.63* 3.24*   HGB 8.7* 8.7* 7.9*   HCT 27.1* 27.7* 24.5*    368* 294   MCV 75* 76* 76*   MCH 24.2* 24.0* 24.4*   MCHC 32.1 31.5* 32.2   BMP  Recent Labs   Lab 01/23/19  0744 01/24/19 2243 01/25/19  0538    91 94   * 136 138   K 3.5 3.2* 3.4*    102 106   CO2 22* 22* 23    BUN 6 9 7   CREATININE 0.6 0.6 0.6   CALCIUM 9.0 9.1 8.7   MG  --   --  2.0      Diagnostic Results:  Microbiology Results (last 7 days)     ** No results found for the last 168 hours. **         CTA chest:  Partial response to therapy with decreased size of primary gallbladder/hepatic lesion, unchanged peripheral hepatic hypodensities and unchanged left upper lobe pulmonary nodule.  2. Findings concerning for bilateral pulmonary emboli.    Assessment/Plan:     * Bilateral pulmonary embolism     CT abdomen with contrast was concerning for bilateral PE.  Was empirically started on full dose Lovenox in ED - will continue q12h.  Will obtain CTA chest in the morning after IV hydration.  She is currently asymptomatic.  Supplemental oxygen and monitor respiratory status closely. Obtain bilateral leg venous doppler scan.    I had extensive counseling regarding use of Coumadin and NOACs, risks related to use of blood thinner and fall precautions reviewed with patient.       Hypokalemia     Replete potassium and monitor BMP closely.      Anemia     Secondary to chemotherapy.  Stable.  Monitor H/H closely and transfuse for hemodynamic instability and/or H/H <7/21.  Check Iron, TIBC, B12 and folate.     Gallbladder carcinoma     With liver metastases. Currently under the treatment of MD Sahu and Dr. Genevieve Alarcon.  Due to receive chemotherapy Monday 1/29/19.       CARINA (generalized anxiety disorder)     Chronic.  Stable.  Continue anxiolytics and monitor closely for any needed adjustments.     Hypothyroidism     Chronic problem s/p thyroidectomy.  Continue Levothyroixne.     Moderate malnutrition  Nutrition consulted. Encourage maximal PO intake. Diet supplementation ordered per nutrition approval. Will encourage PO and monitor closely for weight changes.    VTE Risk Mitigation (From admission, onward)        Ordered     enoxaparin injection 70 mg  Every 12 hours (non-standard times)      01/24/19 4004        Josef Eldridge  MD  Department of Hospital Medicine   [facility]    Addendum 2 PM:   Discussed with Dr. LEATHA Alarcon - once CTA chest and venous doppler results completed. Patient may be discharged in am on PO eliquis.

## 2019-01-25 NOTE — H&P
[facility] Hospital Medicine  History & Physical    Patient Name: Faith Byers  MRN: [id number]  Admission Date: 1/24/2019  Attending Physician: López Hampton MD   Primary Care Provider: Melanie Moreno MD         Patient information was obtained from patient, past medical records and ER records.     Subjective:     Principal Problem:Bilateral pulmonary embolism    Chief Complaint:   Chief Complaint   Patient presents with    Abnormal Ct Scan     sent by doctor for CT scan of chest done today showing PE        HPI: Faith Byers is a 54 yo female with PMHx significant for Gallbladder and Liver CA, arthritis, and Thyroid disease.  She presented to the ED for further evaluation after an abnormal CT scan. CT scan was ordered as a baseline for tumor sizing for comparison post chemotherapy and radiation.  The CT scan was performed with oral contrast with results concerning for PE. She recently had a treatment of chemotherapy and is scheduled to have another in 4 days. She reports nausea and was actively vomiting upon assessment.  She has chronic heart burn and abdominal pain unchanged from baseline. She denied fever, sob, cough, chest pain, diarrhea, leg swelling, dysuria, or any other symptoms at this time.     Past Medical History:   Diagnosis Date    Arthritis     Cancer     gallbadder/ liver spots/biopsy    Thyroid disease        Past Surgical History:   Procedure Laterality Date    ARTHROSCOPY, KNEE, WITH Partial lateral MENISCECTOMY Right 7/12/2018    Performed by [facility]an [facility] at [facility] OR    Vhtvna-lhzrno-yc N/A 11/16/2018    Performed by [facility] 2ND FLR    BREAST BIOPSY      BREAST SURGERY  2001    right biopsy, benign    CHONDROPLASTY,KNEE Medial Femoral Right 7/12/2018    Performed by [facility] Kiran MD at [facility] OR    COLONOSCOPY      COLONOSCOPY N/A 12/5/2014    Performed by Sixto Barrera MD at [facility] ENDO    dental  implant      DGWGPIFTH-PFHX-O-CATH Right 12/14/2018    [facility]formed by [facility] at [facility] OR    KNEE ARTHROSCOPY Right     LIVER BIOPSY  11/16/2018    THYROIDECTOMY  2011    complete       Review of patient's allergies indicates:  No Known Allergies    Current Facility-Administered Medications on File Prior to Encounter   Medication    [COMPLETED] omnipaque 350 iohexol 350 mg iodine/mL    [COMPLETED] omnipaque 350 iohexol 350 mg iodine/mL     Current Outpatient Medications on File Prior to Encounter   Medication Sig    calcium carbonate (CALCIUM 600 ORAL) Take 1,200 mg by mouth every evening.    levothyroxine (SYNTHROID) 125 MCG tablet Take 125 mcg by mouth once daily. Mon, Tues, Thurs, Fri, Sat, brand name only    multivitamin (ONE DAILY MULTIVITAMIN) per tablet Take 1 tablet by mouth once daily.    mupirocin (BACTROBAN) 2 % ointment Apply topically 3 (three) times daily. To affected areas until healed    ondansetron (ZOFRAN-ODT) 8 MG TbDL Take 8 mg by mouth.    oxyCODONE-acetaminophen (PERCOCET) 5-325 mg per tablet Take 1 tablet by mouth.    pantoprazole (PROTONIX) 20 MG tablet Take 1 tablet (20 mg total) by mouth once daily.    prochlorperazine (COMPAZINE) 10 MG tablet Take 1 tablet (10 mg total) by mouth every 6 (six) hours as needed.    ranitidine (ZANTAC) 150 MG tablet Take 150 mg by mouth once daily.    SYNTHROID 112 mcg tablet Take 1 tablet (112 mcg total) by mouth once daily. On Wed, Sun    venlafaxine (EFFEXOR) 37.5 MG Tab Take 1 tablet by mouth.     Family History     Problem Relation (Age of Onset)    ADD / ADHD Daughter    Breast cancer Mother    Cancer Mother    Crohn's disease Paternal Uncle    Heart disease Paternal Uncle    Hyperlipidemia Father        Tobacco Use    Smoking status: Never Smoker    Smokeless tobacco: Never Used   Substance and Sexual Activity    Alcohol use: Yes     Comment: rarely    Drug use: No    Sexual activity: Not Currently     Review of Systems    Constitutional: Negative for appetite change, chills, fatigue and fever.   HENT: Negative for hearing loss, postnasal drip, sore throat and trouble swallowing.    Eyes: Negative for photophobia and visual disturbance.   Respiratory: Negative for cough, shortness of breath and wheezing.    Cardiovascular: Negative for chest pain, palpitations and leg swelling.   Gastrointestinal: Positive for nausea (Post chemo and post oral contrast). Negative for abdominal pain, diarrhea and vomiting.   Endocrine: Negative for polydipsia, polyphagia and polyuria.   Genitourinary: Negative for dysuria, frequency and urgency.   Musculoskeletal: Negative for gait problem, neck pain and neck stiffness.   Skin: Negative for rash and wound.   Neurological: Negative for dizziness, weakness, numbness and headaches.   Hematological: Does not bruise/bleed easily.   Psychiatric/Behavioral: Negative for confusion. The patient is not nervous/anxious.      Objective:     Vital Signs (Most Recent):  Temp: 97.9 °F (36.6 °C) (01/24/19 2332)  Pulse: 87 (01/24/19 2332)  Resp: 16 (01/24/19 2332)  BP: 119/87 (01/24/19 2332)  SpO2: 100 % (01/24/19 2332) Vital Signs (24h Range):  Temp:  [97.6 °F (36.4 °C)-97.9 °F (36.6 °C)] 97.9 °F (36.6 °C)  Pulse:  [87-93] 87  Resp:  [16] 16  SpO2:  [96 %-100 %] 100 %  BP: (119-133)/(67-87) 119/87     Weight: 68 kg (150 lb)  Body mass index is 31.35 kg/m².    Physical Exam   Constitutional: She is oriented to person, place, and time. She appears well-developed and well-nourished. No distress.   HENT:   Head: Normocephalic and atraumatic.   Mouth/Throat: Oropharynx is clear and moist.   Eyes: Conjunctivae and EOM are normal. Pupils are equal, round, and reactive to light. No scleral icterus.   Neck: Normal range of motion. Neck supple. No JVD present. No tracheal deviation present. No thyromegaly present.   Cardiovascular: Normal rate, regular rhythm, normal heart sounds and intact distal pulses. Exam reveals no  gallop and no friction rub.   No murmur heard.  Pulses:       Dorsalis pedis pulses are 2+ on the right side, and 2+ on the left side.   Pulmonary/Chest: Effort normal and breath sounds normal. No accessory muscle usage. No respiratory distress. She has no wheezes. She has no rhonchi. She has no rales.   Abdominal: Soft. Bowel sounds are normal. She exhibits no distension and no mass. There is no tenderness. There is no rebound and no guarding.   Musculoskeletal: Normal range of motion. She exhibits no edema, tenderness or deformity.   Neurological: She is alert and oriented to person, place, and time. She has normal strength. Coordination normal.   Skin: Skin is warm, dry and intact. Capillary refill takes less than 2 seconds. No rash noted. She is not diaphoretic. No erythema.   Psychiatric: She has a normal mood and affect. Her speech is normal and behavior is normal. Judgment and thought content normal.   Vitals reviewed.        CRANIAL NERVES     CN III, IV, VI   Pupils are equal, round, and reactive to light.  Extraocular motions are normal.        Significant Labs:   CBC:   Recent Labs   Lab 01/23/19  0744 01/24/19  2243   WBC 3.90 3.60*   HGB 8.7* 8.7*   HCT 27.1* 27.7*    368*     CMP:   Recent Labs   Lab 01/23/19  0744 01/24/19  2243   * 136   K 3.5 3.2*    102   CO2 22* 22*    91   BUN 6 9   CREATININE 0.6 0.6   CALCIUM 9.0 9.1   PROT 6.6  --    ALBUMIN 2.7*  --    BILITOT 0.2  --    ALKPHOS 131  --    AST 24  --    ALT 17  --    ANIONGAP 9 12   EGFRNONAA >60 >60     Significant Imaging:     CT chest, abdomen, pelvis: Impression  1. Partial response to therapy with decreased size of primary gallbladder/hepatic lesion, unchanged peripheral hepatic hypodensities and unchanged left upper lobe pulmonary nodule.  2. Findings concerning for bilateral pulmonary emboli.  This report was flagged in Epic as abnormal.       Assessment/Plan:     * Bilateral pulmonary embolism    CT abdomen  with contrast was concerning for bilateral PE.  Was empirically started on full dose Lovenox in ED - will continue q12h.  Will obtain CTA chest in the morning after IV hydration.  She is currently asymptomatic.  Supplemental oxygen and monitor respiratory status closely.            Hypokalemia    Replete potassium and monitor BMP closely.        Anemia    Secondary to chemotherapy.  Stable.  Monitor H/H closely and transfuse for hemodynamic instability and/or H/H <7/21.         Gallbladder carcinoma    With liver metastases. Currently under the treatment of MD Sahu and Dr. Genevieve Alarcon.  Due to receive chemotherapy Monday 1/29/19.         CARINA (generalized anxiety disorder)    Chronic.  Stable.  Continue anxiolytics and monitor closely for any needed adjustments.       Hypothyroidism    Chronic problem s/p thyroidectomy.  Continue Levothyroixne.               VTE Risk Mitigation (From admission, onward)        Ordered     enoxaparin injection 70 mg  Every 12 hours (non-standard times)      01/24/19 1013             Roma Coyle NP  Department of Hospital Medicine   [facility]

## 2019-01-25 NOTE — PLAN OF CARE
Problem: Malnutrition  Goal: Improved Nutritional Intake    Intervention: Promote and Optimize Oral Intake   Intervention: Nutrition Supplement therapy-commercial food, Nutrition education- chemo/wt maintenance/nausea, general health ful diet    Recommendation:   1) Contniue regular diet   2) Add Boost Pudding BID and chocolate ice cream BID   3) 1 week s/p chemo avoid cold foods   4) Nutrition education/materials given    Goals: 1) PO intakes > 50% of meals and supplements  Nutrition Goal Status: new  Communication of RD Recs: (POC, sticky note, second sign )

## 2019-01-25 NOTE — ASSESSMENT & PLAN NOTE
With liver metastases. Currently under the treatment of MD Sahu and Dr. Genevieve Alarcon.  Due to receive chemotherapy Monday 1/29/19.

## 2019-01-25 NOTE — PLAN OF CARE
The pt lives with her adult daughter. She denies HH or DME. Pharmacy is [facility] on [address]. PTs PCP is Dr. Moreno and insurance is [employer]. Sienna Correa LMSW      01/25/19 3154   Discharge Assessment   Assessment Type Discharge Planning Assessment   Confirmed/corrected address and phone number on facesheet? Yes   Assessment information obtained from? Patient   Communicated expected length of stay with patient/caregiver no   Prior to hospitilization cognitive status: Alert/Oriented   Prior to hospitalization functional status: Independent   Current cognitive status: Alert/Oriented   Current Functional Status: Independent   Lives With child(simran), adult   Able to Return to Prior Arrangements yes   Is patient able to care for self after discharge? Yes   Readmission Within the Last 30 Days no previous admission in last 30 days   Patient currently being followed by outpatient case management? No   Patient currently receives any other outside agency services? No   Equipment Currently Used at Home none   Do you have any problems affording any of your prescribed medications? No   Is the patient taking medications as prescribed? yes   Does the patient have transportation home? Yes   Transportation Anticipated family or friend will provide   Does the patient receive services at the [facility]? No   Discharge Plan A Home   Discharge Plan B Home with family   DME Needed Upon Discharge  none   Patient/Family in Agreement with Plan yes   Does the patient have transportation to healthcare appointments? Yes

## 2019-01-25 NOTE — ASSESSMENT & PLAN NOTE
Contributing Nutrition Diagnosis  Severe chronic illness related malnutrition    Related to (etiology):   Decreased appetite and N/V d/t chemo    Signs and Symptoms (as evidenced by):   1) 7% wt loss x 2 months (10/19/18-12/17/18) 2) Mild muscle/fat loss @ orbital area, temples, cheeks, clavicle, biceps, calves, and triceps  3) Po intakes < 75% estimated needs x > 3 months    Of note 19% wt loss x 1 year    Interventions/Recommendations (treatment strategy):  1) Nutrition supplement BID + high calorie/protein snacks   2) Nutrition education on chemo/wt maintenance given   Nutrition Diagnosis Status:   New

## 2019-01-25 NOTE — PROGRESS NOTES
[facility]  Adult Nutrition  Progress Note    SUMMARY    Intervention: Nutrition Supplement therapy-commercial food, Nutrition education- chemo/wt maintenance/nausea, general health ful diet    Recommendation:   1) Contniue regular diet   2) Add Boost Pudding BID and chocolate ice cream BID   3) 1 week s/p chemo avoid cold foods   4) Nutrition education/materials given    Goals: 1) PO intakes > 50% of meals and supplements  Nutrition Goal Status: new  Communication of RD Recs: (POC, sticky note, second sign )    Reason for Assessment    Reason For Assessment: identified at risk by screening criteria  Diagnosis: (Bilateral pulmonary embolism)  Relevant Medical History: gallbladder CA s/p chemo/xrt. last ~ 3 weeks ago  Interdisciplinary Rounds: attended    General Information Comments: 54 y/o female admitted with pulmonary embolism s/p chemo ~ 3 weeks ago. Endorses on and off nausea/poor appetite/diarrhea especially the first 3 days following rounds of chemo. On those days pt can only drink water, after that pt eats 2-3 small meals daily. Has been having taste changes, dislikes sweet and some drinks such as coffee taste bitter. Cold sensitivity the week after chemo. Dislikes Boost and Ensure. 19% wt loss x 1 year/ 7% 10/19-12/17/18. NFPE done, mild muscle/fat wasting seen.    Nutrition Discharge Planning: To be determined- regular diet + Boost Pudding/ice cream 3-4 x daily    Nutrition Risk Screen    Nutrition Risk Screen: no indicators present    Nutrition/Diet History    Patient Reported Diet/Restrictions/Preferences: general  Typical Food/Fluid Intake: 2-3 small meals B= eggs, lunch = egg salad, pt does not drink much dairy. Likes Smoothie tavo shakes sometimes  Spiritual, Cultural Beliefs, Hinduism Practices, Values that Affect Care: no  Food Allergies: NKFA(or intolerances)  Factors Affecting Nutritional Intake: decreased appetite    Anthropometrics    Temp: 96.3 °F (35.7 °C)  Height Method:  "Measured  Height: 4' 10"(19)  Height (inches): 58 in  Weight Method: Bed Scale  Weight: 69.1 kg (152 lb 5.4 oz)  Weight (lb): 152.34 lb  Ideal Body Weight (IBW), Female: 90 lb  % Ideal Body Weight, Female (lb): 169.27 lb  BMI (Calculated): 31.9  BMI Grade: 30 - 34.9- obesity - grade I  Weight Loss: unintentional  Usual Body Weight (UBW), k.8 kg(1/15/18)  % Usual Body Weight: 80.7  % Weight Change From Usual Weight: -19.46 %       Lab/Procedures/Meds    Pertinent Labs Reviewed: reviewed  BMP  Lab Results   Component Value Date     2019    K 3.4 (L) 2019     2019    CO2 23 2019    BUN 7 2019    CREATININE 0.6 2019    CALCIUM 8.7 2019    ANIONGAP 9 2019    ESTGFRAFRICA >60 2019    EGFRNONAA >60 2019     Lab Results   Component Value Date    ALBUMIN 2.4 (L) 2019       Pertinent Medications Reviewed: reviewed  Pertinent Medications Comments: MVi, KCl, senna, NS @ 125 ml/hr    Estimated/Assessed Needs    Weight Used For Calorie Calculations: 69.1 kg (152 lb 5.4 oz)  Energy Calorie Requirements (kcal): Edwards St Jeor ( x 1.2 obesity vs. weight maintenance) = 1415 kcal  Energy Need Method: Edwards-St Jeor  Protein Requirements: 1.0 g protein/kg (chemo vs. obesity) = 69 g protein  Weight Used For Protein Calculations: 69.1 kg (152 lb 5.4 oz)  Fluid Requirements (mL): 1450 ml  Estimated Fluid Requirement Method: RDA Method  CHO Requirement: N/A      Nutrition Prescription Ordered    Current Diet Order: regular    Evaluation of Received Nutrient/Fluid Intake    Energy Calories Required: not meeting needs  Protein Required: not meeting needs  Fluid Required: exceeds needs  Comments: NS @ 125 ml/hr + PO  Tolerance: tolerating  % Intake of Estimated Energy Needs: 25%  % Meal Intake: 25%    Nutrition Risk    Level of Risk/Frequency of Follow-up: moderate 2 x weekly until appetite improves    Assessment and Plan    Severe malnutrition    " Contributing Nutrition Diagnosis  Severe chronic illness related malnutrition    Related to (etiology):   Decreased appetite and N/V d/t chemo    Signs and Symptoms (as evidenced by):   1) 7% wt loss x 2 months (10/19/18-12/17/18) 2) Mild muscle/fat loss @ orbital area, temples, cheeks, clavicle, biceps, calves, and triceps  3) Po intakes < 75% estimated needs x > 3 months    Of note 19% wt loss x 1 year    Interventions/Recommendations (treatment strategy):  1) Nutrition supplement BID + high calorie/protein snacks   2) Nutrition education on chemo/wt maintenance given   Nutrition Diagnosis Status:   New            Monitor and Evaluation    Food and Nutrient Intake: energy intake, food and beverage intake  Food and Nutrient Adminstration: diet order  Anthropometric Measurements: weight  Biochemical Data, Medical Tests and Procedures: electrolyte and renal panel, gastrointestinal profile  Nutrition-Focused Physical Findings: overall appearance     Malnutrition Assessment  Malnutrition Type: chronic illness  Energy Intake: severe energy intake  Skin (Micronutrient): (Jefe = 23)  Teeth (Micronutrient): (WDL)   Micronutrient Evaluation: suspected deficiency  Micronutrient Evaluation Comments: may benefit from vitamins/minerals in boost pudding ( MVI)   Weight Loss (Malnutrition): 7.5% in 3 months(19% x 1 year)   Orbital Region (Subcutaneous Fat Loss): mild depletion  Thoracic and Lumbar Region: mild depletion   Lutheran Region (Muscle Loss): mild depletion  Clavicle Bone Region (Muscle Loss): mild depletion  Posterior Calf Region (Muscle Loss): mild depletion(and biceps)       Subcutaneous Fat Loss (Final Summary): mild protein-calorie malnutrition  Muscle Loss Evaluation (Final Summary): mild protein-calorie malnutrition    Severe Weight Loss (Malnutrition): (7% x 2 months)    Nutrition Follow-Up    RD Follow-up?: Yes

## 2019-01-25 NOTE — HPI
Faith Byers is a 52 yo female with PMHx significant for Gallbladder and Liver CA, arthritis, and Thyroid disease.  She presented to the ED for further evaluation after an abnormal CT scan. CT scan was ordered as a baseline for tumor sizing for comparison post chemotherapy and radiation.  The CT scan was performed with oral contrast with results concerning for PE. She recently had a treatment of chemotherapy and is scheduled to have another in 4 days. She reports nausea and was actively vomiting upon assessment.  She has chronic heart burn and abdominal pain unchanged from baseline. She denied fever, sob, cough, chest pain, diarrhea, leg swelling, dysuria, or any other symptoms at this time.

## 2019-01-25 NOTE — SUBJECTIVE & OBJECTIVE
Past Medical History:   Diagnosis Date    Arthritis     Cancer     gallbadder/ liver spots/biopsy    Thyroid disease        Past Surgical History:   Procedure Laterality Date    ARTHROSCOPY, KNEE, WITH Partial lateral MENISCECTOMY Right 7/12/2018    Performed by [facility] at [facility] OR    [id number]-[id number]-vc N/A 11/16/2018    Performed by [facility] OR 2ND FLR    BREAST BIOPSY      BREAST SURGERY  2001    right biopsy, benign    CHONDROPLASTY,KNEE Medial Femoral Right 7/12/2018    Performed by [facility] at [facility] OR    COLONOSCOPY      COLONOSCOPY N/A 12/5/2014    Performed by Sixto Barrera MD at [facility] ENDO    dental implant      NKILPCBLC-KBQS-[id number]-CATH Right 12/14/2018    Performed by Jurgen [facility]o [facility] at [facility] OR    KNEE ARTHROSCOPY Right     LIVER BIOPSY  11/16/2018    THYROIDECTOMY  2011    complete       Review of patient's allergies indicates:  No Known Allergies    Current Facility-Administered Medications on File Prior to Encounter   Medication    [COMPLETED] omnipaque 350 iohexol 350 mg iodine/mL    [COMPLETED] omnipaque 350 iohexol 350 mg iodine/mL     Current Outpatient Medications on File Prior to Encounter   Medication Sig    calcium carbonate (CALCIUM 600 ORAL) Take 1,200 mg by mouth every evening.    levothyroxine (SYNTHROID) 125 MCG tablet Take 125 mcg by mouth once daily. Mon, Tues, Thurs, Fri, Sat, brand name only    multivitamin (ONE DAILY MULTIVITAMIN) per tablet Take 1 tablet by mouth once daily.    mupirocin (BACTROBAN) 2 % ointment Apply topically 3 (three) times daily. To affected areas until healed    ondansetron (ZOFRAN-ODT) 8 MG TbDL Take 8 mg by mouth.    oxyCODONE-acetaminophen (PERCOCET) 5-325 mg per tablet Take 1 tablet by mouth.    pantoprazole (PROTONIX) 20 MG tablet Take 1 tablet (20 mg total) by mouth once daily.    prochlorperazine (COMPAZINE) 10 MG tablet Take 1 tablet (10 mg total) by mouth every 6 (six) hours as  needed.    ranitidine (ZANTAC) 150 MG tablet Take 150 mg by mouth once daily.    SYNTHROID 112 mcg tablet Take 1 tablet (112 mcg total) by mouth once daily. On Wed, Sun    venlafaxine (EFFEXOR) 37.5 MG Tab Take 1 tablet by mouth.     Family History     Problem Relation (Age of Onset)    ADD / ADHD Daughter    Breast cancer Mother    Cancer Mother    Crohn's disease Paternal Uncle    Heart disease Paternal Uncle    Hyperlipidemia Father        Tobacco Use    Smoking status: Never Smoker    Smokeless tobacco: Never Used   Substance and Sexual Activity    Alcohol use: Yes     Comment: rarely    Drug use: No    Sexual activity: Not Currently     Review of Systems   Constitutional: Negative for appetite change, chills, fatigue and fever.   HENT: Negative for hearing loss, postnasal drip, sore throat and trouble swallowing.    Eyes: Negative for photophobia and visual disturbance.   Respiratory: Negative for cough, shortness of breath and wheezing.    Cardiovascular: Negative for chest pain, palpitations and leg swelling.   Gastrointestinal: Positive for nausea (Post chemo and post oral contrast). Negative for abdominal pain, diarrhea and vomiting.   Endocrine: Negative for polydipsia, polyphagia and polyuria.   Genitourinary: Negative for dysuria, frequency and urgency.   Musculoskeletal: Negative for gait problem, neck pain and neck stiffness.   Skin: Negative for rash and wound.   Neurological: Negative for dizziness, weakness, numbness and headaches.   Hematological: Does not bruise/bleed easily.   Psychiatric/Behavioral: Negative for confusion. The patient is not nervous/anxious.      Objective:     Vital Signs (Most Recent):  Temp: 97.9 °F (36.6 °C) (01/24/19 2332)  Pulse: 87 (01/24/19 2332)  Resp: 16 (01/24/19 2332)  BP: 119/87 (01/24/19 2332)  SpO2: 100 % (01/24/19 2332) Vital Signs (24h Range):  Temp:  [97.6 °F (36.4 °C)-97.9 °F (36.6 °C)] 97.9 °F (36.6 °C)  Pulse:  [87-93] 87  Resp:  [16] 16  SpO2:  [96  %-100 %] 100 %  BP: (119-133)/(67-87) 119/87     Weight: 68 kg (150 lb)  Body mass index is 31.35 kg/m².    Physical Exam   Constitutional: She is oriented to person, place, and time. She appears well-developed and well-nourished. No distress.   HENT:   Head: Normocephalic and atraumatic.   Mouth/Throat: Oropharynx is clear and moist.   Eyes: Conjunctivae and EOM are normal. Pupils are equal, round, and reactive to light. No scleral icterus.   Neck: Normal range of motion. Neck supple. No JVD present. No tracheal deviation present. No thyromegaly present.   Cardiovascular: Normal rate, regular rhythm, normal heart sounds and intact distal pulses. Exam reveals no gallop and no friction rub.   No murmur heard.  Pulses:       Dorsalis pedis pulses are 2+ on the right side, and 2+ on the left side.   Pulmonary/Chest: Effort normal and breath sounds normal. No accessory muscle usage. No respiratory distress. She has no wheezes. She has no rhonchi. She has no rales.   Abdominal: Soft. Bowel sounds are normal. She exhibits no distension and no mass. There is no tenderness. There is no rebound and no guarding.   Musculoskeletal: Normal range of motion. She exhibits no edema, tenderness or deformity.   Neurological: She is alert and oriented to person, place, and time. She has normal strength. Coordination normal.   Skin: Skin is warm, dry and intact. Capillary refill takes less than 2 seconds. No rash noted. She is not diaphoretic. No erythema.   Psychiatric: She has a normal mood and affect. Her speech is normal and behavior is normal. Judgment and thought content normal.   Vitals reviewed.        CRANIAL NERVES     CN III, IV, VI   Pupils are equal, round, and reactive to light.  Extraocular motions are normal.        Significant Labs:   CBC:   Recent Labs   Lab 01/23/19  0744 01/24/19  2243   WBC 3.90 3.60*   HGB 8.7* 8.7*   HCT 27.1* 27.7*    368*     CMP:   Recent Labs   Lab 01/23/19  0744 01/24/19  2243   NA  135* 136   K 3.5 3.2*    102   CO2 22* 22*    91   BUN 6 9   CREATININE 0.6 0.6   CALCIUM 9.0 9.1   PROT 6.6  --    ALBUMIN 2.7*  --    BILITOT 0.2  --    ALKPHOS 131  --    AST 24  --    ALT 17  --    ANIONGAP 9 12   EGFRNONAA >60 >60     Significant Imaging:     CT chest, abdomen, pelvis: Impression  1. Partial response to therapy with decreased size of primary gallbladder/hepatic lesion, unchanged peripheral hepatic hypodensities and unchanged left upper lobe pulmonary nodule.  2. Findings concerning for bilateral pulmonary emboli.  This report was flagged in Epic as abnormal.

## 2019-01-25 NOTE — ASSESSMENT & PLAN NOTE
CT abdomen with contrast was concerning for bilateral PE.  Was empirically started on full dose Lovenox in ED - will continue q12h.  Will obtain CTA chest in the morning after IV hydration.  She is currently asymptomatic.  Supplemental oxygen and monitor respiratory status closely.

## 2019-01-26 VITALS
HEART RATE: 88 BPM | RESPIRATION RATE: 16 BRPM | BODY MASS INDEX: 32.63 KG/M2 | WEIGHT: 155.44 LBS | TEMPERATURE: 98 F | DIASTOLIC BLOOD PRESSURE: 56 MMHG | SYSTOLIC BLOOD PRESSURE: 112 MMHG | OXYGEN SATURATION: 96 % | HEIGHT: 58 IN

## 2019-01-26 LAB
ALBUMIN SERPL BCP-MCNC: 2.3 G/DL
ALP SERPL-CCNC: 111 U/L
ALT SERPL W/O P-5'-P-CCNC: 14 U/L
ANION GAP SERPL CALC-SCNC: 6 MMOL/L
AST SERPL-CCNC: 20 U/L
BASOPHILS # BLD AUTO: 0 K/UL
BASOPHILS NFR BLD: 0.6 %
BILIRUB SERPL-MCNC: 0.1 MG/DL
BUN SERPL-MCNC: 4 MG/DL
CALCIUM SERPL-MCNC: 8.5 MG/DL
CHLORIDE SERPL-SCNC: 108 MMOL/L
CO2 SERPL-SCNC: 25 MMOL/L
CREAT SERPL-MCNC: 0.6 MG/DL
DIFFERENTIAL METHOD: ABNORMAL
EOSINOPHIL # BLD AUTO: 0.1 K/UL
EOSINOPHIL NFR BLD: 4.1 %
ERYTHROCYTE [DISTWIDTH] IN BLOOD BY AUTOMATED COUNT: 16.4 %
EST. GFR  (AFRICAN AMERICAN): >60 ML/MIN/1.73 M^2
EST. GFR  (NON AFRICAN AMERICAN): >60 ML/MIN/1.73 M^2
GLUCOSE SERPL-MCNC: 90 MG/DL
HCT VFR BLD AUTO: 25.4 %
HGB BLD-MCNC: 8.1 G/DL
LYMPHOCYTES # BLD AUTO: 1.2 K/UL
LYMPHOCYTES NFR BLD: 45.5 %
MAGNESIUM SERPL-MCNC: 1.9 MG/DL
MCH RBC QN AUTO: 24.7 PG
MCHC RBC AUTO-ENTMCNC: 31.9 G/DL
MCV RBC AUTO: 77 FL
MONOCYTES # BLD AUTO: 0.2 K/UL
MONOCYTES NFR BLD: 7.7 %
NEUTROPHILS # BLD AUTO: 1.1 K/UL
NEUTROPHILS NFR BLD: 42.1 %
PHOSPHATE SERPL-MCNC: 2.6 MG/DL
PLATELET # BLD AUTO: 317 K/UL
PMV BLD AUTO: 6.4 FL
POTASSIUM SERPL-SCNC: 4.1 MMOL/L
PROT SERPL-MCNC: 5.4 G/DL
RBC # BLD AUTO: 3.29 M/UL
SODIUM SERPL-SCNC: 139 MMOL/L
WBC # BLD AUTO: 2.6 K/UL

## 2019-01-26 PROCEDURE — 63600175 PHARM REV CODE 636 W HCPCS: Performed by: NURSE PRACTITIONER

## 2019-01-26 PROCEDURE — 85025 COMPLETE CBC W/AUTO DIFF WBC: CPT

## 2019-01-26 PROCEDURE — 25000003 PHARM REV CODE 250: Performed by: NURSE PRACTITIONER

## 2019-01-26 PROCEDURE — 25000003 PHARM REV CODE 250: Performed by: HOSPITALIST

## 2019-01-26 PROCEDURE — 83735 ASSAY OF MAGNESIUM: CPT

## 2019-01-26 PROCEDURE — 84100 ASSAY OF PHOSPHORUS: CPT

## 2019-01-26 PROCEDURE — 36415 COLL VENOUS BLD VENIPUNCTURE: CPT

## 2019-01-26 PROCEDURE — 80053 COMPREHEN METABOLIC PANEL: CPT

## 2019-01-26 RX ORDER — PANTOPRAZOLE SODIUM 20 MG/1
40 TABLET, DELAYED RELEASE ORAL DAILY
Qty: 180 TABLET | Refills: 3 | Status: SHIPPED | OUTPATIENT
Start: 2019-01-26 | End: 2019-02-04

## 2019-01-26 RX ADMIN — PANTOPRAZOLE SODIUM 20 MG: 20 TABLET, DELAYED RELEASE ORAL at 09:01

## 2019-01-26 RX ADMIN — ENOXAPARIN SODIUM 70 MG: 100 INJECTION SUBCUTANEOUS at 10:01

## 2019-01-26 RX ADMIN — VENLAFAXINE 37.5 MG: 37.5 TABLET ORAL at 09:01

## 2019-01-26 RX ADMIN — PANTOPRAZOLE SODIUM 40 MG: 40 TABLET, DELAYED RELEASE ORAL at 09:01

## 2019-01-26 RX ADMIN — SODIUM CHLORIDE: 0.9 INJECTION, SOLUTION INTRAVENOUS at 07:01

## 2019-01-26 RX ADMIN — LEVOTHYROXINE SODIUM 125 MCG: 125 TABLET ORAL at 05:01

## 2019-01-26 NOTE — PLAN OF CARE
Problem: Adult Inpatient Plan of Care  Goal: Plan of Care Review  Medications reviewed and administered  Call light within reach  Bed low/wheels locked  Telementry  Up ad amanda  Regular diet  Safety maintained

## 2019-01-26 NOTE — PROGRESS NOTES
01/25/19 1906   Patient Assessment/Suction   Level of Consciousness (AVPU) alert   PRE-TX-O2   O2 Device (Oxygen Therapy) room air   SpO2 100 %   Pulse Oximetry Type Intermittent   $ Pulse Oximetry - Multiple Charge Pulse Oximetry - Multiple

## 2019-01-26 NOTE — PLAN OF CARE
Problem: Adult Inpatient Plan of Care  Goal: Plan of Care Review  Medications reviewed and administered   SR upx2  Call light within reach  Bed low/wheels locked  Medication teaching RE:Eliquis  Telemetry  Safety maintained

## 2019-01-26 NOTE — PLAN OF CARE
01/26/19 0806   Patient Assessment/Suction   Level of Consciousness (AVPU) alert   PRE-TX-O2   O2 Device (Oxygen Therapy) room air   SpO2 96 %   Pulse 88   Resp 16

## 2019-01-26 NOTE — HOSPITAL COURSE
Ms. Byers is a 54 yo with GB/Liver CA, and hypothyroidism who presented due to abnormal CT findings showing concern for pulmonary emboli. She reported no symptoms except for nausea which is chronic while on her chemotherapy. She did report acid reflux improved by protonix only. Zantac was not controlling this.  She was started on therapeutic lovenox. She had CTA here with findings of pulmonary artery emboli in multiple branches right lower lobe pulmonary artery appearing acute distending the vessels.  There is also intraluminal filling defect consistent pulmonary artery emboli again appearing acute distending the vessel in branches of left lower lobe pulmonary artery. No proximal saddle emboli. She had lower extremity doppler showing deep venous thrombosis in calf veins, distal anterior tibial veins with no findings suggesting deep venous thrombosis in popliteal veins or more proximally.  After consultation with her primary oncologist, Dr. Alarcon, it was suggested that patient be switched from lovenox to eliquis. Prescription given to patient.  She is to follow up at chemotherapy session on 1/28/19. She was found to be hemodynamically stable for discharge.

## 2019-01-26 NOTE — PLAN OF CARE
Problem: Adult Inpatient Plan of Care  Goal: Plan of Care Review  Outcome: Ongoing (interventions implemented as appropriate)   01/26/19 0342   Plan of Care Review   Plan of Care Reviewed With patient   Pt alert and oriented. VSS. No distress noted. Ambulated to BR without difficulty. NS infusing 100ml/hr. PRN tylenol given for headache. Swallows meds whole. Free of fall or injury. Side rails upx2. Call light in reach. Will continue to monitor.

## 2019-01-27 NOTE — PLAN OF CARE
01/27/19 0822   Final Note   Assessment Type Final Discharge Note   Anticipated Discharge Disposition Home

## 2019-01-28 ENCOUNTER — PATIENT OUTREACH (OUTPATIENT)
Dept: ADMINISTRATIVE | Facility: CLINIC | Age: 54
End: 2019-01-28

## 2019-01-28 ENCOUNTER — TELEPHONE (OUTPATIENT)
Dept: FAMILY MEDICINE | Facility: CLINIC | Age: 54
End: 2019-01-28

## 2019-01-28 NOTE — PATIENT INSTRUCTIONS
Discharge Instructions for Pulmonary Embolism  A pulmonary embolism occurs when an embolus (clot) in the bloodstream travels through the heart and into the lungs. If the embolus becomes lodged in a blood vessel in the lungs, blood flow can be blocked. Symptoms can quickly develop and cause life-threatening heart and lung problems.  Home Care  Take your medications exactly as directed. Dont skip doses.  Ask your doctor about daily aspirin therapy.  Drink 6-8 glasses of water a day, unless directed otherwise.  Learn to take your own pulse. Keep a record of your results. Ask your doctor which readings mean that you need medical attention.  Lifestyle Changes  Avoid sitting, standing, or lying down for long periods without moving your legs and feet.  When traveling by car, stop to get out and move around at least once every three hours. On long airplane, train, or bus rides, get up and move around when possible. If you cant get up, wiggle your toes and tighten your calves to keep your blood moving.  Maintain a healthy weight. Get help to lose any extra pounds.  If you are a smoker, break the smoking habit. Enroll in a stop-smoking program to improve your chances of success.  Be active. Begin an exercise program. Ask your doctor how to get started. You can benefit from simple activities such as walking or gardening.  Follow-Up  Make a follow-up appointment as directed by our staff.    Call the healthcare provider right away if you have any of the following:  Chest pain (call 911)  Trouble breathing (call 911)  Coughing up blood (call 911)  Fainting (call 911)  Skin turns blue (call 911)  Dizziness  Rapid, pounding, or unusual heartbeat  Sweating more than usual  Unusual swelling or pain in your leg   © 6586-6335 [employer], [address] [place] [address], [place] [address]. All rights reserved. This information is not intended as a substitute for professional medical care. Always follow your healthcare professional's  instructions.

## 2019-02-04 ENCOUNTER — LAB VISIT (OUTPATIENT)
Dept: LAB | Facility: HOSPITAL | Age: 54
End: 2019-02-04
Attending: INTERNAL MEDICINE
Payer: COMMERCIAL

## 2019-02-04 ENCOUNTER — OFFICE VISIT (OUTPATIENT)
Dept: FAMILY MEDICINE | Facility: CLINIC | Age: 54
End: 2019-02-04
Payer: COMMERCIAL

## 2019-02-04 VITALS
SYSTOLIC BLOOD PRESSURE: 118 MMHG | WEIGHT: 149.94 LBS | HEIGHT: 57 IN | DIASTOLIC BLOOD PRESSURE: 75 MMHG | TEMPERATURE: 98 F | HEART RATE: 70 BPM | BODY MASS INDEX: 32.35 KG/M2

## 2019-02-04 DIAGNOSIS — I26.99 BILATERAL PULMONARY EMBOLISM: Primary | ICD-10-CM

## 2019-02-04 DIAGNOSIS — C23 CARCINOMA GALLBLADDER: ICD-10-CM

## 2019-02-04 DIAGNOSIS — Z23 NEED FOR PNEUMOCOCCAL VACCINATION: ICD-10-CM

## 2019-02-04 DIAGNOSIS — Z85.09 H/O CANCER OF GALL BLADDER: ICD-10-CM

## 2019-02-04 DIAGNOSIS — Z12.31 VISIT FOR SCREENING MAMMOGRAM: ICD-10-CM

## 2019-02-04 LAB
ALBUMIN SERPL BCP-MCNC: 3.2 G/DL
ALP SERPL-CCNC: 102 U/L
ALT SERPL W/O P-5'-P-CCNC: 19 U/L
ANION GAP SERPL CALC-SCNC: 9 MMOL/L
AST SERPL-CCNC: 23 U/L
BASOPHILS # BLD AUTO: 0 K/UL
BASOPHILS NFR BLD: 1.4 %
BILIRUB SERPL-MCNC: 0.2 MG/DL
BUN SERPL-MCNC: 10 MG/DL
CALCIUM SERPL-MCNC: 9.2 MG/DL
CHLORIDE SERPL-SCNC: 105 MMOL/L
CO2 SERPL-SCNC: 24 MMOL/L
CREAT SERPL-MCNC: 0.6 MG/DL
DIFFERENTIAL METHOD: ABNORMAL
EOSINOPHIL # BLD AUTO: 0.4 K/UL
EOSINOPHIL NFR BLD: 12.7 %
ERYTHROCYTE [DISTWIDTH] IN BLOOD BY AUTOMATED COUNT: 20.8 %
EST. GFR  (AFRICAN AMERICAN): >60 ML/MIN/1.73 M^2
EST. GFR  (NON AFRICAN AMERICAN): >60 ML/MIN/1.73 M^2
FERRITIN SERPL-MCNC: 820 NG/ML
GLUCOSE SERPL-MCNC: 87 MG/DL
HCT VFR BLD AUTO: 31.7 %
HGB BLD-MCNC: 10.1 G/DL
IRON SERPL-MCNC: 40 UG/DL
LYMPHOCYTES # BLD AUTO: 1.8 K/UL
LYMPHOCYTES NFR BLD: 50.3 %
MCH RBC QN AUTO: 25.2 PG
MCHC RBC AUTO-ENTMCNC: 31.7 G/DL
MCV RBC AUTO: 80 FL
MONOCYTES # BLD AUTO: 0.1 K/UL
MONOCYTES NFR BLD: 3.1 %
NEUTROPHILS # BLD AUTO: 1.1 K/UL
NEUTROPHILS NFR BLD: 32.5 %
PLATELET # BLD AUTO: 224 K/UL
PMV BLD AUTO: 7.6 FL
POTASSIUM SERPL-SCNC: 4.3 MMOL/L
PROT SERPL-MCNC: 6.6 G/DL
RBC # BLD AUTO: 3.99 M/UL
SATURATED IRON: 10 %
SODIUM SERPL-SCNC: 138 MMOL/L
TOTAL IRON BINDING CAPACITY: 404 UG/DL
TRANSFERRIN SERPL-MCNC: 273 MG/DL
WBC # BLD AUTO: 3.5 K/UL

## 2019-02-04 PROCEDURE — 84238 ASSAY NONENDOCRINE RECEPTOR: CPT

## 2019-02-04 PROCEDURE — 90471 PNEUMOCOCCAL POLYSACCHARIDE VACCINE 23-VALENT =>2YO SQ IM: ICD-10-PCS | Mod: S$GLB,,, | Performed by: FAMILY MEDICINE

## 2019-02-04 PROCEDURE — 90732 PNEUMOCOCCAL POLYSACCHARIDE VACCINE 23-VALENT =>2YO SQ IM: ICD-10-PCS | Mod: S$GLB,,, | Performed by: FAMILY MEDICINE

## 2019-02-04 PROCEDURE — 82728 ASSAY OF FERRITIN: CPT

## 2019-02-04 PROCEDURE — 99999 PR PBB SHADOW E&M-EST. PATIENT-LVL IV: ICD-10-PCS | Mod: PBBFAC,,, | Performed by: FAMILY MEDICINE

## 2019-02-04 PROCEDURE — 99496 TRANSITIONAL CARE MANAGE SERVICE 7 DAY DISCHARGE: ICD-10-PCS | Mod: S$GLB,,, | Performed by: FAMILY MEDICINE

## 2019-02-04 PROCEDURE — 99496 TRANSJ CARE MGMT HIGH F2F 7D: CPT | Mod: S$GLB,,, | Performed by: FAMILY MEDICINE

## 2019-02-04 PROCEDURE — 90471 IMMUNIZATION ADMIN: CPT | Mod: S$GLB,,, | Performed by: FAMILY MEDICINE

## 2019-02-04 PROCEDURE — 99999 PR PBB SHADOW E&M-EST. PATIENT-LVL IV: CPT | Mod: PBBFAC,,, | Performed by: FAMILY MEDICINE

## 2019-02-04 PROCEDURE — 85025 COMPLETE CBC W/AUTO DIFF WBC: CPT

## 2019-02-04 PROCEDURE — 80053 COMPREHEN METABOLIC PANEL: CPT

## 2019-02-04 PROCEDURE — 90732 PPSV23 VACC 2 YRS+ SUBQ/IM: CPT | Mod: S$GLB,,, | Performed by: FAMILY MEDICINE

## 2019-02-04 PROCEDURE — 83540 ASSAY OF IRON: CPT

## 2019-02-04 RX ORDER — PANTOPRAZOLE SODIUM 40 MG/1
40 TABLET, DELAYED RELEASE ORAL DAILY
Qty: 90 TABLET | Refills: 3 | Status: SHIPPED | OUTPATIENT
Start: 2019-02-04 | End: 2019-03-12 | Stop reason: SDUPTHER

## 2019-02-04 NOTE — PROGRESS NOTES
Patient identified by name and  with verbal read back. Pneumonia pneumovax 23 vaccine 0.5 ml administered IM to right deltoid using aseptic technique. Patient tolerated well, no adverse reactions noted / reported.

## 2019-02-04 NOTE — PATIENT INSTRUCTIONS
Low-Salt Diet  This diet removes foods that are high in salt. It also limits the amount of salt you use when cooking. It is most often used for people with high blood pressure, edema (fluid retention), and kidney, liver, or heart disease.  Table salt contains the mineral sodium. Your body needs sodium to work normally. But too much sodium can make your health problems worse. Your healthcare provider is recommending a low-salt (also called low-sodium) diet for you. Your total daily allowance of salt is 1,500 to 2,300 milligrams (mg). It is less than 1 teaspoon of table salt. This means you can have only about 500 to 700 mg of sodium at each meal. People with certain health problems should limit salt intake to the lower end of the recommended range.    When you cook, dont add much salt. If you can cook without using salt, even better. Dont add salt to your food at the table.  When shopping, read food labels. Salt is often called sodium on the label. Choose foods that are salt-free, low salt, or very low salt. Note that foods with reduced salt may not lower your salt intake enough.    Beans, potatoes, and pasta  Ok: Dry beans, split peas, lentils, potatoes, rice, macaroni, pasta, spaghetti without added salt  Avoid: Potato chips, tortilla chips, and similar products  Breads and cereals  Ok: Low-sodium breads, rolls, cereals, and cakes; low-salt crackers, matzo crackers  Avoid: Salted crackers, pretzels, popcorn, Danish toast, pancakes, muffins  Dairy  Ok: Milk, chocolate milk, hot chocolate mix, low-salt cheeses, and yogurt  Avoid: Processed cheese and cheese spreads; Roquefort, Camembert, and cottage cheese; buttermilk, instant breakfast drink  Desserts  Ok: Ice cream, frozen yogurt, juice bars, gelatin, cookies and pies, sugar, honey, jelly, hard candy  Avoid: Most pies, cakes and cookies prepared or processed with salt; instant pudding  Drinks  Ok: Tea, coffee, fizzy (carbonated) drinks, juices  Avoid: Flavored  coffees, electrolyte replacement drinks, sports drinks  Meats  Ok: All fresh meat, fish, poultry, low-salt tuna, eggs, egg substitute  Avoid: Smoked, pickled, brine-cured, or salted meats and fish. This includes mcpherson, chipped beef, corned beef, hot dogs, deli meats, ham, kosher meats, salt pork, sausage, canned tuna, salted codfish, smoked salmon, herring, sardines, or anchovies.  Seasonings and spices  Ok: Most seasonings are okay. Good substitutes for salt include: fresh herb blends, hot sauce, lemon, garlic, bush, vinegar, dry mustard, parsley, cilantro, horseradish, tomato paste, regular margarine, mayonnaise, unsalted butter, cream cheese, vegetable oil, cream, low-salt salad dressing and gravy.  Avoid: Regular ketchup, relishes, pickles, soy sauce, teriyaki sauce, Worcestershire sauce, BBQ sauce, tartar sauce, meat tenderizer, chili sauce, regular gravy, regular salad dressing, salted butter  Soups  Ok: Low-salt soups and broths made with allowed foods  Avoid: Bouillon cubes, soups with smoked or salted meats, regular soup and broth  Vegetables  Ok: Most vegetables are okay; also low-salt tomato and vegetable juices  Avoid: Sauerkraut and other brine-soaked vegetables; pickles and other pickled vegetables; tomato juice, olives  Date Last Reviewed: 8/1/2016 © 2000-2017 [employer]. [address] [place] [id number]. All rights reserved. This information is not intended as a substitute for professional medical care. Always follow your healthcare professional's instructions.        Advance Medical Directive    An advance medical directive is a form that lets you plan ahead for the care youd want if you could no longer express your wishes. This statement outlines the medical treatment youd want or names the person youd wish to make healthcare decisions for you. Be aware that laws vary from state to state, and it may be worthwhile to talk with an .  Writing down your wishes  · An  advance directive is important whether youre young or old. Injury or illness can strike at any age.  · Decide what is important to you and the kind of treatment youd want, or not want to have.  · Some states allow only one kind of advance directive. Some let you do both a Durable Power of  for Health Care and a Living Will. Some states put both kinds on the same form.  A Durable Power of  for Health Care  · This form lets you name someone else to be your agent.  · This person can decide on treatment for you only when you cant speak for yourself.  · You do not need to be at the end of your life. He or she could speak for you if you were in a coma but were likely to recover.  A Living Will  · This form lets you list the care you want at the end of your life.  · A living will applies only if you wont live without medical treatment. It would apply if you had advanced cancer, a massive stroke, or other serious illness from which you will not recover.  · It takes effect only when you can no longer express your wishes yourself.  Date Last Reviewed: 2/13/2016  © 9931-7143 [employer]. [address] [place] [address]. All rights reserved. This information is not intended as a substitute for professional medical care. Always follow your healthcare professional's instructions.        Understanding Advance Care Planning  Advance care planning is the process of deciding ones own future medical care. It helps to make sure that if you cant speak for yourself, your wishes can still be carried out. The plan is a series of legal documents that note a persons wishes. The documents vary by state. Advance care planning should be discussed at a regular office visit with your primary care provider before an acute illness. Advance care planning is encouraged when a person has a serious illness that is expected to get worse. It may also be done before major surgery. And it can help you and your  family be prepared in case of a major illness or injury. Advance care planning helps with making decisions at these times.    A healthcare proxy is a person who acts as the voice of a patient when the patient cant speak for himself or herself. The name of this role varies by state. It may be called a Durable Medical Power of  or Durable Power of  for Healthcare. It may be called an agent, surrogate, or advocate. Or it may be called a representative or decision maker. It is an official duty that is identified by a legal document. The document also varies by state.   Why is advance care planning important?  If a person communicates his or her healthcare wishes:  · He or she will be given medical care that matches his or her values and goals.  · Family members will not be forced to make decisions in a crisis with no guidance.  Creating a plan  Making an advance care plan is often done in 3 steps:  · Thinking about ones wishes. To create an advance care plan, you should think about what kind of medical treatment you would want if you lose the ability to communicate. Are there any situations in which you would refuse or stop treatment? Are there therapies you would want or not want? And whom do you want to make decisions for you? There are many places to learn more about how to plan for your care. Ask your healthcare provider or  for resources.  · Picking a healthcare proxy. This means choosing a trusted person to speak for you only when you cant speak for yourself. When you cannot make medical decisions, your proxy makes sure the instructions in your advance care plan are followed. A proxy does not make decisions based on his or her own opinions. They must put aside those opinions and values if needed, and carry out your wishes.  · Filling out the legal documents. There are several kinds of legal documents for advance care planning. Each one tells healthcare providers your wishes. The  documents may vary by state. They must be signed and may need to be witnessed or notarized. You can cancel or change them whenever you wish. Depending on your state, the documents may include a Healthcare Proxy form, Living Will, Durable Medical Power of , Advance Directive, or others.  The familys role  The best help a family can give is to support their loved ones wishes. Open and honest communication is vital. Family should express any concerns they have about the patients choices while the patient can still make decisions in the event that his or her illness prevents him or her from communicating those wishes at a later time.   Date Last Reviewed: 4/1/2017 © 2000-2017 [employer]. [address] [place] [address], [place] [address]. All rights reserved. This information is not intended as a substitute for professional medical care. Always follow your healthcare professional's instructions.        Choosing an Agent    A durable power of  for health care is only as good as the person you name to be your agent. If this person knows your treatment wishes and is willing to carry them out, youll probably be well-represented. Be sure to tell your agent whats important to you.  Who to choose  Here are suggestions for choosing an agent:  · You can name a family member, close friend, , , or rabbi.  · You should name one person as your agent. Then name one or two alternates. You need a backup person in case your first choice cant be reached when needed.  · Talk to each person you are thinking of naming as your agent or alternate. Do this before you decide who should carry out your wishes.  Your agent should be...  · A competent adult, 18 years or older.  · Someone you trust and can talk to about the care you want and what is important to you.  · Someone who supports your treatment choices.  In many states, your agent cant be...  · Your healthcare provider.  · An employee of your  healthcare provider or of a hospital, nursing home, or hospice program where you receive care.  Some states list other restrictions about who can be named as an agent for an advance directive.  Tip: It's a good idea to write down your wishes and give a copy to your agent.   Date Last Reviewed: 2/14/2016 © 2000-2017 [employer]. [address] [place] [address], [place] [address]. All rights reserved. This information is not intended as a substitute for professional medical care. Always follow your healthcare professional's instructions.        Apixaban oral tablets  What is this medicine?  APIXABAN (a PIX a ban) is an anticoagulant (blood thinner). It is used to lower the chance of stroke in people with a medical condition called atrial fibrillation. It is also used to treat or prevent blood clots in the lungs or in the veins.  How should I use this medicine?  Take this medicine by mouth with a glass of water. Follow the directions on the prescription label. You can take it with or without food. If it upsets your stomach, take it with food. Take your medicine at regular intervals. Do not take it more often than directed. Do not stop taking except on your doctor's advice. Stopping this medicine may increase your risk of a blot clot. Be sure to refill your prescription before you run out of medicine.  Talk to your pediatrician regarding the use of this medicine in children. Special care may be needed.  What side effects may I notice from receiving this medicine?  Side effects that you should report to your doctor or health care professional as soon as possible:  · allergic reactions like skin rash, itching or hives, swelling of the face, lips, or tongue  · signs and symptoms of bleeding such as bloody or black, tarry stools; red or dark-brown urine; spitting up blood or brown material that looks like coffee grounds; red spots on the skin; unusual bruising or bleeding from the eye, gums, or nose  What may interact  with this medicine?  This medicine may interact with the following:  · aspirin and aspirin-like medicines  · certain medicines for fungal infections like ketoconazole and itraconazole  · certain medicines for seizures like carbamazepine and phenytoin  · certain medicines that treat or prevent blood clots like warfarin, enoxaparin, and dalteparin  · clarithromycin  · NSAIDs, medicines for pain and inflammation, like ibuprofen or naproxen  · rifampin  · ritonavir  · Ramiro's wort  What if I miss a dose?  If you miss a dose, take it as soon as you can. If it is almost time for your next dose, take only that dose. Do not take double or extra doses.  Where should I keep my medicine?  Keep out of the reach of children.  Store at room temperature between 20 and 25 degrees C (68 and 77 degrees F). Throw away any unused medicine after the expiration date.  What should I tell my health care provider before I take this medicine?  They need to know if you have any of these conditions:  · bleeding disorders  · bleeding in the brain  · blood in your stools (black or tarry stools) or if you have blood in your vomit  · history of stomach bleeding  · kidney disease  · liver disease  · mechanical heart valve  · an unusual or allergic reaction to apixaban, other medicines, foods, dyes, or preservatives  · pregnant or trying to get pregnant  · breast-feeding  What should I watch for while using this medicine?  Notify your doctor or health care professional and seek emergency treatment if you develop breathing problems; changes in vision; chest pain; severe, sudden headache; pain, swelling, warmth in the leg; trouble speaking; sudden numbness or weakness of the face, arm, or leg. These can be signs that your condition has gotten worse.  If you are going to have surgery, tell your doctor or health care professional that you are taking this medicine.  Tell your health care professional that you use this medicine before you have a spinal  or epidural procedure. Sometimes people who take this medicine have bleeding problems around the spine when they have a spinal or epidural procedure. This bleeding is very rare. If you have a spinal or epidural procedure while on this medicine, call your health care professional immediately if you have back pain, numbness or tingling (especially in your legs and feet), muscle weakness, paralysis, or loss of bladder or bowel control.  Avoid sports and activities that might cause injury while you are using this medicine. Severe falls or injuries can cause unseen bleeding. Be careful when using sharp tools or knives. Consider using an electric razor. Take special care brushing or flossing your teeth. Report any injuries, bruising, or red spots on the skin to your doctor or health care professional.  NOTE:This sheet is a summary. It may not cover all possible information. If you have questions about this medicine, talk to your doctor, pharmacist, or health care provider. Copyright© 2017 [employer]

## 2019-02-04 NOTE — PROGRESS NOTES
Subjective:       Patient ID: Faith Byers is a 53 y.o. female.    Chief Complaint: Hospital Follow Up and blood clot    The patient was recently dx with bilateral PE. Incidental find with CT. Confirm w/ CTA.. She has Squamous cell Carcinoma of the gallbladder. She has been under chemotherapy and radiotherapy. She denies jaundice, nor gum bleeding, edema, nor ascites. She has been seen by Gastro-Oncology and consult of non surgical candidate. She in her usual state of health until approximately early October 2018, when she noted the onset of epigastric discomfort. She presented to her PCP, who suspected GERD. The patient was given pantoprazole with minimal benefit.    The patient then underwent an abdominal ultrasound on 10/25/18, revealing multiple small gallstones within the gallbladder, a 5.4 x 5.1 x 4.6 cm ill-defined mass in the fundus of the gallbladder, a CBD measuring 4.6 mm, a normal liver, mild intrahepatic biliary duct dilation, and a normal spleen measuring 12.1 cm.  Recent CTA:  bilateral lower lobe pulmonary artery emboli better demonstrated on today's study than on yesterday's exam for technical reasons but as visualized not significantly changed and corresponding as described on that exam.  Findings suggesting deep venous thrombosis in calf veins, distal anterior tibial veins with no findings suggesting deep venous thrombosis in popliteal veins or more proximally  Under Eliquis w/good results.        Review of Systems   Constitutional: Positive for appetite change and fatigue. Negative for unexpected weight change.   Respiratory: Negative for chest tightness and shortness of breath.    Cardiovascular: Negative for chest pain, palpitations and leg swelling.   Gastrointestinal: Negative for abdominal pain.   Musculoskeletal: Negative for arthralgias.   Neurological: Negative for dizziness, syncope, light-headedness and headaches.       Patient Active Problem List   Diagnosis    BMI  34.0-34.9,adult    Arthritis of right knee    Trigger little finger of right hand    Hypothyroidism    CARINA (generalized anxiety disorder)    Right knee pain    Severe obesity (BMI 35.0-39.9)    Gallbladder cancer    H/O cancer of gall bladder    Peripheral venous insufficiency    Malignant neoplasm of liver    Malignant neoplasm of gallbladder    Gallbladder carcinoma    Bilateral pulmonary embolism    Anemia    Hypokalemia    Severe malnutrition       Objective:      Physical Exam   Constitutional: She is oriented to person, place, and time. Vital signs are normal. She appears well-developed and well-nourished. She is active.  Non-toxic appearance. She has a sickly appearance.   HENT:   Right Ear: Hearing and tympanic membrane normal.   Left Ear: Hearing and tympanic membrane normal.   Nose: Nose normal. No epistaxis. Right sinus exhibits no frontal sinus tenderness. Left sinus exhibits no frontal sinus tenderness.   Mouth/Throat: Uvula is midline, oropharynx is clear and moist and mucous membranes are normal.   Neck: Trachea normal, normal range of motion and full passive range of motion without pain. Normal carotid pulses and no hepatojugular reflux present. Carotid bruit is not present. No thyroid mass present.   Cardiovascular: Normal rate, regular rhythm and normal heart sounds.   Pulmonary/Chest: Effort normal and breath sounds normal. No stridor. She has no decreased breath sounds. She has no rhonchi. She exhibits no mass, no laceration and no retraction.   Rt upper chest portal cath       Abdominal: Soft. Normal appearance and bowel sounds are normal. She exhibits no pulsatile liver and no ascites. There is hepatomegaly. There is no hepatosplenomegaly or splenomegaly. There is no tenderness. There is no rebound and no CVA tenderness. No hernia.       Musculoskeletal: She exhibits no edema.   Neurological: She is alert and oriented to person, place, and time.   Skin: Skin is warm and dry.    Psychiatric: She has a normal mood and affect.   Nursing note and vitals reviewed.      Lab Results   Component Value Date    WBC 3.50 (L) 02/04/2019    HGB 10.1 (L) 02/04/2019    HCT 31.7 (L) 02/04/2019     02/04/2019    CHOL 195 06/01/2018    TRIG 62 06/01/2018    HDL 63 06/01/2018    ALT 19 02/04/2019    AST 23 02/04/2019     02/04/2019    K 4.3 02/04/2019     02/04/2019    CREATININE 0.6 02/04/2019    BUN 10 02/04/2019    CO2 24 02/04/2019    TSH 1.546 09/19/2015    INR 1.0 11/16/2018    HGBA1C 5.0 06/01/2018     The 10-year ASCVD risk score (Simon K[facility] Jr., et al., 2013) is: 1.3%    Values used to calculate the score:      Age: 53 years      Sex: Female      Is Non- : No      Diabetic: No      Tobacco smoker: No      Systolic Blood Pressure: 118 mmHg      Is BP treated: No      HDL Cholesterol: 63 mg/dL      Total Cholesterol: 195 mg/dL    Assessment:       1. Bilateral pulmonary embolism    2. Visit for screening mammogram    3. H/O cancer of gall bladder    4. Need for pneumococcal vaccination        Plan:       Bilateral pulmonary embolism    Visit for screening mammogram  -     Mammo Digital Screening Bilateral With CAD; Future; Expected date: 02/04/2019    H/O cancer of gall bladder  -     pantoprazole (PROTONIX) 40 MG tablet; Take 1 tablet (40 mg total) by mouth once daily.  Dispense: 90 tablet; Refill: 3    Need for pneumococcal vaccination  -     (In Office Administered) Pneumococcal Polysaccharide Vaccine (23 Valent) (SQ/IM)    The current medical regimen is effective;  continue present plan and medications.  Stable and controlled. Continue current treatment plan as previously prescribed with your PCP.     Transitional Care Note    Family and/or Caretaker present at visit?  Yes.  Diagnostic tests reviewed/disposition: No diagnosic tests pending after this hospitalization.  Disease/illness education: yes.  Home health/community services discussion/referrals:  Patient does not have home health established from hospital visit.  They do not need home health.  If needed, we will set up home health for the patient.   Establishment or re-establishment of referral orders for community resources: No other necessary community resources.   Discussion with other health care providers: Should discuss with her Incologist for daily Feosol and Vit C supplents due CUATE. Liver profile improved. .           Patient readiness: acceptance and barriers:readiness    During the course of the visit the patient was educated and counseled about the following:     Hypertension:   Dietary sodium restriction.  Regular aerobic exercise.  Obesity:   General weight loss/lifestyle modification strategies discussed (elicit support from others; identify saboteurs; non-food rewards, etc).  Regular aerobic exercise program discussed.    Goals: Hypertension: Reduce Blood Pressure and Obesity: Reduce calorie intake and BMI    Did patient meet goals/outcomes: No    The following self management tools provided: blood pressure log  excercise log    Patient Instructions (the written plan) was given to the patient/family.     Time spent with patient: 45 minutes    Barriers to medications present (yes )    Adverse reactions to current medications (no)    Over the counter medications reviewed (No)        40 minutes spent counseling patient on diet, exercise, and weight loss.  This has been fully explained to the patient, who indicates understanding.

## 2019-02-05 LAB — STFR SERPL-MCNC: 2.7 MG/L

## 2019-02-06 ENCOUNTER — OFFICE VISIT (OUTPATIENT)
Dept: HEMATOLOGY/ONCOLOGY | Facility: CLINIC | Age: 54
End: 2019-02-06
Payer: COMMERCIAL

## 2019-02-06 VITALS
RESPIRATION RATE: 16 BRPM | BODY MASS INDEX: 31.97 KG/M2 | HEART RATE: 103 BPM | TEMPERATURE: 98 F | HEIGHT: 58 IN | OXYGEN SATURATION: 96 % | SYSTOLIC BLOOD PRESSURE: 129 MMHG | WEIGHT: 152.31 LBS | DIASTOLIC BLOOD PRESSURE: 78 MMHG

## 2019-02-06 DIAGNOSIS — C23 CARCINOMA OF GALL BLADDER: Primary | ICD-10-CM

## 2019-02-06 DIAGNOSIS — I26.99 BILATERAL PULMONARY EMBOLISM: ICD-10-CM

## 2019-02-06 DIAGNOSIS — C23 MALIGNANT NEOPLASM OF GALLBLADDER: ICD-10-CM

## 2019-02-06 DIAGNOSIS — C22.0 HEPATOCELLULAR CARCINOMA: ICD-10-CM

## 2019-02-06 PROCEDURE — 99215 OFFICE O/P EST HI 40 MIN: CPT | Mod: S$GLB,,, | Performed by: INTERNAL MEDICINE

## 2019-02-06 PROCEDURE — 3008F PR BODY MASS INDEX (BMI) DOCUMENTED: ICD-10-PCS | Mod: CPTII,S$GLB,, | Performed by: INTERNAL MEDICINE

## 2019-02-06 PROCEDURE — 99999 PR PBB SHADOW E&M-EST. PATIENT-LVL IV: ICD-10-PCS | Mod: PBBFAC,,, | Performed by: INTERNAL MEDICINE

## 2019-02-06 PROCEDURE — 99999 PR PBB SHADOW E&M-EST. PATIENT-LVL IV: CPT | Mod: PBBFAC,,, | Performed by: INTERNAL MEDICINE

## 2019-02-06 PROCEDURE — 3008F BODY MASS INDEX DOCD: CPT | Mod: CPTII,S$GLB,, | Performed by: INTERNAL MEDICINE

## 2019-02-06 PROCEDURE — 99215 PR OFFICE/OUTPT VISIT, EST, LEVL V, 40-54 MIN: ICD-10-PCS | Mod: S$GLB,,, | Performed by: INTERNAL MEDICINE

## 2019-02-06 RX ORDER — HEPARIN 100 UNIT/ML
500 SYRINGE INTRAVENOUS
Status: CANCELLED | OUTPATIENT
Start: 2019-02-11

## 2019-02-06 RX ORDER — ATROPINE SULFATE 0.4 MG/ML
0.4 INJECTION, SOLUTION ENDOTRACHEAL; INTRAMEDULLARY; INTRAMUSCULAR; INTRAVENOUS; SUBCUTANEOUS ONCE AS NEEDED
Status: CANCELLED | OUTPATIENT
Start: 2019-02-11 | End: 2019-02-11

## 2019-02-06 RX ORDER — FLUOROURACIL 50 MG/ML
400 INJECTION, SOLUTION INTRAVENOUS
Status: CANCELLED | OUTPATIENT
Start: 2019-02-11

## 2019-02-06 RX ORDER — EPINEPHRINE 0.3 MG/.3ML
0.3 INJECTION SUBCUTANEOUS ONCE AS NEEDED
Status: CANCELLED | OUTPATIENT
Start: 2019-02-11 | End: 2019-02-11

## 2019-02-06 RX ORDER — SODIUM CHLORIDE 0.9 % (FLUSH) 0.9 %
10 SYRINGE (ML) INJECTION
Status: CANCELLED | OUTPATIENT
Start: 2019-02-11

## 2019-02-06 RX ORDER — HEPARIN 100 UNIT/ML
500 SYRINGE INTRAVENOUS
Status: CANCELLED | OUTPATIENT
Start: 2019-02-13

## 2019-02-06 RX ORDER — DIPHENHYDRAMINE HYDROCHLORIDE 50 MG/ML
50 INJECTION INTRAMUSCULAR; INTRAVENOUS ONCE AS NEEDED
Status: CANCELLED | OUTPATIENT
Start: 2019-02-11 | End: 2019-02-11

## 2019-02-06 RX ORDER — SODIUM CHLORIDE 0.9 % (FLUSH) 0.9 %
10 SYRINGE (ML) INJECTION
Status: CANCELLED | OUTPATIENT
Start: 2019-02-13

## 2019-02-06 NOTE — PROGRESS NOTES
Subjective:       Patient ID: Faith Byers is a 53 y.o. female.    Chief Complaint:  Modified FOLFIRINOX on Monday for invasive squamous cell CA of gallbladder which is an unusual tissue type there was a confusion with the patient starting radiation prior to initiation of systemic chemotherapy patient has seen Dr. Garcia surgeon who had recommended a sandwich more of therapy in the hope of resection off irinotecan due to side effects had a recent CT that shows decreased liver mass patient is feeling a whole lot better symptomatically  HPI:   FINAL PATHOLOGIC DIAGNOSIS  GALLBLADDER/LIVER MASS, BIOPSY:  -Positive for malignancy, invasive squamous cell carcinoma,     Social History     Socioeconomic History    Marital status:      Spouse name: None    Number of children: None    Years of education: None    Highest education level: None   Social Needs    Financial resource strain: None    Food insecurity - worry: None    Food insecurity - inability: None    Transportation needs - medical: None    Transportation needs - non-medical: None   Occupational History    None   Tobacco Use    Smoking status: Never Smoker    Smokeless tobacco: Never Used   Substance and Sexual Activity    Alcohol use: Yes     Comment: rarely    Drug use: No    Sexual activity: Not Currently   Other Topics Concern    None   Social History Narrative    None     Family History   Problem Relation Age of Onset    Cancer Mother         breast and bone     Breast cancer Mother     Hyperlipidemia Father     ADD / ADHD Daughter         autism    Crohn's disease Paternal Uncle     Heart disease Paternal Uncle         heart transplant     Past Surgical History:   Procedure Laterality Date    ARTHROSCOPY, KNEE, WITH Partial lateral MENISCECTOMY Right 7/12/2018    [facility]    Zkmwnn-[id number]-gz N/A 11/16/2018    Performed by [facility] at [facility] OR [address] [place] [address]    BREAST  BIOPSY      BREAST SURGERY  2001    right biopsy, benign    CHONDROPLASTY,KNEE Medial Femoral Right 7/12/2018    [facility]    COLONOSCOPY      COLONOSCOPY N/A 12/5/2014    Performed by Sixto Barrera MD at [facility] ENDO    dental implant      KHIKEDFAS-[id number]-S-CATH Right 12/14/2018    Performed by Jurgen [facility]o [facility] at [facility] OR    KNEE ARTHROSCOPY Right     LIVER BIOPSY  11/16/2018    THYROIDECTOMY  2011    complete     Past Medical History:   Diagnosis Date    Arthritis     Cancer     gallbadder/ liver spots/biopsy    Thyroid disease        Current Outpatient Medications:     apixaban (ELIQUIS) 5 mg Tab, Take 1 tablet (5 mg total) by mouth 2 (two) times daily. To Start after 7 days on 10mg Twice daily., Disp: 60 tablet, Rfl: 3    calcium carbonate (CALCIUM 600 ORAL), Take 1,200 mg by mouth every evening., Disp: , Rfl:     levothyroxine (SYNTHROID) 125 MCG tablet, Take 125 mcg by mouth once daily. Mon, Tues, Thurs, Fri, Sat, brand name only, Disp: , Rfl:     ondansetron (ZOFRAN-ODT) 8 MG TbDL, Take 8 mg by mouth., Disp: , Rfl:     pantoprazole (PROTONIX) 40 MG tablet, Take 1 tablet (40 mg total) by mouth once daily., Disp: 90 tablet, Rfl: 3    prochlorperazine (COMPAZINE) 10 MG tablet, Take 1 tablet (10 mg total) by mouth every 6 (six) hours as needed., Disp: 30 tablet, Rfl: 1    SYNTHROID 112 mcg tablet, Take 1 tablet (112 mcg total) by mouth once daily. On Wed, Sun, Disp: 30 tablet, Rfl: 11    venlafaxine (EFFEXOR) 37.5 MG Tab, Take 1 tablet by mouth., Disp: , Rfl:   Review of patient's allergies indicates:  No Known Allergies      REVIEW OF SYSTEMS:     CONSTITUTIONAL:   Nausea improved eating better weight is increased she is overall much better    BREASTS: There is no swelling around breasts or nipple discharge.    EYES: Denies eye pain, blurred vision, swelling, redness or discharge.      ENT AND MOUTH: Denies runny nose, stuffiness, sinus trouble or sores.  Denies    nosebleeds. Denies, hoarseness, change in voice or swelling in front of the    neck.      CARDIOVASCULAR: Denies chest pain, discomfort or palpitations. Denies neck    swelling or episodes of passing out.      RESPIRATORY: Denies cough, sputum production, blood in sputum, and denies    shortness of breath.      GI: Denies trouble swallowing, indigestion, heartburn, abdominal pain, nausea,    vomiting, diarrhea, has altered bowel habits, no blood in stool, discoloration of    stools, change in nature of stool, bloating, increased abdominal girth.      GENITOURINARY: No discharge. No pelvic pain or lumps. No rash around groin or  lesions. No urinary frequency, hesitation, painful urination or blood in    urine. Denies incontinence. No problems with intercourse.      MUSCULOSKELETAL:  Some amount of knee pain    NEUROLOGICAL: Denies tingling, numbness, altered mentation changes to nerve    function in the face, weakness to one or both of the body. Denies changes to    gait and denies multiple falls or accidents.      PSYCHIATRIC: Denies nervousness, anxiety, hallucinations, depression, suicidal    ideation, trouble sleeping or changes in behavior noticed by family.      The patient denies recent foreign travel or recent exposure to chemicals or    products of concern or infectious diseases.     PHYSICAL EXAM:     Wt Readings from Last 3 Encounters:   02/06/19 69.1 kg (152 lb 5.4 oz)   02/04/19 68 kg (149 lb 14.6 oz)   01/26/19 70.5 kg (155 lb 6.8 oz)     Temp Readings from Last 3 Encounters:   02/06/19 97.7 °F (36.5 °C)   02/04/19 97.8 °F (36.6 °C) (Oral)   01/26/19 97.8 °F (36.6 °C) (Oral)     BP Readings from Last 3 Encounters:   02/06/19 129/78   02/04/19 118/75   01/26/19 (!) 112/56     Pulse Readings from Last 3 Encounters:   02/06/19 103   02/04/19 70   01/26/19 88     GENERAL: Comfortable looking patient. Patient is in no distress.  Awake, alert and oriented to time, person and place.  No anxiety, or  agitation.      HEENT: Normal conjunctivae and eyelids. WNL.  PERRLA 3 to 4 mm. No icterus, no pallor, no congestion, and no discharge noted.     NECK:  Supple. Trachea is central.  No crepitus.  No JVD or masses.    RESPIRATORY:  No intercostal retractions.  No dullness to percussion.  Chest is clear to auscultation.  No rales, rhonchi or wheezes.  No crepitus.  Good air entry bilaterally.    CARDIOVASCULAR:  S1 and S2 are normally heard without murmurs or gallops.  All peripheral pulses are present.    ABDOMEN:  Normal abdomen.  No hepatosplenomegaly.  No free fluid.  Bowel sounds are present.  No hernia noted. No masses.  No rebound or tenderness.  No guarding or rigidity.  Umbilicus is midline.    LYMPHATICS:  No axillary, cervical, supraclavicular, submental, or inguinal lymphadenopathy.    SKIN/MUSCULOSKELETAL:  There is no evidence of excoriation marks or ecchmosis.  No rashes.  No cyanosis.  No clubbing.  No joint or skeletal deformities noted.  Normal range of motion.    NEUROLOGIC:  Higher functions are appropriate.  No cranial nerve deficits.  Normal jody.  Normal strength.  Motor and sensory functions are normal.  Deep tendon reflexes are normal.    GENITAL/RECTAL:  Exams are deferred.      Laboratory:     CBC:  Lab Results   Component Value Date    WBC 3.50 (L) 02/04/2019    RBC 3.99 (L) 02/04/2019    HGB 10.1 (L) 02/04/2019    HCT 31.7 (L) 02/04/2019    MCV 80 (L) 02/04/2019    MCH 25.2 (L) 02/04/2019    MCHC 31.7 (L) 02/04/2019    RDW 20.8 (H) 02/04/2019     02/04/2019    MPV 7.6 (L) 02/04/2019    GRAN 1.1 (L) 02/04/2019    GRAN 32.5 (L) 02/04/2019    LYMPH 1.8 02/04/2019    LYMPH 50.3 (H) 02/04/2019    MONO 0.1 (L) 02/04/2019    MONO 3.1 (L) 02/04/2019    EOS 0.4 02/04/2019    BASO 0.00 02/04/2019    EOSINOPHIL 12.7 (H) 02/04/2019    BASOPHIL 1.4 02/04/2019       BMP: BMP  Lab Results   Component Value Date     02/04/2019    K 4.3 02/04/2019     02/04/2019    CO2 24 02/04/2019     BUN 10 02/04/2019    CREATININE 0.6 02/04/2019    CALCIUM 9.2 02/04/2019    ANIONGAP 9 02/04/2019    ESTGFRAFRICA >60 02/04/2019    EGFRNONAA >60 02/04/2019       LFT:   Lab Results   Component Value Date    ALT 19 02/04/2019    AST 23 02/04/2019    ALKPHOS 102 02/04/2019    BILITOT 0.2 02/04/2019 December 2018 CT chest abdomen pelvis  Impression       Gallbladder mass with invasion of the hepatic parenchyma compatible with the patient's known gallbladder neoplasm, and moderately increased in size.  Possible extension to the hepatic flexure of the colon.  Unchanged mild the biliary dilatation.    Development a suspicious lymph node subjacent to the mass.    3 mm left upper lobe nodule which is nonspecific.  Attention on 6-12 month follow-up recommended.    Unchanged hepatic hypodensities, suggestive for cysts.  Continued attention on follow-up recommended       Impression 1/24/2019       1. Partial response to therapy with decreased size of primary gallbladder/hepatic lesion, unchanged peripheral hepatic hypodensities and unchanged left upper lobe pulmonary nodule.  2. Findings concerning for bilateral pulmonary emboli.  This report was flagged in Epic as abnormal.         Assessment/Plan:         Patient reports she got 3 days of radiation prior to start  Proceed with 5 FU and oxaliplatin as ordered doing much better  Return to clinic for 2 weeks on Monday for ongoing therapy  She continues to work I have urged her to continue eating and keeping her nourishment and rest  Continue hypothyroidism management with PCP  Bilateral pulmonary embolus continue Eliquis

## 2019-02-19 ENCOUNTER — HOSPITAL ENCOUNTER (OUTPATIENT)
Dept: RADIOLOGY | Facility: HOSPITAL | Age: 54
Discharge: HOME | End: 2019-02-19
Attending: FAMILY MEDICINE
Payer: COMMERCIAL

## 2019-02-19 DIAGNOSIS — Z12.31 VISIT FOR SCREENING MAMMOGRAM: ICD-10-CM

## 2019-02-19 PROCEDURE — 77063 MAMMO DIGITAL SCREENING BILAT WITH TOMOSYNTHESIS_CAD: ICD-10-PCS | Mod: 26,,, | Performed by: RADIOLOGY

## 2019-02-19 PROCEDURE — 77067 MAMMO DIGITAL SCREENING BILAT WITH TOMOSYNTHESIS_CAD: ICD-10-PCS | Mod: 26,,, | Performed by: RADIOLOGY

## 2019-02-19 PROCEDURE — 77067 SCR MAMMO BI INCL CAD: CPT | Mod: 26,,, | Performed by: RADIOLOGY

## 2019-02-19 PROCEDURE — 77067 SCR MAMMO BI INCL CAD: CPT | Mod: TC

## 2019-02-19 PROCEDURE — 77063 BREAST TOMOSYNTHESIS BI: CPT | Mod: 26,,, | Performed by: RADIOLOGY

## 2019-02-20 ENCOUNTER — OFFICE VISIT (OUTPATIENT)
Dept: HEMATOLOGY/ONCOLOGY | Facility: CLINIC | Age: 54
End: 2019-02-20
Payer: COMMERCIAL

## 2019-02-20 VITALS
WEIGHT: 149.69 LBS | DIASTOLIC BLOOD PRESSURE: 65 MMHG | TEMPERATURE: 98 F | BODY MASS INDEX: 31.42 KG/M2 | SYSTOLIC BLOOD PRESSURE: 151 MMHG | RESPIRATION RATE: 22 BRPM | HEART RATE: 101 BPM | HEIGHT: 58 IN

## 2019-02-20 DIAGNOSIS — E02 SUBCLINICAL IODINE-DEFICIENCY HYPOTHYROIDISM: ICD-10-CM

## 2019-02-20 DIAGNOSIS — C23 GALLBLADDER CANCER: ICD-10-CM

## 2019-02-20 DIAGNOSIS — C23 CARCINOMA OF GALL BLADDER: Primary | ICD-10-CM

## 2019-02-20 PROCEDURE — 99215 PR OFFICE/OUTPT VISIT, EST, LEVL V, 40-54 MIN: ICD-10-PCS | Mod: S$GLB,,, | Performed by: INTERNAL MEDICINE

## 2019-02-20 PROCEDURE — 99999 PR PBB SHADOW E&M-EST. PATIENT-LVL IV: ICD-10-PCS | Mod: PBBFAC,,, | Performed by: INTERNAL MEDICINE

## 2019-02-20 PROCEDURE — 3008F BODY MASS INDEX DOCD: CPT | Mod: CPTII,S$GLB,, | Performed by: INTERNAL MEDICINE

## 2019-02-20 PROCEDURE — 99999 PR PBB SHADOW E&M-EST. PATIENT-LVL IV: CPT | Mod: PBBFAC,,, | Performed by: INTERNAL MEDICINE

## 2019-02-20 PROCEDURE — 99215 OFFICE O/P EST HI 40 MIN: CPT | Mod: S$GLB,,, | Performed by: INTERNAL MEDICINE

## 2019-02-20 PROCEDURE — 3008F PR BODY MASS INDEX (BMI) DOCUMENTED: ICD-10-PCS | Mod: CPTII,S$GLB,, | Performed by: INTERNAL MEDICINE

## 2019-02-20 RX ORDER — DIPHENHYDRAMINE HYDROCHLORIDE 50 MG/ML
50 INJECTION INTRAMUSCULAR; INTRAVENOUS ONCE AS NEEDED
Status: CANCELLED | OUTPATIENT
Start: 2019-02-25 | End: 2019-02-25

## 2019-02-20 RX ORDER — DIPHENHYDRAMINE HYDROCHLORIDE 50 MG/ML
50 INJECTION INTRAMUSCULAR; INTRAVENOUS ONCE AS NEEDED
Status: CANCELLED | OUTPATIENT
Start: 2019-03-11 | End: 2019-03-11

## 2019-02-20 RX ORDER — SODIUM CHLORIDE 0.9 % (FLUSH) 0.9 %
10 SYRINGE (ML) INJECTION
Status: CANCELLED | OUTPATIENT
Start: 2019-03-13

## 2019-02-20 RX ORDER — FLUOROURACIL 50 MG/ML
400 INJECTION, SOLUTION INTRAVENOUS
Status: CANCELLED | OUTPATIENT
Start: 2019-03-11

## 2019-02-20 RX ORDER — HEPARIN 100 UNIT/ML
500 SYRINGE INTRAVENOUS
Status: CANCELLED | OUTPATIENT
Start: 2019-02-25

## 2019-02-20 RX ORDER — HEPARIN 100 UNIT/ML
500 SYRINGE INTRAVENOUS
Status: CANCELLED | OUTPATIENT
Start: 2019-03-11

## 2019-02-20 RX ORDER — EPINEPHRINE 0.3 MG/.3ML
0.3 INJECTION SUBCUTANEOUS ONCE AS NEEDED
Status: CANCELLED | OUTPATIENT
Start: 2019-03-11 | End: 2019-03-11

## 2019-02-20 RX ORDER — ATROPINE SULFATE 0.4 MG/ML
0.4 INJECTION, SOLUTION ENDOTRACHEAL; INTRAMEDULLARY; INTRAMUSCULAR; INTRAVENOUS; SUBCUTANEOUS ONCE AS NEEDED
Status: CANCELLED | OUTPATIENT
Start: 2019-02-25 | End: 2019-02-25

## 2019-02-20 RX ORDER — HEPARIN 100 UNIT/ML
500 SYRINGE INTRAVENOUS
Status: CANCELLED | OUTPATIENT
Start: 2019-02-27

## 2019-02-20 RX ORDER — SODIUM CHLORIDE 0.9 % (FLUSH) 0.9 %
10 SYRINGE (ML) INJECTION
Status: CANCELLED | OUTPATIENT
Start: 2019-02-25

## 2019-02-20 RX ORDER — ATROPINE SULFATE 0.4 MG/ML
0.4 INJECTION, SOLUTION ENDOTRACHEAL; INTRAMEDULLARY; INTRAMUSCULAR; INTRAVENOUS; SUBCUTANEOUS ONCE AS NEEDED
Status: CANCELLED | OUTPATIENT
Start: 2019-03-11 | End: 2019-03-11

## 2019-02-20 RX ORDER — EPINEPHRINE 0.3 MG/.3ML
0.3 INJECTION SUBCUTANEOUS ONCE AS NEEDED
Status: CANCELLED | OUTPATIENT
Start: 2019-02-25 | End: 2019-02-25

## 2019-02-20 RX ORDER — SODIUM CHLORIDE 0.9 % (FLUSH) 0.9 %
10 SYRINGE (ML) INJECTION
Status: CANCELLED | OUTPATIENT
Start: 2019-02-27

## 2019-02-20 RX ORDER — FLUOROURACIL 50 MG/ML
400 INJECTION, SOLUTION INTRAVENOUS
Status: CANCELLED | OUTPATIENT
Start: 2019-02-25

## 2019-02-20 RX ORDER — SODIUM CHLORIDE 0.9 % (FLUSH) 0.9 %
10 SYRINGE (ML) INJECTION
Status: CANCELLED | OUTPATIENT
Start: 2019-03-11

## 2019-02-20 RX ORDER — HEPARIN 100 UNIT/ML
500 SYRINGE INTRAVENOUS
Status: CANCELLED | OUTPATIENT
Start: 2019-03-13

## 2019-02-20 NOTE — PROGRESS NOTES
Subjective:       Patient ID: Faith Byers is a 53 y.o. female.  Gall bladder ca , started modified FOLFIRINOX  HPI:   FINAL PATHOLOGIC DIAGNOSIS  GALLBLADDER/LIVER MASS, BIOPSY:  -Positive for malignancy, invasive squamous cell carcinoma, doing well pain has subsided also follows with MD Sahu.  Here for cycle 4.  Therapy.  Occasional nausea.  Continues to work.  No other complaints    Social History     Socioeconomic History    Marital status:      Spouse name: None    Number of children: None    Years of education: None    Highest education level: None   Social Needs    Financial resource strain: None    Food insecurity - worry: None    Food insecurity - inability: None    Transportation needs - medical: None    Transportation needs - non-medical: None   Occupational History    None   Tobacco Use    Smoking status: Never Smoker    Smokeless tobacco: Never Used   Substance and Sexual Activity    Alcohol use: Yes     Comment: rarely    Drug use: No    Sexual activity: Not Currently   Other Topics Concern    None   Social History Narrative    None     Family History   Problem Relation Age of Onset    Cancer Mother         breast and bone     Breast cancer Mother     Hyperlipidemia Father     ADD / ADHD Daughter         autism    Crohn's disease Paternal Uncle     Heart disease Paternal Uncle         heart transplant     Past Surgical History:   Procedure Laterality Date    ARTHROSCOPY, KNEE, WITH Partial lateral MENISCECTOMY Right 7/12/2018    Performed by [facility] at [facility] OR    Dqjset-[id number]-bn N/A 11/16/2018    Performed by [facility] at [facility] OR 2ND FLR    BREAST BIOPSY      BREAST SURGERY  2001    right biopsy, benign    CHONDROPLASTY,KNEE Medial Femoral Right 7/12/2018    Performed by [facility]an [facility] at [facility] OR    COLONOSCOPY      COLONOSCOPY N/A 12/5/2014    Performed by Sixto Barrera MD at [facility] ENDO    dental implant       [id number]-[id number]-CATH Right 12/14/2018    Performed [facility] at [facility] OR    KNEE ARTHROSCOPY Right     LIVER BIOPSY  11/16/2018    THYROIDECTOMY  2011    complete     Past Medical History:   Diagnosis Date    Arthritis     Cancer     gallbadder/ liver spots/biopsy    Thyroid disease        Current Outpatient Medications:     apixaban (ELIQUIS) 5 mg Tab, Take 1 tablet (5 mg total) by mouth 2 (two) times daily. To Start after 7 days on 10mg Twice daily., Disp: 60 tablet, Rfl: 3    calcium carbonate (CALCIUM 600 ORAL), Take 1,200 mg by mouth every evening., Disp: , Rfl:     levothyroxine (SYNTHROID) 125 MCG tablet, Take 125 mcg by mouth once daily. Mon, Tues, Thurs, Fri, Sat, brand name only, Disp: , Rfl:     ondansetron (ZOFRAN-ODT) 8 MG TbDL, Take 8 mg by mouth., Disp: , Rfl:     pantoprazole (PROTONIX) 40 MG tablet, Take 1 tablet (40 mg total) by mouth once daily., Disp: 90 tablet, Rfl: 3    prochlorperazine (COMPAZINE) 10 MG tablet, Take 1 tablet (10 mg total) by mouth every 6 (six) hours as needed., Disp: 30 tablet, Rfl: 1    SYNTHROID 112 mcg tablet, Take 1 tablet (112 mcg total) by mouth once daily. On Wed, Sun, Disp: 30 tablet, Rfl: 11    venlafaxine (EFFEXOR) 37.5 MG Tab, Take 1 tablet by mouth., Disp: , Rfl:   Review of patient's allergies indicates:  No Known Allergies      REVIEW OF SYSTEMS:     CONSTITUTIONAL:   Nausea improved eating better weight is increased she is overall much better    BREASTS: There is no swelling around breasts or nipple discharge.    EYES: Denies eye pain, blurred vision, swelling, redness or discharge.      ENT AND MOUTH: Denies runny nose, stuffiness, sinus trouble or sores. Denies    nosebleeds. Denies, hoarseness, change in voice or swelling in front of the    neck.      CARDIOVASCULAR: Denies chest pain, discomfort or palpitations. Denies neck    swelling or episodes of passing out.      RESPIRATORY: Denies cough, sputum production, blood in sputum,  and denies    shortness of breath.      GI: Denies trouble swallowing, indigestion, heartburn, abdominal pain, nausea,    vomiting, diarrhea, has altered bowel habits, no blood in stool, discoloration of    stools, change in nature of stool, bloating, increased abdominal girth.      GENITOURINARY: No discharge. No pelvic pain or lumps. No rash around groin or  lesions. No urinary frequency, hesitation, painful urination or blood in    urine. Denies incontinence. No problems with intercourse.      MUSCULOSKELETAL:  Some amount of knee pain    NEUROLOGICAL: Denies tingling, numbness, altered mentation changes to nerve    function in the face, weakness to one or both of the body. Denies changes to    gait and denies multiple falls or accidents.      PSYCHIATRIC: Denies nervousness, anxiety, hallucinations, depression, suicidal    ideation, trouble sleeping or changes in behavior noticed by family.      The patient denies recent foreign travel or recent exposure to chemicals or    products of concern or infectious diseases.     PHYSICAL EXAM:     Wt Readings from Last 3 Encounters:   02/20/19 67.9 kg (149 lb 11.1 oz)   02/06/19 69.1 kg (152 lb 5.4 oz)   02/04/19 68 kg (149 lb 14.6 oz)     Temp Readings from Last 3 Encounters:   02/20/19 98.1 °F (36.7 °C)   02/06/19 97.7 °F (36.5 °C)   02/04/19 97.8 °F (36.6 °C) (Oral)     BP Readings from Last 3 Encounters:   02/20/19 (!) 151/65   02/06/19 129/78   02/04/19 118/75     Pulse Readings from Last 3 Encounters:   02/20/19 101   02/06/19 103   02/04/19 70     GENERAL: Comfortable looking patient. Patient is in no distress.  Awake, alert and oriented to time, person and place.  No anxiety, or agitation.      HEENT: Normal conjunctivae and eyelids. WNL.  PERRLA 3 to 4 mm. No icterus, no pallor, no congestion, and no discharge noted.     NECK:  Supple. Trachea is central.  No crepitus.  No JVD or masses.    RESPIRATORY:  No intercostal retractions.  No dullness to percussion.   Chest is clear to auscultation.  No rales, rhonchi or wheezes.  No crepitus.  Good air entry bilaterally.    CARDIOVASCULAR:  S1 and S2 are normally heard without murmurs or gallops.  All peripheral pulses are present.    ABDOMEN:  Normal abdomen.  No hepatosplenomegaly.  No free fluid.  Bowel sounds are present.  No hernia noted. No masses.  No rebound or tenderness.  No guarding or rigidity.  Umbilicus is midline.    LYMPHATICS:  No axillary, cervical, supraclavicular, submental, or inguinal lymphadenopathy.    SKIN/MUSCULOSKELETAL:  There is no evidence of excoriation marks or ecchmosis.  No rashes.  No cyanosis.  No clubbing.  No joint or skeletal deformities noted.  Normal range of motion.    NEUROLOGIC:  Higher functions are appropriate.  No cranial nerve deficits.  Normal jody.  Normal strength.  Motor and sensory functions are normal.  Deep tendon reflexes are normal.    GENITAL/RECTAL:  Exams are deferred.      Laboratory:     CBC:  Lab Results   Component Value Date    WBC 4.90 02/19/2019    RBC 4.39 02/19/2019    HGB 11.7 (L) 02/19/2019    HCT 36.0 (L) 02/19/2019    MCV 82 02/19/2019    MCH 26.6 (L) 02/19/2019    MCHC 32.4 02/19/2019    RDW 25.4 (H) 02/19/2019     (L) 02/19/2019    MPV 8.0 (L) 02/19/2019    GRAN 2.1 02/19/2019    GRAN 43.0 02/19/2019    LYMPH 2.2 02/19/2019    LYMPH 44.0 02/19/2019    MONO 0.4 02/19/2019    MONO 8.3 02/19/2019    EOS 0.2 02/19/2019    BASO 0.00 02/19/2019    EOSINOPHIL 4.0 02/19/2019    BASOPHIL 0.7 02/19/2019       BMP: BMP  Lab Results   Component Value Date     02/19/2019    K 4.0 02/19/2019     02/19/2019    CO2 24 02/19/2019    BUN 10 02/19/2019    CREATININE 0.7 02/19/2019    CALCIUM 9.7 02/19/2019    ANIONGAP 11 02/19/2019    ESTGFRAFRICA >60 02/19/2019    EGFRNONAA >60 02/19/2019       LFT:   Lab Results   Component Value Date    ALT 35 02/19/2019    AST 47 (H) 02/19/2019    ALKPHOS 105 02/19/2019    BILITOT 0.5 02/19/2019 December 2018 CT  chest abdomen pelvis  Impression       Gallbladder mass with invasion of the hepatic parenchyma compatible with the patient's known gallbladder neoplasm, and moderately increased in size.  Possible extension to the hepatic flexure of the colon.  Unchanged mild the biliary dilatation.    Development a suspicious lymph node subjacent to the mass.    3 mm left upper lobe nodule which is nonspecific.  Attention on 6-12 month follow-up recommended.    Unchanged hepatic hypodensities, suggestive for cysts.  Continued attention on follow-up recommended       Impression 1/24/2019       1. Partial response to therapy with decreased size of primary gallbladder/hepatic lesion, unchanged peripheral hepatic hypodensities and unchanged left upper lobe pulmonary nodule.  2. Findings concerning for bilateral pulmonary emboli.  This report was flagged in Epic as abnormal.     Ca19.9 at [facility] 3819, now 1mproving down to 2200    Assessment/Plan:         Patient reports she got 3 days of radiation prior to start  Proceed with 5 FU and oxaliplatin as ordered doing much better cycle 4  CBC CMP CT abdomen pelvis for 4th cycle CA 19.9  Return to clinic for 2 weeks on Monday for ongoing therapy  She continues to work I have urged her to continue eating and keeping her nourishment and rest  Continue hypothyroidism management with PCP  Bilateral pulmonary embolus continue Eliquis

## 2019-02-25 ENCOUNTER — TELEPHONE (OUTPATIENT)
Dept: HEMATOLOGY/ONCOLOGY | Facility: CLINIC | Age: 54
End: 2019-02-25

## 2019-02-25 ENCOUNTER — NURSE TRIAGE (OUTPATIENT)
Dept: ADMINISTRATIVE | Facility: CLINIC | Age: 54
End: 2019-02-25

## 2019-02-25 ENCOUNTER — HOSPITAL ENCOUNTER (OUTPATIENT)
Facility: HOSPITAL | Age: 54
Discharge: HOME | End: 2019-02-27
Attending: EMERGENCY MEDICINE | Admitting: INTERNAL MEDICINE
Payer: COMMERCIAL

## 2019-02-25 DIAGNOSIS — G45.9 TIA (TRANSIENT ISCHEMIC ATTACK): ICD-10-CM

## 2019-02-25 DIAGNOSIS — F41.1 GAD (GENERALIZED ANXIETY DISORDER): ICD-10-CM

## 2019-02-25 DIAGNOSIS — R47.81 SLURRED SPEECH: Primary | ICD-10-CM

## 2019-02-25 DIAGNOSIS — I63.9 STROKE: ICD-10-CM

## 2019-02-25 DIAGNOSIS — I26.99 BILATERAL PULMONARY EMBOLISM: ICD-10-CM

## 2019-02-25 DIAGNOSIS — E02 SUBCLINICAL IODINE-DEFICIENCY HYPOTHYROIDISM: ICD-10-CM

## 2019-02-25 PROBLEM — E44.1 MILD MALNUTRITION: Status: ACTIVE | Noted: 2019-02-25

## 2019-02-25 PROBLEM — D72.819 LEUKOPENIA: Status: ACTIVE | Noted: 2019-02-25

## 2019-02-25 LAB
ALBUMIN SERPL BCP-MCNC: 2.9 G/DL
ALP SERPL-CCNC: 102 U/L
ALT SERPL W/O P-5'-P-CCNC: 24 U/L
ANION GAP SERPL CALC-SCNC: 9 MMOL/L
ANISOCYTOSIS BLD QL SMEAR: ABNORMAL
AST SERPL-CCNC: 36 U/L
BASOPHILS # BLD AUTO: 0 K/UL
BASOPHILS NFR BLD: 0.2 %
BILIRUB SERPL-MCNC: 0.4 MG/DL
BUN SERPL-MCNC: 9 MG/DL
CALCIUM SERPL-MCNC: 9.2 MG/DL
CHLORIDE SERPL-SCNC: 108 MMOL/L
CHOLEST SERPL-MCNC: 164 MG/DL
CHOLEST/HDLC SERPL: 2.3 {RATIO}
CO2 SERPL-SCNC: 21 MMOL/L
CREAT SERPL-MCNC: 0.7 MG/DL
DIFFERENTIAL METHOD: ABNORMAL
EOSINOPHIL # BLD AUTO: 0 K/UL
EOSINOPHIL NFR BLD: 0 %
ERYTHROCYTE [DISTWIDTH] IN BLOOD BY AUTOMATED COUNT: 27 %
EST. GFR  (AFRICAN AMERICAN): >60 ML/MIN/1.73 M^2
EST. GFR  (NON AFRICAN AMERICAN): >60 ML/MIN/1.73 M^2
GLUCOSE SERPL-MCNC: 113 MG/DL
HCT VFR BLD AUTO: 33.8 %
HDLC SERPL-MCNC: 70 MG/DL
HDLC SERPL: 42.7 %
HGB BLD-MCNC: 11.1 G/DL
HYPOCHROMIA BLD QL SMEAR: ABNORMAL
INR PPP: 1.1
LDLC SERPL CALC-MCNC: 83 MG/DL
LYMPHOCYTES # BLD AUTO: 0.4 K/UL
LYMPHOCYTES NFR BLD: 18.7 %
MCH RBC QN AUTO: 27.5 PG
MCHC RBC AUTO-ENTMCNC: 32.7 G/DL
MCV RBC AUTO: 84 FL
MONOCYTES # BLD AUTO: 0 K/UL
MONOCYTES NFR BLD: 2 %
NEUTROPHILS # BLD AUTO: 1.7 K/UL
NEUTROPHILS NFR BLD: 79.1 %
NONHDLC SERPL-MCNC: 94 MG/DL
OVALOCYTES BLD QL SMEAR: ABNORMAL
PLATELET # BLD AUTO: 150 K/UL
PLATELET BLD QL SMEAR: ABNORMAL
PMV BLD AUTO: 8.5 FL
POCT GLUCOSE: 119 MG/DL (ref 70–110)
POTASSIUM SERPL-SCNC: 3.8 MMOL/L
PROT SERPL-MCNC: 6.2 G/DL
PROTHROMBIN TIME: 11.1 SEC
RBC # BLD AUTO: 4.02 M/UL
SODIUM SERPL-SCNC: 138 MMOL/L
T4 FREE SERPL-MCNC: 0.9 NG/DL
TRIGL SERPL-MCNC: 55 MG/DL
TSH SERPL DL<=0.005 MIU/L-ACNC: 17.53 UIU/ML
WBC # BLD AUTO: 2.2 K/UL

## 2019-02-25 PROCEDURE — 85610 PROTHROMBIN TIME: CPT

## 2019-02-25 PROCEDURE — 82962 GLUCOSE BLOOD TEST: CPT

## 2019-02-25 PROCEDURE — 84443 ASSAY THYROID STIM HORMONE: CPT

## 2019-02-25 PROCEDURE — 80061 LIPID PANEL: CPT

## 2019-02-25 PROCEDURE — G0378 HOSPITAL OBSERVATION PER HR: HCPCS

## 2019-02-25 PROCEDURE — 80053 COMPREHEN METABOLIC PANEL: CPT

## 2019-02-25 PROCEDURE — 93005 ELECTROCARDIOGRAM TRACING: CPT

## 2019-02-25 PROCEDURE — 85025 COMPLETE CBC W/AUTO DIFF WBC: CPT

## 2019-02-25 PROCEDURE — 25000003 PHARM REV CODE 250: Performed by: INTERNAL MEDICINE

## 2019-02-25 PROCEDURE — 99285 EMERGENCY DEPT VISIT HI MDM: CPT | Mod: 25

## 2019-02-25 PROCEDURE — 84439 ASSAY OF FREE THYROXINE: CPT

## 2019-02-25 PROCEDURE — 36415 COLL VENOUS BLD VENIPUNCTURE: CPT

## 2019-02-25 RX ORDER — PANTOPRAZOLE SODIUM 40 MG/1
40 TABLET, DELAYED RELEASE ORAL DAILY
Status: DISCONTINUED | OUTPATIENT
Start: 2019-02-26 | End: 2019-02-27 | Stop reason: HOSPADM

## 2019-02-25 RX ORDER — SODIUM CHLORIDE 0.9 % (FLUSH) 0.9 %
5 SYRINGE (ML) INJECTION
Status: DISCONTINUED | OUTPATIENT
Start: 2019-02-25 | End: 2019-02-27 | Stop reason: HOSPADM

## 2019-02-25 RX ORDER — LEVOTHYROXINE SODIUM 137 UG/1
137 TABLET ORAL
Status: ON HOLD | COMMUNITY
End: 2019-02-26 | Stop reason: HOSPADM

## 2019-02-25 RX ORDER — PROCHLORPERAZINE MALEATE 10 MG
10 TABLET ORAL EVERY 6 HOURS PRN
Status: DISCONTINUED | OUTPATIENT
Start: 2019-02-25 | End: 2019-02-27 | Stop reason: HOSPADM

## 2019-02-25 RX ORDER — ONDANSETRON 2 MG/ML
4 INJECTION INTRAMUSCULAR; INTRAVENOUS EVERY 8 HOURS PRN
Status: DISCONTINUED | OUTPATIENT
Start: 2019-02-25 | End: 2019-02-27 | Stop reason: HOSPADM

## 2019-02-25 RX ORDER — ACETAMINOPHEN 325 MG/1
650 TABLET ORAL EVERY 6 HOURS PRN
Status: DISCONTINUED | OUTPATIENT
Start: 2019-02-25 | End: 2019-02-27 | Stop reason: HOSPADM

## 2019-02-25 RX ORDER — LEVOTHYROXINE SODIUM 150 UG/1
150 TABLET ORAL
Status: DISCONTINUED | OUTPATIENT
Start: 2019-02-26 | End: 2019-02-27 | Stop reason: HOSPADM

## 2019-02-25 RX ORDER — AMOXICILLIN 250 MG
1 CAPSULE ORAL DAILY PRN
Status: DISCONTINUED | OUTPATIENT
Start: 2019-02-25 | End: 2019-02-27 | Stop reason: HOSPADM

## 2019-02-25 RX ORDER — VENLAFAXINE HYDROCHLORIDE 37.5 MG/1
37.5 CAPSULE, EXTENDED RELEASE ORAL EVERY MORNING
Status: DISCONTINUED | OUTPATIENT
Start: 2019-02-26 | End: 2019-02-27 | Stop reason: HOSPADM

## 2019-02-25 RX ADMIN — APIXABAN 5 MG: 2.5 TABLET, FILM COATED ORAL at 09:02

## 2019-02-25 NOTE — TELEPHONE ENCOUNTER
Notified by infusion that patient called them with complaints of slurred speech. Patient called, noticeable slurred speech, patient instructed to go to ER asap due to stoke symptoms. Family member present telling patient that she needs to go to ER in back ground. Patient stated that she will be on her way and that her fa alyse is going to drive her.

## 2019-02-25 NOTE — H&P
PCP: Melanie Moreno MD    History & Physical    Chief Complaint:  Episode of slurring of speech for 30 min prior to presentation    History of Present Illness:  Patient is a 53 y.o. female admitted to Hospitalist Service from [facility] Emergency Room with complaint of episode of slurring of speech prior to presentation. Patient reportedly has past medical history significant for hypothyroidism, osteoarthritis and history of gallbladder carcinoma (invasive squamous cell carcinoma, received modified FOLFIRINOX + Oxaliplatin, under care by Dr. Alarcon), history of pulmonary embolism and deep venous thrombosis in legs ().  According to patient around 2:00 p.m. this afternoon, while patient was in her usual state of health, while receiving chemotherapy, she started experiencing gradual onset slurring of speech which was witnessed by her daughter. Patient states, she had a similar episode of slurring with first round of chemo and that medication was discontinued. This was 4th round of chemotherapy. No focal neurological deficits such as facial droop or numbness of extremities or face is reported.  Patient does not smoke.  Patient reported her symptoms completely resolved and she is at baseline now. Patient denied chest pain, shortness of breath, abdominal pain, nausea, vomiting, headache, vision changes, cough or fever.    Past Medical History:   Diagnosis Date    Arthritis     Cancer     gallbadder/ liver spots/biopsy    Thyroid disease      Past Surgical History:   Procedure Laterality Date    ARTHROSCOPY, KNEE, WITH Partial lateral MENISCECTOMY Right 7/12/2018    Performed by [facility] OR    Elwqsz-zgkkew-hz N/A 11/16/2018    Performed by [facility] at [facility] OR [facility] FLR    BREAST BIOPSY      BREAST SURGERY  2001    right biopsy, benign    CHONDROPLASTY,KNEE Medial Femoral Right 7/12/2018    Performed by [facility] at [facility] OR    COLONOSCOPY       COLONOSCOPY N/A 12/5/2014    Performed by [facility] at [facility] ENDO    dental implant      CXEUBGKMM-BYJH-Q-CATH Right 12/14/2018    Per[facility] by Jur[facility] at [facility] OR    KNEE ARTHROSCOPY Right     LIVER BIOPSY  11/16/2018    THYROIDECTOMY  2011    complete     Family History   Problem Relation Age of Onset    Cancer Mother         breast and bone     Breast cancer Mother     Hyperlipidemia Father     ADD / ADHD Daughter         autism    Crohn's disease Paternal Uncle     Heart disease Paternal Uncle         heart transplant     Social History     Tobacco Use    Smoking status: Never Smoker    Smokeless tobacco: Never Used   Substance Use Topics    Alcohol use: Yes     Comment: rarely    Drug use: No      Review of patient's allergies indicates:   Allergen Reactions    Leucovorin analogues        (Not in a hospital admission)  Review of Systems:  Constitutional: no fever or chills  Eyes: no visual changes  Ears, nose, mouth, throat, and face: no nasal congestion or sore throat  Respiratory: no cough or shorness of breath  Cardiovascular: no chest pain or palpitations  Gastrointestinal: no nausea or vomiting, no abdominal pain or change in bowel habits  Genitourinary: no hematuria or dysuria  Integument/breast: no rash or pruritis  Hematologic/lymphatic: no easy bruising or lymphadenopathy  Musculoskeletal: no arthralgias or myalgias  Neurological: no seizures or tremors. + speech trouble  Behavioral/Psych: no auditory or visual hallucinations  Endocrine: no heat or cold intolerance     OBJECTIVE:     Vital Signs (Most Recent)  Temp: 97.6 °F (36.4 °C) (02/25/19 1439)  Pulse: 72 (02/25/19 1632)  Resp: 18 (02/25/19 1439)  BP: 132/61 (02/25/19 1632)  SpO2: 96 % (02/25/19 1632)    Physical Exam:  General appearance: well developed, appears stated age  Head: normocephalic, atraumatic  Eyes:  conjunctivae/corneas clear. PERRL.  Nose: Nares normal. Septum midline.  Throat: lips, mucosa, and  tongue normal; teeth and gums normal, no throat erythema.  Neck: supple, symmetrical, trachea midline, no JVD and thyroid not enlarged, symmetric, no tenderness/mass/nodules  Lungs:  clear to auscultation bilaterally and normal respiratory effort  Chest wall: no tenderness. Right chest wall port a cath noted  Heart: regular rate and rhythm, S1, S2 normal, no murmur, click, rub or gallop  Abdomen: soft, non-tender non-distented; bowel sounds normal; no masses,  no organomegaly  Extremities: no cyanosis, clubbing or edema.   Pulses: 2+ and symmetric  Skin: Skin color, texture, turgor normal. No rashes or lesions.  Lymph nodes: Cervical, supraclavicular, and axillary nodes normal.  Neurologic: Normal strength and tone. No focal numbness or weakness. CNII-XII intact.    Laboratory:   CBC:   Recent Labs   Lab 02/25/19  1508   WBC 2.20*   RBC 4.02   HGB 11.1*   HCT 33.8*      MCV 84   MCH 27.5   MCHC 32.7     CMP:   Recent Labs   Lab 02/25/19  1508   *   CALCIUM 9.2   ALBUMIN 2.9*   PROT 6.2      K 3.8   CO2 21*      BUN 9   CREATININE 0.7   ALKPHOS 102   ALT 24   AST 36   BILITOT 0.4     Coagulation:   Recent Labs   Lab 02/25/19  1508   LABPROT 11.1   INR 1.1     Hemoglobin A1C   Date Value Ref Range Status   06/01/2018 5.0 4.0 - 5.6 % Final     Comment:     ADA Screening Guidelines:  5.7-6.4%  Consistent with prediabetes  >or=6.5%  Consistent with diabetes  High levels of fetal hemoglobin interfere with the HbA1C  assay. Heterozygous hemoglobin variants (HbS, HgC, etc)do  not significantly interfere with this assay.   However, presence of multiple variants may affect accuracy.       Microbiology Results (last 7 days)     ** No results found for the last 168 hours. **        Diagnostic Results:  Chest X-Ray: No acute process.  Right Port-A-Cath.    CT head:  There is no acute intracranial abnormality.  There is no hemorrhage, mass/mass effect, acute edema or ischemia.    Assessment/Plan:      Active Hospital Problems    Diagnosis  POA    *TIA (transient ischemic attack) [G45.9]  Yes    Slurred speech [R47.81] - possibly related to chemotherapy  Admit to telemetry floor.  Neurochecks q 4 hrs x 24 hrs.  Fall and seizure precautions.  Continue Eliquis therapy.  Neurology consult.  MRI Brain.  MRA of head and neck.  2 D ECHO for evaluation of intra-cardiac thrombus or valvular heart disease.  Check Lipid Profile.  Consult speech therapy for evaluation and treatment.  Will consult Dr. Alarcon as well.    Yes    Leukopenia [D72.819] status post chemotherapy  Monitor white blood cell count daily.  Follows with Dr. Alarcon.    Yes    Mild malnutrition [E44.1]  Nutrition consulted. Encourage maximal PO intake. Diet supplementation ordered per nutrition approval. Will encourage PO and monitor closely for weight changes.    Yes    Bilateral pulmonary embolism [I26.99]  On Eliquis.    Yes    Malignant neoplasm of gallbladder [C23]  Under care by oncologist.    Yes    CARINA (generalized anxiety disorder) [F41.1]  Using anxiolytics as needed.    Yes    Hypothyroidism [E03.9]  Yes     Established with Dr. Bermudez  Increase Synthroid to 150 mcg daily.  Patient to have TSH and free T4 level checked in 4 weeks.      DVT prophylaxis:  On Eliquis.    Josef Eldridge MD  Department of Hospital Medicine   [facility]

## 2019-02-25 NOTE — TELEPHONE ENCOUNTER
Reason for Disposition   Caller has already spoken with the PCP and has no further questions.   Already left for the hospital/clinic.    Protocols used: ST NO CONTACT OR DUPLICATE CONTACT CALL-A-AH      Faith 's call was sent to triage line because of her report of slurred speech.  Call center staff Vonnie Hernandez stated patient hung up before she could transfer call.  I called her right away; she states she is on the way to the ED now, at the direction of Dr Genevieve Alarcon.  No triage.  Message to Melanie Moreno MD, pcp, and Dr Genevieve Alarcon.  Please contact caller directly with any additional care advice.

## 2019-02-25 NOTE — ED NOTES
Pt awake alert oriented X 4, pt reports episode of slurred speech PTA that has resolved, pt reports receiving chemo this morning and is on a drip via port currently, pt reports she a episode similar to this last month with a different chemo drug, pt denies chest pain or sob, denies n/v/d, denies vision change or dizziness, family at bedside, breath sounds clear apical rate regular, abd soft non tender to palpate, all extremities equal strong strength no facial droop noted

## 2019-02-25 NOTE — ED PROVIDER NOTES
"Encounter Date: 2/25/2019    SCRIBE #1 NOTE: IQuin, am scribing for, and in the presence of, Dr. Brooks.       History     Chief Complaint   Patient presents with    episode of slurred speech     20 minutes prior to arrival  now resolved      2/25/2019  2:48 PM     The patient is a 53 y.o. female  who presents with slurred speech. Patient c/o gradual onset of slurred speech which started approximately 48 minutes ago and is now totally resolved.  Symptoms lasted about 30 min. Her daughter witnessed the slurred speech and states it sound like "a stroke vs down syndrome person".  No facial droop, numbness or weakness. Patient received chemotherapy today and is scheduled to have chemo every other month. Pt states her current episode of slurred speech has occurred before with previous chemo. Pt states she was on her fourth dose of chemo before she developed symptoms prior time. Patient also reports this was the fourth dose of this particular chemotherapy she had today. Pt was diagnosed with blood clot in lungs and legs January 2019. Pt is not a smoker. She denies any changes in vision, leg swelling or sob. PMHx of thyroid disease, arthritis, gallbladder and liver cancer. SHx of liver biospy, thyroidectomy, breast surgery, breast biopsy        The history is provided by the patient.     Review of patient's allergies indicates:   Allergen Reactions    Leucovorin analogues      Past Medical History:   Diagnosis Date    Arthritis     Cancer     gallbadder/ liver spots/biopsy    Thyroid disease      Past Surgical History:   Procedure Laterality Date    ARTHROSCOPY, KNEE, WITH Partial lateral MENISCECTOMY Right 7/12/2018    [facility] OR    Dekzys-hpzcjs-cw N/A 11/16/2018    Performed by [facility] at [facility]    BREAST BIOPSY      BREAST SURGERY  2001    right biopsy, benign    CHONDROPLASTY,KNEE Medial Femoral Right 7/12/2018    Performed by Huey CARABALLO" MD Magno at [facility] OR    COLONOSCOPY      COLONOSCOPY N/A 12/5/2014    Performed by Si[facility]o [facility]rera [facility] at [facility] ENDO    dental implant      XXZRHFDNJ-VXVS-F-CATH Right 12/14/2018    Performed by Jurgen [facility]o MD at [facility] OR    KNEE ARTHROSCOPY Right     LIVER BIOPSY  11/16/2018    THYROIDECTOMY  2011    complete     Family History   Problem Relation Age of Onset    Cancer Mother         breast and bone     Breast cancer Mother     Hyperlipidemia Father     ADD / ADHD Daughter         autism    Crohn's disease Paternal Uncle     Heart disease Paternal Uncle         heart transplant     Social History     Tobacco Use    Smoking status: Never Smoker    Smokeless tobacco: Never Used   Substance Use Topics    Alcohol use: Yes     Comment: rarely    Drug use: No     Review of Systems   Eyes: Negative for visual disturbance.   Respiratory: Negative for shortness of breath.    Cardiovascular: Negative for leg swelling.   Neurological: Positive for speech difficulty. Negative for weakness and numbness.   All other systems reviewed and are negative.      Physical Exam     Initial Vitals [02/25/19 1439]   BP Pulse Resp Temp SpO2   (!) 131/59 76 18 97.6 °F (36.4 °C) 97 %      MAP       --         Physical Exam    Nursing note and vitals reviewed.  Constitutional: She appears well-developed and well-nourished. No distress.   HENT:   Head: Normocephalic and atraumatic.   Mouth/Throat: Mucous membranes are normal.   Eyes: EOM are normal. Pupils are equal, round, and reactive to light.   Neck: Normal range of motion. Neck supple.   Cardiovascular: Normal rate, regular rhythm, normal heart sounds and intact distal pulses. Exam reveals no gallop and no friction rub.    No murmur heard.  Pulmonary/Chest: Breath sounds normal. No respiratory distress. She has no wheezes. She has no rhonchi. She has no rales.   Port to the right chest wall.   Abdominal: Soft. She exhibits no distension. There is no tenderness.    Musculoskeletal: Normal range of motion. She exhibits no edema.   Neurological: She is alert and oriented to person, place, and time. She has normal strength. She displays no tremor. No cranial nerve deficit or sensory deficit. She exhibits normal muscle tone. GCS eye subscore is 4. GCS verbal subscore is 5. GCS motor subscore is 6.   Skin: Skin is warm and dry. No rash noted.   Psychiatric: She has a normal mood and affect. Her behavior is normal. Judgment and thought content normal.         ED Course   Procedures  Labs Reviewed - No data to display       Imaging Results    None          Medical Decision Making:   History:   Old Medical Records: I decided to obtain old medical records.  Clinical Tests:   Lab Tests: Ordered and Reviewed  Radiological Study: Reviewed and Ordered  Medical Tests: Reviewed and Ordered            Scribe Attestation:   Scribe #1: I performed the above scribed service and the documentation accurately describes the services I performed. I attest to the accuracy of the note.    I, Dr. Brooks, personally performed the services described in this documentation. All medical record entries made by the scribe were at my direction and in my presence.  I have reviewed the chart and agree that the record reflects my personal performance and is accurate and complete.6:06 PM 02/25/2019            ED Course as of Feb 25 1752   Mon Feb 25, 2019   1601 X-Ray Chest AP Portable [EF]   1601 CT Head Without Contrast [EF]   1624 Case d/w dr mono jauregui, recommends admission.  Recommends MRI of the brain with and without MRA of the head without echo carotids all tomorrow.  [EF]      ED Course User Index  [EF] Pito Brooks MD     Clinical Impression:       ICD-10-CM ICD-9-CM   1. Stroke I63.9 434.91         Disposition:   Disposition: Admitted  Condition: Stable           53-year-old presents to the emergency room with a 30 min episode of slurred speech earlier today.  This has completely resolved in the  ER.  No indication for tPA.  Dr. Eldridge to admit.  Case discussed with Neurology who can see the patient tomorrow.             Pito Brooks MD  02/25/19 [id number]

## 2019-02-26 ENCOUNTER — TELEPHONE (OUTPATIENT)
Dept: HEMATOLOGY/ONCOLOGY | Facility: CLINIC | Age: 54
End: 2019-02-26

## 2019-02-26 LAB
ALBUMIN SERPL BCP-MCNC: 2.7 G/DL
ALP SERPL-CCNC: 86 U/L
ALT SERPL W/O P-5'-P-CCNC: 21 U/L
ANION GAP SERPL CALC-SCNC: 9 MMOL/L
ANISOCYTOSIS BLD QL SMEAR: ABNORMAL
AST SERPL-CCNC: 31 U/L
BASOPHILS # BLD AUTO: 0 K/UL
BASOPHILS NFR BLD: 0.3 %
BILIRUB SERPL-MCNC: 0.4 MG/DL
BUN SERPL-MCNC: 15 MG/DL
CALCIUM SERPL-MCNC: 9 MG/DL
CHLORIDE SERPL-SCNC: 107 MMOL/L
CO2 SERPL-SCNC: 24 MMOL/L
CREAT SERPL-MCNC: 0.7 MG/DL
DIFFERENTIAL METHOD: ABNORMAL
EOSINOPHIL # BLD AUTO: 0 K/UL
EOSINOPHIL NFR BLD: 0 %
ERYTHROCYTE [DISTWIDTH] IN BLOOD BY AUTOMATED COUNT: 26.9 %
EST. GFR  (AFRICAN AMERICAN): >60 ML/MIN/1.73 M^2
EST. GFR  (NON AFRICAN AMERICAN): >60 ML/MIN/1.73 M^2
GLUCOSE SERPL-MCNC: 108 MG/DL
HCT VFR BLD AUTO: 32.3 %
HGB BLD-MCNC: 10.3 G/DL
HYPOCHROMIA BLD QL SMEAR: ABNORMAL
LYMPHOCYTES # BLD AUTO: 1.2 K/UL
LYMPHOCYTES NFR BLD: 27.5 %
MCH RBC QN AUTO: 27.1 PG
MCHC RBC AUTO-ENTMCNC: 32 G/DL
MCV RBC AUTO: 85 FL
MONOCYTES # BLD AUTO: 0.3 K/UL
MONOCYTES NFR BLD: 7.7 %
NEUTROPHILS # BLD AUTO: 2.8 K/UL
NEUTROPHILS NFR BLD: 64.5 %
OVALOCYTES BLD QL SMEAR: ABNORMAL
PLATELET # BLD AUTO: 176 K/UL
PLATELET BLD QL SMEAR: ABNORMAL
PMV BLD AUTO: 8.1 FL
POIKILOCYTOSIS BLD QL SMEAR: SLIGHT
POLYCHROMASIA BLD QL SMEAR: ABNORMAL
POTASSIUM SERPL-SCNC: 3.5 MMOL/L
PROT SERPL-MCNC: 5.5 G/DL
RBC # BLD AUTO: 3.82 M/UL
SODIUM SERPL-SCNC: 140 MMOL/L
WBC # BLD AUTO: 4.4 K/UL

## 2019-02-26 PROCEDURE — 92610 EVALUATE SWALLOWING FUNCTION: CPT

## 2019-02-26 PROCEDURE — 95819 PR EEG,W/AWAKE & ASLEEP RECORD: ICD-10-PCS | Mod: 26,,, | Performed by: PSYCHIATRY & NEUROLOGY

## 2019-02-26 PROCEDURE — A9585 GADOBUTROL INJECTION: HCPCS | Performed by: INTERNAL MEDICINE

## 2019-02-26 PROCEDURE — 94761 N-INVAS EAR/PLS OXIMETRY MLT: CPT

## 2019-02-26 PROCEDURE — 25000003 PHARM REV CODE 250: Performed by: INTERNAL MEDICINE

## 2019-02-26 PROCEDURE — 80053 COMPREHEN METABOLIC PANEL: CPT

## 2019-02-26 PROCEDURE — 36415 COLL VENOUS BLD VENIPUNCTURE: CPT

## 2019-02-26 PROCEDURE — 25500020 PHARM REV CODE 255: Performed by: INTERNAL MEDICINE

## 2019-02-26 PROCEDURE — 85025 COMPLETE CBC W/AUTO DIFF WBC: CPT

## 2019-02-26 PROCEDURE — G0378 HOSPITAL OBSERVATION PER HR: HCPCS

## 2019-02-26 PROCEDURE — 95819 EEG AWAKE AND ASLEEP: CPT | Mod: 26,,, | Performed by: PSYCHIATRY & NEUROLOGY

## 2019-02-26 RX ORDER — GADOBUTROL 604.72 MG/ML
7 INJECTION INTRAVENOUS
Status: COMPLETED | OUTPATIENT
Start: 2019-02-26 | End: 2019-02-26

## 2019-02-26 RX ORDER — LEVOTHYROXINE SODIUM 150 UG/1
150 TABLET ORAL
Qty: 30 TABLET | Status: SHIPPED | OUTPATIENT
Start: 2019-02-27 | End: 2019-03-12 | Stop reason: DRUGHIGH

## 2019-02-26 RX ADMIN — APIXABAN 5 MG: 2.5 TABLET, FILM COATED ORAL at 08:02

## 2019-02-26 RX ADMIN — GADOBUTROL 7 ML: 604.72 INJECTION INTRAVENOUS at 07:02

## 2019-02-26 RX ADMIN — LEVOTHYROXINE SODIUM 150 MCG: 150 TABLET ORAL at 08:02

## 2019-02-26 RX ADMIN — VENLAFAXINE HYDROCHLORIDE 37.5 MG: 37.5 CAPSULE, EXTENDED RELEASE ORAL at 08:02

## 2019-02-26 RX ADMIN — PANTOPRAZOLE SODIUM 40 MG: 40 TABLET, DELAYED RELEASE ORAL at 11:02

## 2019-02-26 NOTE — PLAN OF CARE
02/26/19 0828   PRE-TX-O2   O2 Device (Oxygen Therapy) room air   SpO2 97 %   Pulse Oximetry Type Intermittent   $ Pulse Oximetry - Multiple Charge Pulse Oximetry - Multiple   Pulse (!) 58   Resp 18

## 2019-02-26 NOTE — PROCEDURES
ROUTINE ELECTROENCEPHALOGRAM REPORT      Faith Byers  [id number]  1965    DATE OF SERVICE: 2/26/19    REQUESTING PROVIDER: Josef Eldridge MD    REASON FOR CONSULT: 52yo F with TIA vs seizure    METHODOLOGY   Electroencephalographic (EEG) recording is with electrodes placed according to the International 10-20 placement system.  Thirty two (32) channels of digital signal (sampling rate of 512/sec) including T1 and T2 was simultaneously recorded from the scalp and may include  EKG, EMG, and/or eye monitors.  Recording band pass was 0.1 to 512 hz.  Digital video recording of the patient is simultaneously recorded with the EEG.  The patient is instructed report clinical symptoms which may occur during the recording session.  EEG and video recording is stored and archived in digital format. Activation procedures which include photic stimulation, hyperventilation and instructing patients to perform simple task are done in selected patients.    The EEG is displayed on a monitor screen and can be reviewed using different montages.  Computer assisted analysis is employed to detect spike and electrographic seizure activity.   The entire record is submitted for computer analysis.  The entire recording is visually reviewed and the times identified by computer analysis as being spikes or seizures are reviewed again.  Compresses spectral analysis (CSA) is also performed on the activity recorded from each individual channel.  This is displayed as a power display of frequencies from 0 to 30 Hz over time.   The CSA is reviewed looking for asymmetries in power between homologous areas of the scalp and then compared with the original EEG recording.     [employer] software was also utilized in the review of this study.  This software suite analyzes the EEG recording in multiple domains.  Coherence and rhythmicity is computed to identify EEG sections which may contain organized seizures.  Each channel undergoes analysis to  detect presence of spike and sharp waves which have special and morphological characteristic of epileptic activity.  The routine EEG recording is converted from spacial into frequency domain.  This is then displayed comparing homologous areas to identify areas of significant asymmetry.  Algorithm to identify non-cortically generated artifact is used to separate eye movement, EMG and other artifact from the EEG    EEG FINDINGS  Background activity:   The background rhythm was characterized by alpha (8-10 Hz) activity with a 10 Hz posterior dominant alpha rhythm at 30-70 microvolts.   Symmetry and continuity: The background was continuous; asymmetry with intermittent right (Cz/Pz/Fp2/F8) sided theta mixed with alpha slowing seen        Sleep:   Normal sleep transients including sleep spindles, K complexes, vertex waves and POSTS were seen.    Activation procedures:   Hyperventilation was performed with no abnormalities seen  Photic stimulation was performed with no abnormalities seen    Abnormal activity:   No epileptiform discharges, periodic discharges, lateralized rhythmic delta activity or electrographic seizures were seen.    IMPRESSION:   This is an abnormal routine EEG due to the intermittent right sided (fronto-parietal) slowing seen, suggestive of underlying structural lesion.  No epileptiform activity or electrographic seizures seen.    CLINICAL CORRELATION IS RECOMMENDED.    Natasha Barboza MD, AD(), [facility].  Neurology-Epilepsy.  [facility].

## 2019-02-26 NOTE — CONSULTS
[facility]  Neurology  Consult Note    Patient Name: Faith Byers  MRN: [id number]  Admission Date: 2/25/2019  Hospital Length of Stay: 0 days  Code Status: Full Code   Attending Provider: Josef Eldridge MD  Consulting Provider: DIVYA Cooper  Primary Care Physician: Melanie Moreno MD  Principal Problem:TIA (transient ischemic attack)    Consults  Subjective:     Chief Complaint:  Slurred speech     HPI: This is a 53 y.o. female admitted with complaint of episode of slurring of speech prior to presentation. Patient reportedly has past medical history significant for hypothyroidism, osteoarthritis and history of gallbladder carcinoma (invasive squamous cell carcinoma, received modified FOLFIRINOX + Oxaliplatin, under care by Dr. Alarcon), history of pulmonary embolism and deep venous thrombosis in legs ().  According to patient around 2:00 p.m. this afternoon, while patient was in her usual state of health, while receiving chemotherapy, she started experiencing gradual onset slurring of speech which was witnessed by her daughter. Patient states, she had a similar episode of slurring with first round of chemo and that medication was discontinued. This is her 4th round of chemotherapy. No focal neurological deficits such as facial droop or numbness of extremities or face is reported. Patient reported her symptoms completely resolved by the time she presented to the ER and she is at baseline now.       CT head:   1.  There is no acute intracranial abnormality.  There is no hemorrhage, mass/mass effect, acute edema or ischemia.    MRI brain: pending    MRA brain: pending    CUS: pending     ECHO: pending         Past Medical History:   Diagnosis Date    Arthritis     Cancer     gallbadder/ liver spots/biopsy    Thyroid disease        Past Surgical History:   Procedure Laterality Date    ARTHROSCOPY, KNEE, WITH Partial lateral MENISCECTOMY Right 7/12/2018    Performed by  Huey Kiran MD at [facility] OR    Sejara-ucbzvy-fi N/A 11/16/2018    Performed by [facility] Provider at [facility] OR 2ND FLR    BREAST BIOPSY      BREAST SURGERY  2001    right biopsy, benign    CHONDROPLASTY,KNEE Medial Femoral Right 7/12/2018    Performed by [facility]y Kiran [facility] at [facility] OR    COLONOSCOPY      COLONOSCOPY N/A 12/5/2014    Performed by Sixto Barrera MD at [facility] ENDO    dental implant      RMGVUURVC-BSDS-I-CATH Right 12/14/2018    Performed by Jurgen Toro MD at [facility] OR    KNEE ARTHROSCOPY Right     LIVER BIOPSY  11/16/2018    THYROIDECTOMY  2011    complete       Review of patient's allergies indicates:   Allergen Reactions    Leucovorin analogues        Current Neurological Medications: reviewed     No current facility-administered medications on file prior to encounter.      Current Outpatient Medications on File Prior to Encounter   Medication Sig    apixaban (ELIQUIS) 5 mg Tab Take 1 tablet (5 mg total) by mouth 2 (two) times daily. To Start after 7 days on 10mg Twice daily.    levothyroxine (SYNTHROID) 137 MCG Tab tablet Take 137 mcg by mouth every Mon, Wed, Fri.    ondansetron (ZOFRAN-ODT) 8 MG TbDL Take 8 mg by mouth every 12 (twelve) hours as needed (nausea).     pantoprazole (PROTONIX) 40 MG tablet Take 1 tablet (40 mg total) by mouth once daily. (Patient taking differently: Take 20 mg by mouth every morning. )    prochlorperazine (COMPAZINE) 10 MG tablet Take 1 tablet (10 mg total) by mouth every 6 (six) hours as needed. (Patient taking differently: Take 10 mg by mouth every 6 (six) hours as needed (nausea). )    venlafaxine (EFFEXOR-XR) 37.5 MG 24 hr capsule Take 1 tablet by mouth every morning.     levothyroxine (SYNTHROID) 125 MCG tablet Take 125 mcg by mouth 4 (four) times a week. Sun, Tues, Thurs, Sat      Family History     Problem Relation (Age of Onset)    ADD / ADHD Daughter    Breast cancer Mother    Cancer Mother    Crohn's disease Paternal Uncle     Heart disease Paternal Uncle    Hyperlipidemia Father        Tobacco Use    Smoking status: Never Smoker    Smokeless tobacco: Never Used   Substance and Sexual Activity    Alcohol use: Yes     Comment: rarely    Drug use: No    Sexual activity: Not Currently     Review of Systems   Constitutional: Negative.    HENT: Negative.    Eyes: Negative.    Respiratory: Negative.    Cardiovascular: Negative.    Gastrointestinal: Negative.    Endocrine: Negative.    Genitourinary: Negative.    Musculoskeletal: Negative.    Allergic/Immunologic: Negative.    Neurological: Negative.    Hematological: Negative.    Psychiatric/Behavioral: Negative.      Objective:     Vital Signs (Most Recent):  Temp: 97 °F (36.1 °C) (02/26/19 0328)  Pulse: (!) 58 (02/26/19 0828)  Resp: 18 (02/26/19 0828)  BP: (!) 150/65 (02/26/19 0822)  SpO2: 97 % (02/26/19 0828) Vital Signs (24h Range):  Temp:  [97 °F (36.1 °C)-98 °F (36.7 °C)] 97 °F (36.1 °C)  Pulse:  [57-91] 58  Resp:  [18-20] 18  SpO2:  [94 %-99 %] 97 %  BP: (100-150)/(59-89) 150/65     Weight: 72.5 kg (159 lb 13.3 oz)  Body mass index is 33.41 kg/m².    Physical Exam   Constitutional: She is oriented to person, place, and time. She appears well-developed and well-nourished.   Eyes: EOM are normal. Pupils are equal, round, and reactive to light.   Neck: Normal range of motion. Neck supple.   Cardiovascular: Normal rate and regular rhythm.   Pulmonary/Chest: Effort normal and breath sounds normal.   Abdominal: Soft. Bowel sounds are normal.   Musculoskeletal: Normal range of motion.   Neurological: She is alert and oriented to person, place, and time. She has normal strength. She has a normal Finger-Nose-Finger Test and a normal Heel to Shin Test.   Reflex Scores:       Tricep reflexes are 2+ on the right side and 2+ on the left side.       Bicep reflexes are 2+ on the right side and 2+ on the left side.       Brachioradialis reflexes are 2+ on the right side and 2+ on the left side.        Patellar reflexes are 2+ on the right side and 2+ on the left side.       Achilles reflexes are 2+ on the right side and 2+ on the left side.  Skin: Skin is warm and dry.   Psychiatric: She has a normal mood and affect.       NEUROLOGICAL EXAMINATION:     MENTAL STATUS   Oriented to person, place, and time.   Level of consciousness: alert    CRANIAL NERVES   Cranial nerves II through XII intact.     CN III, IV, VI   Pupils are equal, round, and reactive to light.  Extraocular motions are normal.     MOTOR EXAM     Strength   Strength 5/5 throughout.   Right neck flexion: 5/5  Left neck flexion: 5/5  Right neck extension: 5/5  Left neck extension: 5/5  Right deltoid: 5/5  Left deltoid: 5/5  Right biceps: 5/5  Left biceps: 5/5  Right triceps: 5/5  Left triceps: 5/5  Right wrist flexion: 5/5  Left wrist flexion: 5/5  Right wrist extension: 5/5  Left wrist extension: 5/5  Right interossei: 5/5  Left interossei: 5/5  Right abdominals: 5/5  Left abdominals: 5/5  Right iliopsoas: 5/5  Left iliopsoas: 5/5  Right quadriceps: 5/5  Left quadriceps: 5/5  Right hamstrin/5  Left hamstrin/5  Right glutei: 5/5  Left glutei: 5/5  Right anterior tibial: 5/5  Left anterior tibial: 5/5  Right posterior tibial: 5/5  Left posterior tibial: 5/5  Right peroneal: 5/5  Left peroneal: 5/5  Right gastroc: 5/5  Left gastroc: 5/5    REFLEXES     Reflexes   Right brachioradialis: 2+  Left brachioradialis: 2+  Right biceps: 2+  Left biceps: 2+  Right triceps: 2+  Left triceps: 2+  Right patellar: 2+  Left patellar: 2+  Right achilles: 2+  Left achilles: 2+  Right : 2+  Left : 2+    SENSORY EXAM   Light touch normal.     GAIT AND COORDINATION      Coordination   Finger to nose coordination: normal  Heel to shin coordination: normal    Tremor   Resting tremor: absent  Intention tremor: absent       Gait not tested        Significant Labs: reviewed     Significant Imaging: reviewed     Assessment and Plan:     Active Diagnoses:     Diagnosis Date Noted POA    PRINCIPAL PROBLEM:  TIA (transient ischemic attack) [G45.9] 02/25/2019 Yes    Slurred speech [R47.81] 02/25/2019 Yes    Leukopenia [D72.819] 02/25/2019 Yes    Mild malnutrition [E44.1] 02/25/2019 Yes    Bilateral pulmonary embolism [I26.99] 01/24/2019 Yes    Malignant neoplasm of gallbladder [C23] 12/20/2018 Yes    CARINA (generalized anxiety disorder) [F41.1] 06/01/2018 Yes    Hypothyroidism [E03.9] 09/15/2015 Yes      Problems Resolved During this Admission:       VTE Risk Mitigation (From admission, onward)        Ordered     apixaban tablet 5 mg  2 times daily      02/25/19 2014     IP VTE HIGH RISK PATIENT  Once      02/25/19 2014     Place APRIL hose  Until discontinued      02/25/19 2014        Transient slurred speech  R/O TIA  -MRI brain with and with contrast today  -MRA brain today  -CUS   -ECHO with bubble  -EEG 30 minutes today  -Continue Eliquis    Stroke education was provided.  If patient has acute neurological changes including weakness, confusion, speech changes, facial droop, difficulty walking, and sensory changes immediately call 911.  Follow up Neurology in 2 weeks at [phone].  Medication side effects discussed with the patient and/or caregiver.    Seizure education.    No driving until seen for follow up in clinic, no swimming alone, no climbing high areas, no operation of heavy machinery or working with high risk electricity equipements.  Patient and/or next of kin informed.    Thank you for your consult.     DIVYA Cooper  [facility]    I, Dr. Anthony Kulkarni, have personally seen and examined the patient with my advanced provider and agree with above. I personally did a focused exam, and reviewed all necessary clinical information. I discussed my management plan with my NP and agree with above. At baseline.

## 2019-02-26 NOTE — DISCHARGE SUMMARY
Discharge Summary  Hospital Medicine    Admit Date: 2/25/2019    Date and Time: 2/26/20191:16 PM    Discharge Attending Physician: Josef Eldridge MD    Primary Care Physician: Melanie Moreno MD    Diagnoses:  Active Hospital Problems    Diagnosis  POA    *TIA (transient ischemic attack) [G45.9]  Yes    Slurred speech [R47.81]  Yes    Leukopenia [D72.819]  Yes    Mild malnutrition [E44.1]  Yes    Bilateral pulmonary embolism [I26.99]  Yes    Malignant neoplasm of gallbladder [C23]  Yes    CARINA (generalized anxiety disorder) [F41.1]  Yes    Hypothyroidism [E03.9]  Yes     Established with Dr. Bermudez     Discharged Condition: Good    Hospital Course:   Patient is a 53 y.o. female admitted to Hospitalist Service from [facility] Room with complaint of episode of slurring of speech prior to presentation. Patient reportedly has past medical history significant for hypothyroidism, osteoarthritis and history of gallbladder carcinoma (invasive squamous cell carcinoma, received modified FOLFIRINOX + Oxaliplatin, under care by Dr. Alarcon), history of pulmonary embolism and deep venous thrombosis in legs ().  According to patient around 2:00 p.m. this afternoon, while patient was in her usual state of health, while receiving chemotherapy, she started experiencing gradual onset slurring of speech which was witnessed by her daughter. Patient states, she had a similar episode of slurring with first round of chemo and that medication was discontinued. This was 4th round of chemotherapy. No focal neurological deficits such as facial droop or numbness of extremities or face is reported.  Patient does not smoke.  Patient reported her symptoms completely resolved and she is at baseline now. Patient denied chest pain, shortness of breath, abdominal pain, nausea, vomiting, headache, vision changes, cough or fever. Patient was admitted to Hospitalist medicine service. Patient was evaluated by   Vinnie. Patient placed on tele-monitoring and routine neuro-checks. Neuro-imaging reviewed, results below. Patient evaluated by neurology team. Patient worked with ST/PT/OT. Symptoms improved. Patient was discharged home in stable condition with following discharge plan of care.     Consults: Dr. Birmingham and Dr. Alarcon    Significant Diagnostic Studies:   Chest X-Ray: No acute process.  Right Port-A-Cath.     CT head:  There is no acute intracranial abnormality.  There is no hemorrhage, mass/mass effect, acute edema or ischemia.    MRA brain:   No aneurysm or hemodynamically significant stenosis of the intracranial arterial vasculature.    MRI brain with and without contrast;  No acute intracranial abnormality.  Minimal white matter disease which could reflect microvascular ischemic change or remote areas of demyelination.    Bilateral carotid ultrasound: No evidence of a hemodynamically significant carotid bifurcation stenosis.    Echocardiogram:  · Normal left ventricular systolic function. The estimated ejection fraction is 60%  · Normal LV diastolic function.  · Normal left atrial pressure.  · Normal right ventricular systolic function.  · No pulmonary hypertension present.  · Normal central venous pressure (3 mm Hg).  · The estimated PA systolic pressure is 26 mm Hg  · No masses in right atrium or ivc  · Bubble study negative for r to left shunt no pfo    Special Treatments/Procedures: None  Disposition: Home or Self Care    Medications:  Reconciled Home Medications:   Current Discharge Medication List      CONTINUE these medications which have CHANGED    Details   levothyroxine (SYNTHROID) 150 MCG tablet Take 1 tablet (150 mcg total) by mouth before breakfast.  Qty: 30 tablet, Refills: o         CONTINUE these medications which have NOT CHANGED    Details   apixaban (ELIQUIS) 5 mg Tab Take 1 tablet (5 mg total) by mouth 2 (two) times daily. To Start after 7 days on 10mg Twice daily.  Qty: 60 tablet,  Refills: 3      ondansetron (ZOFRAN-ODT) 8 MG TbDL Take 8 mg by mouth every 12 (twelve) hours as needed (nausea).       pantoprazole (PROTONIX) 40 MG tablet Take 1 tablet (40 mg total) by mouth once daily.  Qty: 90 tablet, Refills: 3    Associated Diagnoses: H/O cancer of gall bladder      prochlorperazine (COMPAZINE) 10 MG tablet Take 1 tablet (10 mg total) by mouth every 6 (six) hours as needed.  Qty: 30 tablet, Refills: 1    Associated Diagnoses: Carcinoma of gall bladder      venlafaxine (EFFEXOR-XR) 37.5 MG 24 hr capsule Take 1 tablet by mouth every morning.            Discharge Procedure Orders   Diet Adult Regular     Other restrictions (specify):   Order Comments: PLEASE OBSERVE FALL PRECAUTIONS     Call MD for:   Order Comments: For worsening symptoms, chest pain, shortness of breath, increased abdominal pain, high grade fever, stroke or stroke like symptoms, immediately go to the nearest Emergency Room or call 911 as soon as possible.     Follow-up Information     Melanie Moreno MD In 1 week.    Specialty:  Family Medicine  Contact information:  [address] [place] [address] [phone]             Genevieve Alarcon MD.    Specialty:  Hematology and Oncology  Why:  Per routine  Contact information:  [address]  [address]  [place] [address] [phone]             Hugo Kulkarni MD In 2 weeks.    Specialties:  Vascular Neurology, Neurology  Contact information:  [address] [facility] [id number] [phone]

## 2019-02-26 NOTE — PROGRESS NOTES
Progress Note  Hospital Medicine  Patient Name:Faith Byers  MRN:  [id number]  Patient Class: OP- Observation  Admit Date: 2/25/2019  Length of Stay: 0 days  Expected Discharge Date:   Attending Physician: Josef Eldridge MD  Primary Care Provider:  Melanie Moreno MD    SUBJECTIVE:     Principal Problem: TIA (transient ischemic attack)  Initial history of present illness: Patient is a 53 y.o. female admitted to Hospitalist Service from [facility] Room with complaint of episode of slurring of speech prior to presentation. Patient reportedly has past medical history significant for hypothyroidism, osteoarthritis and history of gallbladder carcinoma (invasive squamous cell carcinoma, received modified FOLFIRINOX + Oxaliplatin, under care by Dr. Alarcon), history of pulmonary embolism and deep venous thrombosis in legs ().  According to patient around 2:00 p.m. this afternoon, while patient was in her usual state of health, while receiving chemotherapy, she started experiencing gradual onset slurring of speech which was witnessed by her daughter. Patient states, she had a similar episode of slurring with first round of chemo and that medication was discontinued. This was 4th round of chemotherapy. No focal neurological deficits such as facial droop or numbness of extremities or face is reported.  Patient does not smoke.  Patient reported her symptoms completely resolved and she is at baseline now. Patient denied chest pain, shortness of breath, abdominal pain, nausea, vomiting, headache, vision changes, cough or fever.    PMH/PSH/SH/FH/Meds: reviewed.    Symptoms/Review of Systems:  No new focal neuro deficits reported overnight.  Patient is speaking well.  No facial droop noted.  No shortness of breath, cough, chest pain or headache, fever or abdominal pain.     Diet:  Adequate intake.    Activity level: Normal.    Pain:  Patient reports no pain.       OBJECTIVE:   Vital Signs (Most  Recent):      Temp: 97 °F (36.1 °C) (02/26/19 0328)  Pulse: (!) 58 (02/26/19 0828)  Resp: 18 (02/26/19 0828)  BP: (!) 150/65 (02/26/19 0822)  SpO2: 97 % (02/26/19 0828)       Vital Signs Range (Last 24H):  Temp:  [97 °F (36.1 °C)-98 °F (36.7 °C)]   Pulse:  [57-91]   Resp:  [18-20]   BP: (100-150)/(59-89)   SpO2:  [94 %-99 %]     Weight: 72.5 kg (159 lb 13.3 oz)  Body mass index is 33.41 kg/m².  No intake or output data in the 24 hours ending 02/26/19 1021  Physical Examination:  General appearance: well developed, appears stated age  Head: normocephalic, atraumatic  Eyes:  conjunctivae/corneas clear. PERRL.  Nose: Nares normal. Septum midline.  Throat: lips, mucosa, and tongue normal; teeth and gums normal, no throat erythema.  Neck: supple, symmetrical, trachea midline, no JVD and thyroid not enlarged, symmetric, no tenderness/mass/nodules  Lungs:  clear to auscultation bilaterally and normal respiratory effort  Chest wall: no tenderness. Right chest wall port a cath noted  Heart: regular rate and rhythm, S1, S2 normal, no murmur, click, rub or gallop  Abdomen: soft, non-tender non-distented; bowel sounds normal; no masses,  no organomegaly  Extremities: no cyanosis, clubbing or edema.   Pulses: 2+ and symmetric  Skin: Skin color, texture, turgor normal. No rashes or lesions.  Lymph nodes: Cervical, supraclavicular, and axillary nodes normal.  Neurologic: Normal strength and tone. No focal numbness or weakness. CNII-XII intact.    CBC:  Recent Labs   Lab 02/25/19  1508   WBC 2.20*   RBC 4.02   HGB 11.1*   HCT 33.8*      MCV 84   MCH 27.5   MCHC 32.7   BMP  Recent Labs   Lab 02/25/19  1508   *      K 3.8      CO2 21*   BUN 9   CREATININE 0.7   CALCIUM 9.2      Diagnostic Results:  Microbiology Results (last 7 days)     ** No results found for the last 168 hours. **         Chest X-Ray: No acute process.  Right Port-A-Cath.     CT head:  There is no acute intracranial abnormality.  There is  no hemorrhage, mass/mass effect, acute edema or ischemia.    Assessment/Plan:      *TIA (transient ischemic attack) [G45.9]   Yes    Slurred speech [R47.81] - possibly related to chemotherapy  Continue tele monitoring.  Neurochecks.  Fall and seizure precautions.  Continue Eliquis therapy.  Neurology consult pending.  MRI Brain.  MRA of head and neck.  2 D ECHO for evaluation of intra-cardiac thrombus or valvular heart disease.  Consult speech therapy for evaluation and treatment.  Will consult Dr. Alarcon as well.      Yes    Leukopenia [D72.819] status post chemotherapy  Monitor white blood cell count daily.  Follows with Dr. Alarcon.      Yes    Mild malnutrition [E44.1]  Nutrition consulted. Encourage maximal PO intake. Diet supplementation ordered per nutrition approval. Will encourage PO and monitor closely for weight changes.      Yes    Bilateral pulmonary embolism [I26.99]  On Eliquis.      Yes    Malignant neoplasm of gallbladder [C23]  Under care by oncologist.      Yes    CARINA (generalized anxiety disorder) [F41.1]  Using anxiolytics as needed.      Yes    Hypothyroidism [E03.9]   Yes       Established with Dr. Bermudez  Increase Synthroid to 150 mcg daily.  Patient to have TSH and free T4 level checked in 4 weeks.         VTE Risk Mitigation (From admission, onward)        Ordered     apixaban tablet 5 mg  2 times daily      02/25/19 2014     IP VTE HIGH RISK PATIENT  Once      02/25/19 2014     Place APRIL hose  Until discontinued      02/25/19 2014        Josef Eldridge MD  Department of Hospital Medicine   [facility]

## 2019-02-26 NOTE — TELEPHONE ENCOUNTER
Called and spoke with Brenda to inform her that Dr. Alarcon said that it was ok to release pt from hospital if medically ok. Dr. Orta wanted to verify that it was ok with Dr. Alarcon. Brenda verbalized understanding.

## 2019-02-26 NOTE — PLAN OF CARE
Problem: Adult Inpatient Plan of Care  Goal: Plan of Care Review  Outcome: Ongoing (interventions implemented as appropriate)   02/26/19 8237   Plan of Care Review   Plan of Care Reviewed With patient   Pt alert and oriented. No distress noted. VSS. No slurred speech since pt been on unit. Denies pain, sob. Bed alarm on. Room air. Sr on tele, HR 65. NPO since midnight. Up to BR without difficulty. Tolerated well. Free of fall or injury. Side rails up x2. Call light in reach. Will continue to monitor.

## 2019-02-26 NOTE — PT/OT/SLP EVAL
Speech Language Pathology Evaluation  Bedside Swallow    Patient Name:  Faith Byers   MRN:  [id number]  Admitting Diagnosis: TIA (transient ischemic attack)    Recommendations:                 General Recommendations:  Follow-up not indicated  Diet recommendations:  Regular, Thin   Aspiration Precautions: Standard aspiration precautions   General Precautions: Standard,    Communication strategies:  none    History:     Past Medical History:   Diagnosis Date    Arthritis     Cancer     gallbadder/ liver spots/biopsy    Thyroid disease        Past Surgical History:   Procedure Laterality Date    ARTHROSCOPY, KNEE, WITH Partial lateral MENISCECTOMY Right 7/12/2018    Performed by Huey Kiran MD at [facility] OR    Gekelw-rngmco-cg N/A 11/16/2018    Performed by [facility] at [facility] OR 2ND FLR    BREAST BIOPSY      BREAST SURGERY  2001    right biopsy, benign    CHONDROPLASTY,KNEE Medial Femoral Right 7/12/2018    Performed by Huey Kiran MD at [facility] OR    COLONOSCOPY      COLONOSCOPY N/A 12/5/2014    Performed by Sixto Barrera MD at [facility] ENDO    dental implant      FRJXXZZZU-OCYL-W-CATH Right 12/14/2018    Performed by Jurgen Toro MD at [facility] OR    KNEE ARTHROSCOPY Right     LIVER BIOPSY  11/16/2018    THYROIDECTOMY  2011    complete       Social History: Patient lives with daughter.    Prior Intubation HX:  none    Modified Barium Swallow: none    Imaging:   MRA Brain without contrast   Final Result      No aneurysm or hemodynamically significant stenosis of the intracranial arterial vasculature.         Electronically signed by: Rhys Valdez   Date:    02/26/2019   Time:    10:54      MRI Brain W WO Contrast   Final Result      No acute intracranial abnormality.  Minimal white matter disease which could reflect microvascular ischemic change or remote areas of demyelination.         Electronically signed by: Rhys Valdez   Date:    02/26/2019   Time:    10:50     "  X-Ray Chest AP Portable   Final Result      No acute process.  Right Port-A-Cath.         Electronically signed by: Mack Gonzalez MD   Date:    02/25/2019   Time:    15:49      CT Head Without Contrast   Final Result      1.  There is no acute intracranial abnormality.  There is no hemorrhage, mass/mass effect, acute edema or ischemia.         Electronically signed by: Jordan Foss MD   Date:    02/25/2019   Time:    15:48      US Carotid Bilateral    (Results Pending)   MRI Brain Without Contrast    (Results Pending)        Prior diet: Regular/thin.    Occupation/hobbies/homemaking: Independent. Works two jobs.     Subjective   "I don't have my thyroid glands anymore."    Objective:   Pt seen for clinical swallow evaluation. AAOx4. Speech clear and intelligible. No obvious cognitive-linguistic deficits noted during interview. Pt reports  mental status at baseline.     Oral Musculature Evaluation  · Oral Musculature: WFL  · Dentition: present and adequate  · Secretion Management: adequate  · Mucosal Quality: good  · Mandibular Strength and Mobility: WFL  · Oral Labial Strength and Mobility: WFL  · Lingual Strength and Mobility: WFL  · Velar Elevation: WFL  · Buccal Strength and Mobility: WFL  · Volitional Cough: good  · Volitional Swallow: able to palpate laryngeal movment  · Voice Prior to PO Intake: clear    Bedside Swallow Eval:   Consistencies Assessed:  · Thin liquids via cup, straw  · Puree applesauce  · Mixed consistencies diced peaches  · Solids cookie     Oral Phase:   · WFL    Pharyngeal Phase:   · no overt clinical signs/symptoms of aspiration    Compensatory Strategies  · None    Treatment: Pt/family educated re: results/recs of evaluation, SLP role. Receptive to information provided.     Assessment:   Clinical swallowing evaluation completed. All po trials tolerated with no overt s/s swallow dysfunction. REC Regular textures with thin liquids. Room temperature or hot liquids only; no cold " liquids per pt request.  Mental status at baseline per pt. No f/u indicated ATT. Please re consult PRN    Plan:     · Patient to be seen:      · Plan of Care expires:     · Plan of Care reviewed with:      · SLP Follow-Up:  No       Discharge recommendations:  (home)   Barriers to Discharge:  None    Time Tracking:     SLP Treatment Date:   02/26/19  Speech Start Time:  1046  Speech Stop Time:  1054     Speech Total Time (min):  8 min    Billable Minutes: Eval Swallow and Oral Function 8 and Total Time 8    Rosalind Persadu CCC-SLP  02/26/2019

## 2019-02-26 NOTE — TELEPHONE ENCOUNTER
----- Message from Thiago Mcdonald sent at 2/26/2019  2:44 PM CST -----  Contact: Mervat  Type: Needs Medical Advice    Who Called:  Mervat with [facility]  Symptoms (please be specific):    How long has patient had these symptoms:    Pharmacy name and phone #:   Best Call Back Number: [phone]  Additional Information: requesting a call back to verify if patient is okay to be discharge from the hospital?

## 2019-02-26 NOTE — PLAN OF CARE
02/26/19 1609   Final Note   Assessment Type Final Discharge Note   Anticipated Discharge Disposition Home

## 2019-02-26 NOTE — PLAN OF CARE
Problem: SLP Goal  Goal: SLP Goal    Clinical swallowing evaluation completed. All po trials tolerated with no overt s/s swallow dysfunction. REC Regualr textures with thin liquids. Room temperature or hot liquids only; no cold liquids per pt request.  Mental status at baseline per pt. No f/u indicated ATT. Please reconsult PRN.     Rosalind Persaud MS, CCC/SLP

## 2019-02-27 ENCOUNTER — TELEPHONE (OUTPATIENT)
Dept: HEMATOLOGY/ONCOLOGY | Facility: CLINIC | Age: 54
End: 2019-02-27

## 2019-02-27 VITALS
HEART RATE: 71 BPM | WEIGHT: 159 LBS | SYSTOLIC BLOOD PRESSURE: 110 MMHG | HEIGHT: 58 IN | BODY MASS INDEX: 33.37 KG/M2 | RESPIRATION RATE: 16 BRPM | DIASTOLIC BLOOD PRESSURE: 62 MMHG | TEMPERATURE: 96 F | OXYGEN SATURATION: 99 %

## 2019-02-27 LAB
ALBUMIN SERPL BCP-MCNC: 2.4 G/DL
ALP SERPL-CCNC: 74 U/L
ALT SERPL W/O P-5'-P-CCNC: 34 U/L
ANION GAP SERPL CALC-SCNC: 8 MMOL/L
ANISOCYTOSIS BLD QL SMEAR: ABNORMAL
AORTIC ROOT ANNULUS: 2.98 CM
AORTIC VALVE CUSP SEPERATION: 1.98 CM
AST SERPL-CCNC: 59 U/L
AV INDEX (PROSTH): 0.79
AV MEAN GRADIENT: 4.85 MMHG
AV PEAK GRADIENT: 6.97 MMHG
AV VALVE AREA: 2.43 CM2
AV VELOCITY RATIO: 0.8
BASOPHILS # BLD AUTO: 0 K/UL
BASOPHILS NFR BLD: 0.7 %
BILIRUB SERPL-MCNC: 0.3 MG/DL
BSA FOR ECHO PROCEDURE: 1.72 M2
BUN SERPL-MCNC: 12 MG/DL
CALCIUM SERPL-MCNC: 8.2 MG/DL
CHLORIDE SERPL-SCNC: 110 MMOL/L
CO2 SERPL-SCNC: 24 MMOL/L
CREAT SERPL-MCNC: 0.6 MG/DL
CV ECHO LV RWT: 0.27 CM
DIFFERENTIAL METHOD: ABNORMAL
DOP CALC AO PEAK VEL: 1.32 M/S
DOP CALC AO VTI: 29.5 CM
DOP CALC LVOT AREA: 3.08 CM2
DOP CALC LVOT DIAMETER: 1.98 CM
DOP CALC LVOT PEAK VEL: 1.06 M/S
DOP CALC LVOT STROKE VOLUME: 71.77 CM3
DOP CALCLVOT PEAK VEL VTI: 23.32 CM
E WAVE DECELERATION TIME: 167.52 MSEC
E/A RATIO: 1.34
E/E' RATIO: 7.5
ECHO LV POSTERIOR WALL: 0.57 CM (ref 0.6–1.1)
EOSINOPHIL # BLD AUTO: 0 K/UL
EOSINOPHIL NFR BLD: 0.5 %
ERYTHROCYTE [DISTWIDTH] IN BLOOD BY AUTOMATED COUNT: 26.7 %
EST. GFR  (AFRICAN AMERICAN): >60 ML/MIN/1.73 M^2
EST. GFR  (NON AFRICAN AMERICAN): >60 ML/MIN/1.73 M^2
FRACTIONAL SHORTENING: 32 % (ref 28–44)
GLUCOSE SERPL-MCNC: 81 MG/DL
HCT VFR BLD AUTO: 30.3 %
HGB BLD-MCNC: 9.7 G/DL
INTERVENTRICULAR SEPTUM: 0.82 CM (ref 0.6–1.1)
IVRT: 0.12 MSEC
LEFT ATRIUM SIZE: 3.51 CM
LEFT INTERNAL DIMENSION IN SYSTOLE: 2.92 CM (ref 2.1–4)
LEFT VENTRICLE DIASTOLIC VOLUME INDEX: 49.97 ML/M2
LEFT VENTRICLE DIASTOLIC VOLUME: 82.55 ML
LEFT VENTRICLE MASS INDEX: 52.9 G/M2
LEFT VENTRICLE SYSTOLIC VOLUME INDEX: 19.8 ML/M2
LEFT VENTRICLE SYSTOLIC VOLUME: 32.7 ML
LEFT VENTRICULAR INTERNAL DIMENSION IN DIASTOLE: 4.29 CM (ref 3.5–6)
LEFT VENTRICULAR MASS: 87.37 G
LV LATERAL E/E' RATIO: 7.5
LV SEPTAL E/E' RATIO: 7.5
LYMPHOCYTES # BLD AUTO: 1.5 K/UL
LYMPHOCYTES NFR BLD: 48.8 %
MCH RBC QN AUTO: 27.5 PG
MCHC RBC AUTO-ENTMCNC: 32.1 G/DL
MCV RBC AUTO: 86 FL
MONOCYTES # BLD AUTO: 0.2 K/UL
MONOCYTES NFR BLD: 7.2 %
MV PEAK A VEL: 0.56 M/S
MV PEAK E VEL: 0.75 M/S
NEUTROPHILS # BLD AUTO: 1.3 K/UL
NEUTROPHILS NFR BLD: 42.8 %
OVALOCYTES BLD QL SMEAR: ABNORMAL
PISA TR MAX VEL: 2.4 M/S
PLATELET # BLD AUTO: 133 K/UL
PLATELET BLD QL SMEAR: ABNORMAL
PMV BLD AUTO: 8.1 FL
POIKILOCYTOSIS BLD QL SMEAR: SLIGHT
POLYCHROMASIA BLD QL SMEAR: ABNORMAL
POTASSIUM SERPL-SCNC: 3.3 MMOL/L
PROT SERPL-MCNC: 4.8 G/DL
PULM VEIN A" WAVE DURATION": 69 MSEC
PV PEAK VELOCITY: 0.76 CM/S
RA PRESSURE: 3 MMHG
RBC # BLD AUTO: 3.54 M/UL
RIGHT VENTRICULAR END-DIASTOLIC DIMENSION: 2.81 CM
SODIUM SERPL-SCNC: 142 MMOL/L
TDI LATERAL: 0.1
TDI SEPTAL: 0.1
TDI: 0.1
TR MAX PG: 23.04 MMHG
TRICUSPID ANNULAR PLANE SYSTOLIC EXCURSION: 2.73 CM
TV REST PULMONARY ARTERY PRESSURE: 26 MMHG
WBC # BLD AUTO: 3 K/UL

## 2019-02-27 PROCEDURE — 94760 N-INVAS EAR/PLS OXIMETRY 1: CPT

## 2019-02-27 PROCEDURE — 80053 COMPREHEN METABOLIC PANEL: CPT

## 2019-02-27 PROCEDURE — 95816 EEG AWAKE AND DROWSY: CPT

## 2019-02-27 PROCEDURE — 85025 COMPLETE CBC W/AUTO DIFF WBC: CPT

## 2019-02-27 PROCEDURE — G0378 HOSPITAL OBSERVATION PER HR: HCPCS

## 2019-02-27 PROCEDURE — 36415 COLL VENOUS BLD VENIPUNCTURE: CPT

## 2019-02-27 PROCEDURE — 25000003 PHARM REV CODE 250: Performed by: INTERNAL MEDICINE

## 2019-02-27 RX ADMIN — VENLAFAXINE HYDROCHLORIDE 37.5 MG: 37.5 CAPSULE, EXTENDED RELEASE ORAL at 06:02

## 2019-02-27 RX ADMIN — APIXABAN 5 MG: 2.5 TABLET, FILM COATED ORAL at 08:02

## 2019-02-27 RX ADMIN — PANTOPRAZOLE SODIUM 40 MG: 40 TABLET, DELAYED RELEASE ORAL at 08:02

## 2019-02-27 RX ADMIN — LEVOTHYROXINE SODIUM 150 MCG: 150 TABLET ORAL at 06:02

## 2019-02-27 NOTE — TELEPHONE ENCOUNTER
----- Message from Genevieve Alarcon MD sent at 2/27/2019  9:24 AM CST -----  She is getting dischrged . Get me f/u with her in clinic

## 2019-02-27 NOTE — PLAN OF CARE
Problem: Adult Inpatient Plan of Care  Goal: Plan of Care Review  Outcome: Ongoing (interventions implemented as appropriate)   02/27/19 2637   Plan of Care Review   Plan of Care Reviewed With patient   Pt alert and oriented. No distress noted. VSS.Denies pain, sob. Bed alarm on. Room air.Pe day shift nurse ok per  for pt to continue chemo therapy. SR on tele, HR 65. Up to BR without difficulty. Tolerated well. Free of fall or injury. Side rails up x2. Call light in reach. Will continue to monitor.

## 2019-02-28 NOTE — PLAN OF CARE
02/28/19 0814   Final Note   Assessment Type Final Discharge Note   Anticipated Discharge Disposition Home

## 2019-03-01 ENCOUNTER — HOSPITAL ENCOUNTER (OUTPATIENT)
Dept: RADIOLOGY | Facility: HOSPITAL | Age: 54
Discharge: HOME | End: 2019-03-01
Attending: INTERNAL MEDICINE
Payer: COMMERCIAL

## 2019-03-01 DIAGNOSIS — C23 CARCINOMA OF GALL BLADDER: ICD-10-CM

## 2019-03-01 PROCEDURE — 74177 CT ABD & PELVIS W/CONTRAST: CPT | Mod: TC

## 2019-03-01 PROCEDURE — 74177 CT ABDOMEN PELVIS WITH CONTRAST: ICD-10-PCS | Mod: 26,,, | Performed by: RADIOLOGY

## 2019-03-01 PROCEDURE — 25500020 PHARM REV CODE 255: Performed by: INTERNAL MEDICINE

## 2019-03-01 PROCEDURE — 74177 CT ABD & PELVIS W/CONTRAST: CPT | Mod: 26,,, | Performed by: RADIOLOGY

## 2019-03-01 RX ADMIN — IOHEXOL 75 ML: 350 INJECTION, SOLUTION INTRAVENOUS at 08:03

## 2019-03-01 RX ADMIN — IOHEXOL 30 ML: 350 INJECTION, SOLUTION INTRAVENOUS at 08:03

## 2019-03-07 ENCOUNTER — LAB VISIT (OUTPATIENT)
Dept: LAB | Facility: HOSPITAL | Age: 54
End: 2019-03-07
Attending: INTERNAL MEDICINE
Payer: COMMERCIAL

## 2019-03-07 DIAGNOSIS — K82.9 GALL BLADDER DISEASE: ICD-10-CM

## 2019-03-07 DIAGNOSIS — C23 CARCINOMA GALLBLADDER: ICD-10-CM

## 2019-03-07 DIAGNOSIS — C23 CARCINOMA OF GALL BLADDER: ICD-10-CM

## 2019-03-07 LAB
ALBUMIN SERPL BCP-MCNC: 3.1 G/DL
ALP SERPL-CCNC: 105 U/L
ALT SERPL W/O P-5'-P-CCNC: 31 U/L
ANION GAP SERPL CALC-SCNC: 11 MMOL/L
ANISOCYTOSIS BLD QL SMEAR: ABNORMAL
AST SERPL-CCNC: 40 U/L
BASOPHILS # BLD AUTO: 0 K/UL
BASOPHILS NFR BLD: 0.6 %
BILIRUB SERPL-MCNC: 0.4 MG/DL
BUN SERPL-MCNC: 17 MG/DL
CALCIUM SERPL-MCNC: 9.2 MG/DL
CANCER AG19-9 SERPL-ACNC: 445 U/ML
CHLORIDE SERPL-SCNC: 106 MMOL/L
CO2 SERPL-SCNC: 23 MMOL/L
CREAT SERPL-MCNC: 0.8 MG/DL
DIFFERENTIAL METHOD: ABNORMAL
EOSINOPHIL # BLD AUTO: 0 K/UL
EOSINOPHIL NFR BLD: 0.6 %
ERYTHROCYTE [DISTWIDTH] IN BLOOD BY AUTOMATED COUNT: 26.1 %
EST. GFR  (AFRICAN AMERICAN): >60 ML/MIN/1.73 M^2
EST. GFR  (NON AFRICAN AMERICAN): >60 ML/MIN/1.73 M^2
GLUCOSE SERPL-MCNC: 96 MG/DL
HCT VFR BLD AUTO: 39.4 %
HGB BLD-MCNC: 12.7 G/DL
HYPOCHROMIA BLD QL SMEAR: ABNORMAL
LYMPHOCYTES # BLD AUTO: 2.1 K/UL
LYMPHOCYTES NFR BLD: 45 %
MCH RBC QN AUTO: 27.6 PG
MCHC RBC AUTO-ENTMCNC: 32.2 G/DL
MCV RBC AUTO: 86 FL
MONOCYTES # BLD AUTO: 0.3 K/UL
MONOCYTES NFR BLD: 7 %
NEUTROPHILS # BLD AUTO: 2.2 K/UL
NEUTROPHILS NFR BLD: 46.8 %
PLATELET # BLD AUTO: 145 K/UL
PLATELET BLD QL SMEAR: ABNORMAL
PMV BLD AUTO: 7.6 FL
POIKILOCYTOSIS BLD QL SMEAR: SLIGHT
POTASSIUM SERPL-SCNC: 3.1 MMOL/L
PROT SERPL-MCNC: 6.2 G/DL
RBC # BLD AUTO: 4.6 M/UL
SODIUM SERPL-SCNC: 140 MMOL/L
WBC # BLD AUTO: 4.7 K/UL

## 2019-03-07 PROCEDURE — 36415 COLL VENOUS BLD VENIPUNCTURE: CPT

## 2019-03-07 PROCEDURE — 86301 IMMUNOASSAY TUMOR CA 19-9: CPT

## 2019-03-07 PROCEDURE — 85025 COMPLETE CBC W/AUTO DIFF WBC: CPT

## 2019-03-07 PROCEDURE — 80053 COMPREHEN METABOLIC PANEL: CPT

## 2019-03-08 ENCOUNTER — OFFICE VISIT (OUTPATIENT)
Dept: HEMATOLOGY/ONCOLOGY | Facility: CLINIC | Age: 54
End: 2019-03-08
Payer: COMMERCIAL

## 2019-03-08 VITALS
TEMPERATURE: 98 F | RESPIRATION RATE: 20 BRPM | BODY MASS INDEX: 31.05 KG/M2 | SYSTOLIC BLOOD PRESSURE: 142 MMHG | OXYGEN SATURATION: 100 % | HEIGHT: 58 IN | WEIGHT: 147.94 LBS | HEART RATE: 99 BPM | DIASTOLIC BLOOD PRESSURE: 83 MMHG

## 2019-03-08 DIAGNOSIS — Z09 CHEMOTHERAPY FOLLOW-UP EXAMINATION: ICD-10-CM

## 2019-03-08 DIAGNOSIS — C22.0 HEPATOCELLULAR CARCINOMA: ICD-10-CM

## 2019-03-08 DIAGNOSIS — R47.81 SLURRED SPEECH: Primary | ICD-10-CM

## 2019-03-08 DIAGNOSIS — C23 GALLBLADDER CANCER: ICD-10-CM

## 2019-03-08 DIAGNOSIS — E02 SUBCLINICAL IODINE-DEFICIENCY HYPOTHYROIDISM: ICD-10-CM

## 2019-03-08 LAB
HUMAN PAPILLOMAVIRUS (HPV): NORMAL
PAP SMEAR: NORMAL

## 2019-03-08 PROCEDURE — 3008F BODY MASS INDEX DOCD: CPT | Mod: CPTII,S$GLB,, | Performed by: INTERNAL MEDICINE

## 2019-03-08 PROCEDURE — 99215 PR OFFICE/OUTPT VISIT, EST, LEVL V, 40-54 MIN: ICD-10-PCS | Mod: S$GLB,,, | Performed by: INTERNAL MEDICINE

## 2019-03-08 PROCEDURE — 99999 PR PBB SHADOW E&M-EST. PATIENT-LVL III: CPT | Mod: PBBFAC,,, | Performed by: INTERNAL MEDICINE

## 2019-03-08 PROCEDURE — 99215 OFFICE O/P EST HI 40 MIN: CPT | Mod: S$GLB,,, | Performed by: INTERNAL MEDICINE

## 2019-03-08 PROCEDURE — 3008F PR BODY MASS INDEX (BMI) DOCUMENTED: ICD-10-PCS | Mod: CPTII,S$GLB,, | Performed by: INTERNAL MEDICINE

## 2019-03-08 PROCEDURE — 99999 PR PBB SHADOW E&M-EST. PATIENT-LVL III: ICD-10-PCS | Mod: PBBFAC,,, | Performed by: INTERNAL MEDICINE

## 2019-03-08 NOTE — PROGRESS NOTES
Subjective:       Patient ID: Faith Byers is a 53 y.o. female.  Gall bladder ca , started modified FOLFIRINOX    Patient is here for chemo clearance on cycle 5 day 1 of FOLFIRINOX starting on 03/11/2019      HPI:   FINAL PATHOLOGIC DIAGNOSIS  GALLBLADDER/LIVER MASS, BIOPSY:  -Positive for malignancy, invasive squamous cell carcinoma, doing well pain has subsided also follows with MD Sahu.  Completed cycle 4.  Patient is here for pretreatment evaluation cycle 5.  Pending 03/11/2019    Thaddeus speech negative images evaluation negative Neuro evaluation.  Status return to baseline            Social History     Socioeconomic History    Marital status:      Spouse name: None    Number of children: None    Years of education: None    Highest education level: None   Social Needs    Financial resource strain: None    Food insecurity - worry: None    Food insecurity - inability: None    Transportation needs - medical: None    Transportation needs - non-medical: None   Occupational History    None   Tobacco Use    Smoking status: Never Smoker    Smokeless tobacco: Never Used   Substance and Sexual Activity    Alcohol use: Yes     Comment: rarely    Drug use: No    Sexual activity: Not Currently   Other Topics Concern    None   Social History Narrative    None     Family History   Problem Relation Age of Onset    Cancer Mother         breast and bone     Breast cancer Mother     Hyperlipidemia Father     ADD / ADHD Daughter         autism    Crohn's disease Paternal Uncle     Heart disease Paternal Uncle         heart transplant     Past Surgical History:   Procedure Laterality Date    ARTHROSCOPY, KNEE, WITH Partial lateral MENISCECTOMY Right 7/12/2018    Performed [facility] OR    Rfczfz-armddr-hp N/A 11/16/2018    Performed by [facility] at [facility] OR [facility]    BREAST BIOPSY      BREAST SURGERY  2001    right biopsy, benign     CHONDROPLASTY,KNEE Medial Femoral Right 7/12/2018    [facility] at [facility] OR    COLONOSCOPY      COLONOSCOPY N/A 12/5/2014    Performed by Sixto Barrera MD at [facility] ENDO    dental implant      FMRRAUUVP-NZKG-X-CATH Right 12/14/2018    Performed by [facility]gen [facility]o [facility] at [facility] OR    KNEE ARTHROSCOPY Right     LIVER BIOPSY  11/16/2018    THYROIDECTOMY  2011    complete     Past Medical History:   Diagnosis Date    Arthritis     Cancer     gallbadder/ liver spots/biopsy    Thyroid disease        Current Outpatient Medications:     apixaban (ELIQUIS) 5 mg Tab, Take 1 tablet (5 mg total) by mouth 2 (two) times daily. To Start after 7 days on 10mg Twice daily., Disp: 60 tablet, Rfl: 3    levothyroxine (SYNTHROID) 150 MCG tablet, Take 1 tablet (150 mcg total) by mouth before breakfast., Disp: 30 tablet, Rfl: o    ondansetron (ZOFRAN-ODT) 8 MG TbDL, Take 8 mg by mouth every 12 (twelve) hours as needed (nausea). , Disp: , Rfl:     pantoprazole (PROTONIX) 40 MG tablet, Take 1 tablet (40 mg total) by mouth once daily. (Patient taking differently: Take 20 mg by mouth every morning. ), Disp: 90 tablet, Rfl: 3    prochlorperazine (COMPAZINE) 10 MG tablet, Take 1 tablet (10 mg total) by mouth every 6 (six) hours as needed. (Patient taking differently: Take 10 mg by mouth every 6 (six) hours as needed (nausea). ), Disp: 30 tablet, Rfl: 1    venlafaxine (EFFEXOR-XR) 37.5 MG 24 hr capsule, Take 1 tablet by mouth every morning. , Disp: , Rfl:   Review of patient's allergies indicates:  No Known Allergies      REVIEW OF SYSTEMS:     CONSTITUTIONAL:   Nausea improved eating better weight is increased she is overall much better    BREASTS: There is no swelling around breasts or nipple discharge.    EYES: Denies eye pain, blurred vision, swelling, redness or discharge.      ENT AND MOUTH: Denies runny nose, stuffiness, sinus trouble or sores. Denies    nosebleeds. Denies, hoarseness, change in voice  or swelling in front of the    neck.      CARDIOVASCULAR: Denies chest pain, discomfort or palpitations. Denies neck    swelling or episodes of passing out.      RESPIRATORY: Denies cough, sputum production, blood in sputum, and denies    shortness of breath.      GI: Denies trouble swallowing, indigestion, heartburn, abdominal pain, nausea,    vomiting, diarrhea, has altered bowel habits, no blood in stool, discoloration of    stools, change in nature of stool, bloating, increased abdominal girth.      GENITOURINARY: No discharge. No pelvic pain or lumps. No rash around groin or  lesions. No urinary frequency, hesitation, painful urination or blood in    urine. Denies incontinence. No problems with intercourse.      MUSCULOSKELETAL:  Some amount of knee pain    NEUROLOGICAL: Denies tingling, numbness, altered mentation changes to nerve    function in the face, weakness to one or both of the body. Denies changes to    gait and denies multiple falls or accidents.      PSYCHIATRIC: Denies nervousness, anxiety, hallucinations, depression, suicidal    ideation, trouble sleeping or changes in behavior noticed by family.      The patient denies recent foreign travel or recent exposure to chemicals or    products of concern or infectious diseases.     PHYSICAL EXAM:     Wt Readings from Last 3 Encounters:   03/08/19 67.1 kg (147 lb 14.9 oz)   02/26/19 72.1 kg (159 lb)   02/20/19 67.9 kg (149 lb 11.1 oz)     Temp Readings from Last 3 Encounters:   03/08/19 97.7 °F (36.5 °C)   02/27/19 96.2 °F (35.7 °C) (Oral)   02/20/19 98.1 °F (36.7 °C)     BP Readings from Last 3 Encounters:   03/08/19 (!) 142/83   02/27/19 110/62   02/20/19 (!) 151/65     Pulse Readings from Last 3 Encounters:   03/08/19 99   02/27/19 71   02/20/19 101     GENERAL: Comfortable looking patient. Patient is in no distress.  Awake, alert and oriented to time, person and place.  No anxiety, or agitation.      HEENT: Normal conjunctivae and eyelids. WNL.   PERRLA 3 to 4 mm. No icterus, no pallor, no congestion, and no discharge noted.     NECK:  Supple. Trachea is central.  No crepitus.  No JVD or masses.    RESPIRATORY:  No intercostal retractions.  No dullness to percussion.  Chest is clear to auscultation.  No rales, rhonchi or wheezes.  No crepitus.  Good air entry bilaterally.    CARDIOVASCULAR:  S1 and S2 are normally heard without murmurs or gallops.  All peripheral pulses are present.    ABDOMEN:  Normal abdomen.  No hepatosplenomegaly.  No free fluid.  Bowel sounds are present.  No hernia noted. No masses.  No rebound or tenderness.  No guarding or rigidity.  Umbilicus is midline.    LYMPHATICS:  No axillary, cervical, supraclavicular, submental, or inguinal lymphadenopathy.    SKIN/MUSCULOSKELETAL:  There is no evidence of excoriation marks or ecchmosis.  No rashes.  No cyanosis.  No clubbing.  No joint or skeletal deformities noted.  Normal range of motion.    NEUROLOGIC:  Higher functions are appropriate.  No cranial nerve deficits.  Normal jody.  Normal strength.  Motor and sensory functions are normal.  Deep tendon reflexes are normal.    GENITAL/RECTAL:  Exams are deferred.      Laboratory:     CBC:  Lab Results   Component Value Date    WBC 4.70 03/07/2019    RBC 4.60 03/07/2019    HGB 12.7 03/07/2019    HCT 39.4 03/07/2019    MCV 86 03/07/2019    MCH 27.6 03/07/2019    MCHC 32.2 03/07/2019    RDW 26.1 (H) 03/07/2019     (L) 03/07/2019    MPV 7.6 (L) 03/07/2019    GRAN 2.2 03/07/2019    GRAN 46.8 03/07/2019    LYMPH 2.1 03/07/2019    LYMPH 45.0 03/07/2019    MONO 0.3 03/07/2019    MONO 7.0 03/07/2019    EOS 0.0 03/07/2019    BASO 0.00 03/07/2019    EOSINOPHIL 0.6 03/07/2019    BASOPHIL 0.6 03/07/2019       BMP: BMP  Lab Results   Component Value Date     03/07/2019    K 3.1 (L) 03/07/2019     03/07/2019    CO2 23 03/07/2019    BUN 17 03/07/2019    CREATININE 0.8 03/07/2019    CALCIUM 9.2 03/07/2019    ANIONGAP 11 03/07/2019     ESTGFRAFRICA >60 03/07/2019    EGFRNONAA >60 03/07/2019       LFT:   Lab Results   Component Value Date    ALT 31 03/07/2019    AST 40 03/07/2019    ALKPHOS 105 03/07/2019    BILITOT 0.4 03/07/2019 December 2018 CT chest abdomen pelvis  Impression       Gallbladder mass with invasion of the hepatic parenchyma compatible with the patient's known gallbladder neoplasm, and moderately increased in size.  Possible extension to the hepatic flexure of the colon.  Unchanged mild the biliary dilatation.    Development a suspicious lymph node subjacent to the mass.    3 mm left upper lobe nodule which is nonspecific.  Attention on 6-12 month follow-up recommended.    Unchanged hepatic hypodensities, suggestive for cysts.  Continued attention on follow-up recommended       Impression 1/24/2019       1. Partial response to therapy with decreased size of primary gallbladder/hepatic lesion, unchanged peripheral hepatic hypodensities and unchanged left upper lobe pulmonary nodule.  2. Findings concerning for bilateral pulmonary emboli.  This report was flagged in Epic as abnormal.     Ca19.9 at [facility] 3819, now 1mproving down to 2200    MRA February 25 2019 no aneurysm or hemodynamically significant stenosis of the intracranial arteries vasculature    MRI brain:  Negative    Carotid ultrasound negative      CT head with contrast negative        Assessment/Plan:           [] proceed to cycle 5 of FOLFIRINOX with close monitoring  Review CBC CMP magnesium prior to next infusion    [] Slurred speech admitted for neuro eval, likely chemo related event:  Negative images study:  MRI a MRI ultrasound carotid Doppler.    Neuro status returned to baseline      [] Hypothyroidism continue ongoing medication follow-up with PCP    [] Bilateral pulmonary embolism Eliquis

## 2019-03-12 ENCOUNTER — OFFICE VISIT (OUTPATIENT)
Dept: FAMILY MEDICINE | Facility: CLINIC | Age: 54
End: 2019-03-12
Payer: COMMERCIAL

## 2019-03-12 VITALS
HEART RATE: 78 BPM | WEIGHT: 149 LBS | DIASTOLIC BLOOD PRESSURE: 78 MMHG | BODY MASS INDEX: 31.28 KG/M2 | TEMPERATURE: 98 F | SYSTOLIC BLOOD PRESSURE: 117 MMHG | HEIGHT: 58 IN

## 2019-03-12 DIAGNOSIS — Z79.01 ANTICOAGULANT LONG-TERM USE: ICD-10-CM

## 2019-03-12 DIAGNOSIS — D69.6 THROMBOCYTOPENIA: ICD-10-CM

## 2019-03-12 DIAGNOSIS — G45.9 TIA (TRANSIENT ISCHEMIC ATTACK): ICD-10-CM

## 2019-03-12 DIAGNOSIS — E87.6 HYPOKALEMIA: ICD-10-CM

## 2019-03-12 DIAGNOSIS — C23 MALIGNANT NEOPLASM OF GALLBLADDER: ICD-10-CM

## 2019-03-12 DIAGNOSIS — E44.1 MILD PROTEIN MALNUTRITION: ICD-10-CM

## 2019-03-12 DIAGNOSIS — K21.9 GASTROESOPHAGEAL REFLUX DISEASE WITHOUT ESOPHAGITIS: ICD-10-CM

## 2019-03-12 DIAGNOSIS — I26.99 BILATERAL PULMONARY EMBOLISM: ICD-10-CM

## 2019-03-12 DIAGNOSIS — C22.0 HEPATOCELLULAR CARCINOMA: ICD-10-CM

## 2019-03-12 DIAGNOSIS — Z09 HOSPITAL DISCHARGE FOLLOW-UP: Primary | ICD-10-CM

## 2019-03-12 DIAGNOSIS — E02 SUBCLINICAL IODINE-DEFICIENCY HYPOTHYROIDISM: ICD-10-CM

## 2019-03-12 DIAGNOSIS — F41.1 GAD (GENERALIZED ANXIETY DISORDER): ICD-10-CM

## 2019-03-12 PROBLEM — E66.01 SEVERE OBESITY (BMI 35.0-39.9): Status: RESOLVED | Noted: 2018-08-03 | Resolved: 2019-03-12

## 2019-03-12 PROBLEM — E43 SEVERE MALNUTRITION: Status: RESOLVED | Noted: 2019-01-25 | Resolved: 2019-03-12

## 2019-03-12 PROBLEM — Z85.09 H/O CANCER OF GALL BLADDER: Status: RESOLVED | Noted: 2018-11-16 | Resolved: 2019-03-12

## 2019-03-12 PROCEDURE — 99999 PR PBB SHADOW E&M-EST. PATIENT-LVL IV: ICD-10-PCS | Mod: PBBFAC,,, | Performed by: NURSE PRACTITIONER

## 2019-03-12 PROCEDURE — 99214 PR OFFICE/OUTPT VISIT, EST, LEVL IV, 30-39 MIN: ICD-10-PCS | Mod: S$GLB,,, | Performed by: NURSE PRACTITIONER

## 2019-03-12 PROCEDURE — 3008F BODY MASS INDEX DOCD: CPT | Mod: CPTII,S$GLB,, | Performed by: NURSE PRACTITIONER

## 2019-03-12 PROCEDURE — 99214 OFFICE O/P EST MOD 30 MIN: CPT | Mod: S$GLB,,, | Performed by: NURSE PRACTITIONER

## 2019-03-12 PROCEDURE — 3008F PR BODY MASS INDEX (BMI) DOCUMENTED: ICD-10-PCS | Mod: CPTII,S$GLB,, | Performed by: NURSE PRACTITIONER

## 2019-03-12 PROCEDURE — 99999 PR PBB SHADOW E&M-EST. PATIENT-LVL IV: CPT | Mod: PBBFAC,,, | Performed by: NURSE PRACTITIONER

## 2019-03-12 RX ORDER — LEVOTHYROXINE SODIUM 137 UG/1
137 TABLET ORAL
COMMUNITY
End: 2019-04-22 | Stop reason: ALTCHOICE

## 2019-03-12 RX ORDER — LEVOTHYROXINE SODIUM 125 UG/1
125 TABLET ORAL
COMMUNITY
End: 2019-04-22 | Stop reason: ALTCHOICE

## 2019-03-12 RX ORDER — PANTOPRAZOLE SODIUM 40 MG/1
40 TABLET, DELAYED RELEASE ORAL DAILY
Qty: 90 TABLET | Refills: 3
Start: 2019-03-12 | End: 2019-06-03

## 2019-03-12 NOTE — PATIENT INSTRUCTIONS
Transient Ischemic Attack (TIA)     Your symptoms were caused by a TIA, or mini-stroke. Even though your symptoms have gone away, this condition is as serious as a full stroke. It means you are more likely to have a full stroke. About 1 in 3 people who have a TIA go on to have a full stroke. And 4% to 10% of those people will have the stroke within 2 days.  A TIA is caused when something decreases or blocks blood flow to a part of your brain. A TIA often happens when a blood clot travels to a blood vessel in the brain. The clot reduces or blocks blood flow. This causes the symptoms you had. After a short while, the clot dissolves. Blood flows again, and the symptoms go away. People with hardening of the arteries (atherosclerosis) are at higher risk for a TIA. So are people who have an irregular heartbeat called atrial fibrillation.  A TIA causes symptoms similar to a stroke, but they last less than 24 hours. A full stroke causes symptoms that last more than 24 hours and may be permanent. But even if your symptoms only lasted a short time, the TIA may have damaged your brain tissue. Once you have had a TIA, you are at risk of having a full stroke. You will need tests to look at the blood flow to your brain. The tests can also rule out other causes of your symptoms. The tests may include an ultrasound of the arteries in your neck and an evaluation of your heart. They may also include a CT scan of your brain, an MRI scan of your brain, or both. If your healthcare provider finds problems, he or she will recommend treatment with medicines, procedures, or both.  Your provider may prescribe medicines to reduce your chance of having another TIA and stroke. These may include medicines that prevent blood clots, such as antiplatelet medicines and blood thinners (anticoagulants). Your doctor may recommend other treatment. This may include a procedure to open up a blocked artery in your neck.  Home care  The following  guidelines will help you take care of yourself at home:  · Take any medicines your doctor has prescribed as directed. These may include antiplatelet medicines or medicines for other conditions, such as high blood pressure or high cholesterol.  · A TIA is a serious event that puts you at risk of having a full stroke. Because of this, it is important to take steps to help prevent a stroke from happening. Your doctor will look at all of your risk factors when deciding on what other treatment you may need.  Ways to reduce your risk for stroke  High blood pressure, diabetes, high cholesterol, heavy drinking, and smoking are risk factors for stroke and heart disease. You can control these by taking medicines and making diet and lifestyle changes. One way to help prevent a stroke is to take aspirin or a similar medicine every day. But don't take daily aspirin unless your healthcare provider tells you to.  Your provider will work with you to make lifestyle changes to help prevent a stroke.  Diet  Your healthcare provider will give you information about changes you may need to make to your diet. You may need to see a registered dietitian for help with diet changes. Changes may include:  · Eating less fat and cholesterol  · Eating less salt (sodium). This is especially important if you have high blood pressure.  · Eating more fresh fruits and vegetables  · Eating lean proteins, such as fish, poultry, beans, and peas  · Eating less red meat and processed meats  · Using low-fat dairy products  · Using vegetable and nut oils in limited amounts  · Limiting how many sweets and processed foods such as chips, cookies, and baked goods you eat  · Limiting how much alcohol you drink  Physical activity  Your healthcare provider may recommend that you get more exercise if you have not been as active as possible. He or she may suggest that you get 40 minutes of moderate to vigorous physical activity each day. You should do this at least 3  to 4 days a week. A few examples of moderate to vigorous exercise are:  · Walking at a brisk pace, about 3 to 4 miles per hour  · Jogging or running  · Swimming or water aerobics  · Hiking  · Dancing  · Martial arts  · Tennis  · Riding a bike  Other ways to reduce your risk  · Weight management. If you are overweight or obese, your healthcare provider will work with you to lose weight and lower your body mass index (BMI) to a normal or near-normal level. Making diet changes and increasing physical activity can help.  · Smoking. If you smoke, break the habit. Enroll in a stop smoking program to improve your chances of success.  · Stress. Learn how to manage your stress. This will help you deal with stress at home and at work.  Follow-up care  Call your doctor for an appointment in the next few days for another evaluation, or as advised. This is to make a plan for preventing another TIA or stroke. You may need to see a neurologist to follow up on your TIA. A neurologist is a doctor who specializes in treating brain and nervous system problems. You may need other tests or procedures.  If you had an X-ray, CT scan, MRI scan, or ECG (electrocardiogram), a specialist will review it. Youll be told of any new findings that will affect your care.  Call 911  Call 911 if any of these occur:  · Any of your TIA symptoms return  · New problems with speech, vision, walking, or weakness or numbness of the face or on one side of the body  · Severe headache, fainting spell, dizziness, or seizure  Date Last Reviewed: 10/1/2016  © 0226-2091 [employer]. [address] [place] [address], [place] [address]. All rights reserved. This information is not intended as a substitute for professional medical care. Always follow your healthcare professional's instructions.        What is a TIA?  A TIA (Transient Ischemic Attack) is an early warning that a stroke (also called a brain attack) is coming. A TIA is a temporary stroke. It causes  no lasting damage. But the effects of a stroke, if it happens, can be very serious and lasting. If you think you are having symptoms of a TIA or stroke--even if they dont last--get medical help right away.     If you have any symptoms of a TIA or stroke, call 911 and your doctor as soon as possible.     Symptoms of TIA and stroke  Symptoms may come on suddenly and last for a few seconds or a few hours. You may have symptoms only once. Or they may come and go for days. If you notice any of the following symptoms, dont wait. Call 911 or emergency services right away.  · Weakness, numbness, tingling, or loss of feeling in your face, arm, or leg.  · Trouble seeing in one or both eyes; double vision.  · Slurred speech, trouble talking, or problems understanding others when they speak  · Sudden, severe headache  · Dizziness or a feeling of spinning  · Loss of balance or falling  · Blackouts  Date Last Reviewed: 6/8/2015  © 5422-8876 [employer]. [address] [place] [address], [place] [address]. All rights reserved. This information is not intended as a substitute for professional medical care. Always follow your healthcare professional's instructions.

## 2019-03-12 NOTE — PROGRESS NOTES
Subjective:       Patient ID: Faith Byers is a 53 y.o. female.    Chief Complaint: Hospital Follow Up    Chief Complaint  Chief Complaint   Patient presents with    Hospital Follow Up       HPI  Faith Byers is a 53 y.o. female with medical diagnoses as listed in the medical history and problem list that presents for hospital follow-up appointment from admission to [facility] on 2/25/2019 with discharge on 2/26/2019.  Patient presented to the emergency room with complaints of slurring of speech with no other neurological deficits and was back to baseline by the time she arrived at the emergency room.  Patient is currently being treated for gallbladder cancer by Oncology Dr. Alarcon and is receiving chemotherapy and had just had her 4th round of chemo when she experienced the slurring of speech.  She had also had a similar episode of slurring of speech with her 1st round of chemo but that medication was discontinued and this fourth treatment was with a different chemotherapy drug.  She also states that the first time she had the slurring of the speech it lasted for almost 24 hr and required treatment with steroids before resolving.  Where as this episode of slurred speech lasted only about 10 or 15 min and resolved without treatment.  Up while she was in the hospital the patient had a chest x-ray that was negative she had a CT scan of the head that showed no hemorrhage, mass/mass effect, acute edema, or ischemia.  She also had an MRA and an MRI of the brain and both of them were also normal.  The bilateral carotid ultrasound was done and showed no evidence of hemodynamically significant carotid stenosis.  An echocardiogram was done and it was also normal.  The patient was discharged home with a diagnosis of TIA.  In the hospital patient's TSH was checked it was 17.532 and she was discharged on an increased dose of her levothyroxine but she has not increased the dose because she is  waiting for follow up with Dr. Lyons for her thyroid and is currently taking levothyroxine 137 mcg 3 days weekly and 125 mcg 4 days a week.  There were no other changes made to her medication.  Patient is also regularly treated for bilateral pulmonary emboli and bilateral lower leg DVT and is taking Eliquis 5 mg twice daily, and is treated for generalized anxiety disorder.  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  Blood pressure today is 117/78.  BMI is 31.14 and the patient's weight is unchanged at 149 lb since her last visit.  Established patient with last clinic appointment 2/4/2019.        PAST MEDICAL HISTORY:  Past Medical History:   Diagnosis Date    Arthritis     Cancer     gallbadder/ liver spots/biopsy    Thyroid disease        PAST SURGICAL HISTORY:  Past Surgical History:   Procedure Laterality Date    ARTHROSCOPY, KNEE, WITH Partial lateral MENISCECTOMY Right 7/12/2018    [facility]formed [facility] OR    Ginggj-sffcph-vt N/A 11/16/2018    Performed by [facility]    BREAST BIOPSY      BREAST SURGERY  2001    right biopsy, benign    CHONDROPLASTY,KNEE Medial Femoral Right 7/12/2018    Performed by [facility] at [facility] OR    COLONOSCOPY      COLONOSCOPY N/A 12/5/2014    Performed by Sixto Barrera [facility] at [facility] ENDO    dental implant      EAFZXSEEF-IEVT-M-CATH Right 12/14/2018    Performed by Jurgen [facility]o [facility] at [facility] OR    KNEE ARTHROSCOPY Right     LIVER BIOPSY  11/16/2018    THYROIDECTOMY  2011    complete       SOCIAL HISTORY:  Social History     Socioeconomic History    Marital status:      Spouse name: Not on file    Number of children: Not on file    Years of education: Not on file    Highest education level: Not on file   Social Needs    Financial resource strain: Not on file    Food insecurity - worry: Not on file     Food insecurity - inability: Not on file    Transportation needs - medical: Not on file    Transportation needs - non-medical: Not on file   Occupational History    Not on file   Tobacco Use    Smoking status: Never Smoker    Smokeless tobacco: Never Used   Substance and Sexual Activity    Alcohol use: Yes     Comment: rarely    Drug use: No    Sexual activity: Not Currently   Other Topics Concern    Not on file   Social History Narrative    Not on file       FAMILY HISTORY:  Family History   Problem Relation Age of Onset    Cancer Mother         breast and bone     Breast cancer Mother     Hyperlipidemia Father     ADD / ADHD Daughter         autism    Crohn's disease Paternal Uncle     Heart disease Paternal Uncle         heart transplant       ALLERGIES AND MEDICATIONS: updated and reviewed.  Review of patient's allergies indicates:   Allergen Reactions    Leucovorin analogues      Current Outpatient Medications   Medication Sig Dispense Refill    apixaban (ELIQUIS) 5 mg Tab Take 1 tablet (5 mg total) by mouth 2 (two) times daily. To Start after 7 days on 10mg Twice daily. 60 tablet 3    levothyroxine (SYNTHROID) 125 MCG tablet Take 125 mcg by mouth every Tuesday, Thursday, Saturday, Sunday.      levothyroxine (SYNTHROID) 137 MCG Tab tablet Take 137 mcg by mouth 3 (three) times a week. Monday, Weds, Friday      pantoprazole (PROTONIX) 40 MG tablet Take 1 tablet (40 mg total) by mouth once daily. 90 tablet 3    prochlorperazine (COMPAZINE) 10 MG tablet Take 1 tablet (10 mg total) by mouth every 6 (six) hours as needed. (Patient taking differently: Take 10 mg by mouth every 6 (six) hours as needed (nausea). ) 30 tablet 1    venlafaxine (EFFEXOR-XR) 37.5 MG 24 hr capsule Take 1 tablet by mouth every morning.       ondansetron (ZOFRAN-ODT) 8 MG TbDL Take 8 mg by mouth every 12 (twelve) hours as needed (nausea).        No current facility-administered medications for this visit.        I  "have reviewed the patient's medical history in detail and updated the computerized patient record.    Review of Systems   Constitutional: Negative for activity change, appetite change, chills, fatigue and fever.        Patient continues to work. Appetite is OK, gets nausea in morning with chemo.    HENT: Positive for rhinorrhea and sore throat. Negative for congestion, ear pain, sinus pressure and sinus pain.         Clear runny nose, slight sore throat, took zyrtec today for allergies.   Eyes: Negative for visual disturbance.        Vision corrected with glasses   Respiratory: Negative for cough and shortness of breath.    Cardiovascular: Negative for chest pain, palpitations and leg swelling.   Gastrointestinal: Positive for diarrhea and nausea. Negative for abdominal pain, constipation and vomiting.        Diarrhea and nausea with chemo.    Genitourinary: Negative for difficulty urinating and dysuria.   Musculoskeletal: Negative for arthralgias and myalgias.   Skin: Positive for color change. Negative for rash and wound.        One bruise to left antecubital area from blood draw.   Neurological: Negative for dizziness, speech difficulty, weakness, light-headedness, numbness and headaches.   Hematological: Does not bruise/bleed easily.        Not bruising with eliquis.    Psychiatric/Behavioral: Negative for dysphoric mood and sleep disturbance. The patient is not nervous/anxious.          Objective:      Vitals:    03/12/19 1251   BP: 117/78   BP Location: Right arm   Patient Position: Sitting   BP Method: Large (Automatic)   Pulse: 78   Temp: 97.9 °F (36.6 °C)   TempSrc: Oral   Weight: 67.6 kg (149 lb)   Height: 4' 10" (1.473 m)     Physical Exam   Constitutional: She is oriented to person, place, and time. Vital signs are normal. She appears well-developed. No distress.   Blood pressure 117/78   HENT:   Head: Normocephalic and atraumatic.   Right Ear: Hearing, tympanic membrane, external ear and ear canal " normal.   Left Ear: Hearing, tympanic membrane, external ear and ear canal normal.   Nose: Nose normal. No mucosal edema.   Mouth/Throat: Oropharynx is clear and moist and mucous membranes are normal.   Eyes: Conjunctivae, EOM and lids are normal. Pupils are equal, round, and reactive to light. Right eye exhibits no discharge. Left eye exhibits no discharge. Right conjunctiva is not injected. Left conjunctiva is not injected.   Neck: Normal range of motion. Neck supple. Normal carotid pulses present. Carotid bruit is not present.   Cardiovascular: Normal rate, regular rhythm, S1 normal, S2 normal, normal heart sounds, intact distal pulses and normal pulses.   No murmur heard.  Pulses:       Carotid pulses are 2+ on the right side, and 2+ on the left side.       Radial pulses are 2+ on the right side, and 2+ on the left side.        Posterior tibial pulses are 2+ on the right side, and 2+ on the left side.   No edema   Pulmonary/Chest: Effort normal and breath sounds normal. No respiratory distress. She has no decreased breath sounds. She has no wheezes. She has no rhonchi. She has no rales.   Patient has a right upper chest Port-A-Cath which is accessed with chemotherapy infusing at visit today.  Dressing to site is dry and intact without any redness, drainage, or signs and symptoms of infection noted.   Musculoskeletal: Normal range of motion.   Lymphadenopathy:        Head (right side): No submental, no submandibular, no tonsillar, no preauricular, no posterior auricular and no occipital adenopathy present.        Head (left side): No submental, no submandibular, no tonsillar, no preauricular, no posterior auricular and no occipital adenopathy present.     She has no cervical adenopathy.        Right: No supraclavicular adenopathy present.        Left: No supraclavicular adenopathy present.   Neurological: She is alert and oriented to person, place, and time. She has normal strength. No cranial nerve deficit.    Cranial nerves 2-12 grossly intact   Skin: Skin is warm, dry and intact. She is not diaphoretic. No pallor.   Psychiatric: She has a normal mood and affect. Her speech is normal and behavior is normal. Judgment and thought content normal. Cognition and memory are normal.   Nursing note and vitals reviewed.        Assessment:       1. Hospital discharge follow-up    2. TIA (transient ischemic attack)    3. Malignant neoplasm of gallbladder    4. Hepatocellular carcinoma    5. Thrombocytopenia    6. Subclinical iodine-deficiency hypothyroidism    7. Hypokalemia    8. Bilateral pulmonary embolism    9. Anticoagulant long-term use    10. CARINA (generalized anxiety disorder)    11. Mild protein malnutrition    12. Gastroesophageal reflux disease without esophagitis    13. BMI 31.0-31.9,adult          Plan:       Faith was seen today for hospital follow up.    Diagnoses and all orders for this visit:    Hospital discharge follow-up   Hospital records, discharge summary,  test results, blood work results reviewed, medication list reconciled.  TIA (transient ischemic attack)   Discharge diagnosis included TIA with slurred speech which resolved in 10-15 minutes of onset   Patient had CT scan the head, MRA and MRI of the brain, bilateral carotid artery ultrasound, echocardiogram, and chest x-ray while in hospital all with negative/normal results   Educational handouts provided.  What is a TIA?  Transient Ischemic Attacks   Patient admits that prior to TIA she was not taking her Eliquis as prescribed 2 times daily every day due to forgetting to take the 2nd dose, she has now set an alarm on her phone as a reminder.  Malignant neoplasm of gallbladder   Continue follow-up and treatment with Oncology, Dr. Alarcon  Hepatocellular carcinoma   Continue follow-up and treatment with Oncology, Dr. Alarcon  Thrombocytopenia     Lab Results   Component Value Date    WBC 4.70 03/07/2019    HGB 12.7 03/07/2019    HCT 39.4 03/07/2019    MCV  86 03/07/2019     (L) 03/07/2019    Continue follow-up and treatment with Oncology, Dr. Alarcon    Subclinical iodine-deficiency hypothyroidism     Lab Results   Component Value Date    TSH 17.532 (H) 02/25/2019    patient to continue levothyroxine doses as prescribed by Dr. Lyons, follow-up with her as instructed    Hypokalemia   Potassium level was 3.1 on 3/7/2019   Patient has blood work scheduled with a repeat potassium level on 3/19/2019  Bilateral pulmonary embolism   Continue Eliquis 5 mg 2 times daily as prescribed  Anticoagulant long-term use   Stable on Eliquis 5 mg 2 times daily, denies easy bruising or bleeding  CARINA (generalized anxiety disorder)   Symptoms are controlled with Effexor XR at dose prescribed, continue medication  Mild protein malnutrition   Albumin level was 3.1 on 3/07/2019   Patient reports that she has been eating more protein, diet varies based on what she has an appetite for while receiving chemotherapy  Gastroesophageal reflux disease without esophagitis  -     pantoprazole (PROTONIX) 40 MG tablet; Take 1 tablet (40 mg total) by mouth once daily.  - symptoms adequately controlled with current medication, to continue as is    BMI 31.0-31.9,adult   BMI today is 31.14 with a weight of 149 lb which is unchanged since visit on 2/4/2019   Patient is encouraged to eat a healthy well-balanced diet.  Weight loss is not encouraged at this time due to cancer diagnosis and active chemotherapy.    Follow-up if symptoms worsen or fail to improve, for Keep scheduled appointment with Dr. Moreno 4/22/19.      Patient readiness: acceptance and barriers:none    During the course of the visit the patient was educated and counseled about the following:     Obesity:   Diet interventions: patient advised not to try to lose weight at this time because Of current cancer diagnosis and on going chemotherapy.    Goals: Obesity: Reduce calorie intake and BMI    Did patient meet goals/outcomes:  Yes    The following self management tools provided:  Weight loss not encouraged at this time    Patient Instructions (the written plan) was given to the patient/family.     Time spent with patient: 45 minutes    Barriers to medications present (no )    Adverse reactions to current medications (no)    Over the counter medications reviewed (Yes)

## 2019-03-19 ENCOUNTER — LAB VISIT (OUTPATIENT)
Dept: LAB | Facility: HOSPITAL | Age: 54
End: 2019-03-19
Attending: INTERNAL MEDICINE
Payer: COMMERCIAL

## 2019-03-19 DIAGNOSIS — C23 GALLBLADDER CANCER: ICD-10-CM

## 2019-03-19 LAB
ALBUMIN SERPL BCP-MCNC: 2.8 G/DL
ALP SERPL-CCNC: 92 U/L
ALT SERPL W/O P-5'-P-CCNC: 29 U/L
ANION GAP SERPL CALC-SCNC: 9 MMOL/L
AST SERPL-CCNC: 51 U/L
BASOPHILS # BLD AUTO: 0 K/UL
BASOPHILS NFR BLD: 0.8 %
BILIRUB SERPL-MCNC: 0.3 MG/DL
BUN SERPL-MCNC: 12 MG/DL
CALCIUM SERPL-MCNC: 8.5 MG/DL
CHLORIDE SERPL-SCNC: 107 MMOL/L
CO2 SERPL-SCNC: 26 MMOL/L
CREAT SERPL-MCNC: 0.6 MG/DL
DIFFERENTIAL METHOD: ABNORMAL
EOSINOPHIL # BLD AUTO: 0.1 K/UL
EOSINOPHIL NFR BLD: 1.2 %
ERYTHROCYTE [DISTWIDTH] IN BLOOD BY AUTOMATED COUNT: 24.4 %
EST. GFR  (AFRICAN AMERICAN): >60 ML/MIN/1.73 M^2
EST. GFR  (NON AFRICAN AMERICAN): >60 ML/MIN/1.73 M^2
GLUCOSE SERPL-MCNC: 89 MG/DL
HCT VFR BLD AUTO: 36 %
HGB BLD-MCNC: 11.7 G/DL
LYMPHOCYTES # BLD AUTO: 2.5 K/UL
LYMPHOCYTES NFR BLD: 51.8 %
MAGNESIUM SERPL-MCNC: 1.2 MG/DL
MCH RBC QN AUTO: 28.6 PG
MCHC RBC AUTO-ENTMCNC: 32.5 G/DL
MCV RBC AUTO: 88 FL
MONOCYTES # BLD AUTO: 0.1 K/UL
MONOCYTES NFR BLD: 2.9 %
NEUTROPHILS # BLD AUTO: 2.1 K/UL
NEUTROPHILS NFR BLD: 43.3 %
PLATELET # BLD AUTO: 128 K/UL
PMV BLD AUTO: 7.3 FL
POTASSIUM SERPL-SCNC: 3.3 MMOL/L
PROT SERPL-MCNC: 5.7 G/DL
RBC # BLD AUTO: 4.08 M/UL
SODIUM SERPL-SCNC: 142 MMOL/L
WBC # BLD AUTO: 4.8 K/UL

## 2019-03-19 PROCEDURE — 80053 COMPREHEN METABOLIC PANEL: CPT

## 2019-03-19 PROCEDURE — 85025 COMPLETE CBC W/AUTO DIFF WBC: CPT

## 2019-03-19 PROCEDURE — 83735 ASSAY OF MAGNESIUM: CPT

## 2019-03-19 PROCEDURE — 36415 COLL VENOUS BLD VENIPUNCTURE: CPT

## 2019-03-20 ENCOUNTER — OFFICE VISIT (OUTPATIENT)
Dept: HEMATOLOGY/ONCOLOGY | Facility: CLINIC | Age: 54
End: 2019-03-20
Payer: COMMERCIAL

## 2019-03-20 VITALS
DIASTOLIC BLOOD PRESSURE: 85 MMHG | RESPIRATION RATE: 16 BRPM | HEIGHT: 58 IN | SYSTOLIC BLOOD PRESSURE: 134 MMHG | OXYGEN SATURATION: 97 % | HEART RATE: 100 BPM | BODY MASS INDEX: 31.75 KG/M2 | WEIGHT: 151.25 LBS | TEMPERATURE: 98 F

## 2019-03-20 DIAGNOSIS — C23 MALIGNANT NEOPLASM OF GALLBLADDER: Primary | ICD-10-CM

## 2019-03-20 PROCEDURE — 99999 PR PBB SHADOW E&M-EST. PATIENT-LVL IV: CPT | Mod: PBBFAC,,, | Performed by: INTERNAL MEDICINE

## 2019-03-20 PROCEDURE — 3008F PR BODY MASS INDEX (BMI) DOCUMENTED: ICD-10-PCS | Mod: CPTII,S$GLB,, | Performed by: INTERNAL MEDICINE

## 2019-03-20 PROCEDURE — 99999 PR PBB SHADOW E&M-EST. PATIENT-LVL IV: ICD-10-PCS | Mod: PBBFAC,,, | Performed by: INTERNAL MEDICINE

## 2019-03-20 PROCEDURE — 99215 PR OFFICE/OUTPT VISIT, EST, LEVL V, 40-54 MIN: ICD-10-PCS | Mod: S$GLB,,, | Performed by: INTERNAL MEDICINE

## 2019-03-20 PROCEDURE — 99215 OFFICE O/P EST HI 40 MIN: CPT | Mod: S$GLB,,, | Performed by: INTERNAL MEDICINE

## 2019-03-20 PROCEDURE — 3008F BODY MASS INDEX DOCD: CPT | Mod: CPTII,S$GLB,, | Performed by: INTERNAL MEDICINE

## 2019-03-20 RX ORDER — FLUOROURACIL 50 MG/ML
400 INJECTION, SOLUTION INTRAVENOUS
Status: CANCELLED | OUTPATIENT
Start: 2019-03-25

## 2019-03-20 RX ORDER — HEPARIN 100 UNIT/ML
500 SYRINGE INTRAVENOUS
Status: CANCELLED | OUTPATIENT
Start: 2019-03-27

## 2019-03-20 RX ORDER — EPINEPHRINE 0.3 MG/.3ML
0.3 INJECTION SUBCUTANEOUS ONCE AS NEEDED
Status: CANCELLED | OUTPATIENT
Start: 2019-03-25 | End: 2019-03-25

## 2019-03-20 RX ORDER — DIPHENHYDRAMINE HYDROCHLORIDE 50 MG/ML
50 INJECTION INTRAMUSCULAR; INTRAVENOUS ONCE AS NEEDED
Status: CANCELLED | OUTPATIENT
Start: 2019-03-25 | End: 2019-03-25

## 2019-03-20 RX ORDER — SODIUM CHLORIDE 0.9 % (FLUSH) 0.9 %
10 SYRINGE (ML) INJECTION
Status: CANCELLED | OUTPATIENT
Start: 2019-03-25

## 2019-03-20 RX ORDER — SODIUM CHLORIDE 0.9 % (FLUSH) 0.9 %
10 SYRINGE (ML) INJECTION
Status: CANCELLED | OUTPATIENT
Start: 2019-03-27

## 2019-03-20 RX ORDER — HEPARIN 100 UNIT/ML
500 SYRINGE INTRAVENOUS
Status: CANCELLED | OUTPATIENT
Start: 2019-03-25

## 2019-03-20 RX ORDER — ATROPINE SULFATE 0.4 MG/ML
0.4 INJECTION, SOLUTION ENDOTRACHEAL; INTRAMEDULLARY; INTRAMUSCULAR; INTRAVENOUS; SUBCUTANEOUS ONCE AS NEEDED
Status: CANCELLED | OUTPATIENT
Start: 2019-03-25 | End: 2019-03-25

## 2019-03-20 NOTE — PROGRESS NOTES
Subjective:       Patient ID: Faith Byers is a 53 y.o. female.  Gall bladder ca , started modified FOLFIRINOX  HPI:   FINAL PATHOLOGIC DIAGNOSIS  GALLBLADDER/LIVER MASS, BIOPSY:  -Positive for malignancy, invasive squamous cell carcinoma, doing well pain has subsided also follows with MD Sahu.  Here for cycle 4.  Therapy.  Occasional nausea.  Continues to work.  No other complaints    Social History     Socioeconomic History    Marital status:      Spouse name: None    Number of children: None    Years of education: None    Highest education level: None   Social Needs    Financial resource strain: None    Food insecurity - worry: None    Food insecurity - inability: None    Transportation needs - medical: None    Transportation needs - non-medical: None   Occupational History    None   Tobacco Use    Smoking status: Never Smoker    Smokeless tobacco: Never Used   Substance and Sexual Activity    Alcohol use: Yes     Comment: rarely    Drug use: No    Sexual activity: Not Currently   Other Topics Concern    None   Social History Narrative    None     Family History   Problem Relation Age of Onset    Cancer Mother         breast and bone     Breast cancer Mother     Hyperlipidemia Father     ADD / ADHD Daughter         autism    Crohn's disease Paternal Uncle     Heart disease Paternal Uncle         heart transplant     Past Surgical History:   Procedure Laterality Date    ARTHROSCOPY, KNEE, WITH Partial lateral MENISCECTOMY Right 7/12/2018    Performed [facility]an [facility] at [facility] OR    Ukrvda-ilgfvs-kf N/A 11/16/2018    [facility] 2ND FLR    BREAST BIOPSY      BREAST SURGERY  2001    right biopsy, benign    CHONDROPLASTY,KNEE Medial Femoral Right 7/12/2018    [facility] by [facility] at [facility] OR    COLONOSCOPY      COLONOSCOPY N/A 12/5/2014    Performed by Sixto [facility]rera MD at [facility] ENDO    dental implant       OUAXUMWWD-PFMH-P-CATH Right 12/14/2018    [facility]    KNEE ARTHROSCOPY Right     LIVER BIOPSY  11/16/2018    THYROIDECTOMY  2011    complete     Past Medical History:   Diagnosis Date    Arthritis     Cancer     gallbadder/ liver spots/biopsy    Thyroid disease        Current Outpatient Medications:     apixaban (ELIQUIS) 5 mg Tab, Take 1 tablet (5 mg total) by mouth 2 (two) times daily. To Start after 7 days on 10mg Twice daily., Disp: 60 tablet, Rfl: 3    levothyroxine (SYNTHROID) 125 MCG tablet, Take 125 mcg by mouth every Tuesday, Thursday, Saturday, Sunday., Disp: , Rfl:     levothyroxine (SYNTHROID) 137 MCG Tab tablet, Take 137 mcg by mouth 3 (three) times a week. Monday, Weds, Friday, Disp: , Rfl:     ondansetron (ZOFRAN-ODT) 8 MG TbDL, Take 8 mg by mouth every 12 (twelve) hours as needed (nausea). , Disp: , Rfl:     pantoprazole (PROTONIX) 40 MG tablet, Take 1 tablet (40 mg total) by mouth once daily., Disp: 90 tablet, Rfl: 3    prochlorperazine (COMPAZINE) 10 MG tablet, Take 1 tablet (10 mg total) by mouth every 6 (six) hours as needed. (Patient taking differently: Take 10 mg by mouth every 6 (six) hours as needed (nausea). ), Disp: 30 tablet, Rfl: 1    venlafaxine (EFFEXOR-XR) 37.5 MG 24 hr capsule, Take 1 tablet by mouth every morning. , Disp: , Rfl:   Review of patient's allergies indicates:  No Known Allergies      REVIEW OF SYSTEMS:     CONSTITUTIONAL:   Nausea improved eating better weight is increased she is overall much better    BREASTS: There is no swelling around breasts or nipple discharge.    EYES: Denies eye pain, blurred vision, swelling, redness or discharge.      ENT AND MOUTH: Denies runny nose, stuffiness, sinus trouble or sores. Denies    nosebleeds. Denies, hoarseness, change in voice or swelling in front of the    neck.      CARDIOVASCULAR: Denies chest pain, discomfort or palpitations. Denies neck    swelling or episodes of passing out.       RESPIRATORY: Denies cough, sputum production, blood in sputum, and denies    shortness of breath.      GI: Denies trouble swallowing, indigestion, heartburn, abdominal pain, nausea,    vomiting, diarrhea, has altered bowel habits, no blood in stool, discoloration of    stools, change in nature of stool, bloating, increased abdominal girth.      GENITOURINARY: No discharge. No pelvic pain or lumps. No rash around groin or  lesions. No urinary frequency, hesitation, painful urination or blood in    urine. Denies incontinence. No problems with intercourse.      MUSCULOSKELETAL:  Some amount of knee pain    NEUROLOGICAL: Denies tingling, numbness, altered mentation changes to nerve    function in the face, weakness to one or both of the body. Denies changes to    gait and denies multiple falls or accidents.      PSYCHIATRIC: Denies nervousness, anxiety, hallucinations, depression, suicidal    ideation, trouble sleeping or changes in behavior noticed by family.      The patient denies recent foreign travel or recent exposure to chemicals or    products of concern or infectious diseases.     PHYSICAL EXAM:     Wt Readings from Last 3 Encounters:   03/20/19 68.6 kg (151 lb 3.8 oz)   03/12/19 67.6 kg (149 lb)   03/08/19 67.1 kg (147 lb 14.9 oz)     Temp Readings from Last 3 Encounters:   03/20/19 98.1 °F (36.7 °C)   03/12/19 97.9 °F (36.6 °C) (Oral)   03/08/19 97.7 °F (36.5 °C)     BP Readings from Last 3 Encounters:   03/20/19 134/85   03/12/19 117/78   03/08/19 (!) 142/83     Pulse Readings from Last 3 Encounters:   03/20/19 100   03/12/19 78   03/08/19 99     GENERAL: Comfortable looking patient. Patient is in no distress.  Awake, alert and oriented to time, person and place.  No anxiety, or agitation.      HEENT: Normal conjunctivae and eyelids. WNL.  PERRLA 3 to 4 mm. No icterus, no pallor, no congestion, and no discharge noted.     NECK:  Supple. Trachea is central.  No crepitus.  No JVD or  masses.    RESPIRATORY:  No intercostal retractions.  No dullness to percussion.  Chest is clear to auscultation.  No rales, rhonchi or wheezes.  No crepitus.  Good air entry bilaterally.    CARDIOVASCULAR:  S1 and S2 are normally heard without murmurs or gallops.  All peripheral pulses are present.    ABDOMEN:  Normal abdomen.  No hepatosplenomegaly.  No free fluid.  Bowel sounds are present.  No hernia noted. No masses.  No rebound or tenderness.  No guarding or rigidity.  Umbilicus is midline.    LYMPHATICS:  No axillary, cervical, supraclavicular, submental, or inguinal lymphadenopathy.    SKIN/MUSCULOSKELETAL:  There is no evidence of excoriation marks or ecchmosis.  No rashes.  No cyanosis.  No clubbing.  No joint or skeletal deformities noted.  Normal range of motion.    NEUROLOGIC:  Higher functions are appropriate.  No cranial nerve deficits.  Normal jody.  Normal strength.  Motor and sensory functions are normal.  Deep tendon reflexes are normal.    GENITAL/RECTAL:  Exams are deferred.      Laboratory:     CBC:  Lab Results   Component Value Date    WBC 4.80 03/19/2019    RBC 4.08 03/19/2019    HGB 11.7 (L) 03/19/2019    HCT 36.0 (L) 03/19/2019    MCV 88 03/19/2019    MCH 28.6 03/19/2019    MCHC 32.5 03/19/2019    RDW 24.4 (H) 03/19/2019     (L) 03/19/2019    MPV 7.3 (L) 03/19/2019    GRAN 2.1 03/19/2019    GRAN 43.3 03/19/2019    LYMPH 2.5 03/19/2019    LYMPH 51.8 (H) 03/19/2019    MONO 0.1 (L) 03/19/2019    MONO 2.9 (L) 03/19/2019    EOS 0.1 03/19/2019    BASO 0.00 03/19/2019    EOSINOPHIL 1.2 03/19/2019    BASOPHIL 0.8 03/19/2019       BMP: BMP  Lab Results   Component Value Date     03/19/2019    K 3.3 (L) 03/19/2019     03/19/2019    CO2 26 03/19/2019    BUN 12 03/19/2019    CREATININE 0.6 03/19/2019    CALCIUM 8.5 (L) 03/19/2019    ANIONGAP 9 03/19/2019    ESTGFRAFRICA >60 03/19/2019    EGFRNONAA >60 03/19/2019       LFT:   Lab Results   Component Value Date    ALT 29 03/19/2019     AST 51 (H) 03/19/2019    ALKPHOS 92 03/19/2019    BILITOT 0.3 03/19/2019 December 2018 CT chest abdomen pelvis  Impression       Gallbladder mass with invasion of the hepatic parenchyma compatible with the patient's known gallbladder neoplasm, and moderately increased in size.  Possible extension to the hepatic flexure of the colon.  Unchanged mild the biliary dilatation.    Development a suspicious lymph node subjacent to the mass.    3 mm left upper lobe nodule which is nonspecific.  Attention on 6-12 month follow-up recommended.    Unchanged hepatic hypodensities, suggestive for cysts.  Continued attention on follow-up recommended     Impression 3/2019 CT       Moderate reduction in size of the patient's known gallbladder mass and associated hepatic parenchymal invasion.  Unchanged scattered small hepatic hypodensities may simply represent cysts however continued attention on follow-up recommended.  No evidence for extrahepatic metastatic disease.       Ca19.9 at [facility] 3819, now 1mproving down to 445.0    Assessment/Plan:       Cont with FOLFIRINOX  Pt going to [facility] to see sx also  rtc 2 weeks for next round and to discuss MDA plans

## 2019-03-29 ENCOUNTER — TELEPHONE (OUTPATIENT)
Dept: BARIATRICS | Facility: CLINIC | Age: 54
End: 2019-03-29

## 2019-03-29 ENCOUNTER — HOSPITAL ENCOUNTER (EMERGENCY)
Facility: HOSPITAL | Age: 54
Discharge: HOME | End: 2019-03-29
Attending: EMERGENCY MEDICINE
Payer: COMMERCIAL

## 2019-03-29 ENCOUNTER — PATIENT MESSAGE (OUTPATIENT)
Dept: HEMATOLOGY/ONCOLOGY | Facility: CLINIC | Age: 54
End: 2019-03-29

## 2019-03-29 VITALS
OXYGEN SATURATION: 98 % | HEIGHT: 58 IN | BODY MASS INDEX: 32.32 KG/M2 | TEMPERATURE: 98 F | SYSTOLIC BLOOD PRESSURE: 136 MMHG | DIASTOLIC BLOOD PRESSURE: 86 MMHG | WEIGHT: 154 LBS | HEART RATE: 93 BPM | RESPIRATION RATE: 18 BRPM

## 2019-03-29 DIAGNOSIS — Z45.2 ENCOUNTER FOR CARE RELATED TO VASCULAR ACCESS PORT: Primary | ICD-10-CM

## 2019-03-29 PROCEDURE — 99285 EMERGENCY DEPT VISIT HI MDM: CPT | Mod: 25

## 2019-03-29 PROCEDURE — 25500020 PHARM REV CODE 255: Performed by: EMERGENCY MEDICINE

## 2019-03-29 PROCEDURE — 63600175 PHARM REV CODE 636 W HCPCS: Performed by: RADIOLOGY

## 2019-03-29 PROCEDURE — 96374 THER/PROPH/DIAG INJ IV PUSH: CPT

## 2019-03-29 RX ORDER — HEPARIN 100 UNIT/ML
5 SYRINGE INTRAVENOUS ONCE
Status: COMPLETED | OUTPATIENT
Start: 2019-03-29 | End: 2019-03-29

## 2019-03-29 RX ADMIN — IOHEXOL 3 ML: 350 INJECTION, SOLUTION INTRAVENOUS at 04:03

## 2019-03-29 RX ADMIN — HEPARIN SODIUM (PORCINE) LOCK FLUSH IV SOLN 100 UNIT/ML 500 UNITS: 100 SOLUTION at 04:03

## 2019-03-29 NOTE — TELEPHONE ENCOUNTER
----- Message from Chantel Ledesma sent at 3/29/2019  9:37 AM CDT -----  Contact: [phone]  Patient is requesting a call back from the nurse stated her port was flush and now patient is experiencing pain in port area.  Port was placed by dr stinson whom is retired, she want to know can someone help her in the surgery dept?    Please call the patient upon request at phone number [phone].

## 2019-03-29 NOTE — TELEPHONE ENCOUNTER
Advised i have no surgeons in office today, pt can't do next week, and wouldn't be able to get into office till april 9th. I advised pt if she's in that much pain from port, she should go to ed. pt acknowledged and stated that she was already going to go tonight.

## 2019-04-03 ENCOUNTER — LAB VISIT (OUTPATIENT)
Dept: LAB | Facility: HOSPITAL | Age: 54
End: 2019-04-03
Attending: INTERNAL MEDICINE
Payer: COMMERCIAL

## 2019-04-03 DIAGNOSIS — C23 MALIGNANT NEOPLASM OF GALLBLADDER: ICD-10-CM

## 2019-04-03 LAB
ALBUMIN SERPL BCP-MCNC: 2.5 G/DL (ref 3.5–5.2)
ALP SERPL-CCNC: 98 U/L (ref 55–135)
ALT SERPL W/O P-5'-P-CCNC: 27 U/L (ref 10–44)
ANION GAP SERPL CALC-SCNC: 10 MMOL/L (ref 8–16)
AST SERPL-CCNC: 48 U/L (ref 10–40)
BASOPHILS # BLD AUTO: 0 K/UL (ref 0–0.2)
BASOPHILS NFR BLD: 0.3 % (ref 0–1.9)
BILIRUB SERPL-MCNC: 0.4 MG/DL (ref 0.1–1)
BUN SERPL-MCNC: 10 MG/DL (ref 6–20)
CALCIUM SERPL-MCNC: 8.7 MG/DL (ref 8.7–10.5)
CANCER AG19-9 SERPL-ACNC: 125 U/ML (ref 2–40)
CHLORIDE SERPL-SCNC: 104 MMOL/L (ref 95–110)
CO2 SERPL-SCNC: 27 MMOL/L (ref 23–29)
CREAT SERPL-MCNC: 0.7 MG/DL (ref 0.5–1.4)
DIFFERENTIAL METHOD: ABNORMAL
EOSINOPHIL # BLD AUTO: 0 K/UL (ref 0–0.5)
EOSINOPHIL NFR BLD: 0.5 % (ref 0–8)
ERYTHROCYTE [DISTWIDTH] IN BLOOD BY AUTOMATED COUNT: 22 % (ref 11.5–14.5)
EST. GFR  (AFRICAN AMERICAN): >60 ML/MIN/1.73 M^2
EST. GFR  (NON AFRICAN AMERICAN): >60 ML/MIN/1.73 M^2
GLUCOSE SERPL-MCNC: 89 MG/DL (ref 70–110)
HCT VFR BLD AUTO: 38.5 % (ref 37–48.5)
HGB BLD-MCNC: 12.5 G/DL (ref 12–16)
LYMPHOCYTES # BLD AUTO: 2.7 K/UL (ref 1–4.8)
LYMPHOCYTES NFR BLD: 61.1 % (ref 18–48)
MAGNESIUM SERPL-MCNC: 1.7 MG/DL (ref 1.6–2.6)
MCH RBC QN AUTO: 29.2 PG (ref 27–31)
MCHC RBC AUTO-ENTMCNC: 32.4 G/DL (ref 32–36)
MCV RBC AUTO: 90 FL (ref 82–98)
MONOCYTES # BLD AUTO: 0.2 K/UL (ref 0.3–1)
MONOCYTES NFR BLD: 3.9 % (ref 4–15)
NEUTROPHILS # BLD AUTO: 1.5 K/UL (ref 1.8–7.7)
NEUTROPHILS NFR BLD: 34.2 % (ref 38–73)
PLATELET # BLD AUTO: 175 K/UL (ref 150–350)
PMV BLD AUTO: 7.5 FL (ref 9.2–12.9)
POTASSIUM SERPL-SCNC: 2.8 MMOL/L (ref 3.5–5.1)
PROT SERPL-MCNC: 5.3 G/DL (ref 6–8.4)
RBC # BLD AUTO: 4.27 M/UL (ref 4–5.4)
SODIUM SERPL-SCNC: 141 MMOL/L (ref 136–145)
WBC # BLD AUTO: 4.5 K/UL (ref 3.9–12.7)

## 2019-04-03 PROCEDURE — 86301 IMMUNOASSAY TUMOR CA 19-9: CPT

## 2019-04-03 PROCEDURE — 80053 COMPREHEN METABOLIC PANEL: CPT

## 2019-04-03 PROCEDURE — 85025 COMPLETE CBC W/AUTO DIFF WBC: CPT

## 2019-04-03 PROCEDURE — 36415 COLL VENOUS BLD VENIPUNCTURE: CPT

## 2019-04-03 PROCEDURE — 83735 ASSAY OF MAGNESIUM: CPT

## 2019-04-04 ENCOUNTER — OFFICE VISIT (OUTPATIENT)
Dept: HEMATOLOGY/ONCOLOGY | Facility: CLINIC | Age: 54
End: 2019-04-04
Payer: COMMERCIAL

## 2019-04-04 ENCOUNTER — TELEPHONE (OUTPATIENT)
Dept: HEMATOLOGY/ONCOLOGY | Facility: CLINIC | Age: 54
End: 2019-04-04

## 2019-04-04 VITALS
TEMPERATURE: 98 F | RESPIRATION RATE: 20 BRPM | HEIGHT: 58 IN | SYSTOLIC BLOOD PRESSURE: 126 MMHG | DIASTOLIC BLOOD PRESSURE: 69 MMHG | BODY MASS INDEX: 31.51 KG/M2 | WEIGHT: 150.13 LBS | OXYGEN SATURATION: 99 % | HEART RATE: 100 BPM

## 2019-04-04 DIAGNOSIS — T45.1X5A CHEMOTHERAPY INDUCED DIARRHEA: Primary | ICD-10-CM

## 2019-04-04 DIAGNOSIS — D72.820 LYMPHOCYTOSIS: ICD-10-CM

## 2019-04-04 DIAGNOSIS — E87.6 HYPOKALEMIA: ICD-10-CM

## 2019-04-04 DIAGNOSIS — B35.9 RINGWORM: ICD-10-CM

## 2019-04-04 DIAGNOSIS — K21.9 GASTROESOPHAGEAL REFLUX DISEASE WITHOUT ESOPHAGITIS: ICD-10-CM

## 2019-04-04 DIAGNOSIS — Z09 CHEMOTHERAPY FOLLOW-UP EXAMINATION: ICD-10-CM

## 2019-04-04 DIAGNOSIS — Z09 ONCOLOGY FOLLOW-UP ENCOUNTER: ICD-10-CM

## 2019-04-04 DIAGNOSIS — C23 MALIGNANT NEOPLASM OF GALLBLADDER: ICD-10-CM

## 2019-04-04 DIAGNOSIS — C22.0 HEPATOCELLULAR CARCINOMA: ICD-10-CM

## 2019-04-04 DIAGNOSIS — K52.1 CHEMOTHERAPY INDUCED DIARRHEA: Primary | ICD-10-CM

## 2019-04-04 PROCEDURE — 99215 PR OFFICE/OUTPT VISIT, EST, LEVL V, 40-54 MIN: ICD-10-PCS | Mod: S$GLB,,, | Performed by: INTERNAL MEDICINE

## 2019-04-04 PROCEDURE — 3008F PR BODY MASS INDEX (BMI) DOCUMENTED: ICD-10-PCS | Mod: CPTII,S$GLB,, | Performed by: INTERNAL MEDICINE

## 2019-04-04 PROCEDURE — 3008F BODY MASS INDEX DOCD: CPT | Mod: CPTII,S$GLB,, | Performed by: INTERNAL MEDICINE

## 2019-04-04 PROCEDURE — 99215 OFFICE O/P EST HI 40 MIN: CPT | Mod: S$GLB,,, | Performed by: INTERNAL MEDICINE

## 2019-04-04 PROCEDURE — 99999 PR PBB SHADOW E&M-EST. PATIENT-LVL III: CPT | Mod: PBBFAC,,, | Performed by: INTERNAL MEDICINE

## 2019-04-04 PROCEDURE — 99999 PR PBB SHADOW E&M-EST. PATIENT-LVL III: ICD-10-PCS | Mod: PBBFAC,,, | Performed by: INTERNAL MEDICINE

## 2019-04-04 RX ORDER — POTASSIUM CHLORIDE 20 MEQ/1
20 TABLET, EXTENDED RELEASE ORAL DAILY
Qty: 10 TABLET | Refills: 0 | Status: SHIPPED | OUTPATIENT
Start: 2019-04-04 | End: 2019-04-10

## 2019-04-04 RX ORDER — EPINEPHRINE 0.3 MG/.3ML
0.3 INJECTION SUBCUTANEOUS ONCE AS NEEDED
Status: CANCELLED | OUTPATIENT
Start: 2019-04-08

## 2019-04-04 RX ORDER — SODIUM CHLORIDE 0.9 % (FLUSH) 0.9 %
10 SYRINGE (ML) INJECTION
Status: CANCELLED | OUTPATIENT
Start: 2019-04-04

## 2019-04-04 RX ORDER — FLUOROURACIL 50 MG/ML
400 INJECTION, SOLUTION INTRAVENOUS
Status: CANCELLED | OUTPATIENT
Start: 2019-04-08

## 2019-04-04 RX ORDER — PRENATAL VIT 91/IRON/FOLIC/DHA 28-975-200
COMBINATION PACKAGE (EA) ORAL 2 TIMES DAILY
Qty: 24 G | Refills: 0 | Status: SHIPPED | OUTPATIENT
Start: 2019-04-04

## 2019-04-04 RX ORDER — POTASSIUM CHLORIDE 7.45 MG/ML
20 INJECTION INTRAVENOUS ONCE
Status: CANCELLED | OUTPATIENT
Start: 2019-04-04

## 2019-04-04 RX ORDER — SODIUM CHLORIDE 0.9 % (FLUSH) 0.9 %
10 SYRINGE (ML) INJECTION
Status: CANCELLED | OUTPATIENT
Start: 2019-04-10

## 2019-04-04 RX ORDER — ATROPINE SULFATE 0.4 MG/ML
0.4 INJECTION, SOLUTION ENDOTRACHEAL; INTRAMEDULLARY; INTRAMUSCULAR; INTRAVENOUS; SUBCUTANEOUS ONCE AS NEEDED
Status: CANCELLED | OUTPATIENT
Start: 2019-04-08

## 2019-04-04 RX ORDER — SODIUM CHLORIDE 0.9 % (FLUSH) 0.9 %
10 SYRINGE (ML) INJECTION
Status: CANCELLED | OUTPATIENT
Start: 2019-04-08

## 2019-04-04 RX ORDER — HEPARIN 100 UNIT/ML
500 SYRINGE INTRAVENOUS
Status: CANCELLED | OUTPATIENT
Start: 2019-04-08

## 2019-04-04 RX ORDER — HEPARIN 100 UNIT/ML
500 SYRINGE INTRAVENOUS
Status: CANCELLED | OUTPATIENT
Start: 2019-04-04

## 2019-04-04 RX ORDER — DIPHENHYDRAMINE HYDROCHLORIDE 50 MG/ML
50 INJECTION INTRAMUSCULAR; INTRAVENOUS ONCE AS NEEDED
Status: CANCELLED | OUTPATIENT
Start: 2019-04-08

## 2019-04-04 RX ORDER — HEPARIN 100 UNIT/ML
500 SYRINGE INTRAVENOUS
Status: CANCELLED | OUTPATIENT
Start: 2019-04-10

## 2019-04-04 NOTE — PROGRESS NOTES
Subjective:       Patient ID: Faith Byers is a 53 y.o. female.  Gall bladder ca , started modified FOLFIRINOX    HPI:   FINAL PATHOLOGIC DIAGNOSIS  GALLBLADDER/LIVER MASS, BIOPSY:  -Positive for malignancy, invasive squamous cell carcinoma, doing well pain has subsided also follows with MD Sahu.  Here for cycle 4.  Therapy.  Occasional nausea.  Continues to work.  No other complaints    Her MD [facility] doctor has suggested to delete leucovorin and bolus 5 FU due to her sevree diarrhea. SHe is having more than 10 BM s loose daily  SHe is on oral magnesium and started oral potassium yesterday'  SHe is tired  No pain , no falls  accompanied by her sister a nurse   She had a ct scan at [facility] and results are pending  She is here to get cleared for Monday chemo cycle 7 day 1 modified further per her primary oncologist  Dr Garner wants to hold leucovorin and delete the 5 FU bolus   Pt is here with hypokalemia despite k 99 mg ( which I explained is 2 Meq)    Social History     Socioeconomic History    Marital status:      Spouse name: Not on file    Number of children: Not on file    Years of education: Not on file    Highest education level: Not on file   Occupational History    Not on file   Social Needs    Financial resource strain: Not on file    Food insecurity:     Worry: Not on file     Inability: Not on file    Transportation needs:     Medical: Not on file     Non-medical: Not on file   Tobacco Use    Smoking status: Never Smoker    Smokeless tobacco: Never Used   Substance and Sexual Activity    Alcohol use: Yes     Comment: rarely    Drug use: No    Sexual activity: Not Currently   Lifestyle    Physical activity:     Days per week: Not on file     Minutes per session: Not on file    Stress: Not on file   Relationships    Social connections:     Talks on phone: Not on file     Gets together: Not on file     Attends [employer] service: Not on file     Active member of club or  organization: Not on file     Attends meetings of clubs or organizations: Not on file     Relationship status: Not on file   Other Topics Concern    Not on file   Social History Narrative    Not on file     Family History   Problem Relation Age of Onset    Cancer Mother         breast and bone     Breast cancer Mother     Hyperlipidemia Father     ADD / ADHD Daughter         autism    Crohn's disease Paternal Uncle     Heart disease Paternal Uncle         heart transplant     Past Surgical History:   Procedure Laterality Date    ARTHROSCOPY, KNEE, WITH Partial lateral MENISCECTOMY Right 7/12/2018    Performed [facility] at [facility] OR    Demdai-lmnbzk-cm N/A 11/16/2018    Performed by [facility] 2ND FLR    BREAST BIOPSY      BREAST SURGERY  2001    right biopsy, benign    CHONDROPLASTY,KNEE Medial Femoral Right 7/12/2018    Performed by [facility]an [facility] at [facility] OR    COLONOSCOPY      COLONOSCOPY N/A 12/5/2014    Performed by Sixto Barrera MD at [facility] ENDO    dental implant      VTGOQSYBF-EYQZ-E-CATH Right 12/14/2018    Performed by Jurgen Toro MD at [facility] OR    KNEE ARTHROSCOPY Right     LIVER BIOPSY  11/16/2018    THYROIDECTOMY  2011    complete     Past Medical History:   Diagnosis Date    Arthritis     Cancer     gallbadder/ liver spots/biopsy    Thyroid disease        Current Outpatient Medications:     apixaban (ELIQUIS) 5 mg Tab, Take 1 tablet (5 mg total) by mouth 2 (two) times daily. To Start after 7 days on 10mg Twice daily., Disp: 60 tablet, Rfl: 3    levothyroxine (SYNTHROID) 125 MCG tablet, Take 125 mcg by mouth every Tuesday, Thursday, Saturday, Sunday., Disp: , Rfl:     levothyroxine (SYNTHROID) 137 MCG Tab tablet, Take 137 mcg by mouth 3 (three) times a week. Monday, Weds, Friday, Disp: , Rfl:     ondansetron (ZOFRAN-ODT) 8 MG TbDL, Take 8 mg by mouth every 12 (twelve) hours as needed (nausea). , Disp: , Rfl:     pantoprazole  (PROTONIX) 40 MG tablet, Take 1 tablet (40 mg total) by mouth once daily., Disp: 90 tablet, Rfl: 3    prochlorperazine (COMPAZINE) 10 MG tablet, Take 1 tablet (10 mg total) by mouth every 6 (six) hours as needed. (Patient taking differently: Take 10 mg by mouth every 6 (six) hours as needed (nausea). ), Disp: 30 tablet, Rfl: 1    venlafaxine (EFFEXOR-XR) 37.5 MG 24 hr capsule, Take 1 tablet by mouth every morning. , Disp: , Rfl:   Review of patient's allergies indicates:  No Known Allergies    REVIEW OF SYSTEMS:     Review of Systems:  General: Weight gain: No, Weight Loss: No, Fatigue: No  Fever: No, Chills: No, Night Sweats: No, Insomnia: No, Excessive sleeping: No   Respiratory:  Cough: No, Shortness of Breath: No,   Wheezing: No, Excessive Snoring: No, Coughing up blood: No  Endocrine: Heat Intolerance: No, Cold Intolerance: No,   Excessive Thirst: No, Excessive Hunger: No,   Eyes:  Blurred Vision: No, Double Vision: No,   Light Sensitivity: No, Eye pain: No  Musculoskeletal: Muscle Aches/Pain: No, Joint Pain/Swelling: No, Muscle Cramps: No, Muscle Weakness: No, Neck Pain: No, Back Pain: No   Neurological: Difficulty Walking/Falls: Yes, Headache Migraine: No, Dizziness/Vertigo: No, Fainting: No, Difficulty with Speech: No, Weakness: No, Tingling/Numbness: No  Cardiovascular: Chest Pain: No, Shortness of Breath: No,   Palpitations: No,  Gastrointestinal: Nausea/Vomiting: No, Constipation: No, Diarrhea:  +++ not on imodium nor lomotil   Bloody Stools: No   Psych/Cog:  Depression: No, Anxiety: No, Hallucinations: No, Problems Concentrating: No  : Frequent Urination: No, Incontinence: No, Blood of Urine: No, Urinary Infections: No, Changes in Sex Drive: No   ENT:Hearing Loss: No, Earache: No, Ringing in Ears: No,   Facial Pain: No, Chronic Congestion: No   Immune: Seasonal Allergies: No, Hives and/or Rashes: No  The remainder of the review of twelve body systems was reviewed and normal  Skin lesion on chest with  round pearly erythematous base     PHYSICAL EXAM:     Wt Readings from Last 3 Encounters:   04/04/19 68.1 kg (150 lb 2.1 oz)   03/29/19 69.9 kg (154 lb)   03/20/19 68.6 kg (151 lb 3.8 oz)     Temp Readings from Last 3 Encounters:   04/04/19 97.8 °F (36.6 °C)   03/29/19 97.7 °F (36.5 °C) (Oral)   03/20/19 98.1 °F (36.7 °C)     BP Readings from Last 3 Encounters:   04/04/19 126/69   03/29/19 136/86   03/20/19 134/85     Pulse Readings from Last 3 Encounters:   04/04/19 100   03/29/19 93   03/20/19 100     Constitutional: oriented to person, place, and time.  well-developed and well-nourished.   HENT:   Head: Normocephalic and atraumatic.   Right Ear: External ear normal.   Left Ear: External ear normal.   Nose: Nose normal.   Mouth/Throat: Oropharynx is clear and moist.   Eyes: Conjunctivae and EOM are normal. Pupils are equal, round,  Neck: Normal range of motion. Neck supple. No thyromegaly present.   Cardiovascular: Normal rate, regular rhythm, normal heart sounds and intact distal pulses.    No murmur heard.  Pulmonary/Chest: Effort normal and breath sounds normal.  no wheezes.  no rales.   Abdominal: Soft. Bowel sounds are normal.  no mass. There is no tenderness. There is no rebound.   Musculoskeletal: Normal range of motion.  no edema or tenderness.   Lymphadenopathy:    no cervical adenopathy.   Neurological: alert and oriented to person, place, and time. . No cranial nerve deficit.   Skin: Skin is warm and dry.  ++ leison noted   Psychiatric: normal mood and affect.   behavior is normal.   Vitals reviewed.    Laboratory:     CBC:  Lab Results   Component Value Date    WBC 4.50 04/03/2019    RBC 4.27 04/03/2019    HGB 12.5 04/03/2019    HCT 38.5 04/03/2019    MCV 90 04/03/2019    MCH 29.2 04/03/2019    MCHC 32.4 04/03/2019    RDW 22.0 (H) 04/03/2019     04/03/2019    MPV 7.5 (L) 04/03/2019    GRAN 1.5 (L) 04/03/2019    GRAN 34.2 (L) 04/03/2019    LYMPH 2.7 04/03/2019    LYMPH 61.1 (H) 04/03/2019    MONO  0.2 (L) 04/03/2019    MONO 3.9 (L) 04/03/2019    EOS 0.0 04/03/2019    BASO 0.00 04/03/2019    EOSINOPHIL 0.5 04/03/2019    BASOPHIL 0.3 04/03/2019       BMP: BMP  Lab Results   Component Value Date     04/03/2019    K 2.8 (L) 04/03/2019     04/03/2019    CO2 27 04/03/2019    BUN 10 04/03/2019    CREATININE 0.7 04/03/2019    CALCIUM 8.7 04/03/2019    ANIONGAP 10 04/03/2019    ESTGFRAFRICA >60 04/03/2019    EGFRNONAA >60 04/03/2019       LFT:   Lab Results   Component Value Date    ALT 27 04/03/2019    AST 48 (H) 04/03/2019    ALKPHOS 98 04/03/2019    BILITOT 0.4 04/03/2019 December 2018 CT chest abdomen pelvis  Impression       Gallbladder mass with invasion of the hepatic parenchyma compatible with the patient's known gallbladder neoplasm, and moderately increased in size.  Possible extension to the hepatic flexure of the colon.  Unchanged mild the biliary dilatation.    Development a suspicious lymph node subjacent to the mass.    3 mm left upper lobe nodule which is nonspecific.  Attention on 6-12 month follow-up recommended.    Unchanged hepatic hypodensities, suggestive for cysts.  Continued attention on follow-up recommended     Impression 3/2019 CT       Moderate reduction in size of the patient's known gallbladder mass and associated hepatic parenchymal invasion.  Unchanged scattered small hepatic hypodensities may simply represent cysts however continued attention on follow-up recommended.  No evidence for extrahepatic metastatic disease.       Ca19.9 at MDA 3819, now 1mproving down to 445.0    Assessment/Plan:         Chemotherapy induced diarrhea  -     CBC auto differential; Standing  -     CMP; Future; Expected date: 04/04/2019    Hypokalemia  -     potassium chloride SA (K-DUR,KLOR-CON) 20 MEQ tablet; Take 1 tablet (20 mEq total) by mouth once daily.  Dispense: 10 tablet; Refill: 0    Lymphocytosis    Chemotherapy follow-up examination    Oncology follow-up  encounter    Ringworm    Gastroesophageal reflux disease without esophagitis    Malignant neoplasm of gallbladder    Hepatocellular carcinoma    Other orders  -     palonosetron (ALOXI) 0.25 mg, dexamethasone (DECADRON) 12 mg in sodium chloride 0.9 % 50 mL IVPB  -     EPINEPHrine (EPIPEN) 0.3 mg/0.3 mL pen injection 0.3 mg  -     diphenhydrAMINE injection 50 mg  -     hydrocortisone sodium succinate injection 100 mg  -     oxaliplatin (ELOXATIN) 85 mg/m2 = 143 mg in dextrose 5 % 500 mL chemo infusion  -     Cancel: leucovorin calcium 400 mg/m2 = 670 mg in dextrose 5 % 250 mL infusion  -     Cancel: fluorouracil injection 670 mg  -     fluorouracil (ADRUCIL) 2,400 mg/m2 = 4,030 mg in sodium chloride 0.9% 180.6 mL chemo infusion  -     atropine injection 0.4 mg  -     dextrose 5 % 250 mL flush bag  -     sodium chloride 0.9% 100 mL flush bag  -     sodium chloride 0.9% flush 10 mL  -     heparin, porcine (PF) 100 unit/mL injection flush 500 Units  -     alteplase injection 2 mg  -     sodium chloride 0.9% flush 10 mL  -     heparin, porcine (PF) 100 unit/mL injection flush 500 Units  -     alteplase injection 2 mg    proceed with IV potassium today  Take 20 Meq K by mouth tonight and start one daily after such   RTC MOnday for chemo clearance with no leuucovorin and no bolus 5 FU  Pt with continual diarrhea   She refuses to take imodium and lomotil  'SHe is taking Magnesium ( which will worsen diarrhea!)  SHe is followed by [facility] doctor   I willl delete her leucovorin and bolus 5FU although I did explain this is not usual protocol  Will replace her potassium IV  And put her on K 20 meq daily for ten days  Recheck labs before next chemo and to see Dr Alarcon for clearance   Time spent with patient : over 50 minutes  Over 50% in counseling

## 2019-04-04 NOTE — TELEPHONE ENCOUNTER
Patient notified that potassium is low and that Dr Alarcon would like her to take 40Meq of potasium now and in 4 hours. Patient verbalized understanding and stated she will leave work now to go get and take.

## 2019-04-08 ENCOUNTER — OFFICE VISIT (OUTPATIENT)
Dept: HEMATOLOGY/ONCOLOGY | Facility: CLINIC | Age: 54
End: 2019-04-08
Payer: COMMERCIAL

## 2019-04-08 ENCOUNTER — LAB VISIT (OUTPATIENT)
Dept: LAB | Facility: HOSPITAL | Age: 54
End: 2019-04-08
Attending: INTERNAL MEDICINE
Payer: COMMERCIAL

## 2019-04-08 VITALS
OXYGEN SATURATION: 96 % | HEART RATE: 97 BPM | DIASTOLIC BLOOD PRESSURE: 64 MMHG | BODY MASS INDEX: 32.39 KG/M2 | TEMPERATURE: 99 F | RESPIRATION RATE: 20 BRPM | WEIGHT: 154.31 LBS | SYSTOLIC BLOOD PRESSURE: 125 MMHG | HEIGHT: 58 IN

## 2019-04-08 DIAGNOSIS — K21.9 GASTROESOPHAGEAL REFLUX DISEASE WITHOUT ESOPHAGITIS: ICD-10-CM

## 2019-04-08 DIAGNOSIS — C78.7 METASTASES TO THE LIVER: ICD-10-CM

## 2019-04-08 DIAGNOSIS — D64.81 ANEMIA ASSOCIATED WITH CHEMOTHERAPY: ICD-10-CM

## 2019-04-08 DIAGNOSIS — Z09 ONCOLOGY FOLLOW-UP ENCOUNTER: ICD-10-CM

## 2019-04-08 DIAGNOSIS — E44.1 MILD PROTEIN MALNUTRITION: ICD-10-CM

## 2019-04-08 DIAGNOSIS — K52.1 CHEMOTHERAPY INDUCED DIARRHEA: ICD-10-CM

## 2019-04-08 DIAGNOSIS — C23 MALIGNANT NEOPLASM OF GALLBLADDER: ICD-10-CM

## 2019-04-08 DIAGNOSIS — C23 GALLBLADDER CANCER: Primary | ICD-10-CM

## 2019-04-08 DIAGNOSIS — T45.1X5A LEUKOPENIA DUE TO ANTINEOPLASTIC CHEMOTHERAPY: ICD-10-CM

## 2019-04-08 DIAGNOSIS — T45.1X5A CHEMOTHERAPY INDUCED DIARRHEA: ICD-10-CM

## 2019-04-08 DIAGNOSIS — Z79.01 ANTICOAGULANT LONG-TERM USE: ICD-10-CM

## 2019-04-08 DIAGNOSIS — R13.10 ODYNOPHAGIA: ICD-10-CM

## 2019-04-08 DIAGNOSIS — F41.1 GAD (GENERALIZED ANXIETY DISORDER): ICD-10-CM

## 2019-04-08 DIAGNOSIS — I26.99 BILATERAL PULMONARY EMBOLISM: ICD-10-CM

## 2019-04-08 DIAGNOSIS — E02 SUBCLINICAL IODINE-DEFICIENCY HYPOTHYROIDISM: ICD-10-CM

## 2019-04-08 DIAGNOSIS — Z09 CHEMOTHERAPY FOLLOW-UP EXAMINATION: ICD-10-CM

## 2019-04-08 DIAGNOSIS — D70.1 LEUKOPENIA DUE TO ANTINEOPLASTIC CHEMOTHERAPY: ICD-10-CM

## 2019-04-08 DIAGNOSIS — T45.1X5A ANEMIA ASSOCIATED WITH CHEMOTHERAPY: ICD-10-CM

## 2019-04-08 LAB
ALBUMIN SERPL BCP-MCNC: 2.3 G/DL (ref 3.5–5.2)
ALP SERPL-CCNC: 98 U/L (ref 55–135)
ALT SERPL W/O P-5'-P-CCNC: 26 U/L (ref 10–44)
ANION GAP SERPL CALC-SCNC: 5 MMOL/L (ref 8–16)
ANISOCYTOSIS BLD QL SMEAR: ABNORMAL
AST SERPL-CCNC: 47 U/L (ref 10–40)
BASOPHILS NFR BLD: 1 % (ref 0–1.9)
BILIRUB SERPL-MCNC: 0.3 MG/DL (ref 0.1–1)
BUN SERPL-MCNC: 16 MG/DL (ref 6–20)
CALCIUM SERPL-MCNC: 8.6 MG/DL (ref 8.7–10.5)
CHLORIDE SERPL-SCNC: 109 MMOL/L (ref 95–110)
CO2 SERPL-SCNC: 26 MMOL/L (ref 23–29)
CREAT SERPL-MCNC: 0.7 MG/DL (ref 0.5–1.4)
DIFFERENTIAL METHOD: ABNORMAL
EOSINOPHIL NFR BLD: 0 % (ref 0–8)
ERYTHROCYTE [DISTWIDTH] IN BLOOD BY AUTOMATED COUNT: 22.2 % (ref 11.5–14.5)
EST. GFR  (AFRICAN AMERICAN): >60 ML/MIN/1.73 M^2
EST. GFR  (NON AFRICAN AMERICAN): >60 ML/MIN/1.73 M^2
GLUCOSE SERPL-MCNC: 87 MG/DL (ref 70–110)
HCT VFR BLD AUTO: 36 % (ref 37–48.5)
HGB BLD-MCNC: 11.8 G/DL (ref 12–16)
HYPOCHROMIA BLD QL SMEAR: ABNORMAL
LYMPHOCYTES NFR BLD: 81 % (ref 18–48)
MCH RBC QN AUTO: 29.6 PG (ref 27–31)
MCHC RBC AUTO-ENTMCNC: 32.8 G/DL (ref 32–36)
MCV RBC AUTO: 90 FL (ref 82–98)
MONOCYTES NFR BLD: 7 % (ref 4–15)
NEUTROPHILS NFR BLD: 10 % (ref 38–73)
NEUTS BAND NFR BLD MANUAL: 1 %
OVALOCYTES BLD QL SMEAR: ABNORMAL
PLATELET # BLD AUTO: 219 K/UL (ref 150–350)
PLATELET BLD QL SMEAR: ABNORMAL
PMV BLD AUTO: 7.6 FL (ref 9.2–12.9)
POIKILOCYTOSIS BLD QL SMEAR: SLIGHT
POTASSIUM SERPL-SCNC: 3.8 MMOL/L (ref 3.5–5.1)
PROT SERPL-MCNC: 4.7 G/DL (ref 6–8.4)
RBC # BLD AUTO: 3.99 M/UL (ref 4–5.4)
SODIUM SERPL-SCNC: 140 MMOL/L (ref 136–145)
WBC # BLD AUTO: 2.9 K/UL (ref 3.9–12.7)

## 2019-04-08 PROCEDURE — 99999 PR PBB SHADOW E&M-EST. PATIENT-LVL III: ICD-10-PCS | Mod: PBBFAC,,, | Performed by: INTERNAL MEDICINE

## 2019-04-08 PROCEDURE — 99999 PR PBB SHADOW E&M-EST. PATIENT-LVL III: CPT | Mod: PBBFAC,,, | Performed by: INTERNAL MEDICINE

## 2019-04-08 PROCEDURE — 3008F PR BODY MASS INDEX (BMI) DOCUMENTED: ICD-10-PCS | Mod: CPTII,S$GLB,, | Performed by: INTERNAL MEDICINE

## 2019-04-08 PROCEDURE — 99215 OFFICE O/P EST HI 40 MIN: CPT | Mod: S$GLB,,, | Performed by: INTERNAL MEDICINE

## 2019-04-08 PROCEDURE — 85027 COMPLETE CBC AUTOMATED: CPT

## 2019-04-08 PROCEDURE — 99215 PR OFFICE/OUTPT VISIT, EST, LEVL V, 40-54 MIN: ICD-10-PCS | Mod: S$GLB,,, | Performed by: INTERNAL MEDICINE

## 2019-04-08 PROCEDURE — 3008F BODY MASS INDEX DOCD: CPT | Mod: CPTII,S$GLB,, | Performed by: INTERNAL MEDICINE

## 2019-04-08 PROCEDURE — 85007 BL SMEAR W/DIFF WBC COUNT: CPT

## 2019-04-08 PROCEDURE — 36415 COLL VENOUS BLD VENIPUNCTURE: CPT

## 2019-04-08 PROCEDURE — 80053 COMPREHEN METABOLIC PANEL: CPT

## 2019-04-08 NOTE — PROGRESS NOTES
Subjective:       Patient ID: Faith yBers is a 53 y.o. female.  Gall bladder ca , started modified FOLFIRINOX    HPI:   FINAL PATHOLOGIC DIAGNOSIS  GALLBLADDER/LIVER MASS, BIOPSY:  -Positive for malignancy, invasive squamous cell carcinoma, doing well pain has subsided also follows with MD Sahu.  Here for cycle 4.  Therapy.  Occasional nausea.  Continues to work.  No other complaints    Her [facility] doctor has suggested to delete leucovorin and bolus 5 FU due to her sevree diarrhea. SHe is having more than 10 BM s loose daily  SHe is on oral magnesium and started oral potassium yesterday'  SHe is tired  No pain , no falls  Diarrhea continues she has started imodium   She is here to get cleared for chemo   She had hypokalemia which was replaced     Dr Garner wants to hold leucovorin and delete the 5 FU bolus   She is feeling better today   She remains on     Social History     Socioeconomic History    Marital status:      Spouse name: Not on file    Number of children: Not on file    Years of education: Not on file    Highest education level: Not on file   Occupational History    Not on file   Social Needs    Financial resource strain: Not on file    Food insecurity:     Worry: Not on file     Inability: Not on file    Transportation needs:     Medical: Not on file     Non-medical: Not on file   Tobacco Use    Smoking status: Never Smoker    Smokeless tobacco: Never Used   Substance and Sexual Activity    Alcohol use: Yes     Comment: rarely    Drug use: No    Sexual activity: Not Currently   Lifestyle    Physical activity:     Days per week: Not on file     Minutes per session: Not on file    Stress: Not on file   Relationships    Social connections:     Talks on phone: Not on file     Gets together: Not on file     Attends Cheondoism service: Not on file     Active member of club or organization: Not on file     Attends meetings of clubs or organizations: Not on file      Relationship status: Not on file   Other Topics Concern    Not on file   Social History Narrative    Not on file     Family History   Problem Relation Age of Onset    Cancer Mother         breast and bone     Breast cancer Mother     Hyperlipidemia Father     ADD / ADHD Daughter         autism    Crohn's disease Paternal Uncle     Heart disease Paternal Uncle         heart transplant     Past Surgical History:   Procedure Laterality Date    ARTHROSCOPY, KNEE, WITH Partial lateral MENISCECTOMY Right 7/12/2018    Performed by [facility] Kiran [facility] at [facility] OR    [id number]-[id number] N/A 11/16/2018    Performed by [facility]    BREAST BIOPSY      BREAST SURGERY  2001    right biopsy, benign    CHONDROPLASTY,KNEE Medial Femoral Right 7/12/2018    Performed by [facility]an [facility] at [facility] OR    COLONOSCOPY      COLONOSCOPY N/A 12/5/2014    Performed by Sixto Barrera MD at [facility] ENDO    dental implant      ASEREDWBZ-[id number] Right 12/14/2018    Performed by Jurgen Toro MD at [facility] OR    KNEE ARTHROSCOPY Right     LIVER BIOPSY  11/16/2018    THYROIDECTOMY  2011    complete     Past Medical History:   Diagnosis Date    Arthritis     Cancer     gallbadder/ liver spots/biopsy    Thyroid disease        Current Outpatient Medications:     apixaban (ELIQUIS) 5 mg Tab, Take 1 tablet (5 mg total) by mouth 2 (two) times daily. To Start after 7 days on 10mg Twice daily., Disp: 60 tablet, Rfl: 3    levothyroxine (SYNTHROID) 125 MCG tablet, Take 125 mcg by mouth every Tuesday, Thursday, Saturday, Sunday., Disp: , Rfl:     levothyroxine (SYNTHROID) 137 MCG Tab tablet, Take 137 mcg by mouth 3 (three) times a week. Monday, Weds, Friday, Disp: , Rfl:     ondansetron (ZOFRAN-ODT) 8 MG TbDL, Take 8 mg by mouth every 12 (twelve) hours as needed (nausea). , Disp: , Rfl:     pantoprazole (PROTONIX) 40 MG tablet, Take 1 tablet (40 mg total) by mouth once daily., Disp: 90 tablet,  Rfl: 3    potassium chloride SA (K-DUR,KLOR-CON) 20 MEQ tablet, Take 1 tablet (20 mEq total) by mouth once daily., Disp: 10 tablet, Rfl: 0    prochlorperazine (COMPAZINE) 10 MG tablet, Take 1 tablet (10 mg total) by mouth every 6 (six) hours as needed. (Patient taking differently: Take 10 mg by mouth every 6 (six) hours as needed (nausea). ), Disp: 30 tablet, Rfl: 1    terbinafine HCl (LAMISIL) 1 % cream, Apply topically 2 (two) times daily., Disp: 24 g, Rfl: 0    venlafaxine (EFFEXOR-XR) 37.5 MG 24 hr capsule, Take 1 tablet by mouth every morning. , Disp: , Rfl:   Review of patient's allergies indicates:  No Known Allergies    REVIEW OF SYSTEMS:     Review of Systems:  General: Weight gain: No, Weight Loss: No, Fatigue: No  Fever: No, Chills: No, Night Sweats: No, Insomnia: No  Respiratory:  Cough: No, Shortness of Breath: No,   Wheezing: No, Excessive Snoring: No, Coughing up blood: No  Endocrine: Heat Intolerance: No, Cold Intolerance: No,   Excessive Thirst: No, Excessive Hunger: No,   Eyes:  Blurred Vision: No, Double Vision: No,   Light Sensitivity: No, Eye pain: No  Musculoskeletal: Muscle Aches/Pain: No, Joint Pain/Swelling: No, Muscle Cramps: No, Muscle Weakness: No, Neck Pain: No, Back Pain: No   Neurological: Difficulty Walking/Falls: Yes, Headache Migraine: No, Dizziness/Vertigo: No, Fainting: No, Difficulty with Speech: No, Weakness: No  Cardiovascular: Chest Pain: No, Shortness of Breath: No,   Palpitations: No,  Gastrointestinal: Nausea/Vomiting: No, Constipation: No, Diarrhea: better  Bloody Stools: No  ++ odynophagia , no dysphagia  Psych/Cog:  Depression: No, Anxiety: No, Hallucinations: No, Problems Concentrating: No  : Frequent Urination: No, Incontinence: No, Blood of Urine: No, Urinary Infections: No  ENT:Hearing Loss: No, Earache: No, Ringing in Ears: No,   Facial Pain: No, Chronic Congestion: No   Immune: Seasonal Allergies: No, Hives and/or Rashes: No  The remainder of the review of  twelve body systems was reviewed and normal  Skin lesion on chest with round pearly erythematous base     PHYSICAL EXAM:     Wt Readings from Last 3 Encounters:   04/04/19 68.1 kg (150 lb 2.1 oz)   03/29/19 69.9 kg (154 lb)   03/20/19 68.6 kg (151 lb 3.8 oz)     Temp Readings from Last 3 Encounters:   04/04/19 97.8 °F (36.6 °C)   03/29/19 97.7 °F (36.5 °C) (Oral)   03/20/19 98.1 °F (36.7 °C)     BP Readings from Last 3 Encounters:   04/04/19 126/69   03/29/19 136/86   03/20/19 134/85     Pulse Readings from Last 3 Encounters:   04/04/19 100   03/29/19 93   03/20/19 100     Constitutional: oriented to person, place, and time.  Hands with hypopigmented flat macules , no rash, no open lesions  HENT:   Head: Normocephalic and atraumatic.   Right Ear: External ear normal.   Left Ear: External ear normal.   Nose: Nose normal.   Mouth/Throat: Oropharynx is clear and moist.  ++ post nasal drip no erythema   Eyes: Conjunctivae and EOM are normal. Pupils are equal, round,  Neck: Normal range of motion. Neck supple. No thyromegaly present.   Cardiovascular: Normal rate, regular rhythm, normal heart sounds   No murmur heard.  Pulmonary/Chest: Effort normal and breath sounds normal.  no wheezes.  no rales.   Abdominal: Soft. Bowel sounds are normal.  no mass. There is no tenderness. There is no rebound.   Musculoskeletal: Normal range of motion.  no edema   Lymphadenopathy:    no cervical adenopathy.   Neurological: alert and oriented to person, place, and time.  Strength normal   Skin: Skin is warm and dry.    Psychiatric: normal mood and affect.   behavior is normal.   Vitals reviewed.  CA 19-92.0 - 40.0 U/mL125.0High   CA 19-92.0 - 40.0 U/mL445.0     Laboratory:     CBC:  Lab Results   Component Value Date    WBC 2.90 (L) 04/08/2019    RBC 3.99 (L) 04/08/2019    HGB 11.8 (L) 04/08/2019    HCT 36.0 (L) 04/08/2019    MCV 90 04/08/2019    MCH 29.6 04/08/2019    MCHC 32.8 04/08/2019    RDW 22.2 (H) 04/08/2019      04/08/2019    MPV 7.6 (L) 04/08/2019    GRAN 1.5 (L) 04/03/2019    GRAN 34.2 (L) 04/03/2019    LYMPH 2.7 04/03/2019    LYMPH 61.1 (H) 04/03/2019    MONO 0.2 (L) 04/03/2019    MONO 3.9 (L) 04/03/2019    EOS 0.0 04/03/2019    BASO 0.00 04/03/2019    EOSINOPHIL 0.5 04/03/2019    BASOPHIL 0.3 04/03/2019       CMP  Sodium   Date Value Ref Range Status   04/03/2019 141 136 - 145 mmol/L Final   01/16/2019 134 134 - 144 mmol/L      Potassium   Date Value Ref Range Status   04/03/2019 2.8 (L) 3.5 - 5.1 mmol/L Final     Chloride   Date Value Ref Range Status   04/03/2019 104 95 - 110 mmol/L Final   01/16/2019 97 (L) 98 - 110 mmol/L      CO2   Date Value Ref Range Status   04/03/2019 27 23 - 29 mmol/L Final     Glucose   Date Value Ref Range Status   04/03/2019 89 70 - 110 mg/dL Final   01/16/2019 116 (H) 70 - 99 mg/dL      BUN, Bld   Date Value Ref Range Status   04/03/2019 10 6 - 20 mg/dL Final     Creatinine   Date Value Ref Range Status   04/03/2019 0.7 0.5 - 1.4 mg/dL Final   01/16/2019 0.57 (L) 0.60 - 1.40 mg/dL      Calcium   Date Value Ref Range Status   04/03/2019 8.7 8.7 - 10.5 mg/dL Final     Total Protein   Date Value Ref Range Status   04/03/2019 5.3 (L) 6.0 - 8.4 g/dL Final     Albumin   Date Value Ref Range Status   04/03/2019 2.5 (L) 3.5 - 5.2 g/dL Final   01/16/2019 2.2 (L) 3.1 - 4.7 g/dL      Total Bilirubin   Date Value Ref Range Status   04/03/2019 0.4 0.1 - 1.0 mg/dL Final     Comment:     For infants and newborns, interpretation of results should be based  on gestational age, weight and in agreement with clinical  observations.  Premature Infant recommended reference ranges:  Up to 24 hours.............<8.0 mg/dL  Up to 48 hours............<12.0 mg/dL  3-5 days..................<15.0 mg/dL  6-29 days.................<15.0 mg/dL       Alkaline Phosphatase   Date Value Ref Range Status   04/03/2019 98 55 - 135 U/L Final     AST   Date Value Ref Range Status   04/03/2019 48 (H) 10 - 40 U/L Final     ALT    Date Value Ref Range Status   04/03/2019 27 10 - 44 U/L Final     Anion Gap   Date Value Ref Range Status   04/03/2019 10 8 - 16 mmol/L Final     eGFR if    Date Value Ref Range Status   04/03/2019 >60 >60 mL/min/1.73 m^2 Final     eGFR if non    Date Value Ref Range Status   04/03/2019 >60 >60 mL/min/1.73 m^2 Final     Comment:     Calculation used to obtain the estimated glomerular filtration  rate (eGFR) is the CKD-EPI equation.          LFT:   Lab Results   Component Value Date    ALT 27 04/03/2019    AST 48 (H) 04/03/2019    ALKPHOS 98 04/03/2019    BILITOT 0.4 04/03/2019 December 2018 CT chest abdomen pelvis  Impression       Gallbladder mass with invasion of the hepatic parenchyma compatible with the patient's known gallbladder neoplasm, and moderately increased in size.  Possible extension to the hepatic flexure of the colon.  Unchanged mild the biliary dilatation.    Development a suspicious lymph node subjacent to the mass.    3 mm left upper lobe nodule which is nonspecific.  Attention on 6-12 month follow-up recommended.    Unchanged hepatic hypodensities, suggestive for cysts.  Continued attention on follow-up recommended     Impression 3/2019 CT       Moderate reduction in size of the patient's known gallbladder mass and associated hepatic parenchymal invasion.  Unchanged scattered small hepatic hypodensities may simply represent cysts however continued attention on follow-up recommended.  No evidence for extrahepatic metastatic disease.       Ca19.9 at [facility] 3819, now 1mproving down to 445.0    Assessment/Plan:         Gallbladder cancer  -     CBC auto differential; Future; Expected date: 04/08/2019  -     CMP; Future; Expected date: 04/08/2019    Chemotherapy follow-up examination  -     CBC auto differential; Future; Expected date: 04/08/2019  -     CMP; Future; Expected date: 04/08/2019    Bilateral pulmonary embolism    Anticoagulant long-term  use    Odynophagia    Anemia associated with chemotherapy  -     CBC auto differential; Future; Expected date: 04/08/2019  -     CMP; Future; Expected date: 04/08/2019    Leukopenia due to antineoplastic chemotherapy  -     CBC auto differential; Future; Expected date: 04/08/2019  -     CMP; Future; Expected date: 04/08/2019    Malignant neoplasm of gallbladder    Subclinical iodine-deficiency hypothyroidism    Mild protein malnutrition    Gastroesophageal reflux disease without esophagitis    CARINA (generalized anxiety disorder)    Oncology follow-up encounter    Metastases to the liver    leukopenia due to chemo  Anemia progressing yet stable enough for chemo     RTC MOnday for chemo clearance with no leuucovorin and no bolus 5 FU  SHe is followed by [facility] doctor   ELLIOT wu delete her leucovorin and bolus 5FU although I did explain this is not usual protocol  Leave the rest of the cycles to be adjusted by Dr Alarcon   Recheck labs before next chemo and to see Dr Alarcon for clearance

## 2019-04-09 ENCOUNTER — PATIENT OUTREACH (OUTPATIENT)
Dept: ADMINISTRATIVE | Facility: HOSPITAL | Age: 54
End: 2019-04-09

## 2019-04-10 ENCOUNTER — OFFICE VISIT (OUTPATIENT)
Dept: SURGERY | Facility: CLINIC | Age: 54
End: 2019-04-10
Payer: COMMERCIAL

## 2019-04-10 VITALS
RESPIRATION RATE: 16 BRPM | SYSTOLIC BLOOD PRESSURE: 128 MMHG | TEMPERATURE: 98 F | DIASTOLIC BLOOD PRESSURE: 76 MMHG | HEART RATE: 96 BPM | HEIGHT: 58 IN | BODY MASS INDEX: 33.36 KG/M2 | WEIGHT: 158.94 LBS

## 2019-04-10 DIAGNOSIS — Z09 CHEMOTHERAPY FOLLOW-UP EXAMINATION: Primary | ICD-10-CM

## 2019-04-10 PROCEDURE — 99213 PR OFFICE/OUTPT VISIT, EST, LEVL III, 20-29 MIN: ICD-10-PCS | Mod: S$GLB,,, | Performed by: SURGERY

## 2019-04-10 PROCEDURE — 3008F PR BODY MASS INDEX (BMI) DOCUMENTED: ICD-10-PCS | Mod: CPTII,S$GLB,, | Performed by: SURGERY

## 2019-04-10 PROCEDURE — 99999 PR PBB SHADOW E&M-EST. PATIENT-LVL III: ICD-10-PCS | Mod: PBBFAC,,, | Performed by: SURGERY

## 2019-04-10 PROCEDURE — 3008F BODY MASS INDEX DOCD: CPT | Mod: CPTII,S$GLB,, | Performed by: SURGERY

## 2019-04-10 PROCEDURE — 99999 PR PBB SHADOW E&M-EST. PATIENT-LVL III: CPT | Mod: PBBFAC,,, | Performed by: SURGERY

## 2019-04-10 PROCEDURE — 99213 OFFICE O/P EST LOW 20 MIN: CPT | Mod: S$GLB,,, | Performed by: SURGERY

## 2019-04-10 RX ORDER — PANCRELIPASE 36000; 180000; 114000 [USP'U]/1; [USP'U]/1; [USP'U]/1
72000-108000 CAPSULE, DELAYED RELEASE PELLETS ORAL
COMMUNITY
Start: 2019-04-01

## 2019-04-16 ENCOUNTER — PATIENT MESSAGE (OUTPATIENT)
Dept: HEMATOLOGY/ONCOLOGY | Facility: CLINIC | Age: 54
End: 2019-04-16

## 2019-04-17 ENCOUNTER — DOCUMENTATION ONLY (OUTPATIENT)
Dept: FAMILY MEDICINE | Facility: CLINIC | Age: 54
End: 2019-04-17

## 2019-04-17 NOTE — PROGRESS NOTES
Pre-Visit Chart Review  For Appointment Scheduled on 4/22/2019    Health Maintenance Due   Topic Date Due    High Dose Statin  06[birth year]03/1985

## 2019-04-18 ENCOUNTER — LAB VISIT (OUTPATIENT)
Dept: LAB | Facility: HOSPITAL | Age: 54
End: 2019-04-18
Attending: INTERNAL MEDICINE
Payer: COMMERCIAL

## 2019-04-18 DIAGNOSIS — R13.10 ODYNOPHAGIA: ICD-10-CM

## 2019-04-18 DIAGNOSIS — C78.7 METASTASES TO THE LIVER: ICD-10-CM

## 2019-04-18 DIAGNOSIS — Z09 CHEMOTHERAPY FOLLOW-UP EXAMINATION: ICD-10-CM

## 2019-04-18 LAB
ALBUMIN SERPL BCP-MCNC: 2.4 G/DL (ref 3.5–5.2)
ALP SERPL-CCNC: 114 U/L (ref 55–135)
ALT SERPL W/O P-5'-P-CCNC: 30 U/L (ref 10–44)
ANION GAP SERPL CALC-SCNC: 9 MMOL/L (ref 8–16)
AST SERPL-CCNC: 61 U/L (ref 10–40)
BASOPHILS # BLD AUTO: 0 K/UL (ref 0–0.2)
BASOPHILS NFR BLD: 0.2 % (ref 0–1.9)
BILIRUB SERPL-MCNC: 0.4 MG/DL (ref 0.1–1)
BUN SERPL-MCNC: 11 MG/DL (ref 6–20)
CALCIUM SERPL-MCNC: 8.5 MG/DL (ref 8.7–10.5)
CHLORIDE SERPL-SCNC: 104 MMOL/L (ref 95–110)
CO2 SERPL-SCNC: 27 MMOL/L (ref 23–29)
CREAT SERPL-MCNC: 0.8 MG/DL (ref 0.5–1.4)
DIFFERENTIAL METHOD: ABNORMAL
EOSINOPHIL # BLD AUTO: 0 K/UL (ref 0–0.5)
EOSINOPHIL NFR BLD: 0.3 % (ref 0–8)
ERYTHROCYTE [DISTWIDTH] IN BLOOD BY AUTOMATED COUNT: 21.1 % (ref 11.5–14.5)
EST. GFR  (AFRICAN AMERICAN): >60 ML/MIN/1.73 M^2
EST. GFR  (NON AFRICAN AMERICAN): >60 ML/MIN/1.73 M^2
GLUCOSE SERPL-MCNC: 92 MG/DL (ref 70–110)
HCT VFR BLD AUTO: 37.7 % (ref 37–48.5)
HGB BLD-MCNC: 12.2 G/DL (ref 12–16)
LYMPHOCYTES # BLD AUTO: 2.1 K/UL (ref 1–4.8)
LYMPHOCYTES NFR BLD: 45 % (ref 18–48)
MAGNESIUM SERPL-MCNC: 1.7 MG/DL (ref 1.6–2.6)
MCH RBC QN AUTO: 29.4 PG (ref 27–31)
MCHC RBC AUTO-ENTMCNC: 32.2 G/DL (ref 32–36)
MCV RBC AUTO: 91 FL (ref 82–98)
MONOCYTES # BLD AUTO: 0.4 K/UL (ref 0.3–1)
MONOCYTES NFR BLD: 7.8 % (ref 4–15)
NEUTROPHILS # BLD AUTO: 2.2 K/UL (ref 1.8–7.7)
NEUTROPHILS NFR BLD: 46.7 % (ref 38–73)
PLATELET # BLD AUTO: 249 K/UL (ref 150–350)
PMV BLD AUTO: 8.2 FL (ref 9.2–12.9)
POTASSIUM SERPL-SCNC: 2.8 MMOL/L (ref 3.5–5.1)
PROT SERPL-MCNC: 5.1 G/DL (ref 6–8.4)
RBC # BLD AUTO: 4.14 M/UL (ref 4–5.4)
SODIUM SERPL-SCNC: 140 MMOL/L (ref 136–145)
WBC # BLD AUTO: 4.7 K/UL (ref 3.9–12.7)

## 2019-04-18 PROCEDURE — 80053 COMPREHEN METABOLIC PANEL: CPT

## 2019-04-18 PROCEDURE — 85025 COMPLETE CBC W/AUTO DIFF WBC: CPT

## 2019-04-18 PROCEDURE — 83735 ASSAY OF MAGNESIUM: CPT

## 2019-04-18 PROCEDURE — 36415 COLL VENOUS BLD VENIPUNCTURE: CPT

## 2019-04-18 NOTE — PROGRESS NOTES
Here because she has been having pain with medication administration in her port.    Vitals:    04/10/19 1447   BP: 128/76   Pulse: 96   Resp: 16   Temp: 98.3 °F (36.8 °C)     Port on right chest, no signs of infection, no swelling  Some tenderness in her shoulder no obviously related to port    A/p    Port x rays reviewed    I do not see an obvious complication with the port.  She has 2 rounds of chemo left.  She says she can tolerate the pain for now.  Will plan to remove after and if needs another will replace.  Suspect pain is musculoskeletal or micro leak from port

## 2019-04-22 ENCOUNTER — TELEPHONE (OUTPATIENT)
Dept: FAMILY MEDICINE | Facility: CLINIC | Age: 54
End: 2019-04-22

## 2019-04-22 ENCOUNTER — OFFICE VISIT (OUTPATIENT)
Dept: HEMATOLOGY/ONCOLOGY | Facility: CLINIC | Age: 54
End: 2019-04-22
Payer: COMMERCIAL

## 2019-04-22 ENCOUNTER — HOSPITAL ENCOUNTER (OUTPATIENT)
Dept: RADIOLOGY | Facility: CLINIC | Age: 54
Discharge: HOME | End: 2019-04-22
Attending: FAMILY MEDICINE
Payer: COMMERCIAL

## 2019-04-22 ENCOUNTER — OFFICE VISIT (OUTPATIENT)
Dept: FAMILY MEDICINE | Facility: CLINIC | Age: 54
End: 2019-04-22
Payer: COMMERCIAL

## 2019-04-22 VITALS
WEIGHT: 143.5 LBS | TEMPERATURE: 98 F | OXYGEN SATURATION: 96 % | DIASTOLIC BLOOD PRESSURE: 62 MMHG | HEART RATE: 98 BPM | BODY MASS INDEX: 30.12 KG/M2 | HEIGHT: 58 IN | RESPIRATION RATE: 20 BRPM | SYSTOLIC BLOOD PRESSURE: 129 MMHG

## 2019-04-22 VITALS
DIASTOLIC BLOOD PRESSURE: 82 MMHG | HEIGHT: 58 IN | BODY MASS INDEX: 30.21 KG/M2 | SYSTOLIC BLOOD PRESSURE: 120 MMHG | TEMPERATURE: 98 F | WEIGHT: 143.94 LBS | HEART RATE: 93 BPM

## 2019-04-22 DIAGNOSIS — E03.9 HYPOTHYROIDISM, UNSPECIFIED TYPE: Primary | ICD-10-CM

## 2019-04-22 DIAGNOSIS — G89.29 CHRONIC RIGHT SHOULDER PAIN: ICD-10-CM

## 2019-04-22 DIAGNOSIS — E87.6 HYPOKALEMIA: ICD-10-CM

## 2019-04-22 DIAGNOSIS — M25.511 CHRONIC RIGHT SHOULDER PAIN: ICD-10-CM

## 2019-04-22 DIAGNOSIS — C22.9 MALIGNANT NEOPLASM OF LIVER, UNSPECIFIED LIVER MALIGNANCY TYPE: ICD-10-CM

## 2019-04-22 DIAGNOSIS — Z51.11 ENCOUNTER FOR ANTINEOPLASTIC CHEMOTHERAPY: ICD-10-CM

## 2019-04-22 DIAGNOSIS — C23 MALIGNANT NEOPLASM OF GALLBLADDER: Primary | ICD-10-CM

## 2019-04-22 DIAGNOSIS — Z23 IMMUNIZATION DUE: ICD-10-CM

## 2019-04-22 DIAGNOSIS — C23 GALLBLADDER CANCER: ICD-10-CM

## 2019-04-22 DIAGNOSIS — Z13.220 SCREENING, LIPID: ICD-10-CM

## 2019-04-22 PROCEDURE — 99999 PR PBB SHADOW E&M-EST. PATIENT-LVL III: CPT | Mod: PBBFAC,,, | Performed by: INTERNAL MEDICINE

## 2019-04-22 PROCEDURE — 3008F PR BODY MASS INDEX (BMI) DOCUMENTED: ICD-10-PCS | Mod: CPTII,S$GLB,, | Performed by: INTERNAL MEDICINE

## 2019-04-22 PROCEDURE — 90670 PCV13 VACCINE IM: CPT | Mod: S$GLB,,, | Performed by: FAMILY MEDICINE

## 2019-04-22 PROCEDURE — 99999 PR PBB SHADOW E&M-EST. PATIENT-LVL III: ICD-10-PCS | Mod: PBBFAC,,, | Performed by: INTERNAL MEDICINE

## 2019-04-22 PROCEDURE — 73030 XR SHOULDER TRAUMA 3 VIEW RIGHT: ICD-10-PCS | Mod: 26,RT,S$GLB, | Performed by: RADIOLOGY

## 2019-04-22 PROCEDURE — 90670 PNEUMOCOCCAL CONJUGATE VACCINE 13-VALENT LESS THAN 5YO & GREATER THAN: ICD-10-PCS | Mod: S$GLB,,, | Performed by: FAMILY MEDICINE

## 2019-04-22 PROCEDURE — 90471 IMMUNIZATION ADMIN: CPT | Mod: S$GLB,,, | Performed by: FAMILY MEDICINE

## 2019-04-22 PROCEDURE — 90471 PNEUMOCOCCAL CONJUGATE VACCINE 13-VALENT LESS THAN 5YO & GREATER THAN: ICD-10-PCS | Mod: S$GLB,,, | Performed by: FAMILY MEDICINE

## 2019-04-22 PROCEDURE — 99214 OFFICE O/P EST MOD 30 MIN: CPT | Mod: 25,S$GLB,, | Performed by: FAMILY MEDICINE

## 2019-04-22 PROCEDURE — 3008F BODY MASS INDEX DOCD: CPT | Mod: CPTII,S$GLB,, | Performed by: FAMILY MEDICINE

## 2019-04-22 PROCEDURE — 73030 X-RAY EXAM OF SHOULDER: CPT | Mod: TC,FY,PO,RT

## 2019-04-22 PROCEDURE — 99999 PR PBB SHADOW E&M-EST. PATIENT-LVL IV: CPT | Mod: PBBFAC,,, | Performed by: FAMILY MEDICINE

## 2019-04-22 PROCEDURE — 3008F BODY MASS INDEX DOCD: CPT | Mod: CPTII,S$GLB,, | Performed by: INTERNAL MEDICINE

## 2019-04-22 PROCEDURE — 99999 PR PBB SHADOW E&M-EST. PATIENT-LVL IV: ICD-10-PCS | Mod: PBBFAC,,, | Performed by: FAMILY MEDICINE

## 2019-04-22 PROCEDURE — 99214 PR OFFICE/OUTPT VISIT, EST, LEVL IV, 30-39 MIN: ICD-10-PCS | Mod: 25,S$GLB,, | Performed by: FAMILY MEDICINE

## 2019-04-22 PROCEDURE — 73030 X-RAY EXAM OF SHOULDER: CPT | Mod: 26,RT,S$GLB, | Performed by: RADIOLOGY

## 2019-04-22 PROCEDURE — 3008F PR BODY MASS INDEX (BMI) DOCUMENTED: ICD-10-PCS | Mod: CPTII,S$GLB,, | Performed by: FAMILY MEDICINE

## 2019-04-22 PROCEDURE — 99215 OFFICE O/P EST HI 40 MIN: CPT | Mod: S$GLB,,, | Performed by: INTERNAL MEDICINE

## 2019-04-22 PROCEDURE — 99215 PR OFFICE/OUTPT VISIT, EST, LEVL V, 40-54 MIN: ICD-10-PCS | Mod: S$GLB,,, | Performed by: INTERNAL MEDICINE

## 2019-04-22 RX ORDER — SODIUM CHLORIDE 0.9 % (FLUSH) 0.9 %
10 SYRINGE (ML) INJECTION
Status: CANCELLED | OUTPATIENT
Start: 2019-04-23

## 2019-04-22 RX ORDER — LEVOTHYROXINE SODIUM 13 UG/1
CAPSULE ORAL
COMMUNITY
End: 2019-05-27

## 2019-04-22 RX ORDER — FUROSEMIDE 40 MG/1
40 TABLET ORAL DAILY PRN
Refills: 2 | COMMUNITY
Start: 2019-04-13 | End: 2019-08-16

## 2019-04-22 RX ORDER — ATROPINE SULFATE 0.4 MG/ML
0.4 INJECTION, SOLUTION ENDOTRACHEAL; INTRAMEDULLARY; INTRAMUSCULAR; INTRAVENOUS; SUBCUTANEOUS ONCE AS NEEDED
Status: CANCELLED | OUTPATIENT
Start: 2019-04-23

## 2019-04-22 RX ORDER — HEPARIN 100 UNIT/ML
500 SYRINGE INTRAVENOUS
Status: CANCELLED | OUTPATIENT
Start: 2019-04-25

## 2019-04-22 RX ORDER — POTASSIUM CHLORIDE 750 MG/1
TABLET, EXTENDED RELEASE ORAL
Refills: 3 | COMMUNITY
Start: 2019-04-13 | End: 2019-08-16

## 2019-04-22 RX ORDER — EPINEPHRINE 0.3 MG/.3ML
0.3 INJECTION SUBCUTANEOUS ONCE AS NEEDED
Status: CANCELLED | OUTPATIENT
Start: 2019-04-23

## 2019-04-22 RX ORDER — DIPHENHYDRAMINE HYDROCHLORIDE 50 MG/ML
50 INJECTION INTRAMUSCULAR; INTRAVENOUS ONCE AS NEEDED
Status: CANCELLED | OUTPATIENT
Start: 2019-04-23

## 2019-04-22 RX ORDER — HEPARIN 100 UNIT/ML
500 SYRINGE INTRAVENOUS
Status: CANCELLED | OUTPATIENT
Start: 2019-04-23

## 2019-04-22 RX ORDER — SODIUM CHLORIDE 0.9 % (FLUSH) 0.9 %
10 SYRINGE (ML) INJECTION
Status: CANCELLED | OUTPATIENT
Start: 2019-04-25

## 2019-04-22 NOTE — PROGRESS NOTES
Subjective:       Patient ID: Faith Byers is a 53 y.o. female.  Gall bladder ca , started modified FOLFIRINOX    HPI:   FINAL PATHOLOGIC DIAGNOSIS  GALLBLADDER/LIVER MASS, BIOPSY:  -Positive for malignancy, invasive squamous cell carcinoma, doing well pain has subsided also follows with [facility].  Here for cycle 4.  Therapy.  Occasional nausea.  Continues to work.  No other complaints    Her [facility] doctor has suggested to delete leucovorin and bolus 5 FU due to her sevree diarrhea. SHe is having more than 10 BM s loose daily  SHe is on oral magnesium and started oral potassium yesterday'  SHe is tired  No pain , no falls  Diarrhea continues she has started imodium   She is here to get cleared for chemo   She had hypokalemia which was replaced     Dr Garner from [facility] wants to hold leucovorin and delete the 5 FU bolus     No complaining today.      Social History     Socioeconomic History    Marital status:      Spouse name: Not on file    Number of children: Not on file    Years of education: Not on file    Highest education level: Not on file   Occupational History    Not on file   Social Needs    Financial resource strain: Not on file    Food insecurity:     Worry: Not on file     Inability: Not on file    Transportation needs:     Medical: Not on file     Non-medical: Not on file   Tobacco Use    Smoking status: Never Smoker    Smokeless tobacco: Never Used   Substance and Sexual Activity    Alcohol use: Yes     Comment: rarely    Drug use: No    Sexual activity: Not Currently   Lifestyle    Physical activity:     Days per week: Not on file     Minutes per session: Not on file    Stress: Not on file   Relationships    Social connections:     Talks on phone: Not on file     Gets together: Not on file     Attends Scientologist service: Not on file     Active member of club or organization: Not on file     Attends meetings of clubs or organizations: Not on file      Relationship status: Not on file   Other Topics Concern    Not on file   Social History Narrative    Not on file     Family History   Problem Relation Age of Onset    Cancer Mother         breast and bone     Breast cancer Mother     Hyperlipidemia Father     ADD / ADHD Daughter         autism    Crohn's disease Paternal Uncle     Heart disease Paternal Uncle         heart transplant     Past Surgical History:   Procedure Laterality Date    ARTHROSCOPY, KNEE, WITH Partial lateral MENISCECTOMY Right 7/12/2018    [facility]    Mqhrek-cjfbxl-os N/A 11/16/2018    [facility]    BREAST BIOPSY      BREAST SURGERY  2001    right biopsy, benign    CHONDROPLASTY,KNEE Medial Femoral Right 7/12/2018    [facility] OR    COLONOSCOPY      COLONOSCOPY N/A 12/5/2014    Performed by Sixto Barrera MD at [facility] ENDO    dental implant      [id number]-MUXR-V-CATH Right 12/14/2018    [facility] by Jur[facility]o [facility] at [facility] OR    KNEE ARTHROSCOPY Right     LIVER BIOPSY  11/16/2018    THYROIDECTOMY  2011    complete     Past Medical History:   Diagnosis Date    Arthritis     Cancer     gallbadder/ liver spots/biopsy    Thyroid disease        Current Outpatient Medications:     apixaban (ELIQUIS) 5 mg Tab, Take 1 tablet (5 mg total) by mouth 2 (two) times daily. To Start after 7 days on 10mg Twice daily., Disp: 60 tablet, Rfl: 3    furosemide (LASIX) 40 MG tablet, Take 40 mg by mouth daily as needed., Disp: , Rfl: 2    levothyroxine (TIROSINT) 150 mcg Cap, Take by mouth., Disp: , Rfl:     lipase-protease-amylase (CREON) 36,000-114,000- 180,000 unit CpDR, Take 72,000-108,000 Units by mouth., Disp: , Rfl:     ondansetron (ZOFRAN-ODT) 8 MG TbDL, Take 8 mg by mouth every 12 (twelve) hours as needed (nausea). , Disp: , Rfl:     pantoprazole (PROTONIX) 40 MG tablet, Take 1 tablet (40 mg total) by mouth once  daily., Disp: 90 tablet, Rfl: 3    potassium chloride SA (K-DUR,KLOR-CON) 10 MEQ tablet, TAKE 1 TABLET BY MOUTH EVERY DAY WITH LASIX, Disp: , Rfl: 3    prochlorperazine (COMPAZINE) 10 MG tablet, Take 1 tablet (10 mg total) by mouth every 6 (six) hours as needed. (Patient taking differently: Take 10 mg by mouth every 6 (six) hours as needed (nausea). ), Disp: 30 tablet, Rfl: 1    terbinafine HCl (LAMISIL) 1 % cream, Apply topically 2 (two) times daily., Disp: 24 g, Rfl: 0    venlafaxine (EFFEXOR-XR) 37.5 MG 24 hr capsule, Take 1 tablet by mouth every morning. , Disp: , Rfl:   Review of patient's allergies indicates:  No Known Allergies    REVIEW OF SYSTEMS:     Review of Systems:  General: Weight gain: No, Weight Loss: No, Fatigue: No  Fever: No, Chills: No, Night Sweats: No, Insomnia: No  Respiratory:  Cough: No, Shortness of Breath: No,   Wheezing: No, Excessive Snoring: No, Coughing up blood: No  Endocrine: Heat Intolerance: No, Cold Intolerance: No,   Excessive Thirst: No, Excessive Hunger: No,   Eyes:  Blurred Vision: No, Double Vision: No,   Light Sensitivity: No, Eye pain: No  Musculoskeletal: Muscle Aches/Pain: No, Joint Pain/Swelling: No, Muscle Cramps: No, Muscle Weakness: No, Neck Pain: No, Back Pain: No   Neurological: Difficulty Walking/Falls: Yes, Headache Migraine: No, Dizziness/Vertigo: No, Fainting: No, Difficulty with Speech: No, Weakness: No  Cardiovascular: Chest Pain: No, Shortness of Breath: No,   Palpitations: No,  Gastrointestinal: Nausea/Vomiting: No, Constipation: No, Diarrhea: better  Bloody Stools: No  ++ odynophagia , no dysphagia  Psych/Cog:  Depression: No, Anxiety: No, Hallucinations: No, Problems Concentrating: No  : Frequent Urination: No, Incontinence: No, Blood of Urine: No, Urinary Infections: No  ENT:Hearing Loss: No, Earache: No, Ringing in Ears: No,   Facial Pain: No, Chronic Congestion: No   Immune: Seasonal Allergies: No, Hives and/or Rashes: No  The remainder of the  review of twelve body systems was reviewed and normal  Skin lesion on chest with round pearly erythematous base     PHYSICAL EXAM:     Vitals:    04/22/19 1038   BP: 129/62   Pulse: 98   Resp: 20   Temp: 97.8 °F (36.6 °C)       Constitutional:  Alert oriented x3 no acute distress   HENT:  Normocephalic atraumatic pupils equal round reactive to light extraocular muscle intact.  Cardiovascular:  Regular rate and rhythm  Pulmonary/Chest:  Clear to auscultate bilaterally  Musculoskeletal: Normal range of motion.  no edema   Lymphadenopathy:    no cervical adenopathy.   Neurological: alert and oriented to person, place, and time.  Strength normal   Skin: Skin is warm and dry.    Psychiatric: normal mood and affect.   behavior is normal.   Vitals reviewed.  CA 19-92.0 - 40.0 U/mL125.0High   CA 19-92.0 - 40.0 U/mL445.0     Laboratory:     CBC:  Lab Results   Component Value Date    WBC 4.70 04/18/2019    RBC 4.14 04/18/2019    HGB 12.2 04/18/2019    HCT 37.7 04/18/2019    MCV 91 04/18/2019    MCH 29.4 04/18/2019    MCHC 32.2 04/18/2019    RDW 21.1 (H) 04/18/2019     04/18/2019    MPV 8.2 (L) 04/18/2019    GRAN 2.2 04/18/2019    GRAN 46.7 04/18/2019    LYMPH 2.1 04/18/2019    LYMPH 45.0 04/18/2019    MONO 0.4 04/18/2019    MONO 7.8 04/18/2019    EOS 0.0 04/18/2019    BASO 0.00 04/18/2019    EOSINOPHIL 0.3 04/18/2019    BASOPHIL 0.2 04/18/2019       CMP  Sodium   Date Value Ref Range Status   04/18/2019 140 136 - 145 mmol/L Final   01/16/2019 134 134 - 144 mmol/L      Potassium   Date Value Ref Range Status   04/18/2019 2.8 (L) 3.5 - 5.1 mmol/L Final     Chloride   Date Value Ref Range Status   04/18/2019 104 95 - 110 mmol/L Final   01/16/2019 97 (L) 98 - 110 mmol/L      CO2   Date Value Ref Range Status   04/18/2019 27 23 - 29 mmol/L Final     Glucose   Date Value Ref Range Status   04/18/2019 92 70 - 110 mg/dL Final   01/16/2019 116 (H) 70 - 99 mg/dL      BUN, Bld   Date Value Ref Range Status   04/18/2019 11 6 - 20  mg/dL Final     Creatinine   Date Value Ref Range Status   04/18/2019 0.8 0.5 - 1.4 mg/dL Final   01/16/2019 0.57 (L) 0.60 - 1.40 mg/dL      Calcium   Date Value Ref Range Status   04/18/2019 8.5 (L) 8.7 - 10.5 mg/dL Final     Total Protein   Date Value Ref Range Status   04/18/2019 5.1 (L) 6.0 - 8.4 g/dL Final     Albumin   Date Value Ref Range Status   04/18/2019 2.4 (L) 3.5 - 5.2 g/dL Final   01/16/2019 2.2 (L) 3.1 - 4.7 g/dL      Total Bilirubin   Date Value Ref Range Status   04/18/2019 0.4 0.1 - 1.0 mg/dL Final     Comment:     For infants and newborns, interpretation of results should be based  on gestational age, weight and in agreement with clinical  observations.  Premature Infant recommended reference ranges:  Up to 24 hours.............<8.0 mg/dL  Up to 48 hours............<12.0 mg/dL  3-5 days..................<15.0 mg/dL  6-29 days.................<15.0 mg/dL       Alkaline Phosphatase   Date Value Ref Range Status   04/18/2019 114 55 - 135 U/L Final     AST   Date Value Ref Range Status   04/18/2019 61 (H) 10 - 40 U/L Final     ALT   Date Value Ref Range Status   04/18/2019 30 10 - 44 U/L Final     Anion Gap   Date Value Ref Range Status   04/18/2019 9 8 - 16 mmol/L Final     eGFR if    Date Value Ref Range Status   04/18/2019 >60 >60 mL/min/1.73 m^2 Final     eGFR if non    Date Value Ref Range Status   04/18/2019 >60 >60 mL/min/1.73 m^2 Final     Comment:     Calculation used to obtain the estimated glomerular filtration  rate (eGFR) is the CKD-EPI equation.          LFT:   Lab Results   Component Value Date    ALT 30 04/18/2019    AST 61 (H) 04/18/2019    ALKPHOS 114 04/18/2019    BILITOT 0.4 04/18/2019 December 2018 CT chest abdomen pelvis  Impression       Gallbladder mass with invasion of the hepatic parenchyma compatible with the patient's known gallbladder neoplasm, and moderately increased in size.  Possible extension to the hepatic flexure of the colon.   Unchanged mild the biliary dilatation.    Development a suspicious lymph node subjacent to the mass.    3 mm left upper lobe nodule which is nonspecific.  Attention on 6-12 month follow-up recommended.    Unchanged hepatic hypodensities, suggestive for cysts.  Continued attention on follow-up recommended     Impression 3/2019 CT       Moderate reduction in size of the patient's known gallbladder mass and associated hepatic parenchymal invasion.  Unchanged scattered small hepatic hypodensities may simply represent cysts however continued attention on follow-up recommended.  No evidence for extrahepatic metastatic disease.       Ca19.9 at [facility] 3819, now 1mproving down to 445.0    Assessment/Plan:         [] Positive for malignancy, invasive squamous cell carcinoma, co-management by [facility].  Oxaliplatin and continue 5 FU.  Omitting Luke ovarian and bolus 5 FU per MD Luis Alberto recommendation due to severe diarrhea.  Patient is here for cycle 8 Therapy.    Follow-up with Dr. Alarcon in 2 weeks    [] Hypokalemia - IV 40 mEq potassium today.  Will hold chemotherapy for today and infuse potassium 40 mEq 1st then patient may return to clinic tomorrow for chemotherapy..      [] Follow-up with [facility]    [] Dictation soft for use in this note.  Expect typographic error

## 2019-04-22 NOTE — PROGRESS NOTES
Identified using name and . Prevnar 13 was administered in left deltoid using sterile technique. No bleeding noted at injection site.

## 2019-04-22 NOTE — TELEPHONE ENCOUNTER
[facility] cant find record. [facility] number transfers me then disconnects. Placed call to Dr Perez office left a detailed voicemail asking for them to fax us pt labs with fax number provided for them

## 2019-04-22 NOTE — PROGRESS NOTES
CHIEF COMPLAINT:  Follow up      HISTORY OF PRESENT ILLNESS:  Faith Byers is a 53 y.o. female who presents to clinic for follow up on her chronic medical conditions.    1. Hypothyroidism: She follows up with Dr. Lyons regularly. She is currently on tirosint 150 mcg daily, a last TSH was 150.    2. She is due for a prevnar 13 due to recent cancer diagnosis.    3. She is due for a screening lipid panel.    4. She has had several month history of pain over her right shoulder. This occurs after getting her port flushed, however she had her port evaluated and they do not believe it is causing the pain. She denies any injury to her shoulder.       REVIEW OF SYSTEMS:  The patient denies any fever, chills, night sweats, headaches, vision changes, difficulty speaking or swallowing, decreased hearing, weight loss, weight gain, chest pain, palpitations, shortness of breath, cough, nausea, vomiting, abdominal pain, dysuria, diarrhea, constipation, hematuria, hematochezia, melena, changes in her hair, skin, nails, numbness or weakness in her extremities, erythema, swelling over any of her joints, myalgia, swollen glands, easy bruising,  symptoms of anxiety or depression.       MEDICATIONS:   Reviewed and/or reconciled in EPIC    ALLERGIES:  Reviewed and/or reconciled in [facility]    PAST MEDICAL/SURGICAL HISTORY:   Past Medical History:   Diagnosis Date    Arthritis     Cancer     gallbadder/ liver spots/biopsy    Thyroid disease       Past Surgical History:   Procedure Laterality Date    ARTHROSCOPY, KNEE, WITH Partial lateral MENISCECTOMY Right 7/12/2018    [facility] OR    [id number]-ivsawr-og N/A 11/16/2018    Performed by [facility] 2ND FLR    BREAST BIOPSY      BREAST SURGERY  2001    right biopsy, benign    CHONDROPLASTY,KNEE Medial Femoral Right 7/12/2018    Performed [facility] at [facility] OR    COLONOSCOPY      COLONOSCOPY N/A 12/5/2014  "   [facility] ENDO    dental implant      [id number]-CATH Right 12/14/2018    Performed by Jurgen [facility]o MD at [facility] OR    KNEE ARTHROSCOPY Right     LIVER BIOPSY  11/16/2018    THYROIDECTOMY  2011    complete       FAMILY HISTORY:    Family History   Problem Relation Age of Onset    Cancer Mother         breast and bone     Breast cancer Mother     Hyperlipidemia Father     ADD / ADHD Daughter         autism    Crohn's disease Paternal Uncle     Heart disease Paternal Uncle         heart transplant       SOCIAL HISTORY:    Social History     Socioeconomic History    Marital status:      Spouse name: Not on file    Number of children: Not on file    Years of education: Not on file    Highest education level: Not on file   Occupational History    Not on file   Social Needs    Financial resource strain: Not on file    Food insecurity:     Worry: Not on file     Inability: Not on file    Transportation needs:     Medical: Not on file     Non-medical: Not on file   Tobacco Use    Smoking status: Never Smoker    Smokeless tobacco: Never Used   Substance and Sexual Activity    Alcohol use: Yes     Comment: rarely    Drug use: No    Sexual activity: Not Currently   Lifestyle    Physical activity:     Days per week: Not on file     Minutes per session: Not on file    Stress: Not on file   Relationships    Social connections:     Talks on phone: Not on file     Gets together: Not on file     Attends Moravian service: Not on file     Active member of club or organization: Not on file     Attends meetings of clubs or organizations: Not on file     Relationship status: Not on file   Other Topics Concern    Not on file   Social History Narrative    Not on file       PHYSICAL EXAM:  VITAL SIGNS:   Vitals:    04/22/19 0808   BP: 120/82   Pulse: 93   Temp: 97.7 °F (36.5 °C)   Weight: 65.3 kg (143 lb 15.4 oz)   Height: 4' 10" (1.473 m)     GENERAL:  Patient " appears well nourished, sitting on exam table, in no acute distress.  HEENT:  Atraumatic, normocephalic, PERRLA, EOMI, no conjunctival injection, sclerae are anicteric, normal external auditory canals,TMs clear b/l, gross hearing intact to whisper, MMM, no oropharygneal erythema or exudate.  NECK:  Supple, normal ROM, trachea is midline , no supraclavicular or cervical LAD or masses palpated.  Thyroid gland not palpable.  CARDIOVASCULAR:  RRR, normal S1 and S2, no m/r/g.  RESPIRATORY:  CTA b/l, no wheezes, rhonchi, rales.  No increased work of breathing, no  use of accessory muscles.  ABDOMEN:  Soft, nontender, nondistended, normoactive bowel sounds in all four quadrants, no rebound or guarding, no HSM or masses palpated.  Normal percussion.  EXTREMITIES:  2+ DP pulses b/l, no edema.  SKIN:  Warm, no lesions on exposed skin.  NEUROMUSCULAR:  Cranial nerves II-XII grossly intact.  Strength is 4+/5 over upper extremity flexors/extensors b/l, 2+ biceps and brachioradialis  reflexes b/l. Normal ROM over right shoulder. Negative provocative testing. No clubbing or cyanosis of digits/nails.  Steady gait.  PSYCH:  Patient is alert and oriented to person, time, place. They are appropriately dressed and groomed. There is normal eye contact. Rate and tone of speech is normal. Normal insight, judgement. Normal thought content and process.     LABORATORY/IMAGING STUDIES: pending     ASSESSMENT/PLAN: This is a 53 y.o. female who presents to clinic for evaluation of the following concerns.  1. Hypothyroidism, unspecified type  See below    2. Immunization due  - Pneumococcal Conjugate Vaccine (13 Valent) (IM)    3. Screening, lipid  - Lipid panel; Future    4. Chronic right shoulder pain  - X-Ray Shoulder Trauma 3 view Right; Future  -consider referral to orthopedics.             Melanie Moreno MD

## 2019-04-23 ENCOUNTER — TELEPHONE (OUTPATIENT)
Dept: HEMATOLOGY/ONCOLOGY | Facility: CLINIC | Age: 54
End: 2019-04-23

## 2019-04-23 ENCOUNTER — PATIENT MESSAGE (OUTPATIENT)
Dept: HEMATOLOGY/ONCOLOGY | Facility: CLINIC | Age: 54
End: 2019-04-23

## 2019-04-29 RX ORDER — HEPARIN 100 UNIT/ML
500 SYRINGE INTRAVENOUS
Status: CANCELLED | OUTPATIENT
Start: 2019-04-29

## 2019-04-29 RX ORDER — SODIUM CHLORIDE 0.9 % (FLUSH) 0.9 %
10 SYRINGE (ML) INJECTION
Status: CANCELLED | OUTPATIENT
Start: 2019-04-29

## 2019-05-05 ENCOUNTER — LAB VISIT (OUTPATIENT)
Dept: LAB | Facility: HOSPITAL | Age: 54
End: 2019-05-05
Attending: INTERNAL MEDICINE
Payer: COMMERCIAL

## 2019-05-05 DIAGNOSIS — Z09 CHEMOTHERAPY FOLLOW-UP EXAMINATION: ICD-10-CM

## 2019-05-05 DIAGNOSIS — D70.1 LEUKOPENIA DUE TO ANTINEOPLASTIC CHEMOTHERAPY: ICD-10-CM

## 2019-05-05 DIAGNOSIS — C23 GALLBLADDER CANCER: ICD-10-CM

## 2019-05-05 DIAGNOSIS — T45.1X5A LEUKOPENIA DUE TO ANTINEOPLASTIC CHEMOTHERAPY: ICD-10-CM

## 2019-05-05 DIAGNOSIS — D64.81 ANEMIA ASSOCIATED WITH CHEMOTHERAPY: ICD-10-CM

## 2019-05-05 DIAGNOSIS — T45.1X5A ANEMIA ASSOCIATED WITH CHEMOTHERAPY: ICD-10-CM

## 2019-05-05 LAB
ALBUMIN SERPL BCP-MCNC: 2.7 G/DL (ref 3.5–5.2)
ALP SERPL-CCNC: 121 U/L (ref 55–135)
ALT SERPL W/O P-5'-P-CCNC: 40 U/L (ref 10–44)
ANION GAP SERPL CALC-SCNC: 10 MMOL/L (ref 8–16)
AST SERPL-CCNC: 72 U/L (ref 10–40)
BASOPHILS # BLD AUTO: 0 K/UL (ref 0–0.2)
BASOPHILS NFR BLD: 0.6 % (ref 0–1.9)
BILIRUB SERPL-MCNC: 0.4 MG/DL (ref 0.1–1)
BUN SERPL-MCNC: 10 MG/DL (ref 6–20)
CALCIUM SERPL-MCNC: 9.2 MG/DL (ref 8.7–10.5)
CHLORIDE SERPL-SCNC: 104 MMOL/L (ref 95–110)
CO2 SERPL-SCNC: 25 MMOL/L (ref 23–29)
CREAT SERPL-MCNC: 0.8 MG/DL (ref 0.5–1.4)
DIFFERENTIAL METHOD: ABNORMAL
EOSINOPHIL # BLD AUTO: 0.1 K/UL (ref 0–0.5)
EOSINOPHIL NFR BLD: 0.9 % (ref 0–8)
ERYTHROCYTE [DISTWIDTH] IN BLOOD BY AUTOMATED COUNT: 19.8 % (ref 11.5–14.5)
EST. GFR  (AFRICAN AMERICAN): >60 ML/MIN/1.73 M^2
EST. GFR  (NON AFRICAN AMERICAN): >60 ML/MIN/1.73 M^2
GLUCOSE SERPL-MCNC: 96 MG/DL (ref 70–110)
HCT VFR BLD AUTO: 35.7 % (ref 37–48.5)
HGB BLD-MCNC: 11.5 G/DL (ref 12–16)
LYMPHOCYTES # BLD AUTO: 2 K/UL (ref 1–4.8)
LYMPHOCYTES NFR BLD: 31.1 % (ref 18–48)
MCH RBC QN AUTO: 30.3 PG (ref 27–31)
MCHC RBC AUTO-ENTMCNC: 32.3 G/DL (ref 32–36)
MCV RBC AUTO: 94 FL (ref 82–98)
MONOCYTES # BLD AUTO: 0.6 K/UL (ref 0.3–1)
MONOCYTES NFR BLD: 9.2 % (ref 4–15)
NEUTROPHILS # BLD AUTO: 3.7 K/UL (ref 1.8–7.7)
NEUTROPHILS NFR BLD: 58.2 % (ref 38–73)
PLATELET # BLD AUTO: 136 K/UL (ref 150–350)
PMV BLD AUTO: 8.3 FL (ref 9.2–12.9)
POTASSIUM SERPL-SCNC: 3.4 MMOL/L (ref 3.5–5.1)
PROT SERPL-MCNC: 5.9 G/DL (ref 6–8.4)
RBC # BLD AUTO: 3.81 M/UL (ref 4–5.4)
SODIUM SERPL-SCNC: 139 MMOL/L (ref 136–145)
WBC # BLD AUTO: 6.4 K/UL (ref 3.9–12.7)

## 2019-05-05 PROCEDURE — 36415 COLL VENOUS BLD VENIPUNCTURE: CPT

## 2019-05-05 PROCEDURE — 85025 COMPLETE CBC W/AUTO DIFF WBC: CPT

## 2019-05-05 PROCEDURE — 80053 COMPREHEN METABOLIC PANEL: CPT

## 2019-05-06 ENCOUNTER — OFFICE VISIT (OUTPATIENT)
Dept: HEMATOLOGY/ONCOLOGY | Facility: CLINIC | Age: 54
End: 2019-05-06
Payer: COMMERCIAL

## 2019-05-06 VITALS
HEART RATE: 85 BPM | TEMPERATURE: 98 F | SYSTOLIC BLOOD PRESSURE: 134 MMHG | BODY MASS INDEX: 28.69 KG/M2 | WEIGHT: 136.69 LBS | DIASTOLIC BLOOD PRESSURE: 71 MMHG | RESPIRATION RATE: 20 BRPM | HEIGHT: 58 IN | OXYGEN SATURATION: 100 %

## 2019-05-06 DIAGNOSIS — E03.9 HYPOTHYROIDISM, UNSPECIFIED TYPE: Primary | ICD-10-CM

## 2019-05-06 DIAGNOSIS — I26.99 BILATERAL PULMONARY EMBOLISM: ICD-10-CM

## 2019-05-06 DIAGNOSIS — F41.1 GAD (GENERALIZED ANXIETY DISORDER): ICD-10-CM

## 2019-05-06 DIAGNOSIS — C23 MALIGNANT NEOPLASM OF GALLBLADDER: ICD-10-CM

## 2019-05-06 DIAGNOSIS — K21.9 GASTROESOPHAGEAL REFLUX DISEASE WITHOUT ESOPHAGITIS: ICD-10-CM

## 2019-05-06 DIAGNOSIS — C22.9 MALIGNANT NEOPLASM OF LIVER, UNSPECIFIED LIVER MALIGNANCY TYPE: ICD-10-CM

## 2019-05-06 DIAGNOSIS — D64.9 ANEMIA, UNSPECIFIED TYPE: ICD-10-CM

## 2019-05-06 DIAGNOSIS — Z79.01 ANTICOAGULANT LONG-TERM USE: ICD-10-CM

## 2019-05-06 DIAGNOSIS — Z51.11 ENCOUNTER FOR ANTINEOPLASTIC CHEMOTHERAPY: ICD-10-CM

## 2019-05-06 PROCEDURE — 3008F PR BODY MASS INDEX (BMI) DOCUMENTED: ICD-10-PCS | Mod: CPTII,S$GLB,, | Performed by: INTERNAL MEDICINE

## 2019-05-06 PROCEDURE — 99215 PR OFFICE/OUTPT VISIT, EST, LEVL V, 40-54 MIN: ICD-10-PCS | Mod: S$GLB,,, | Performed by: INTERNAL MEDICINE

## 2019-05-06 PROCEDURE — 3008F BODY MASS INDEX DOCD: CPT | Mod: CPTII,S$GLB,, | Performed by: INTERNAL MEDICINE

## 2019-05-06 PROCEDURE — 99999 PR PBB SHADOW E&M-EST. PATIENT-LVL IV: ICD-10-PCS | Mod: PBBFAC,,, | Performed by: INTERNAL MEDICINE

## 2019-05-06 PROCEDURE — 99999 PR PBB SHADOW E&M-EST. PATIENT-LVL IV: CPT | Mod: PBBFAC,,, | Performed by: INTERNAL MEDICINE

## 2019-05-06 PROCEDURE — 99215 OFFICE O/P EST HI 40 MIN: CPT | Mod: S$GLB,,, | Performed by: INTERNAL MEDICINE

## 2019-05-06 RX ORDER — LEVOTHYROXINE SODIUM 13 UG/1
CAPSULE ORAL
COMMUNITY
Start: 2019-04-18 | End: 2019-05-27 | Stop reason: SDUPTHER

## 2019-05-06 RX ORDER — EPINEPHRINE 0.3 MG/.3ML
0.3 INJECTION SUBCUTANEOUS ONCE AS NEEDED
Status: CANCELLED | OUTPATIENT
Start: 2019-05-07

## 2019-05-06 RX ORDER — FUROSEMIDE 40 MG/1
TABLET ORAL
COMMUNITY
Start: 2019-04-13 | End: 2019-05-27 | Stop reason: SDUPTHER

## 2019-05-06 RX ORDER — HEPARIN 100 UNIT/ML
500 SYRINGE INTRAVENOUS
Status: CANCELLED | OUTPATIENT
Start: 2019-05-07

## 2019-05-06 RX ORDER — POTASSIUM CHLORIDE 750 MG/1
TABLET, EXTENDED RELEASE ORAL
COMMUNITY
Start: 2019-04-13 | End: 2019-05-27 | Stop reason: SDUPTHER

## 2019-05-06 RX ORDER — ATROPINE SULFATE 0.4 MG/ML
0.4 INJECTION, SOLUTION ENDOTRACHEAL; INTRAMEDULLARY; INTRAMUSCULAR; INTRAVENOUS; SUBCUTANEOUS ONCE AS NEEDED
Status: CANCELLED | OUTPATIENT
Start: 2019-05-07

## 2019-05-06 RX ORDER — DIPHENHYDRAMINE HYDROCHLORIDE 50 MG/ML
50 INJECTION INTRAMUSCULAR; INTRAVENOUS ONCE AS NEEDED
Status: CANCELLED | OUTPATIENT
Start: 2019-05-07

## 2019-05-06 RX ORDER — PANTOPRAZOLE SODIUM 40 MG/1
40 TABLET, DELAYED RELEASE ORAL
COMMUNITY
Start: 2019-03-12 | End: 2019-08-16

## 2019-05-06 RX ORDER — LANOLIN ALCOHOL/MO/W.PET/CERES
400 CREAM (GRAM) TOPICAL
COMMUNITY
End: 2019-08-29 | Stop reason: ALTCHOICE

## 2019-05-06 RX ORDER — HEPARIN 100 UNIT/ML
500 SYRINGE INTRAVENOUS
Status: CANCELLED | OUTPATIENT
Start: 2019-05-09

## 2019-05-06 RX ORDER — SODIUM CHLORIDE 0.9 % (FLUSH) 0.9 %
10 SYRINGE (ML) INJECTION
Status: CANCELLED | OUTPATIENT
Start: 2019-05-07

## 2019-05-06 RX ORDER — SODIUM CHLORIDE 0.9 % (FLUSH) 0.9 %
10 SYRINGE (ML) INJECTION
Status: CANCELLED | OUTPATIENT
Start: 2019-05-09

## 2019-05-06 NOTE — PROGRESS NOTES
Subjective:       Patient ID: Faith Byers is a 53 y.o. female.  Gall bladder ca , started modified FOLFIRINOX    HPI:   FINAL PATHOLOGIC DIAGNOSIS  GALLBLADDER/LIVER MASS, BIOPSY:  -Positive for malignancy, invasive squamous cell carcinoma, doing well pain has subsided also follows with [facility].  Here for cycle 4.  Therapy.  Occasional nausea.  Continues to work.  No other complaints    Seen at [facility] doctor   Modified leucovorin and bolus 5 FU due to her sevree diarrhea.    No pain , no falls  Diarrhea continues she has continued imodium   She is here to get cleared for chemo     Dr Garner from [facility] wants to hold leucovorin and delete the 5 FU bolus   No complications   Pt feels exhausted   On potassium replacement now and diuretic   No depression today  No falls no pain now   Was having pain this am   Social History     Socioeconomic History    Marital status:      Spouse name: Not on file    Number of children: Not on file    Years of education: Not on file    Highest education level: Not on file   Occupational History    Not on file   Social Needs    Financial resource strain: Not on file    Food insecurity:     Worry: Not on file     Inability: Not on file    Transportation needs:     Medical: Not on file     Non-medical: Not on file   Tobacco Use    Smoking status: Never Smoker    Smokeless tobacco: Never Used   Substance and Sexual Activity    Alcohol use: Yes     Comment: rarely    Drug use: No    Sexual activity: Not Currently   Lifestyle    Physical activity:     Days per week: Not on file     Minutes per session: Not on file    Stress: Not on file   Relationships    Social connections:     Talks on phone: Not on file     Gets together: Not on file     Attends Jain service: Not on file     Active member of club or organization: Not on file     Attends meetings of clubs or organizations: Not on file     Relationship status: Not on file   Other Topics  Concern    Not on file   Social History Narrative    Not on file     Family History   Problem Relation Age of Onset    Cancer Mother         breast and bone     Breast cancer Mother     Hyperlipidemia Father     ADD / ADHD Daughter         autism    Crohn's disease Paternal Uncle     Heart disease Paternal Uncle         heart transplant     Past Surgical History:   Procedure Laterality Date    ARTHROSCOPY, KNEE, WITH Partial lateral MENISCECTOMY Right 7/12/2018    Performed by Huey Kiran MD at [facility] OR    Msvkmh-[id number]-fl N/A 11/16/2018    Performed by [facility]    BREAST BIOPSY      BREAST SURGERY  2001    right biopsy, benign    CHONDROPLASTY,KNEE Medial Femoral Right 7/12/2018    Performed by [facility] Kiran [facility] at [facility] OR    COLONOSCOPY      COLONOSCOPY N/A 12/5/2014    Performed by Sixto Barrera MD at [facility] ENDO    dental implant      [id number] Right 12/14/2018    Performed by Jurgen Toro MD at [facility] OR    KNEE ARTHROSCOPY Right     LIVER BIOPSY  11/16/2018    THYROIDECTOMY  2011    complete     Past Medical History:   Diagnosis Date    Arthritis     Cancer     gallbadder/ liver spots/biopsy    Thyroid disease        Current Outpatient Medications:     apixaban (ELIQUIS) 5 mg Tab, Take 1 tablet (5 mg total) by mouth 2 (two) times daily. To Start after 7 days on 10mg Twice daily., Disp: 60 tablet, Rfl: 3    furosemide (LASIX) 40 MG tablet, Take 40 mg by mouth daily as needed., Disp: , Rfl: 2    furosemide (LASIX) 40 MG tablet, daily., Disp: , Rfl:     levothyroxine (TIROSINT) 150 mcg Cap, Take by mouth., Disp: , Rfl:     levothyroxine (TIROSINT) 150 mcg Cap, Take by mouth., Disp: , Rfl:     lipase-protease-amylase (CREON) 36,000-114,000- 180,000 unit CpDR, Take 72,000-108,000 Units by mouth., Disp: , Rfl:     magnesium oxide (MAG-OX) 400 mg (241.3 mg magnesium) tablet, Take 400 mg by mouth., Disp: , Rfl:     ondansetron  (ZOFRAN-ODT) 8 MG TbDL, Take 8 mg by mouth every 12 (twelve) hours as needed (nausea). , Disp: , Rfl:     pantoprazole (PROTONIX) 40 MG tablet, Take 1 tablet (40 mg total) by mouth once daily., Disp: 90 tablet, Rfl: 3    pantoprazole (PROTONIX) 40 MG tablet, Take 40 mg by mouth., Disp: , Rfl:     potassium chloride SA (K-DUR,KLOR-CON) 10 MEQ tablet, TAKE 1 TABLET BY MOUTH EVERY DAY WITH LASIX, Disp: , Rfl: 3    potassium chloride SA (K-DUR,KLOR-CON) 10 MEQ tablet, daily., Disp: , Rfl:     prochlorperazine (COMPAZINE) 10 MG tablet, Take 1 tablet (10 mg total) by mouth every 6 (six) hours as needed. (Patient taking differently: Take 10 mg by mouth every 6 (six) hours as needed (nausea). ), Disp: 30 tablet, Rfl: 1    terbinafine HCl (LAMISIL) 1 % cream, Apply topically 2 (two) times daily., Disp: 24 g, Rfl: 0    venlafaxine (EFFEXOR-XR) 37.5 MG 24 hr capsule, Take 1 tablet by mouth every morning. , Disp: , Rfl:   Review of patient's allergies indicates:  No Known Allergies    REVIEW OF SYSTEMS:     Review of Systems:  General: Weight gain: No, Weight Loss: No, Fatigue: No  Fever: No, Chills: No, Night Sweats: No, Insomnia: No  Respiratory:  Cough: No, Shortness of Breath: No,   Wheezing: No, Excessive Snoring: No, Coughing up blood: No  Endocrine: Heat Intolerance: No, Cold Intolerance: No,   Excessive Thirst: No, Excessive Hunger: No,   Eyes:  Blurred Vision: No, Double Vision: No,   Light Sensitivity: No, Eye pain: No  Musculoskeletal: Muscle Aches/Pain: No, Joint Pain/Swelling: No, Muscle Cramps: No, Muscle Weakness: No  Neurological: Difficulty Walking/Falls: no    Headache Migraine: No, Dizziness/Vertigo: No, Fainting: No  Cardiovascular: Chest Pain: No, Shortness of Breath: No,   Palpitations: No,  Gastrointestinal: Nausea/Vomiting: No, Constipation: No, Diarrhea: better  Bloody Stools: No  ++ odynophagia , no dysphagia  Psych/Cog:  Depression: No, Anxiety: No, Hallucinations: No  : Frequent Urination:  No, Incontinence: No, Blood of Urine: No  ENT:Hearing Loss: No, Earache: No, Ringing in Ears: No,   Facial Pain: No, Chronic Congestion: No   Immune: Seasonal Allergies: No, Hives and/or Rashes: No      PHYSICAL EXAM:     Vitals:    05/06/19 0924   BP: 134/71   Pulse: 85   Resp: 20   Temp: 97.7 °F (36.5 °C)       Constitutional: oriented to person, place, and time.  well-developed and well-nourished.   HENT:   Head: Normocephalic and atraumatic.   Right Ear: External ear normal. Left Ear: External ear normal.   Nose: Nose normal.   Mouth/Throat: Oropharynx is clear and moist.   Eyes: Conjunctivae and EOM are normal. Pupils are equal, round  Neck: Normal range of motion. Neck supple. No thyromegaly present.   Cardiovascular: Normal rate, regular rhythm, normal heart sounds   No murmur heard.  Pulmonary/Chest: Effort normal and breath sounds normal.  no wheezes.   Abdominal: Soft. Bowel sounds are normal.  no mass. There is no tenderness.   Musculoskeletal: Normal range of motion.  no edema or tenderness.   Lymphadenopathy:    no cervical adenopathy.   Neurological: alert and oriented to person, place, and time.  Skin: Skin is warm and dry. No rash noted.   Psychiatric: normal mood and affect.   behavior is normal.   Vitals reviewed.      Laboratory:     CBC:  Lab Results   Component Value Date    WBC 6.40 05/05/2019    RBC 3.81 (L) 05/05/2019    HGB 11.5 (L) 05/05/2019    HCT 35.7 (L) 05/05/2019    MCV 94 05/05/2019    MCH 30.3 05/05/2019    MCHC 32.3 05/05/2019    RDW 19.8 (H) 05/05/2019     (L) 05/05/2019    MPV 8.3 (L) 05/05/2019    GRAN 3.7 05/05/2019    GRAN 58.2 05/05/2019    LYMPH 2.0 05/05/2019    LYMPH 31.1 05/05/2019    MONO 0.6 05/05/2019    MONO 9.2 05/05/2019    EOS 0.1 05/05/2019    BASO 0.00 05/05/2019    EOSINOPHIL 0.9 05/05/2019    BASOPHIL 0.6 05/05/2019       CMP  Sodium   Date Value Ref Range Status   05/05/2019 139 136 - 145 mmol/L Final   01/16/2019 134 134 - 144 mmol/L      Potassium    Date Value Ref Range Status   05/05/2019 3.4 (L) 3.5 - 5.1 mmol/L Final     Chloride   Date Value Ref Range Status   05/05/2019 104 95 - 110 mmol/L Final   01/16/2019 97 (L) 98 - 110 mmol/L      CO2   Date Value Ref Range Status   05/05/2019 25 23 - 29 mmol/L Final     Glucose   Date Value Ref Range Status   05/05/2019 96 70 - 110 mg/dL Final   01/16/2019 116 (H) 70 - 99 mg/dL      BUN, Bld   Date Value Ref Range Status   05/05/2019 10 6 - 20 mg/dL Final     Creatinine   Date Value Ref Range Status   05/05/2019 0.8 0.5 - 1.4 mg/dL Final   01/16/2019 0.57 (L) 0.60 - 1.40 mg/dL      Calcium   Date Value Ref Range Status   05/05/2019 9.2 8.7 - 10.5 mg/dL Final     Total Protein   Date Value Ref Range Status   05/05/2019 5.9 (L) 6.0 - 8.4 g/dL Final     Albumin   Date Value Ref Range Status   05/05/2019 2.7 (L) 3.5 - 5.2 g/dL Final   01/16/2019 2.2 (L) 3.1 - 4.7 g/dL      Total Bilirubin   Date Value Ref Range Status   05/05/2019 0.4 0.1 - 1.0 mg/dL Final     Comment:     For infants and newborns, interpretation of results should be based  on gestational age, weight and in agreement with clinical  observations.  Premature Infant recommended reference ranges:  Up to 24 hours.............<8.0 mg/dL  Up to 48 hours............<12.0 mg/dL  3-5 days..................<15.0 mg/dL  6-29 days.................<15.0 mg/dL       Alkaline Phosphatase   Date Value Ref Range Status   05/05/2019 121 55 - 135 U/L Final     AST   Date Value Ref Range Status   05/05/2019 72 (H) 10 - 40 U/L Final     ALT   Date Value Ref Range Status   05/05/2019 40 10 - 44 U/L Final     Anion Gap   Date Value Ref Range Status   05/05/2019 10 8 - 16 mmol/L Final     eGFR if    Date Value Ref Range Status   05/05/2019 >60 >60 mL/min/1.73 m^2 Final     eGFR if non    Date Value Ref Range Status   05/05/2019 >60 >60 mL/min/1.73 m^2 Final     Comment:     Calculation used to obtain the estimated glomerular filtration  rate  (eGFR) is the CKD-EPI equation.          LFT:   Lab Results   Component Value Date    ALT 40 05/05/2019    AST 72 (H) 05/05/2019    ALKPHOS 121 05/05/2019    BILITOT 0.4 05/05/2019 December 2018 CT chest abdomen pelvis  Impression       Gallbladder mass with invasion of the hepatic parenchyma compatible with the patient's known gallbladder neoplasm, and moderately increased in size.  Possible extension to the hepatic flexure of the colon.  Unchanged mild the biliary dilatation.    Development a suspicious lymph node subjacent to the mass.    3 mm left upper lobe nodule which is nonspecific.  Attention on 6-12 month follow-up recommended.    Unchanged hepatic hypodensities, suggestive for cysts.  Continued attention on follow-up recommended     Impression 3/2019 CT       Moderate reduction in size of the patient's known gallbladder mass and associated hepatic parenchymal invasion.  Unchanged scattered small hepatic hypodensities may simply represent cysts however continued attention on follow-up recommended.  No evidence for extrahepatic metastatic disease.       Ca19.9 at [facility] 3819, now 1mproving down to 445.0    Assessment/Plan:         Hypothyroidism, unspecified type    Malignant neoplasm of liver, unspecified liver malignancy type    Malignant neoplasm of gallbladder  -     CBC auto differential; Future; Expected date: 05/06/2019  -     CMP; Future; Expected date: 05/06/2019    Encounter for antineoplastic chemotherapy    Anemia, unspecified type    Gastroesophageal reflux disease without esophagitis    Bilateral pulmonary embolism    Anticoagulant long-term use    CARINA (generalized anxiety disorder)    Other orders  -     palonosetron (ALOXI) 0.25 mg, dexamethasone (DECADRON) 12 mg in sodium chloride 0.9 % 50 mL IVPB  -     EPINEPHrine (EPIPEN) 0.3 mg/0.3 mL pen injection 0.3 mg  -     diphenhydrAMINE injection 50 mg  -     hydrocortisone sodium succinate injection 100 mg  -     oxaliplatin (ELOXATIN) 85  mg/m2 = 143 mg in dextrose 5 % 500 mL chemo infusion  -     fluorouracil (ADRUCIL) 2,400 mg/m2 = 4,030 mg in sodium chloride 0.9% 180.6 mL chemo infusion  -     atropine injection 0.4 mg  -     dextrose 5 % 250 mL flush bag  -     sodium chloride 0.9% 100 mL flush bag  -     sodium chloride 0.9% flush 10 mL  -     heparin, porcine (PF) 100 unit/mL injection flush 500 Units  -     alteplase injection 2 mg  -     sodium chloride 0.9% flush 10 mL  -     heparin, porcine (PF) 100 unit/mL injection flush 500 Units  -     alteplase injection 2 mg    anemia and thrombocytopenia stable : no bleeding no need for transfusions  CLEARED for chemo   Cont eliquis  See Dr Hawley for thyroid   Increase exercise : start weight lifting     Increase to 2 potassium daily

## 2019-05-07 ENCOUNTER — OFFICE VISIT (OUTPATIENT)
Dept: ORTHOPEDICS | Facility: CLINIC | Age: 54
End: 2019-05-07
Payer: COMMERCIAL

## 2019-05-07 VITALS — WEIGHT: 136.69 LBS | HEIGHT: 58 IN | BODY MASS INDEX: 28.69 KG/M2

## 2019-05-07 DIAGNOSIS — M25.511 ACUTE PAIN OF RIGHT SHOULDER: Primary | ICD-10-CM

## 2019-05-07 PROCEDURE — 3008F BODY MASS INDEX DOCD: CPT | Mod: CPTII,S$GLB,, | Performed by: ORTHOPAEDIC SURGERY

## 2019-05-07 PROCEDURE — 3008F PR BODY MASS INDEX (BMI) DOCUMENTED: ICD-10-PCS | Mod: CPTII,S$GLB,, | Performed by: ORTHOPAEDIC SURGERY

## 2019-05-07 PROCEDURE — 20610 LARGE JOINT ASPIRATION/INJECTION: R SUBACROMIAL BURSA: ICD-10-PCS | Mod: RT,S$GLB,, | Performed by: ORTHOPAEDIC SURGERY

## 2019-05-07 PROCEDURE — 99999 PR PBB SHADOW E&M-EST. PATIENT-LVL II: CPT | Mod: PBBFAC,,, | Performed by: ORTHOPAEDIC SURGERY

## 2019-05-07 PROCEDURE — 20610 DRAIN/INJ JOINT/BURSA W/O US: CPT | Mod: RT,S$GLB,, | Performed by: ORTHOPAEDIC SURGERY

## 2019-05-07 PROCEDURE — 99999 PR PBB SHADOW E&M-EST. PATIENT-LVL II: ICD-10-PCS | Mod: PBBFAC,,, | Performed by: ORTHOPAEDIC SURGERY

## 2019-05-07 PROCEDURE — 99214 PR OFFICE/OUTPT VISIT, EST, LEVL IV, 30-39 MIN: ICD-10-PCS | Mod: 25,S$GLB,, | Performed by: ORTHOPAEDIC SURGERY

## 2019-05-07 PROCEDURE — 99214 OFFICE O/P EST MOD 30 MIN: CPT | Mod: 25,S$GLB,, | Performed by: ORTHOPAEDIC SURGERY

## 2019-05-07 RX ORDER — TRIAMCINOLONE ACETONIDE 40 MG/ML
40 INJECTION, SUSPENSION INTRA-ARTICULAR; INTRAMUSCULAR
Status: DISCONTINUED | OUTPATIENT
Start: 2019-05-07 | End: 2019-05-08 | Stop reason: HOSPADM

## 2019-05-07 RX ADMIN — TRIAMCINOLONE ACETONIDE 40 MG: 40 INJECTION, SUSPENSION INTRA-ARTICULAR; INTRAMUSCULAR at 10:05

## 2019-05-08 NOTE — PROCEDURES
Large Joint Aspiration/Injection: R subacromial bursa  Date/Time: 5/7/2019 10:50 PM  Performed by: Huey Kiran MD  Authorized by: Huey Kiran MD     Consent Done?:  Yes (Verbal)  Indications:  Pain  Timeout: Prior to procedure the correct patient, procedure, and site was verified      Location:  Shoulder  Site:  R subacromial bursa  Prep: Patient was prepped and draped in usual sterile fashion    Approach:  Lateral  Medications:  40 mg triamcinolone acetonide 40 mg/mL

## 2019-05-08 NOTE — PROGRESS NOTES
Past Medical History:   Diagnosis Date    Arthritis     Cancer     gallbadder/ liver spots/biopsy    Thyroid disease        Past Surgical History:   Procedure Laterality Date    ARTHROSCOPY, KNEE, WITH Partial lateral MENISCECTOMY Right 7/12/2018    Performed by [facility]an [facility] at [facility] OR    Sticow-felzhm-no N/A 11/16/2018    Performed by [facility] Provider at [facility] OR 2ND FLR    BREAST BIOPSY      BREAST SURGERY  2001    right biopsy, benign    CHONDROPLASTY,KNEE Medial Femoral Right 7/12/2018    Performed by [facility]an [facility] at [facility] OR    COLONOSCOPY      COLONOSCOPY N/A 12/5/2014    Performed by Sixto Barrera MD at [facility] ENDO    dental implant      [id number]-CATH Right 12/14/2018    Performed by Jurgen Toro MD at [facility] OR    KNEE ARTHROSCOPY Right     LIVER BIOPSY  11/16/2018    THYROIDECTOMY  2011    complete       Current Outpatient Medications   Medication Sig    apixaban (ELIQUIS) 5 mg Tab Take 1 tablet (5 mg total) by mouth 2 (two) times daily. To Start after 7 days on 10mg Twice daily.    furosemide (LASIX) 40 MG tablet Take 40 mg by mouth daily as needed.    furosemide (LASIX) 40 MG tablet daily.    levothyroxine (TIROSINT) 150 mcg Cap Take by mouth.    levothyroxine (TIROSINT) 150 mcg Cap Take by mouth.    lipase-protease-amylase (CREON) 36,000-114,000- 180,000 unit CpDR Take 72,000-108,000 Units by mouth.    magnesium oxide (MAG-OX) 400 mg (241.3 mg magnesium) tablet Take 400 mg by mouth.    ondansetron (ZOFRAN-ODT) 8 MG TbDL Take 8 mg by mouth every 12 (twelve) hours as needed (nausea).     pantoprazole (PROTONIX) 40 MG tablet Take 1 tablet (40 mg total) by mouth once daily.    pantoprazole (PROTONIX) 40 MG tablet Take 40 mg by mouth.    potassium chloride SA (K-DUR,KLOR-CON) 10 MEQ tablet TAKE 1 TABLET BY MOUTH EVERY DAY WITH LASIX    potassium chloride SA (K-DUR,KLOR-CON) 10 MEQ tablet daily.    prochlorperazine (COMPAZINE) 10 MG tablet Take 1  tablet (10 mg total) by mouth every 6 (six) hours as needed. (Patient taking differently: Take 10 mg by mouth every 6 (six) hours as needed (nausea). )    terbinafine HCl (LAMISIL) 1 % cream Apply topically 2 (two) times daily.    venlafaxine (EFFEXOR-XR) 37.5 MG 24 hr capsule Take 1 tablet by mouth every morning.      No current facility-administered medications for this visit.        Review of patient's allergies indicates:   Allergen Reactions    Camptosar [irinotecan] Other (See Comments)     Slurred speech       Family History   Problem Relation Age of Onset    Cancer Mother         breast and bone     Breast cancer Mother     Hyperlipidemia Father     ADD / ADHD Daughter         autism    Crohn's disease Paternal Uncle     Heart disease Paternal Uncle         heart transplant       Social History     Socioeconomic History    Marital status:      Spouse name: Not on file    Number of children: Not on file    Years of education: Not on file    Highest education level: Not on file   Occupational History    Not on file   Social Needs    Financial resource strain: Not on file    Food insecurity:     Worry: Not on file     Inability: Not on file    Transportation needs:     Medical: Not on file     Non-medical: Not on file   Tobacco Use    Smoking status: Never Smoker    Smokeless tobacco: Never Used   Substance and Sexual Activity    Alcohol use: Yes     Comment: rarely    Drug use: No    Sexual activity: Not Currently   Lifestyle    Physical activity:     Days per week: Not on file     Minutes per session: Not on file    Stress: Not on file   Relationships    Social connections:     Talks on phone: Not on file     Gets together: Not on file     Attends Episcopal service: Not on file     Active member of club or organization: Not on file     Attends meetings of clubs or organizations: Not on file     Relationship status: Not on file   Other Topics Concern    Not on file   Social  "History Narrative    Not on file       Chief Complaint:   Chief Complaint   Patient presents with    Right Shoulder - Pain       Consulting Physician: No ref. provider found    History of present illness:    This is a 53 y.o. female who complains of new right shoulder pain for 4-6 weeks. Worse after infusions and with overhead. Pain 6/10. Denies injury.    Review of Systems:    Constitution: Denies chills, fever, and sweats.  HENT: Denies headaches or blurry vision.  Cardiovascular: Denies chest pain or irregular heart beat.  Respiratory: Denies cough or shortness of breath.  Gastrointestinal: Denies abdominal pain, nausea, or vomiting.  Musculoskeletal:  Denies muscle cramps.  Neurological: Denies dizziness or focal weakness.  Psychiatric/Behavioral: Normal mental status.  Hematologic/Lymphatic: Denies bleeding problem or easy bruising/bleeding.  Skin: Denies rash or suspicious lesions.    Examination:    Vital Signs:    Vitals:    05/07/19 1430   Weight: 62 kg (136 lb 11 oz)   Height: 4' 10" (1.473 m)   PainSc:   6   PainLoc: Shoulder       Body mass index is 28.57 kg/m².    This a well-developed, well nourished patient in no acute distress.    Alert and oriented x 3 and cooperative to examination.       Physical Exam: Right Shoulder Exam     Skin  Rash:   None  Scars:   None    Inspection/Observation   Swelling:   none  Erythema:   none  Bruising:   none  Wounds:   none  Scapular Winging:  none  Scapular Dyskinesia:  none  Atrophy:   none  Masses:   None  Lymphadenopathy: None    Tenderness   AC Joint:   mild    Range of Motion   Active Forward Flexion:  130  Active External Rotation:  20  Active Internal Rotation:  Hip pocket    Passive Forward Flexion:  160  Passive External Rotation:  30    Muscle Strength   Forward Flexion:  4/5  External Rotation:  4/5  Internal Rotation:  4/5    Tests & Signs   Cross Arm:   positive  Impingement:   positive    Other   Sensation:   normal  Pulse:    2+ " radial          Imaging: X-rays ordered and images interpreted today personally by me of right shoulder show slight head elevation.        Assessment: Acute pain of right shoulder  -     Large Joint Aspiration/Injection: R subacromial bursa        Plan:  Some bursitis. Will inject. Declined PT. PRN.      DISCLAIMER: This note may have been dictated using voice recognition software and may contain grammatical errors.     NOTE: Consult report sent to referring provider via [employer] EMR.

## 2019-05-15 ENCOUNTER — PATIENT MESSAGE (OUTPATIENT)
Dept: HEMATOLOGY/ONCOLOGY | Facility: CLINIC | Age: 54
End: 2019-05-15

## 2019-05-15 DIAGNOSIS — C22.9 MALIGNANT NEOPLASM OF LIVER, UNSPECIFIED LIVER MALIGNANCY TYPE: Primary | ICD-10-CM

## 2019-05-19 ENCOUNTER — LAB VISIT (OUTPATIENT)
Dept: LAB | Facility: HOSPITAL | Age: 54
End: 2019-05-19
Attending: INTERNAL MEDICINE
Payer: COMMERCIAL

## 2019-05-19 DIAGNOSIS — C22.9 MALIGNANT NEOPLASM OF LIVER, UNSPECIFIED LIVER MALIGNANCY TYPE: ICD-10-CM

## 2019-05-19 DIAGNOSIS — C23 MALIGNANT NEOPLASM OF GALLBLADDER: ICD-10-CM

## 2019-05-19 LAB
ALBUMIN SERPL BCP-MCNC: 3.3 G/DL (ref 3.5–5.2)
ALP SERPL-CCNC: 148 U/L (ref 55–135)
ALT SERPL W/O P-5'-P-CCNC: 58 U/L (ref 10–44)
ANION GAP SERPL CALC-SCNC: 8 MMOL/L (ref 8–16)
AST SERPL-CCNC: 62 U/L (ref 10–40)
BASOPHILS # BLD AUTO: 0 K/UL (ref 0–0.2)
BASOPHILS NFR BLD: 0.5 % (ref 0–1.9)
BILIRUB SERPL-MCNC: 0.6 MG/DL (ref 0.1–1)
BUN SERPL-MCNC: 16 MG/DL (ref 6–20)
CALCIUM SERPL-MCNC: 9.7 MG/DL (ref 8.7–10.5)
CHLORIDE SERPL-SCNC: 108 MMOL/L (ref 95–110)
CO2 SERPL-SCNC: 26 MMOL/L (ref 23–29)
CREAT SERPL-MCNC: 0.7 MG/DL (ref 0.5–1.4)
DIFFERENTIAL METHOD: ABNORMAL
EOSINOPHIL # BLD AUTO: 0 K/UL (ref 0–0.5)
EOSINOPHIL NFR BLD: 0.3 % (ref 0–8)
ERYTHROCYTE [DISTWIDTH] IN BLOOD BY AUTOMATED COUNT: 20.8 % (ref 11.5–14.5)
EST. GFR  (AFRICAN AMERICAN): >60 ML/MIN/1.73 M^2
EST. GFR  (NON AFRICAN AMERICAN): >60 ML/MIN/1.73 M^2
GLUCOSE SERPL-MCNC: 93 MG/DL (ref 70–110)
HCT VFR BLD AUTO: 33.8 % (ref 37–48.5)
HGB BLD-MCNC: 10.9 G/DL (ref 12–16)
LYMPHOCYTES # BLD AUTO: 1.7 K/UL (ref 1–4.8)
LYMPHOCYTES NFR BLD: 21.9 % (ref 18–48)
MCH RBC QN AUTO: 31.3 PG (ref 27–31)
MCHC RBC AUTO-ENTMCNC: 32.2 G/DL (ref 32–36)
MCV RBC AUTO: 97 FL (ref 82–98)
MONOCYTES # BLD AUTO: 0.6 K/UL (ref 0.3–1)
MONOCYTES NFR BLD: 7.4 % (ref 4–15)
NEUTROPHILS # BLD AUTO: 5.3 K/UL (ref 1.8–7.7)
NEUTROPHILS NFR BLD: 69.9 % (ref 38–73)
PLATELET # BLD AUTO: 110 K/UL (ref 150–350)
PMV BLD AUTO: 7.4 FL (ref 9.2–12.9)
POTASSIUM SERPL-SCNC: 3.9 MMOL/L (ref 3.5–5.1)
PROT SERPL-MCNC: 6.4 G/DL (ref 6–8.4)
RBC # BLD AUTO: 3.47 M/UL (ref 4–5.4)
SODIUM SERPL-SCNC: 142 MMOL/L (ref 136–145)
T4 FREE SERPL-MCNC: 0.84 NG/DL (ref 0.71–1.51)
TSH SERPL DL<=0.005 MIU/L-ACNC: 71.91 UIU/ML (ref 0.4–4)
WBC # BLD AUTO: 7.6 K/UL (ref 3.9–12.7)

## 2019-05-19 PROCEDURE — 80053 COMPREHEN METABOLIC PANEL: CPT

## 2019-05-19 PROCEDURE — 84439 ASSAY OF FREE THYROXINE: CPT

## 2019-05-19 PROCEDURE — 84443 ASSAY THYROID STIM HORMONE: CPT

## 2019-05-19 PROCEDURE — 85025 COMPLETE CBC W/AUTO DIFF WBC: CPT

## 2019-05-19 PROCEDURE — 36415 COLL VENOUS BLD VENIPUNCTURE: CPT

## 2019-05-19 PROCEDURE — 84480 ASSAY TRIIODOTHYRONINE (T3): CPT

## 2019-05-20 ENCOUNTER — OFFICE VISIT (OUTPATIENT)
Dept: HEMATOLOGY/ONCOLOGY | Facility: CLINIC | Age: 54
End: 2019-05-20
Payer: COMMERCIAL

## 2019-05-20 VITALS
SYSTOLIC BLOOD PRESSURE: 138 MMHG | OXYGEN SATURATION: 95 % | HEART RATE: 88 BPM | WEIGHT: 125 LBS | DIASTOLIC BLOOD PRESSURE: 83 MMHG | BODY MASS INDEX: 26.24 KG/M2 | RESPIRATION RATE: 20 BRPM | TEMPERATURE: 98 F | HEIGHT: 58 IN

## 2019-05-20 DIAGNOSIS — C22.9 MALIGNANT NEOPLASM OF LIVER, UNSPECIFIED LIVER MALIGNANCY TYPE: Primary | ICD-10-CM

## 2019-05-20 DIAGNOSIS — C23 MALIGNANT NEOPLASM OF GALLBLADDER: ICD-10-CM

## 2019-05-20 DIAGNOSIS — D69.6 THROMBOCYTOPENIA: ICD-10-CM

## 2019-05-20 LAB — T3 SERPL-MCNC: 56 NG/DL (ref 60–180)

## 2019-05-20 PROCEDURE — 99999 PR PBB SHADOW E&M-EST. PATIENT-LVL V: ICD-10-PCS | Mod: PBBFAC,,, | Performed by: INTERNAL MEDICINE

## 2019-05-20 PROCEDURE — 99215 OFFICE O/P EST HI 40 MIN: CPT | Mod: S$GLB,,, | Performed by: INTERNAL MEDICINE

## 2019-05-20 PROCEDURE — 3008F PR BODY MASS INDEX (BMI) DOCUMENTED: ICD-10-PCS | Mod: CPTII,S$GLB,, | Performed by: INTERNAL MEDICINE

## 2019-05-20 PROCEDURE — 3008F BODY MASS INDEX DOCD: CPT | Mod: CPTII,S$GLB,, | Performed by: INTERNAL MEDICINE

## 2019-05-20 PROCEDURE — 99999 PR PBB SHADOW E&M-EST. PATIENT-LVL V: CPT | Mod: PBBFAC,,, | Performed by: INTERNAL MEDICINE

## 2019-05-20 PROCEDURE — 99215 PR OFFICE/OUTPT VISIT, EST, LEVL V, 40-54 MIN: ICD-10-PCS | Mod: S$GLB,,, | Performed by: INTERNAL MEDICINE

## 2019-05-20 RX ORDER — EPINEPHRINE 0.3 MG/.3ML
0.3 INJECTION SUBCUTANEOUS ONCE AS NEEDED
Status: CANCELLED | OUTPATIENT
Start: 2019-05-21

## 2019-05-20 RX ORDER — DIPHENHYDRAMINE HYDROCHLORIDE 50 MG/ML
50 INJECTION INTRAMUSCULAR; INTRAVENOUS ONCE AS NEEDED
Status: CANCELLED | OUTPATIENT
Start: 2019-05-21

## 2019-05-20 RX ORDER — SODIUM CHLORIDE 0.9 % (FLUSH) 0.9 %
10 SYRINGE (ML) INJECTION
Status: CANCELLED | OUTPATIENT
Start: 2019-05-24

## 2019-05-20 RX ORDER — SODIUM CHLORIDE 0.9 % (FLUSH) 0.9 %
10 SYRINGE (ML) INJECTION
Status: CANCELLED | OUTPATIENT
Start: 2019-05-21

## 2019-05-20 RX ORDER — PROCHLORPERAZINE MALEATE 10 MG
TABLET ORAL
COMMUNITY
End: 2019-05-27 | Stop reason: SDUPTHER

## 2019-05-20 RX ORDER — VENLAFAXINE HYDROCHLORIDE 37.5 MG/1
CAPSULE, EXTENDED RELEASE ORAL
COMMUNITY
End: 2019-05-27 | Stop reason: SDUPTHER

## 2019-05-20 RX ORDER — HEPARIN 100 UNIT/ML
500 SYRINGE INTRAVENOUS
Status: CANCELLED | OUTPATIENT
Start: 2019-05-21

## 2019-05-20 RX ORDER — FUROSEMIDE 40 MG/1
TABLET ORAL
COMMUNITY
End: 2019-05-27 | Stop reason: SDUPTHER

## 2019-05-20 RX ORDER — ATROPINE SULFATE 0.4 MG/ML
0.4 INJECTION, SOLUTION ENDOTRACHEAL; INTRAMEDULLARY; INTRAMUSCULAR; INTRAVENOUS; SUBCUTANEOUS ONCE AS NEEDED
Status: CANCELLED | OUTPATIENT
Start: 2019-05-21

## 2019-05-20 RX ORDER — ONDANSETRON HYDROCHLORIDE 8 MG/1
TABLET, FILM COATED ORAL
COMMUNITY
End: 2019-05-27 | Stop reason: SDUPTHER

## 2019-05-20 RX ORDER — LEVOTHYROXINE SODIUM 13 UG/1
CAPSULE ORAL
COMMUNITY
End: 2019-06-03

## 2019-05-20 RX ORDER — PANTOPRAZOLE SODIUM 40 MG/1
TABLET, DELAYED RELEASE ORAL
COMMUNITY
End: 2019-05-27 | Stop reason: SDUPTHER

## 2019-05-20 RX ORDER — HEPARIN 100 UNIT/ML
500 SYRINGE INTRAVENOUS
Status: CANCELLED | OUTPATIENT
Start: 2019-05-24

## 2019-05-20 NOTE — PROGRESS NOTES
Subjective:       Patient ID: Faith Byers is a 53 y.o. female.  Gall bladder ca , started modified FOLFIRINOX  HPI:   FINAL PATHOLOGIC DIAGNOSIS  GALLBLADDER/LIVER MASS, BIOPSY:  -Positive for malignancy, invasive squamous cell carcinoma, doing well pain has subsided also follows with [facility].     had a recent visit in 5/2019 plan is to improve albumin and get 4 more  Cycle and revisit with [facility].  Occasional nausea.  Continues to work.  No other complaints    Social History     Socioeconomic History    Marital status:      Spouse name: Not on file    Number of children: Not on file    Years of education: Not on file    Highest education level: Not on file   Occupational History    Not on file   Social Needs    Financial resource strain: Not on file    Food insecurity:     Worry: Not on file     Inability: Not on file    Transportation needs:     Medical: Not on file     Non-medical: Not on file   Tobacco Use    Smoking status: Never Smoker    Smokeless tobacco: Never Used   Substance and Sexual Activity    Alcohol use: Yes     Comment: rarely    Drug use: No    Sexual activity: Not Currently   Lifestyle    Physical activity:     Days per week: Not on file     Minutes per session: Not on file    Stress: Not on file   Relationships    Social connections:     Talks on phone: Not on file     Gets together: Not on file     Attends [employer] service: Not on file     Active member of club or organization: Not on file     Attends meetings of clubs or organizations: Not on file     Relationship status: Not on file   Other Topics Concern    Not on file   Social History Narrative    Not on file     Family History   Problem Relation Age of Onset    Cancer Mother         breast and bone     Breast cancer Mother     Hyperlipidemia Father     ADD / ADHD Daughter         autism    Crohn's disease Paternal Uncle     Heart disease Paternal Uncle         heart transplant      Past Surgical History:   Procedure Laterality Date    ARTHROSCOPY, KNEE, WITH Partial lateral MENISCECTOMY Right 7/12/2018    Performed by [facility] at [facility] OR    Jpzifw-uybzcz-in N/A 11/16/2018    Performed by [facility] at [facility] OR 2ND FLR    BREAST BIOPSY      BREAST SURGERY  2001    right biopsy, benign    CHONDROPLASTY,KNEE Medial Femoral Right 7/12/2018    Performed by [facility]an [facility] at [facility] OR    COLONOSCOPY      COLONOSCOPY N/A 12/5/2014    Performed by Sixto Barrera MD at [facility] ENDO    dental implant      [id number] Right 12/14/2018    Performed by Jurgen Toro MD at [facility] OR    KNEE ARTHROSCOPY Right     LIVER BIOPSY  11/16/2018    THYROIDECTOMY  2011    complete     Past Medical History:   Diagnosis Date    Arthritis     Cancer     gallbadder/ liver spots/biopsy    Thyroid disease        Current Outpatient Medications:     apixaban (ELIQUIS) 5 mg Tab, Take 1 tablet (5 mg total) by mouth 2 (two) times daily. To Start after 7 days on 10mg Twice daily., Disp: 60 tablet, Rfl: 3    furosemide (LASIX) 40 MG tablet, Take 40 mg by mouth daily as needed., Disp: , Rfl: 2    furosemide (LASIX) 40 MG tablet, daily., Disp: , Rfl:     levothyroxine (TIROSINT) 150 mcg Cap, Take by mouth., Disp: , Rfl:     levothyroxine (TIROSINT) 150 mcg Cap, Take by mouth., Disp: , Rfl:     lipase-protease-amylase (CREON) 36,000-114,000- 180,000 unit CpDR, Take 72,000-108,000 Units by mouth., Disp: , Rfl:     magnesium oxide (MAG-OX) 400 mg (241.3 mg magnesium) tablet, Take 400 mg by mouth., Disp: , Rfl:     ondansetron (ZOFRAN-ODT) 8 MG TbDL, Take 8 mg by mouth every 12 (twelve) hours as needed (nausea). , Disp: , Rfl:     pantoprazole (PROTONIX) 40 MG tablet, Take 1 tablet (40 mg total) by mouth once daily., Disp: 90 tablet, Rfl: 3    pantoprazole (PROTONIX) 40 MG tablet, Take 40 mg by mouth., Disp: , Rfl:     potassium chloride SA (K-DUR,KLOR-CON) 10 MEQ  tablet, TAKE 1 TABLET BY MOUTH EVERY DAY WITH LASIX, Disp: , Rfl: 3    potassium chloride SA (K-DUR,KLOR-CON) 10 MEQ tablet, daily., Disp: , Rfl:     prochlorperazine (COMPAZINE) 10 MG tablet, Take 1 tablet (10 mg total) by mouth every 6 (six) hours as needed. (Patient taking differently: Take 10 mg by mouth every 6 (six) hours as needed (nausea). ), Disp: 30 tablet, Rfl: 1    terbinafine HCl (LAMISIL) 1 % cream, Apply topically 2 (two) times daily., Disp: 24 g, Rfl: 0    venlafaxine (EFFEXOR-XR) 37.5 MG 24 hr capsule, Take 1 tablet by mouth every morning. , Disp: , Rfl:   Review of patient's allergies indicates:  No Known Allergies      REVIEW OF SYSTEMS:     CONSTITUTIONAL:   Nausea improved eating better weight is increased she is overall much better    BREASTS: There is no swelling around breasts or nipple discharge.    EYES: Denies eye pain, blurred vision, swelling, redness or discharge.      ENT AND MOUTH: Denies runny nose, stuffiness, sinus trouble or sores. Denies    nosebleeds. Denies, hoarseness, change in voice or swelling in front of the    neck.      CARDIOVASCULAR: Denies chest pain, discomfort or palpitations. Denies neck    swelling or episodes of passing out.      RESPIRATORY: Denies cough, sputum production, blood in sputum, and denies    shortness of breath.      GI: Denies trouble swallowing, indigestion, heartburn, abdominal pain, nausea,    vomiting, diarrhea, has altered bowel habits, no blood in stool, discoloration of    stools, change in nature of stool, bloating, increased abdominal girth.      GENITOURINARY: No discharge. No pelvic pain or lumps. No rash around groin or  lesions. No urinary frequency, hesitation, painful urination or blood in    urine. Denies incontinence. No problems with intercourse.      MUSCULOSKELETAL:  Some amount of knee pain    NEUROLOGICAL: Denies tingling, numbness, altered mentation changes to nerve    function in the face, weakness to one or both of the  body. Denies changes to    gait and denies multiple falls or accidents.      PSYCHIATRIC: Denies nervousness, anxiety, hallucinations, depression, suicidal    ideation, trouble sleeping or changes in behavior noticed by family.      The patient denies recent foreign travel or recent exposure to chemicals or    products of concern or infectious diseases.     PHYSICAL EXAM:     Wt Readings from Last 3 Encounters:   05/07/19 62 kg (136 lb 11 oz)   05/06/19 62 kg (136 lb 11 oz)   04/22/19 65.1 kg (143 lb 8.3 oz)     Temp Readings from Last 3 Encounters:   05/06/19 97.7 °F (36.5 °C)   04/22/19 97.8 °F (36.6 °C)   04/22/19 97.7 °F (36.5 °C)     BP Readings from Last 3 Encounters:   05/06/19 134/71   04/22/19 129/62   04/22/19 120/82     Pulse Readings from Last 3 Encounters:   05/06/19 85   04/22/19 98   04/22/19 93     GENERAL: Comfortable looking patient. Patient is in no distress.  Awake, alert and oriented to time, person and place.  No anxiety, or agitation.      HEENT: Normal conjunctivae and eyelids. WNL.  PERRLA 3 to 4 mm. No icterus, no pallor, no congestion, and no discharge noted.     NECK:  Supple. Trachea is central.  No crepitus.  No JVD or masses.    RESPIRATORY:  No intercostal retractions.  No dullness to percussion.  Chest is clear to auscultation.  No rales, rhonchi or wheezes.  No crepitus.  Good air entry bilaterally.    CARDIOVASCULAR:  S1 and S2 are normally heard without murmurs or gallops.  All peripheral pulses are present.    ABDOMEN:  Normal abdomen.  No hepatosplenomegaly.  No free fluid.  Bowel sounds are present.  No hernia noted. No masses.  No rebound or tenderness.  No guarding or rigidity.  Umbilicus is midline.    LYMPHATICS:  No axillary, cervical, supraclavicular, submental, or inguinal lymphadenopathy.    SKIN/MUSCULOSKELETAL:  There is no evidence of excoriation marks or ecchmosis.  No rashes.  No cyanosis.  No clubbing.  No joint or skeletal deformities noted.  Normal range of  motion.    NEUROLOGIC:  Higher functions are appropriate.  No cranial nerve deficits.  Normal jody.  Normal strength.  Motor and sensory functions are normal.  Deep tendon reflexes are normal.    GENITAL/RECTAL:  Exams are deferred.      Laboratory:     CBC:  Lab Results   Component Value Date    WBC 7.60 05/19/2019    RBC 3.47 (L) 05/19/2019    HGB 10.9 (L) 05/19/2019    HCT 33.8 (L) 05/19/2019    MCV 97 05/19/2019    MCH 31.3 (H) 05/19/2019    MCHC 32.2 05/19/2019    RDW 20.8 (H) 05/19/2019     (L) 05/19/2019    MPV 7.4 (L) 05/19/2019    GRAN 5.3 05/19/2019    GRAN 69.9 05/19/2019    LYMPH 1.7 05/19/2019    LYMPH 21.9 05/19/2019    MONO 0.6 05/19/2019    MONO 7.4 05/19/2019    EOS 0.0 05/19/2019    BASO 0.00 05/19/2019    EOSINOPHIL 0.3 05/19/2019    BASOPHIL 0.5 05/19/2019       BMP: BMP  Lab Results   Component Value Date     05/19/2019    K 3.9 05/19/2019     05/19/2019    CO2 26 05/19/2019    BUN 16 05/19/2019    CREATININE 0.7 05/19/2019    CALCIUM 9.7 05/19/2019    ANIONGAP 8 05/19/2019    ESTGFRAFRICA >60 05/19/2019    EGFRNONAA >60 05/19/2019       LFT:   Lab Results   Component Value Date    ALT 58 (H) 05/19/2019    AST 62 (H) 05/19/2019    ALKPHOS 148 (H) 05/19/2019    BILITOT 0.6 05/19/2019 December 2018 CT chest abdomen pelvis  Impression       Gallbladder mass with invasion of the hepatic parenchyma compatible with the patient's known gallbladder neoplasm, and moderately increased in size.  Possible extension to the hepatic flexure of the colon.  Unchanged mild the biliary dilatation.    Development a suspicious lymph node subjacent to the mass.    3 mm left upper lobe nodule which is nonspecific.  Attention on 6-12 month follow-up recommended.    Unchanged hepatic hypodensities, suggestive for cysts.  Continued attention on follow-up recommended     Impression 3/2019 CT       Moderate reduction in size of the patient's known gallbladder mass and associated hepatic parenchymal  invasion.  Unchanged scattered small hepatic hypodensities may simply represent cysts however continued attention on follow-up recommended.  No evidence for extrahepatic metastatic disease.       Ca19.9 at [facility] 3819, now 1mproving down to 445.0    Assessment/Plan:       Cont with FOLFIRINOX  Pt went to [facility] in may recs are for more treatments with FOLFIRINOX  rtc 2 weeks for next round which will be cycle 3 of requested 4 more prior to sx evaluation   scans doen at [facility] ( see care everywhere)  Living Will  During this visit, I engaged the patient in the advance care planning process.  The patient and I reviewed the role for advance directives and their purpose in directing future healthcare if the patient's unable to speak for herself.  At this point in time, the patient does have full decision-making capacity.  We discussed different extreme health states that she could experience, and reviewed what kind of medical care she would want in those situations.  The patient communicated that if she were comatose and had little chance of a meaningful recovery, she does not want machines/life-sustaining treatments used.  The patient  Has not  completed a living will to reflect these preferences.  The patient has not already designated a healthcare power of  to make decisions on her behalf.   She has initaited the process already

## 2019-05-21 ENCOUNTER — DOCUMENTATION ONLY (OUTPATIENT)
Dept: HEMATOLOGY/ONCOLOGY | Facility: CLINIC | Age: 54
End: 2019-05-21

## 2019-05-21 ENCOUNTER — TELEPHONE (OUTPATIENT)
Dept: BARIATRICS | Facility: CLINIC | Age: 54
End: 2019-05-21

## 2019-05-21 DIAGNOSIS — C22.9 MALIGNANT NEOPLASM OF LIVER, UNSPECIFIED LIVER MALIGNANCY TYPE: Primary | ICD-10-CM

## 2019-05-21 NOTE — TELEPHONE ENCOUNTER
----- Message from Valarie Bone sent at 5/21/2019  2:45 PM CDT -----  Contact: patient  Type: Needs Medical Advice    Who Called:  Patient  Best Call Back Number: [phone] (home) [phone]  Additional Information: patient said she needs an appt to schedule a procedure and she said she needs another port because the one she had is leaking from the front would like a call back please asap needs to come in today

## 2019-05-21 NOTE — TELEPHONE ENCOUNTER
Called pt, pt stated during last chemo, port leaked and will now have to have central line placed. port is also very painful, needs port replaced. scheduled for 5/31/19. pt acknowledged.

## 2019-05-21 NOTE — PROGRESS NOTES
Infusion called , Port leaking. Pt has had a port study and eval by Surgeon and they all said port fine , but now leaking again under bandage . Pt here to get 5FU pump placed. I gave order for Picc/ Midline, pt is already going to replace port . Order given verbally to Brianna Kerr

## 2019-05-27 ENCOUNTER — PATIENT MESSAGE (OUTPATIENT)
Dept: FAMILY MEDICINE | Facility: CLINIC | Age: 54
End: 2019-05-27

## 2019-05-27 NOTE — TELEPHONE ENCOUNTER
Dr. Perez,  I was hoping you could help me since Dr. Moreno is out of the office and I am covering her messages.  This patient is taking Eliquis and will need to stop it for surgery on 5/31 (tunneled central venous cath removal).  It appears she takes the Eliquis for PE's.  She asked if she needs to take anything as far as a blood thinner between now and surgery.

## 2019-05-29 ENCOUNTER — TELEPHONE (OUTPATIENT)
Dept: FAMILY MEDICINE | Facility: CLINIC | Age: 54
End: 2019-05-29

## 2019-05-29 DIAGNOSIS — I26.99 OTHER PULMONARY EMBOLISM WITHOUT ACUTE COR PULMONALE, UNSPECIFIED CHRONICITY: Primary | ICD-10-CM

## 2019-05-29 RX ORDER — ENOXAPARIN SODIUM 100 MG/ML
100 INJECTION SUBCUTANEOUS DAILY
Qty: 5 ML | Refills: 0 | Status: SHIPPED | OUTPATIENT
Start: 2019-05-29 | End: 2019-08-16

## 2019-05-29 NOTE — TELEPHONE ENCOUNTER
Please let pt know I sent message to Dr. Perez, Hematology, regarding her anticoagulation. She will need lovenox injections prior to her surgery as a bridge since stopping Eliquis.  She may need to come into the office for the nurse to administer these if she is not able to do them herself.

## 2019-05-29 NOTE — TELEPHONE ENCOUNTER
----- Message from David Perez MD sent at 5/27/2019  9:47 PM CDT -----  This patient is Dr moulton    Please let her know    Patient has PE needs anticoagulation    If Eliquis is stopped patient can be on Lovenox before the surgery

## 2019-05-30 ENCOUNTER — ANESTHESIA EVENT (OUTPATIENT)
Dept: SURGERY | Facility: HOSPITAL | Age: 54
End: 2019-05-30
Payer: COMMERCIAL

## 2019-05-31 ENCOUNTER — HOSPITAL ENCOUNTER (OUTPATIENT)
Facility: HOSPITAL | Age: 54
Discharge: HOME | End: 2019-05-31
Attending: SURGERY | Admitting: SURGERY
Payer: COMMERCIAL

## 2019-05-31 ENCOUNTER — ANESTHESIA (OUTPATIENT)
Dept: SURGERY | Facility: HOSPITAL | Age: 54
End: 2019-05-31
Payer: COMMERCIAL

## 2019-05-31 ENCOUNTER — TELEPHONE (OUTPATIENT)
Dept: FAMILY MEDICINE | Facility: CLINIC | Age: 54
End: 2019-05-31

## 2019-05-31 VITALS
OXYGEN SATURATION: 96 % | TEMPERATURE: 98 F | HEART RATE: 85 BPM | DIASTOLIC BLOOD PRESSURE: 77 MMHG | HEIGHT: 58 IN | WEIGHT: 135 LBS | BODY MASS INDEX: 28.34 KG/M2 | RESPIRATION RATE: 16 BRPM | SYSTOLIC BLOOD PRESSURE: 125 MMHG

## 2019-05-31 DIAGNOSIS — C22.9 MALIGNANT NEOPLASM OF LIVER, UNSPECIFIED LIVER MALIGNANCY TYPE: Primary | ICD-10-CM

## 2019-05-31 PROCEDURE — 77001 CHG FLUOROGUIDE CNTRL VEN ACCESS,PLACE,REPLACE,REMOVE: ICD-10-PCS | Mod: 26,,, | Performed by: SURGERY

## 2019-05-31 PROCEDURE — 37000008 HC ANESTHESIA 1ST 15 MINUTES: Performed by: SURGERY

## 2019-05-31 PROCEDURE — 63600175 PHARM REV CODE 636 W HCPCS: Performed by: NURSE ANESTHETIST, CERTIFIED REGISTERED

## 2019-05-31 PROCEDURE — C1788 PORT, INDWELLING, IMP: HCPCS | Performed by: SURGERY

## 2019-05-31 PROCEDURE — D9220A PRA ANESTHESIA: Mod: ANES,,, | Performed by: ANESTHESIOLOGY

## 2019-05-31 PROCEDURE — 36561 INSERT TUNNELED CV CATH: CPT | Mod: LT,,, | Performed by: SURGERY

## 2019-05-31 PROCEDURE — 36590 PR REMOVAL TUNNELED CV CATH W SUBQ PORT OR PUMP: ICD-10-PCS | Mod: 51,RT,, | Performed by: SURGERY

## 2019-05-31 PROCEDURE — 36590 REMOVAL TUNNELED CV CATH: CPT | Mod: 51,RT,, | Performed by: SURGERY

## 2019-05-31 PROCEDURE — 37000009 HC ANESTHESIA EA ADD 15 MINS: Performed by: SURGERY

## 2019-05-31 PROCEDURE — 63600175 PHARM REV CODE 636 W HCPCS: Performed by: ANESTHESIOLOGY

## 2019-05-31 PROCEDURE — 25000003 PHARM REV CODE 250: Performed by: SURGERY

## 2019-05-31 PROCEDURE — D9220A PRA ANESTHESIA: ICD-10-PCS | Mod: ANES,,, | Performed by: ANESTHESIOLOGY

## 2019-05-31 PROCEDURE — D9220A PRA ANESTHESIA: ICD-10-PCS | Mod: CRNA,,, | Performed by: NURSE ANESTHETIST, CERTIFIED REGISTERED

## 2019-05-31 PROCEDURE — 36561 PR INSERT TUNNELED CV CATH WITH PORT: ICD-10-PCS | Mod: LT,,, | Performed by: SURGERY

## 2019-05-31 PROCEDURE — 36000706: Performed by: SURGERY

## 2019-05-31 PROCEDURE — 36000707: Performed by: SURGERY

## 2019-05-31 PROCEDURE — 99900104 DSU ONLY-NO CHARGE-EA ADD'L HR (STAT): Performed by: SURGERY

## 2019-05-31 PROCEDURE — 71000015 HC POSTOP RECOV 1ST HR: Performed by: SURGERY

## 2019-05-31 PROCEDURE — 71000039 HC RECOVERY, EACH ADD'L HOUR: Performed by: SURGERY

## 2019-05-31 PROCEDURE — 99900103 DSU ONLY-NO CHARGE-INITIAL HR (STAT): Performed by: SURGERY

## 2019-05-31 PROCEDURE — 25000003 PHARM REV CODE 250: Performed by: ANESTHESIOLOGY

## 2019-05-31 PROCEDURE — 71000033 HC RECOVERY, INTIAL HOUR: Performed by: SURGERY

## 2019-05-31 PROCEDURE — 63600175 PHARM REV CODE 636 W HCPCS: Performed by: SURGERY

## 2019-05-31 PROCEDURE — 77001 FLUOROGUIDE FOR VEIN DEVICE: CPT | Mod: 26,,, | Performed by: SURGERY

## 2019-05-31 PROCEDURE — D9220A PRA ANESTHESIA: Mod: CRNA,,, | Performed by: NURSE ANESTHETIST, CERTIFIED REGISTERED

## 2019-05-31 DEVICE — KIT POWERPORT SINGLE 8FR: Type: IMPLANTABLE DEVICE | Site: SUBCLAVIAN | Status: FUNCTIONAL

## 2019-05-31 RX ORDER — ONDANSETRON HYDROCHLORIDE 2 MG/ML
INJECTION, SOLUTION INTRAMUSCULAR; INTRAVENOUS
Status: DISCONTINUED | OUTPATIENT
Start: 2019-05-31 | End: 2019-05-31

## 2019-05-31 RX ORDER — MIDAZOLAM HYDROCHLORIDE 1 MG/ML
INJECTION INTRAMUSCULAR; INTRAVENOUS
Status: DISCONTINUED | OUTPATIENT
Start: 2019-05-31 | End: 2019-05-31

## 2019-05-31 RX ORDER — LIDOCAINE HYDROCHLORIDE 10 MG/ML
1 INJECTION, SOLUTION EPIDURAL; INFILTRATION; INTRACAUDAL; PERINEURAL ONCE
Status: DISCONTINUED | OUTPATIENT
Start: 2019-05-31 | End: 2019-05-31 | Stop reason: HOSPADM

## 2019-05-31 RX ORDER — HEPARIN SODIUM,PORCINE/PF 10 UNIT/ML
SYRINGE (ML) INTRAVENOUS
Status: DISCONTINUED | OUTPATIENT
Start: 2019-05-31 | End: 2019-05-31 | Stop reason: HOSPADM

## 2019-05-31 RX ORDER — BUPIVACAINE HYDROCHLORIDE AND EPINEPHRINE 2.5; 5 MG/ML; UG/ML
INJECTION, SOLUTION EPIDURAL; INFILTRATION; INTRACAUDAL; PERINEURAL
Status: DISCONTINUED | OUTPATIENT
Start: 2019-05-31 | End: 2019-05-31 | Stop reason: HOSPADM

## 2019-05-31 RX ORDER — DEXAMETHASONE SODIUM PHOSPHATE 4 MG/ML
INJECTION, SOLUTION INTRA-ARTICULAR; INTRALESIONAL; INTRAMUSCULAR; INTRAVENOUS; SOFT TISSUE
Status: DISCONTINUED | OUTPATIENT
Start: 2019-05-31 | End: 2019-05-31

## 2019-05-31 RX ORDER — PROPOFOL 10 MG/ML
VIAL (ML) INTRAVENOUS
Status: DISCONTINUED | OUTPATIENT
Start: 2019-05-31 | End: 2019-05-31

## 2019-05-31 RX ORDER — SODIUM CHLORIDE, SODIUM LACTATE, POTASSIUM CHLORIDE, CALCIUM CHLORIDE 600; 310; 30; 20 MG/100ML; MG/100ML; MG/100ML; MG/100ML
INJECTION, SOLUTION INTRAVENOUS CONTINUOUS
Status: DISCONTINUED | OUTPATIENT
Start: 2019-05-31 | End: 2019-05-31 | Stop reason: HOSPADM

## 2019-05-31 RX ORDER — OXYCODONE HYDROCHLORIDE 5 MG/1
5 TABLET ORAL ONCE AS NEEDED
Status: COMPLETED | OUTPATIENT
Start: 2019-05-31 | End: 2019-05-31

## 2019-05-31 RX ORDER — ONDANSETRON 4 MG/1
4 TABLET, ORALLY DISINTEGRATING ORAL EVERY 6 HOURS PRN
Qty: 30 TABLET | Refills: 0 | Status: SHIPPED | OUTPATIENT
Start: 2019-05-31 | End: 2019-06-03

## 2019-05-31 RX ORDER — CEFAZOLIN SODIUM 2 G/50ML
2 SOLUTION INTRAVENOUS
Status: COMPLETED | OUTPATIENT
Start: 2019-05-31 | End: 2019-05-31

## 2019-05-31 RX ORDER — LIDOCAINE HCL/PF 100 MG/5ML
SYRINGE (ML) INTRAVENOUS
Status: DISCONTINUED | OUTPATIENT
Start: 2019-05-31 | End: 2019-05-31

## 2019-05-31 RX ORDER — FENTANYL CITRATE 50 UG/ML
25 INJECTION, SOLUTION INTRAMUSCULAR; INTRAVENOUS EVERY 5 MIN PRN
Status: COMPLETED | OUTPATIENT
Start: 2019-05-31 | End: 2019-05-31

## 2019-05-31 RX ORDER — FENTANYL CITRATE 50 UG/ML
INJECTION, SOLUTION INTRAMUSCULAR; INTRAVENOUS
Status: DISCONTINUED | OUTPATIENT
Start: 2019-05-31 | End: 2019-05-31

## 2019-05-31 RX ORDER — SODIUM CHLORIDE, SODIUM LACTATE, POTASSIUM CHLORIDE, CALCIUM CHLORIDE 600; 310; 30; 20 MG/100ML; MG/100ML; MG/100ML; MG/100ML
125 INJECTION, SOLUTION INTRAVENOUS CONTINUOUS
Status: DISCONTINUED | OUTPATIENT
Start: 2019-05-31 | End: 2019-05-31 | Stop reason: HOSPADM

## 2019-05-31 RX ORDER — ONDANSETRON 2 MG/ML
4 INJECTION INTRAMUSCULAR; INTRAVENOUS ONCE AS NEEDED
Status: DISCONTINUED | OUTPATIENT
Start: 2019-05-31 | End: 2019-05-31 | Stop reason: HOSPADM

## 2019-05-31 RX ORDER — HYDROCODONE BITARTRATE AND ACETAMINOPHEN 7.5; 325 MG/1; MG/1
1 TABLET ORAL EVERY 4 HOURS PRN
Qty: 20 TABLET | Refills: 0 | Status: SHIPPED | OUTPATIENT
Start: 2019-05-31 | End: 2019-09-23

## 2019-05-31 RX ADMIN — SODIUM CHLORIDE, SODIUM LACTATE, POTASSIUM CHLORIDE, AND CALCIUM CHLORIDE: .6; .31; .03; .02 INJECTION, SOLUTION INTRAVENOUS at 10:05

## 2019-05-31 RX ADMIN — CEFAZOLIN SODIUM 2 G: 2 SOLUTION INTRAVENOUS at 12:05

## 2019-05-31 RX ADMIN — FENTANYL CITRATE 25 MCG: 50 INJECTION INTRAMUSCULAR; INTRAVENOUS at 01:05

## 2019-05-31 RX ADMIN — FENTANYL CITRATE 50 MCG: 50 INJECTION, SOLUTION INTRAMUSCULAR; INTRAVENOUS at 12:05

## 2019-05-31 RX ADMIN — LIDOCAINE HYDROCHLORIDE 75 MG: 20 INJECTION, SOLUTION INTRAVENOUS at 12:05

## 2019-05-31 RX ADMIN — OXYCODONE HYDROCHLORIDE 5 MG: 5 TABLET ORAL at 02:05

## 2019-05-31 RX ADMIN — DEXAMETHASONE SODIUM PHOSPHATE 4 MG: 4 INJECTION, SOLUTION INTRAMUSCULAR; INTRAVENOUS at 12:05

## 2019-05-31 RX ADMIN — MIDAZOLAM HYDROCHLORIDE 2 MG: 1 INJECTION, SOLUTION INTRAMUSCULAR; INTRAVENOUS at 12:05

## 2019-05-31 RX ADMIN — ONDANSETRON 4 MG: 2 INJECTION, SOLUTION INTRAMUSCULAR; INTRAVENOUS at 12:05

## 2019-05-31 RX ADMIN — PROPOFOL 140 MG: 10 INJECTION, EMULSION INTRAVENOUS at 12:05

## 2019-05-31 NOTE — DISCHARGE INSTRUCTIONS
"Discharge Instructions: After Your Surgery/Procedure  Youve just had surgery. During surgery you were given medicine called anesthesia to keep you relaxed and free of pain. After surgery you may have some pain or nausea. This is common. Here are some tips for feeling better and getting well after surgery.     Stay on schedule with your medication.   Going home  Your doctor or nurse will show you how to take care of yourself when you go home. He or she will also answer your questions. Have an adult family member or friend drive you home.      For your safety we recommend these precaution for the first 24 hours after your procedure:  · Do not drive or use heavy equipment.  · Do not make important decisions or sign legal papers.  · Do not drink alcohol.  · Have someone stay with you, if needed. He or she can watch for problems and help keep you safe.  · Your concentration, balance, coordination, and judgement may be impaired for many hours after anesthesia.  Use caution when ambulating or standing up.     · You may feel weak and "washed out" after anesthesia and surgery.      Subtle residual effects of general anesthesia or sedation with regional / local anesthesia can last more than 24 hours.  Rest for the remainder of the day or longer if your Doctor/Surgeon has advised you to do so.  Although you may feel normal within the first 24 hours, your reflexes and mental ability may be impaired without you realizing it.  You may feel dizzy, lightheaded or sleepy for 24 hours or longer.      Be sure to go to all follow-up visits with your doctor. And rest after your surgery for as long as your doctor tells you to.  Coping with pain  If you have pain after surgery, pain medicine will help you feel better. Take it as told, before pain becomes severe. Also, ask your doctor or pharmacist about other ways to control pain. This might be with heat, ice, or relaxation. And follow any other instructions your surgeon or nurse gives " you.  Tips for taking pain medicine  To get the best relief possible, remember these points:  · Pain medicines can upset your stomach. Taking them with a little food may help.  · Most pain relievers taken by mouth need at least 20 to 30 minutes to start to work.  · Taking medicine on a schedule can help you remember to take it. Try to time your medicine so that you can take it before starting an activity. This might be before you get dressed, go for a walk, or sit down for dinner.  · Constipation is a common side effect of pain medicines. Call your doctor before taking any medicines such as laxatives or stool softeners to help ease constipation. Also ask if you should skip any foods. Drinking lots of fluids and eating foods such as fruits and vegetables that are high in fiber can also help. Remember, do not take laxatives unless your surgeon has prescribed them.  · Drinking alcohol and taking pain medicine can cause dizziness and slow your breathing. It can even be deadly. Do not drink alcohol while taking pain medicine.  · Pain medicine can make you react more slowly to things. Do not drive or run machinery while taking pain medicine.  Your health care provider may tell you to take acetaminophen to help ease your pain. Ask him or her how much you are supposed to take each day. Acetaminophen or other pain relievers may interact with your prescription medicines or other over-the-counter (OTC) drugs. Some prescription medicines have acetaminophen and other ingredients. Using both prescription and OTC acetaminophen for pain can cause you to overdose. Read the labels on your OTC medicines with care. This will help you to clearly know the list of ingredients, how much to take, and any warnings. It may also help you not take too much acetaminophen. If you have questions or do not understand the information, ask your pharmacist or health care provider to explain it to you before you take the OTC medicine.  Managing  nausea  Some people have an upset stomach after surgery. This is often because of anesthesia, pain, or pain medicine, or the stress of surgery. These tips will help you handle nausea and eat healthy foods as you get better. If you were on a special food plan before surgery, ask your doctor if you should follow it while you get better. These tips may help:  · Do not push yourself to eat. Your body will tell you when to eat and how much.  · Start off with clear liquids and soup. They are easier to digest.  · Next try semi-solid foods, such as mashed potatoes, applesauce, and gelatin, as you feel ready.  · Slowly move to solid foods. Dont eat fatty, rich, or spicy foods at first.  · Do not force yourself to have 3 large meals a day. Instead eat smaller amounts more often.  · Take pain medicines with a small amount of solid food, such as crackers or toast, to avoid nausea.     Call your surgeon if  · You still have pain an hour after taking medicine. The medicine may not be strong enough.  · You feel too sleepy, dizzy, or groggy. The medicine may be too strong.  · You have side effects like nausea, vomiting, or skin changes, such as rash, itching, or hives.       If you have obstructive sleep apnea  You were given anesthesia medicine during surgery to keep you comfortable and free of pain. After surgery, you may have more apnea spells because of this medicine and other medicines you were given. The spells may last longer than usual.   At home:  · Keep using the continuous positive airway pressure (CPAP) device when you sleep. Unless your health care provider tells you not to, use it when you sleep, day or night. CPAP is a common device used to treat obstructive sleep apnea.  · Talk with your provider before taking any pain medicine, muscle relaxants, or sedatives. Your provider will tell you about the possible dangers of taking these medicines.  © 5479-5805 [employer]. [address] [place] [address]  [place] [address]. All rights reserved. This information is not intended as a substitute for professional medical care. Always follow your healthcare professional's instructions.  Post op instructions for prevention of DVT  What is deep vein thrombosis?  Deep vein thrombosis (DVT) is the medical term for blood clots in the deep veins of the leg.  These blood clots can be dangerous.  A DVT can block a blood vessel and keep blood from getting where it needs to go.  Another problem is that the clot can travel to other parts of the body such as the lungs.  A clot that travels to the lungs is called a pulmonary embolus (PE) and can cause serious problems with breathing which can lead to death.  Am I at risk for DVT/PE?  If you are not very active, you are at risk of DVT.  Anyone confined to bed, sitting for long periods of time, recovering from surgery, etc. increases the risk of DVT.  Other risk factors are cancer diagnosis, certain medications, estrogen replacement in any form,older age, obesity, pregnancy, smoking, history of clotting disorders, and dehydration.  How will I know if I have a DVT?   Swelling in the lower leg   Pain   Warmth, redness, hardness or bulging of the vein  If you have any of these symptoms, call your doctors office right away.  Some people will not have any symptoms until the clot moves to the lungs.  What are the symptoms of a PE?   Panting, shortness of breath, or trouble breathing   Sharp, knife-like chest pain when you breathe   Coughing or coughing up blood   Rapid heartbeat  If you have any of these symptoms or get worse quickly, call 911 for emergency treatment.  How can I prevent a DVT?   Avoid long periods of inactivity and dont cross your legs--get up and walk around every hour or so.   Stay active--walking after surgery is highly encouraged.  This means you should get out of the house and walk in the neighborhood.  Going up and down stairs will not impair healing (unless advised  against such activity by your doctor).     Drink plenty of noncaffeinated, nonalcoholic fluids each day to prevent dehydration.   Wear special support stockings, if they have been advised by your doctor.   If you travel, stop at least once an hour and walk around.   Avoid smoking (assistance with stopping is available through your healthcare provider)  Always notify your doctor if you are not able to follow the post operative instructions that are given to you at the time of discharge.  It may be necessary to prescribe one of the medications available to prevent DVT.Ok to use port  Resume eliquis tomorrow in am

## 2019-05-31 NOTE — TRANSFER OF CARE
"Anesthesia Transfer of Care Note    Patient: Faith Byers    Procedure(s) Performed: Procedure(s) (LRB):  REMOVAL, CATHETER, CENTRAL VENOUS, TUNNELED, WITH PORT (N/A)  [id number] (N/A)    Patient location: PACU    Anesthesia Type: general    Transport from OR: Transported from OR on 2-3 L/min O2 by NC with adequate spontaneous ventilation    Post pain: adequate analgesia    Post assessment: no apparent anesthetic complications and tolerated procedure well    Post vital signs: stable    Level of consciousness: sedated    Nausea/Vomiting: no nausea/vomiting    Complications: none    Transfer of care protocol was followed      Last vitals:   Visit Vitals  /71 (BP Location: Left arm, Patient Position: Lying)   Pulse 69   Temp 37 °C (98.6 °F) (Skin)   Resp 18   Ht 4' 10" (1.473 m)   Wt 61.2 kg (135 lb)   SpO2 100%   Breastfeeding? No   BMI 28.22 kg/m²     "

## 2019-05-31 NOTE — H&P
[facility]  History & Physical    Subjective:      Chief Complaint/Reason for Admission: port removal     Faith Byers is a 53 y.o. female with pain and suspected leaking from right sided port.  Has been having pain with infusion for some time.  Here to remove port and then plan to replace on opposite side.    Past Medical History:   Diagnosis Date    Arthritis     Cancer     gallbadder/ liver spots/biopsy    Thyroid disease      Past Surgical History:   Procedure Laterality Date    ARTHROSCOPY, KNEE, WITH Partial lateral MENISCECTOMY Right 7/12/2018    Performed by Huey Kiran MD at [facility] OR    Uvcvgp-[id number]-bx N/A 11/16/2018    Performed by [employer] Provider at [facility] OR 2ND FLR    BREAST BIOPSY      BREAST SURGERY  2001    right biopsy, benign    CHONDROPLASTY,KNEE Medial Femoral Right 7/12/2018    Performed by Huey Kiran MD at [facility] OR    COLONOSCOPY      COLONOSCOPY N/A 12/5/2014    Performed by Sixto Barrera MD at [facility] ENDO    dental implant      [id number]-CATH Right 12/14/2018    Performed by Jurgen Toro MD at [facility] OR    KNEE ARTHROSCOPY Right     LIVER BIOPSY  11/16/2018    THYROIDECTOMY  2011    complete     Family History   Problem Relation Age of Onset    Cancer Mother         breast and bone     Breast cancer Mother     Hyperlipidemia Father     ADD / ADHD Daughter         autism    Crohn's disease Paternal Uncle     Heart disease Paternal Uncle         heart transplant     Social History     Tobacco Use    Smoking status: Never Smoker    Smokeless tobacco: Never Used   Substance Use Topics    Alcohol use: Yes     Comment: rarely    Drug use: No       PTA Medications   Medication Sig    apixaban (ELIQUIS) 5 mg Tab Take 1 tablet (5 mg total) by mouth 2 (two) times daily. To Start after 7 days on 10mg Twice daily.    furosemide (LASIX) 40 MG tablet Take 40 mg by mouth daily as needed.    levothyroxine (TIROSINT) 150  mcg Cap 200mg on an empty stomach in the morning    magnesium oxide (MAG-OX) 400 mg (241.3 mg magnesium) tablet Take 400 mg by mouth.    ondansetron (ZOFRAN-ODT) 8 MG TbDL Take 8 mg by mouth every 12 (twelve) hours as needed (nausea).     pantoprazole (PROTONIX) 40 MG tablet Take 1 tablet (40 mg total) by mouth once daily.    pantoprazole (PROTONIX) 40 MG tablet Take 40 mg by mouth.    potassium chloride SA (K-DUR,KLOR-CON) 10 MEQ tablet TAKE 1 TABLET BY MOUTH EVERY DAY WITH LASIX    venlafaxine (EFFEXOR-XR) 37.5 MG 24 hr capsule Take 1 tablet by mouth every morning.     apixaban (ELIQUIS) 5 mg Tab as directed    enoxaparin (LOVENOX) 100 mg/mL Syrg Inject 1 mL (100 mg total) into the skin once daily.    lipase-protease-amylase (CREON) 36,000-114,000- 180,000 unit CpDR Take 72,000-108,000 Units by mouth.    prochlorperazine (COMPAZINE) 10 MG tablet Take 1 tablet (10 mg total) by mouth every 6 (six) hours as needed. (Patient taking differently: Take 10 mg by mouth every 6 (six) hours as needed (nausea). )    terbinafine HCl (LAMISIL) 1 % cream Apply topically 2 (two) times daily.     Review of patient's allergies indicates:   Allergen Reactions    Camptosar [irinotecan] Other (See Comments)     Slurred speech        Review of Systems   Constitutional: Negative for chills and fever.   Respiratory: Negative for cough and sputum production.    Cardiovascular: Negative for chest pain and palpitations.   Gastrointestinal: Negative for abdominal pain, nausea and vomiting.   Genitourinary: Negative for dysuria and frequency.   Musculoskeletal: Positive for back pain, joint pain and myalgias.   Neurological: Negative for dizziness.   Endo/Heme/Allergies: Bruises/bleeds easily.       Objective:      Vital Signs (Most Recent)  Temp: 98.6 °F (37 °C) (05/31/19 0932)  Pulse: 69 (05/31/19 0932)  Resp: 18 (05/31/19 0932)  BP: 136/71 (05/31/19 0932)  SpO2: 100 % (05/31/19 0932)    Vital Signs Range (Last 24H):  Temp:   [98.6 °F (37 °C)]   Pulse:  [69]   Resp:  [18]   BP: (136)/(71)   SpO2:  [100 %]     Physical Exam   Constitutional: She appears well-developed and well-nourished. No distress.   HENT:   Head: Atraumatic.   Mouth/Throat: No oropharyngeal exudate.   Eyes: EOM are normal. Right eye exhibits no discharge. Left eye exhibits no discharge.   Neck: Neck supple. No JVD present. No tracheal deviation present.   Cardiovascular: Normal rate and regular rhythm.   Pulmonary/Chest: Effort normal and breath sounds normal.   Abdominal: Soft. She exhibits no distension. There is no tenderness.   Musculoskeletal: Normal range of motion. She exhibits no edema.   Skin: Skin is warm and dry. She is not diaphoretic.   Bruising over right chest       Data Review:    CBC:   Lab Results   Component Value Date    WBC 7.60 05/19/2019    RBC 3.47 (L) 05/19/2019    HGB 10.9 (L) 05/19/2019    HCT 33.8 (L) 05/19/2019     (L) 05/19/2019     BMP:   Lab Results   Component Value Date    GLU 93 05/19/2019     (H) 01/16/2019     05/19/2019     01/16/2019    K 3.9 05/19/2019     05/19/2019    CL 97 (L) 01/16/2019    CO2 26 05/19/2019    BUN 16 05/19/2019    CREATININE 0.7 05/19/2019    CREATININE 0.57 (L) 01/16/2019    CALCIUM 9.7 05/19/2019          Assessment:      Active Hospital Problems    Diagnosis  POA    Malignant neoplasm of liver [C22.9]  Yes      Resolved Hospital Problems   No resolved problems to display.       Plan:    Remove right sided port, place left sided port

## 2019-05-31 NOTE — OP NOTE
Port a cath Procedure Note    Date of procedure:  05/31/2019    Indications: cancer    Pre-operative Diagnosis: need for port- central iv access; malfunction of port a cath    Post-operative Diagnosis: Same    Surgeon: Hunter Botello MD    Assistants: none    Anesthesia: MAC/Local    ASA Class: 3    Procedure Details   The patient was seen in the Holding Room. The risks, benefits, complications, treatment options, and expected outcomes were discussed with the patient. The possibilities of reaction to medication, pulmonary aspiration, perforation of lungs, pneumothorax, hemothorax, bleeding, infection, the need for additional procedures, pain, scarring, and creating a complication requiring transfusion or operation were discussed with the patient. The patient concurred with the proposed plan, giving informed consent. The site of surgery properly noted/marked. The patient was taken to Operating Room 5 identified as Faith Byers  and the procedure verified as portacath insertion and removal of malfunctioning port. A Time Out was held and the above information confirmed.    Full general anesthesia was induced with orotracheal intubation. The patient was prepped and draped in a supine position with a bump under the shoulders. Appropriate antibiotics were given intravenously. Arms were tucked.    Lidocaine 1 percent with epinephrine was used to anesthetize the skin around the left clavicle and deltopectoral groove.  The large bore finder needle was then inserted into the left subclavian vein using the clavicle and sternal notch as landmarks.  The needle entered the vein on the 1st attempt.  Wire was inserted through needle angle confirmed with x ray to be in the vein.    Needle removed. Wire left in place.    Lidocaine 1 percent with epinephrine was used to anaesthetize the skin below the wire on the chest wall in the proposed site for the port.  15 blade was used to incise the skin near the wire and  dissection carried into the subcutaneous tissues for about 1 cm.  Using skin rakes and cautery a port pocket was created along with blunt dissection.  Hemostasis was achieved.    Fluoro was used to identify the appropriate length of tubing for the port which was then cut to size at approximately 16 cm and attached to the port itself.  Under fluoro the dilator was placed over the wire and tunnel dilated.  The wire was removed and tubing placed through the dilator.  The breakaway dilator was broken and removed.  The port was placed in the pocket.  Fluoro confirmed placement of the port in the svc.  The port was sutured in placed with 2-0 vicryl.  Subcutaneous tissues closed with 3-0 vicryl and skin closed with monocryl.  Steris and dressings were placed.    An incision was then made over the right sided port.  The port was removed with cautery.  The tubing was difficult to remove but with slight constant tension eventually came out.  It was intact.  Examining the tube there was indeed a tear in the tubing that would explain the leaking.  The wound was then closed with vicryl and tract closed with vicryl.  Skin closed with monocryl.    Patient awakened from anesthesia.    Instrument, sponge, and needle counts were correct prior to wound closure and at the conclusion of the case.     Findings:  Tear in previous port tubing    Estimated Blood Loss: 5.0 cc    Drains: none    Total IV Fluids: 1000 ml    Specimens: none    Implants: port    Complications:  None; patient tolerated the procedure well.    Disposition: PACU - hemodynamically stable, pending chest x ray to confirm no pneumothorax prior to discharge    Condition: stable    Attending Attestation: I was present and scrubbed for the entire procedure.

## 2019-05-31 NOTE — PLAN OF CARE
Arrived ambulatory in no present distress, plan of care reviewed and warm blankets provided. Father was provided with valuables

## 2019-05-31 NOTE — PLAN OF CARE
Pt aaox4, states pain is 2 out 10 and tolerable, denies nausea and tolerating clear liquids, vss, dressing cdi, mom updated, pt released by anesthesia to transfer to phase II, report given to NAHED Martinez

## 2019-05-31 NOTE — PLAN OF CARE
Pt n phase II rec has small 2x2 bandages on both sides of chest, covered with tegaderms, dry andintact  States where they removed a port on  Her right side hurts a litte. 2/10  But left side where port was put in, doesn't hurt at all  Family at bedside explained dc instructions to pt and famiily, all verb understanding.  Will wheel to car via wheelchair when able

## 2019-05-31 NOTE — ANESTHESIA POSTPROCEDURE EVALUATION
Anesthesia Post Evaluation    Patient: Faith Byers    Procedure(s) Performed: Procedure(s) (LRB):  REMOVAL, CATHETER, CENTRAL VENOUS, TUNNELED, WITH PORT (N/A)  [id number] (N/A)    Final Anesthesia Type: general  Patient location during evaluation: PACU  Patient participation: Yes- Able to Participate  Level of consciousness: awake and alert  Post-procedure vital signs: reviewed and stable  Pain management: adequate  Airway patency: patent  PONV status at discharge: No PONV  Anesthetic complications: no      Cardiovascular status: hemodynamically stable  Respiratory status: unassisted and room air  Hydration status: euvolemic  Follow-up not needed.          Vitals Value Taken Time   /77 5/31/2019  2:23 PM   Temp 36.7 °C (98 °F) 5/31/2019  2:00 PM   Pulse 85 5/31/2019  2:23 PM   Resp 16 5/31/2019  2:23 PM   SpO2 96 % 5/31/2019  2:23 PM         Event Time     Out of Recovery 14:21:00          Pain/Deena Score: Pain Rating Prior to Med Admin: 7 (5/31/2019  2:15 PM)  Pain Rating Post Med Admin: 2 (5/31/2019  2:23 PM)  Deena Score: 10 (5/31/2019  2:23 PM)

## 2019-05-31 NOTE — DISCHARGE SUMMARY
[facility]  Discharge Note  Short Stay    Admit Date: 5/31/2019    Discharge Date and Time: 05/31/2019    Attending Physician: Hunter Botello MD     Discharge Provider: Hunter Botello    Diagnoses:  Active Hospital Problems    Diagnosis  POA    *Malignant neoplasm of liver [C22.9]  Yes      Resolved Hospital Problems   No resolved problems to display.       Discharged Condition: good    Hospital Course: Patient was admitted for an outpatient procedure and tolerated the procedure well with no complications.    Final Diagnoses: Same as principal problem.    Disposition: Home or Self Care    Follow up/Patient Instructions:    Medications:  Reconciled Home Medications:      Medication List      START taking these medications    HYDROcodone-acetaminophen 7.5-325 mg per tablet  Commonly known as:  NORCO  Take 1 tablet by mouth every 4 (four) hours as needed for Pain.        CHANGE how you take these medications    * ondansetron 8 MG Tbdl  Commonly known as:  ZOFRAN-ODT  Take 8 mg by mouth every 12 (twelve) hours as needed (nausea).  What changed:  Another medication with the same name was added. Make sure you understand how and when to take each.     * ondansetron 4 MG Tbdl  Commonly known as:  ZOFRAN-ODT  Take 1 tablet (4 mg total) by mouth every 6 (six) hours as needed.  What changed:  You were already taking a medication with the same name, and this prescription was added. Make sure you understand how and when to take each.     prochlorperazine 10 MG tablet  Commonly known as:  COMPAZINE  Take 1 tablet (10 mg total) by mouth every 6 (six) hours as needed.  What changed:  reasons to take this         * This list has 2 medication(s) that are the same as other medications prescribed for you. Read the directions carefully, and ask your doctor or other care provider to review them with you.            CONTINUE taking these medications    * ELIQUIS 5 mg Tab  Generic drug:  apixaban  as directed     *  apixaban 5 mg Tab  Commonly known as:  ELIQUIS  Take 1 tablet (5 mg total) by mouth 2 (two) times daily. To Start after 7 days on 10mg Twice daily.     CREON 36,000-114,000- 180,000 unit Cpdr  Generic drug:  lipase-protease-amylase  Take 72,000-108,000 Units by mouth.     enoxaparin 100 mg/mL Syrg  Commonly known as:  LOVENOX  Inject 1 mL (100 mg total) into the skin once daily.     furosemide 40 MG tablet  Commonly known as:  LASIX  Take 40 mg by mouth daily as needed.     magnesium oxide 400 mg (241.3 mg magnesium) tablet  Commonly known as:  MAG-OX  Take 400 mg by mouth.     * pantoprazole 40 MG tablet  Commonly known as:  PROTONIX  Take 1 tablet (40 mg total) by mouth once daily.     * pantoprazole 40 MG tablet  Commonly known as:  PROTONIX  Take 40 mg by mouth.     potassium chloride SA 10 MEQ tablet  Commonly known as:  K-DUR,KLOR-CON  TAKE 1 TABLET BY MOUTH EVERY DAY WITH LASIX     terbinafine HCl 1 % cream  Commonly known as:  LAMISIL  Apply topically 2 (two) times daily.     TIROSINT 150 mcg Cap  Generic drug:  levothyroxine  200mg on an empty stomach in the morning     venlafaxine 37.5 MG 24 hr capsule  Commonly known as:  EFFEXOR-XR  Take 1 tablet by mouth every morning.         * This list has 4 medication(s) that are the same as other medications prescribed for you. Read the directions carefully, and ask your doctor or other care provider to review them with you.              Discharge Procedure Orders   Diet Adult Regular     Notify your health care provider if you experience any of the following:  temperature >100.4     Notify your health care provider if you experience any of the following:  severe uncontrolled pain     Remove dressing in 48 hours     Activity as tolerated         Discharge Procedure Orders (must include Diet, Follow-up, Activity):   Discharge Procedure Orders (must include Diet, Follow-up, Activity)   Diet Adult Regular     Notify your health care provider if you experience any of  the following:  temperature >100.4     Notify your health care provider if you experience any of the following:  severe uncontrolled pain     Remove dressing in 48 hours     Activity as tolerated

## 2019-05-31 NOTE — ANESTHESIA PREPROCEDURE EVALUATION
05/31/2019  Faith Byers is a 53 y.o., female.    Pre-op Assessment    I have reviewed the Patient Summary Reports.     I have reviewed the Nursing Notes.   I have reviewed the Medications.     Review of Systems  Anesthesia Hx:  No problems with previous Anesthesia    Social:  Non-Smoker, No Alcohol Use    Hematology/Oncology:        Current/Recent Cancer. Oncology Comments: Gallbladder CA   Cardiovascular:  Cardiovascular Normal     Pulmonary:  Pulmonary Normal    Renal/:  Renal/ Normal     Hepatic/GI:   GERD Liver Disease,    Neurological:   TIA,    Endocrine:   Hypothyroidism    Psych:   Psychiatric History anxiety          Physical Exam  General:  Obesity    Airway/Jaw/Neck:  Airway Findings: Mouth Opening: Normal Tongue: Normal  General Airway Assessment: Adult, Average  Mallampati: II  Jaw/Neck Findings:  Neck ROM: Normal ROM       Chest/Lungs:  Chest/Lungs Findings: Clear to auscultation, Normal Respiratory Rate     Heart/Vascular:  Heart Findings: Rate: Normal  Rhythm: Regular Rhythm  Sounds: Normal  Heart murmur: negative       Mental Status:  Mental Status Findings:  Cooperative, Alert and Oriented         Anesthesia Plan  Type of Anesthesia, risks & benefits discussed:  Anesthesia Type:  general  Patient's Preference:   Intra-op Monitoring Plan: standard ASA monitors  Intra-op Monitoring Plan Comments:   Post Op Pain Control Plan: IV/PO Opioids PRN  Post Op Pain Control Plan Comments:   Induction:   IV  Beta Blocker:  Patient is not currently on a Beta-Blocker (No further documentation required).       Informed Consent: Patient understands risks and agrees with Anesthesia plan.  Questions answered. Anesthesia consent signed with patient.  ASA Score: 3     Day of Surgery Review of History & Physical: I have interviewed and examined the patient. I have reviewed the patient's H&P  dated:  There are no significant changes.  H&P update referred to the surgeon.     Anesthesia Plan Notes: LMA general        Ready For Surgery From Anesthesia Perspective.

## 2019-05-31 NOTE — TELEPHONE ENCOUNTER
----- Message from Chastity Castillo sent at 5/31/2019 11:16 AM CDT -----  Type: Needs Medical Advice    Who Called:  pt  Best Call Back Number: [phone]  Additional Information:   Pt  Is  At  The  Hospital  Having a   1  Day   Surgery  And  Is  Calling to  Discuss a  Med  That  Was  Called in // please call  Before   12.00   Or  After  4:00

## 2019-06-02 ENCOUNTER — LAB VISIT (OUTPATIENT)
Dept: LAB | Facility: HOSPITAL | Age: 54
End: 2019-06-02
Attending: INTERNAL MEDICINE
Payer: COMMERCIAL

## 2019-06-02 DIAGNOSIS — C23 CARCINOMA GALLBLADDER: ICD-10-CM

## 2019-06-02 DIAGNOSIS — C23 CARCINOMA OF GALL BLADDER: ICD-10-CM

## 2019-06-02 LAB
ALBUMIN SERPL BCP-MCNC: 3 G/DL (ref 3.5–5.2)
ALP SERPL-CCNC: 126 U/L (ref 55–135)
ALT SERPL W/O P-5'-P-CCNC: 37 U/L (ref 10–44)
ANION GAP SERPL CALC-SCNC: 6 MMOL/L (ref 8–16)
AST SERPL-CCNC: 48 U/L (ref 10–40)
BASOPHILS # BLD AUTO: 0 K/UL (ref 0–0.2)
BASOPHILS NFR BLD: 0.3 % (ref 0–1.9)
BILIRUB SERPL-MCNC: 0.6 MG/DL (ref 0.1–1)
BUN SERPL-MCNC: 16 MG/DL (ref 6–20)
CALCIUM SERPL-MCNC: 9.2 MG/DL (ref 8.7–10.5)
CHLORIDE SERPL-SCNC: 109 MMOL/L (ref 95–110)
CO2 SERPL-SCNC: 26 MMOL/L (ref 23–29)
CREAT SERPL-MCNC: 0.6 MG/DL (ref 0.5–1.4)
DIFFERENTIAL METHOD: ABNORMAL
EOSINOPHIL # BLD AUTO: 0 K/UL (ref 0–0.5)
EOSINOPHIL NFR BLD: 0.4 % (ref 0–8)
ERYTHROCYTE [DISTWIDTH] IN BLOOD BY AUTOMATED COUNT: 20.9 % (ref 11.5–14.5)
EST. GFR  (AFRICAN AMERICAN): >60 ML/MIN/1.73 M^2
EST. GFR  (NON AFRICAN AMERICAN): >60 ML/MIN/1.73 M^2
GLUCOSE SERPL-MCNC: 104 MG/DL (ref 70–110)
HCT VFR BLD AUTO: 30.7 % (ref 37–48.5)
HGB BLD-MCNC: 10.1 G/DL (ref 12–16)
LYMPHOCYTES # BLD AUTO: 1.9 K/UL (ref 1–4.8)
LYMPHOCYTES NFR BLD: 36.6 % (ref 18–48)
MCH RBC QN AUTO: 33.2 PG (ref 27–31)
MCHC RBC AUTO-ENTMCNC: 32.9 G/DL (ref 32–36)
MCV RBC AUTO: 101 FL (ref 82–98)
MONOCYTES # BLD AUTO: 0.5 K/UL (ref 0.3–1)
MONOCYTES NFR BLD: 9.6 % (ref 4–15)
NEUTROPHILS # BLD AUTO: 2.8 K/UL (ref 1.8–7.7)
NEUTROPHILS NFR BLD: 53.1 % (ref 38–73)
PLATELET # BLD AUTO: 70 K/UL (ref 150–350)
PMV BLD AUTO: 7.4 FL (ref 9.2–12.9)
POTASSIUM SERPL-SCNC: 3.4 MMOL/L (ref 3.5–5.1)
PROT SERPL-MCNC: 5.8 G/DL (ref 6–8.4)
RBC # BLD AUTO: 3.04 M/UL (ref 4–5.4)
SODIUM SERPL-SCNC: 141 MMOL/L (ref 136–145)
WBC # BLD AUTO: 5.3 K/UL (ref 3.9–12.7)

## 2019-06-02 PROCEDURE — 80053 COMPREHEN METABOLIC PANEL: CPT

## 2019-06-02 PROCEDURE — 36415 COLL VENOUS BLD VENIPUNCTURE: CPT

## 2019-06-02 PROCEDURE — 85025 COMPLETE CBC W/AUTO DIFF WBC: CPT

## 2019-06-02 PROCEDURE — 86301 IMMUNOASSAY TUMOR CA 19-9: CPT

## 2019-06-03 ENCOUNTER — OFFICE VISIT (OUTPATIENT)
Dept: HEMATOLOGY/ONCOLOGY | Facility: CLINIC | Age: 54
End: 2019-06-03
Payer: COMMERCIAL

## 2019-06-03 VITALS
BODY MASS INDEX: 29.24 KG/M2 | HEIGHT: 58 IN | TEMPERATURE: 99 F | OXYGEN SATURATION: 98 % | WEIGHT: 139.31 LBS | DIASTOLIC BLOOD PRESSURE: 81 MMHG | SYSTOLIC BLOOD PRESSURE: 137 MMHG | HEART RATE: 73 BPM | RESPIRATION RATE: 20 BRPM

## 2019-06-03 DIAGNOSIS — I26.99 BILATERAL PULMONARY EMBOLISM: ICD-10-CM

## 2019-06-03 DIAGNOSIS — D69.6 THROMBOCYTOPENIA: ICD-10-CM

## 2019-06-03 DIAGNOSIS — C22.9 MALIGNANT NEOPLASM OF LIVER, UNSPECIFIED LIVER MALIGNANCY TYPE: Primary | ICD-10-CM

## 2019-06-03 LAB — CANCER AG19-9 SERPL-ACNC: 114 U/ML (ref 2–40)

## 2019-06-03 PROCEDURE — 99999 PR PBB SHADOW E&M-EST. PATIENT-LVL IV: ICD-10-PCS | Mod: PBBFAC,,, | Performed by: INTERNAL MEDICINE

## 2019-06-03 PROCEDURE — 3008F PR BODY MASS INDEX (BMI) DOCUMENTED: ICD-10-PCS | Mod: CPTII,S$GLB,, | Performed by: INTERNAL MEDICINE

## 2019-06-03 PROCEDURE — 99214 OFFICE O/P EST MOD 30 MIN: CPT | Mod: S$GLB,,, | Performed by: INTERNAL MEDICINE

## 2019-06-03 PROCEDURE — 99214 PR OFFICE/OUTPT VISIT, EST, LEVL IV, 30-39 MIN: ICD-10-PCS | Mod: S$GLB,,, | Performed by: INTERNAL MEDICINE

## 2019-06-03 PROCEDURE — 99999 PR PBB SHADOW E&M-EST. PATIENT-LVL IV: CPT | Mod: PBBFAC,,, | Performed by: INTERNAL MEDICINE

## 2019-06-03 PROCEDURE — 3008F BODY MASS INDEX DOCD: CPT | Mod: CPTII,S$GLB,, | Performed by: INTERNAL MEDICINE

## 2019-06-04 NOTE — PROGRESS NOTES
Subjective:       Patient ID: Faith Byers is a 54 y.o. female.  Gall bladder ca , started modified FOLFIRINOX  HPI:   FINAL PATHOLOGIC DIAGNOSIS  GALLBLADDER/LIVER MASS, BIOPSY:  -Positive for malignancy, invasive squamous cell carcinoma, doing well pain has subsided also follows with [facility].     had a recent visit in 5/2019 plan is to improve albumin and get 4 more  Cycle and revisit with [facility].  Occasional nausea.  Continues to work.  No other complaints    Social History     Socioeconomic History    Marital status:      Spouse name: Not on file    Number of children: Not on file    Years of education: Not on file    Highest education level: Not on file   Occupational History    Not on file   Social Needs    Financial resource strain: Not on file    Food insecurity:     Worry: Not on file     Inability: Not on file    Transportation needs:     Medical: Not on file     Non-medical: Not on file   Tobacco Use    Smoking status: Never Smoker    Smokeless tobacco: Never Used   Substance and Sexual Activity    Alcohol use: Yes     Comment: rarely    Drug use: No    Sexual activity: Not Currently   Lifestyle    Physical activity:     Days per week: Not on file     Minutes per session: Not on file    Stress: Not on file   Relationships    Social connections:     Talks on phone: Not on file     Gets together: Not on file     Attends Voodoo service: Not on file     Active member of club or organization: Not on file     Attends meetings of clubs or organizations: Not on file     Relationship status: Not on file   Other Topics Concern    Not on file   Social History Narrative    Not on file     Family History   Problem Relation Age of Onset    Cancer Mother         breast and bone     Breast cancer Mother     Hyperlipidemia Father     ADD / ADHD Daughter         autism    Crohn's disease Paternal Uncle     Heart disease Paternal Uncle         heart transplant      Past Surgical History:   Procedure Laterality Date    ARTHROSCOPY, KNEE, WITH Partial lateral MENISCECTOMY Right 7/12/2018    Performed by [facility]an [facility] OR    Otoesu-cnrgfk-he N/A 11/16/2018    Performed by [facility] 2ND FLR    BREAST BIOPSY      BREAST SURGERY  2001    right biopsy, benign    CHONDROPLASTY,KNEE Medial Femoral Right 7/12/2018    Performed by [facility]an [facility] OR    COLONOSCOPY      COLONOSCOPY N/A 12/5/2014    Performed by Sixto Barrera MD at [facility] ENDO    dental implant      QHAJGQITJ-IYMN-Q-CATH N/A 5/31/2019    Performed by Hunter Botello [facility] at [facility] OR    EHEZJVWVH-BDXA-X-CATH Right 12/14/2018    Performed by Jurgen Toro [facility] at [facility] OR    KNEE ARTHROSCOPY Right     LIVER BIOPSY  11/16/2018    REMOVAL, CATHETER, CENTRAL VENOUS, TUNNELED, WITH PORT N/A 5/31/2019    Performed by Hunter Botello [facility] at [facility] OR    THYROIDECTOMY  2011    complete     Past Medical History:   Diagnosis Date    Arthritis     Cancer     gallbadder/ liver spots/biopsy    Thyroid disease        Current Outpatient Medications:     apixaban (ELIQUIS) 5 mg Tab, Take 1 tablet (5 mg total) by mouth 2 (two) times daily. To Start after 7 days on 10mg Twice daily., Disp: 60 tablet, Rfl: 3    apixaban (ELIQUIS) 5 mg Tab, as directed, Disp: , Rfl:     enoxaparin (LOVENOX) 100 mg/mL Syrg, Inject 1 mL (100 mg total) into the skin once daily., Disp: 5 mL, Rfl: 0    furosemide (LASIX) 40 MG tablet, Take 40 mg by mouth daily as needed., Disp: , Rfl: 2    HYDROcodone-acetaminophen (NORCO) 7.5-325 mg per tablet, Take 1 tablet by mouth every 4 (four) hours as needed for Pain., Disp: 20 tablet, Rfl: 0    lipase-protease-amylase (CREON) 36,000-114,000- 180,000 unit CpDR, Take 72,000-108,000 Units by mouth. , Disp: , Rfl:     magnesium oxide (MAG-OX) 400 mg (241.3 mg magnesium) tablet, Take 400 mg by mouth., Disp: , Rfl:     ondansetron (ZOFRAN-ODT) 8 MG  TbDL, Take 8 mg by mouth every 12 (twelve) hours as needed (nausea). , Disp: , Rfl:     pantoprazole (PROTONIX) 40 MG tablet, Take 40 mg by mouth., Disp: , Rfl:     potassium chloride SA (K-DUR,KLOR-CON) 10 MEQ tablet, TAKE 1 TABLET BY MOUTH EVERY DAY WITH LASIX, Disp: , Rfl: 3    terbinafine HCl (LAMISIL) 1 % cream, Apply topically 2 (two) times daily., Disp: 24 g, Rfl: 0    venlafaxine (EFFEXOR-XR) 37.5 MG 24 hr capsule, Take 1 tablet by mouth every morning. , Disp: , Rfl:   Review of patient's allergies indicates:  No Known Allergies      REVIEW OF SYSTEMS:     CONSTITUTIONAL:   Nausea improved eating better weight is increased she is overall much better    BREASTS: There is no swelling around breasts or nipple discharge.    EYES: Denies eye pain, blurred vision, swelling, redness or discharge.      ENT AND MOUTH: Denies runny nose, stuffiness, sinus trouble or sores. Denies    nosebleeds. Denies, hoarseness, change in voice or swelling in front of the    neck.      CARDIOVASCULAR: Denies chest pain, discomfort or palpitations. Denies neck    swelling or episodes of passing out.      RESPIRATORY: Denies cough, sputum production, blood in sputum, and denies    shortness of breath.      GI: Denies trouble swallowing, indigestion, heartburn, abdominal pain, nausea,    vomiting, diarrhea, has altered bowel habits, no blood in stool, discoloration of    stools, change in nature of stool, bloating, increased abdominal girth.      GENITOURINARY: No discharge. No pelvic pain or lumps. No rash around groin or  lesions. No urinary frequency, hesitation, painful urination or blood in    urine. Denies incontinence. No problems with intercourse.      MUSCULOSKELETAL:  Some amount of knee pain    NEUROLOGICAL: Denies tingling, numbness, altered mentation changes to nerve    function in the face, weakness to one or both of the body. Denies changes to    gait and denies multiple falls or accidents.      PSYCHIATRIC: Denies  nervousness, anxiety, hallucinations, depression, suicidal    ideation, trouble sleeping or changes in behavior noticed by family.      The patient denies recent foreign travel or recent exposure to chemicals or    products of concern or infectious diseases.     PHYSICAL EXAM:     Wt Readings from Last 3 Encounters:   06/03/19 63.2 kg (139 lb 5.3 oz)   05/31/19 61.2 kg (135 lb)   05/20/19 56.7 kg (125 lb)     Temp Readings from Last 3 Encounters:   06/03/19 98.8 °F (37.1 °C)   05/31/19 98 °F (36.7 °C) (Skin)   05/20/19 98.4 °F (36.9 °C)     BP Readings from Last 3 Encounters:   06/03/19 137/81   05/31/19 125/77   05/20/19 138/83     Pulse Readings from Last 3 Encounters:   06/03/19 73   05/31/19 85   05/20/19 88     GENERAL: Comfortable looking patient. Patient is in no distress.  Awake, alert and oriented to time, person and place.  No anxiety, or agitation.      HEENT: Normal conjunctivae and eyelids. WNL.  PERRLA 3 to 4 mm. No icterus, no pallor, no congestion, and no discharge noted.     NECK:  Supple. Trachea is central.  No crepitus.  No JVD or masses.    RESPIRATORY:  No intercostal retractions.  No dullness to percussion.  Chest is clear to auscultation.  No rales, rhonchi or wheezes.  No crepitus.  Good air entry bilaterally.    CARDIOVASCULAR:  S1 and S2 are normally heard without murmurs or gallops.  All peripheral pulses are present.    ABDOMEN:  Normal abdomen.  No hepatosplenomegaly.  No free fluid.  Bowel sounds are present.  No hernia noted. No masses.  No rebound or tenderness.  No guarding or rigidity.  Umbilicus is midline.    LYMPHATICS:  No axillary, cervical, supraclavicular, submental, or inguinal lymphadenopathy.    SKIN/MUSCULOSKELETAL:  There is no evidence of excoriation marks or ecchmosis.  No rashes.  No cyanosis.  No clubbing.  No joint or skeletal deformities noted.  Normal range of motion.    NEUROLOGIC:  Higher functions are appropriate.  No cranial nerve deficits.  Normal jody.   Normal strength.  Motor and sensory functions are normal.  Deep tendon reflexes are normal.    GENITAL/RECTAL:  Exams are deferred.      Laboratory:     CBC:  Lab Results   Component Value Date    WBC 5.30 06/02/2019    RBC 3.04 (L) 06/02/2019    HGB 10.1 (L) 06/02/2019    HCT 30.7 (L) 06/02/2019     (H) 06/02/2019    MCH 33.2 (H) 06/02/2019    MCHC 32.9 06/02/2019    RDW 20.9 (H) 06/02/2019    PLT 70 (L) 06/02/2019    MPV 7.4 (L) 06/02/2019    GRAN 2.8 06/02/2019    GRAN 53.1 06/02/2019    LYMPH 1.9 06/02/2019    LYMPH 36.6 06/02/2019    MONO 0.5 06/02/2019    MONO 9.6 06/02/2019    EOS 0.0 06/02/2019    BASO 0.00 06/02/2019    EOSINOPHIL 0.4 06/02/2019    BASOPHIL 0.3 06/02/2019       BMP: BMP  Lab Results   Component Value Date     06/02/2019    K 3.4 (L) 06/02/2019     06/02/2019    CO2 26 06/02/2019    BUN 16 06/02/2019    CREATININE 0.6 06/02/2019    CALCIUM 9.2 06/02/2019    ANIONGAP 6 (L) 06/02/2019    ESTGFRAFRICA >60 06/02/2019    EGFRNONAA >60 06/02/2019       LFT:   Lab Results   Component Value Date    ALT 37 06/02/2019    AST 48 (H) 06/02/2019    ALKPHOS 126 06/02/2019    BILITOT 0.6 06/02/2019 December 2018 CT chest abdomen pelvis  Impression       Gallbladder mass with invasion of the hepatic parenchyma compatible with the patient's known gallbladder neoplasm, and moderately increased in size.  Possible extension to the hepatic flexure of the colon.  Unchanged mild the biliary dilatation.    Development a suspicious lymph node subjacent to the mass.    3 mm left upper lobe nodule which is nonspecific.  Attention on 6-12 month follow-up recommended.    Unchanged hepatic hypodensities, suggestive for cysts.  Continued attention on follow-up recommended     Impression 3/2019 CT       Moderate reduction in size of the patient's known gallbladder mass and associated hepatic parenchymal invasion.  Unchanged scattered small hepatic hypodensities may simply represent cysts however  continued attention on follow-up recommended.  No evidence for extrahepatic metastatic disease.       Ca19.9 at [facility] 3819, now 1mproving down to 445.0    Assessment/Plan:       Cont with FOLFIRINOX  Pt went to [facility] in may recs are for more treatments with FOLFIRINOX  For a total 4 more prior to sx evaluation   plts too low for  rc delay rc by a week   Hold lovenox till I see her next week for bilat PE   scans done at [facility] ( see care everywhere)  Living Will  During this visit, I engaged the patient in the advance care planning process.  The patient and I reviewed the role for advance directives and their purpose in directing future healthcare if the patient's unable to speak for herself.  At this point in time, the patient does have full decision-making capacity.  We discussed different extreme health states that she could experience, and reviewed what kind of medical care she would want in those situations.  The patient communicated that if she were comatose and had little chance of a meaningful recovery, she does not want machines/life-sustaining treatments used.  The patient  Has not  completed a living will to reflect these preferences.  The patient has not already designated a healthcare power of  to make decisions on her behalf.   She has initaited the process already

## 2019-06-04 NOTE — TELEPHONE ENCOUNTER
Spoke to patient. Patient states Dr Alarcon told her to not take her blood thinner due to low platelet count patient wants to know if she should be holding both lovenox and eliquis

## 2019-06-06 ENCOUNTER — PATIENT MESSAGE (OUTPATIENT)
Dept: HEMATOLOGY/ONCOLOGY | Facility: CLINIC | Age: 54
End: 2019-06-06

## 2019-06-07 DIAGNOSIS — F41.1 GAD (GENERALIZED ANXIETY DISORDER): ICD-10-CM

## 2019-06-07 RX ORDER — VENLAFAXINE HYDROCHLORIDE 37.5 MG/1
37.5 CAPSULE, EXTENDED RELEASE ORAL DAILY
Qty: 30 CAPSULE | Refills: 10 | Status: SHIPPED | OUTPATIENT
Start: 2019-06-07

## 2019-06-09 ENCOUNTER — APPOINTMENT (OUTPATIENT)
Dept: LAB | Facility: HOSPITAL | Age: 54
End: 2019-06-09
Attending: INTERNAL MEDICINE
Payer: COMMERCIAL

## 2019-06-10 ENCOUNTER — OFFICE VISIT (OUTPATIENT)
Dept: HEMATOLOGY/ONCOLOGY | Facility: CLINIC | Age: 54
End: 2019-06-10
Payer: COMMERCIAL

## 2019-06-10 VITALS
DIASTOLIC BLOOD PRESSURE: 71 MMHG | OXYGEN SATURATION: 98 % | WEIGHT: 139.75 LBS | RESPIRATION RATE: 20 BRPM | HEART RATE: 95 BPM | HEIGHT: 58 IN | BODY MASS INDEX: 29.33 KG/M2 | TEMPERATURE: 99 F | SYSTOLIC BLOOD PRESSURE: 119 MMHG

## 2019-06-10 DIAGNOSIS — D69.6 THROMBOCYTOPENIA: ICD-10-CM

## 2019-06-10 DIAGNOSIS — C22.9 MALIGNANT NEOPLASM OF LIVER, UNSPECIFIED LIVER MALIGNANCY TYPE: Primary | ICD-10-CM

## 2019-06-10 DIAGNOSIS — Z79.01 ANTICOAGULANT LONG-TERM USE: ICD-10-CM

## 2019-06-10 DIAGNOSIS — E03.2 HYPOTHYROIDISM DUE TO MEDICATION: ICD-10-CM

## 2019-06-10 DIAGNOSIS — I26.99 BILATERAL PULMONARY EMBOLISM: ICD-10-CM

## 2019-06-10 DIAGNOSIS — C23 GALLBLADDER CANCER: ICD-10-CM

## 2019-06-10 PROCEDURE — 99999 PR PBB SHADOW E&M-EST. PATIENT-LVL IV: CPT | Mod: PBBFAC,,, | Performed by: INTERNAL MEDICINE

## 2019-06-10 PROCEDURE — 3008F BODY MASS INDEX DOCD: CPT | Mod: CPTII,S$GLB,, | Performed by: INTERNAL MEDICINE

## 2019-06-10 PROCEDURE — 99999 PR PBB SHADOW E&M-EST. PATIENT-LVL IV: ICD-10-PCS | Mod: PBBFAC,,, | Performed by: INTERNAL MEDICINE

## 2019-06-10 PROCEDURE — 99215 PR OFFICE/OUTPT VISIT, EST, LEVL V, 40-54 MIN: ICD-10-PCS | Mod: S$GLB,,, | Performed by: INTERNAL MEDICINE

## 2019-06-10 PROCEDURE — 3008F PR BODY MASS INDEX (BMI) DOCUMENTED: ICD-10-PCS | Mod: CPTII,S$GLB,, | Performed by: INTERNAL MEDICINE

## 2019-06-10 PROCEDURE — 99215 OFFICE O/P EST HI 40 MIN: CPT | Mod: S$GLB,,, | Performed by: INTERNAL MEDICINE

## 2019-06-10 RX ORDER — SODIUM CHLORIDE 0.9 % (FLUSH) 0.9 %
10 SYRINGE (ML) INJECTION
Status: CANCELLED | OUTPATIENT
Start: 2019-06-14

## 2019-06-10 RX ORDER — ATROPINE SULFATE 0.4 MG/ML
0.4 INJECTION, SOLUTION ENDOTRACHEAL; INTRAMEDULLARY; INTRAMUSCULAR; INTRAVENOUS; SUBCUTANEOUS ONCE AS NEEDED
Status: CANCELLED | OUTPATIENT
Start: 2019-06-10

## 2019-06-10 RX ORDER — HEPARIN 100 UNIT/ML
500 SYRINGE INTRAVENOUS
Status: CANCELLED | OUTPATIENT
Start: 2019-06-10

## 2019-06-10 RX ORDER — DIPHENHYDRAMINE HYDROCHLORIDE 50 MG/ML
50 INJECTION INTRAMUSCULAR; INTRAVENOUS ONCE AS NEEDED
Status: CANCELLED | OUTPATIENT
Start: 2019-06-10

## 2019-06-10 RX ORDER — EPINEPHRINE 0.3 MG/.3ML
0.3 INJECTION SUBCUTANEOUS ONCE AS NEEDED
Status: CANCELLED | OUTPATIENT
Start: 2019-06-10

## 2019-06-10 RX ORDER — HEPARIN 100 UNIT/ML
500 SYRINGE INTRAVENOUS
Status: CANCELLED | OUTPATIENT
Start: 2019-06-14

## 2019-06-10 RX ORDER — SODIUM CHLORIDE 0.9 % (FLUSH) 0.9 %
10 SYRINGE (ML) INJECTION
Status: CANCELLED | OUTPATIENT
Start: 2019-06-10

## 2019-06-10 NOTE — PROGRESS NOTES
Subjective:       Patient ID: Faith Byers is a 54 y.o. female.  Gall bladder ca , started modified FOLFIRINOX  HPI:   FINAL PATHOLOGIC DIAGNOSIS  GALLBLADDER/LIVER MASS, BIOPSY:  -Positive for malignancy, invasive squamous cell carcinoma, doing well pain has subsided also follows with [facility].     had a recent visit in 5/2019 plan is to improve albumin and get 4 more  Cycle and revisit with [facility].  Occasional nausea.  Continues to work.  No other complaints   rx delayed last week due to low plts    Social History     Socioeconomic History    Marital status:      Spouse name: Not on file    Number of children: Not on file    Years of education: Not on file    Highest education level: Not on file   Occupational History    Not on file   Social Needs    Financial resource strain: Not on file    Food insecurity:     Worry: Not on file     Inability: Not on file    Transportation needs:     Medical: Not on file     Non-medical: Not on file   Tobacco Use    Smoking status: Never Smoker    Smokeless tobacco: Never Used   Substance and Sexual Activity    Alcohol use: Yes     Comment: rarely    Drug use: No    Sexual activity: Not Currently   Lifestyle    Physical activity:     Days per week: Not on file     Minutes per session: Not on file    Stress: Not on file   Relationships    Social connections:     Talks on phone: Not on file     Gets together: Not on file     Attends Synagogue service: Not on file     Active member of club or organization: Not on file     Attends meetings of clubs or organizations: Not on file     Relationship status: Not on file   Other Topics Concern    Not on file   Social History Narrative    Not on file     Family History   Problem Relation Age of Onset    Cancer Mother         breast and bone     Breast cancer Mother     Hyperlipidemia Father     ADD / ADHD Daughter         autism    Crohn's disease Paternal Uncle     Heart disease Paternal  Uncle         heart transplant     Past Surgical History:   Procedure Laterality Date    ARTHROSCOPY, KNEE, WITH Partial lateral MENISCECTOMY Right 7/12/2018    [facility]    Kwiyhw-oiqqse-oz N/A 11/16/2018    Performed by [facility] 2ND FLR    BREAST BIOPSY      BREAST SURGERY  2001    right biopsy, benign    CHONDROPLASTY,KNEE Medial Femoral Right 7/12/2018    [facility] at [facility] OR    COLONOSCOPY      COLONOSCOPY N/A 12/5/2014    Performed by Sixto [facility]rera [facility] at [facility] ENDO    dental implant      [id number]-CATH N/A 5/31/2019    Performed by Hunter Botello MD at [facility] OR    HRYILDKAI-ERBK-K-CATH Right 12/14/2018    Performed by Jurgen Toro MD at [facility] OR    KNEE ARTHROSCOPY Right     LIVER BIOPSY  11/16/2018    REMOVAL, CATHETER, CENTRAL VENOUS, TUNNELED, WITH PORT N/A 5/31/2019    Performed by Hunter [facility]ello [facility] at [facility] OR    THYROIDECTOMY  2011    complete     Past Medical History:   Diagnosis Date    Arthritis     Cancer     gallbadder/ liver spots/biopsy    Thyroid disease        Current Outpatient Medications:     apixaban (ELIQUIS) 5 mg Tab, Take 1 tablet (5 mg total) by mouth 2 (two) times daily. To Start after 7 days on 10mg Twice daily., Disp: 60 tablet, Rfl: 3    apixaban (ELIQUIS) 5 mg Tab, as directed, Disp: , Rfl:     enoxaparin (LOVENOX) 100 mg/mL Syrg, Inject 1 mL (100 mg total) into the skin once daily., Disp: 5 mL, Rfl: 0    furosemide (LASIX) 40 MG tablet, Take 40 mg by mouth daily as needed., Disp: , Rfl: 2    HYDROcodone-acetaminophen (NORCO) 7.5-325 mg per tablet, Take 1 tablet by mouth every 4 (four) hours as needed for Pain., Disp: 20 tablet, Rfl: 0    lipase-protease-amylase (CREON) 36,000-114,000- 180,000 unit CpDR, Take 72,000-108,000 Units by mouth. , Disp: , Rfl:     magnesium oxide (MAG-OX) 400 mg (241.3 mg magnesium) tablet, Take 400 mg by mouth., Disp: , Rfl:      ondansetron (ZOFRAN-ODT) 8 MG TbDL, Take 8 mg by mouth every 12 (twelve) hours as needed (nausea). , Disp: , Rfl:     pantoprazole (PROTONIX) 40 MG tablet, Take 40 mg by mouth., Disp: , Rfl:     potassium chloride SA (K-DUR,KLOR-CON) 10 MEQ tablet, TAKE 1 TABLET BY MOUTH EVERY DAY WITH LASIX, Disp: , Rfl: 3    terbinafine HCl (LAMISIL) 1 % cream, Apply topically 2 (two) times daily., Disp: 24 g, Rfl: 0    venlafaxine (EFFEXOR-XR) 37.5 MG 24 hr capsule, TAKE 1 CAPSULE (37.5 MG TOTAL) BY MOUTH ONCE DAILY., Disp: 30 capsule, Rfl: 10  Review of patient's allergies indicates:  No Known Allergies      REVIEW OF SYSTEMS:     CONSTITUTIONAL:   Nausea improved eating better weight is increased she is overall much better    BREASTS: There is no swelling around breasts or nipple discharge.    EYES: Denies eye pain, blurred vision, swelling, redness or discharge.      ENT AND MOUTH: Denies runny nose, stuffiness, sinus trouble or sores. Denies    nosebleeds. Denies, hoarseness, change in voice or swelling in front of the    neck.      CARDIOVASCULAR: Denies chest pain, discomfort or palpitations. Denies neck    swelling or episodes of passing out.      RESPIRATORY: Denies cough, sputum production, blood in sputum, and denies    shortness of breath.      GI: Denies trouble swallowing, indigestion, heartburn, abdominal pain, nausea,    vomiting, diarrhea, has altered bowel habits, no blood in stool, discoloration of    stools, change in nature of stool, bloating, increased abdominal girth.      GENITOURINARY: No discharge. No pelvic pain or lumps. No rash around groin or  lesions. No urinary frequency, hesitation, painful urination or blood in    urine. Denies incontinence. No problems with intercourse.      MUSCULOSKELETAL:  Some amount of knee pain    NEUROLOGICAL: Denies tingling, numbness, altered mentation changes to nerve    function in the face, weakness to one or both of the body. Denies changes to    gait and denies  multiple falls or accidents.      PSYCHIATRIC: Denies nervousness, anxiety, hallucinations, depression, suicidal    ideation, trouble sleeping or changes in behavior noticed by family.      The patient denies recent foreign travel or recent exposure to chemicals or    products of concern or infectious diseases.     PHYSICAL EXAM:     Wt Readings from Last 3 Encounters:   06/03/19 63.2 kg (139 lb 5.3 oz)   05/31/19 61.2 kg (135 lb)   05/20/19 56.7 kg (125 lb)     Temp Readings from Last 3 Encounters:   06/03/19 98.8 °F (37.1 °C)   05/31/19 98 °F (36.7 °C) (Skin)   05/20/19 98.4 °F (36.9 °C)     BP Readings from Last 3 Encounters:   06/03/19 137/81   05/31/19 125/77   05/20/19 138/83     Pulse Readings from Last 3 Encounters:   06/03/19 73   05/31/19 85   05/20/19 88     GENERAL: Comfortable looking patient. Patient is in no distress.  Awake, alert and oriented to time, person and place.  No anxiety, or agitation.      HEENT: Normal conjunctivae and eyelids. WNL.  PERRLA 3 to 4 mm. No icterus, no pallor, no congestion, and no discharge noted.     NECK:  Supple. Trachea is central.  No crepitus.  No JVD or masses.    RESPIRATORY:  No intercostal retractions.  No dullness to percussion.  Chest is clear to auscultation.  No rales, rhonchi or wheezes.  No crepitus.  Good air entry bilaterally.    CARDIOVASCULAR:  S1 and S2 are normally heard without murmurs or gallops.  All peripheral pulses are present.    ABDOMEN:  Normal abdomen.  No hepatosplenomegaly.  No free fluid.  Bowel sounds are present.  No hernia noted. No masses.  No rebound or tenderness.  No guarding or rigidity.  Umbilicus is midline.    LYMPHATICS:  No axillary, cervical, supraclavicular, submental, or inguinal lymphadenopathy.    SKIN/MUSCULOSKELETAL:  There is no evidence of excoriation marks or ecchmosis.  No rashes.  No cyanosis.  No clubbing.  No joint or skeletal deformities noted.  Normal range of motion.    NEUROLOGIC:  Higher functions are  appropriate.  No cranial nerve deficits.  Normal jody.  Normal strength.  Motor and sensory functions are normal.  Deep tendon reflexes are normal.    GENITAL/RECTAL:  Exams are deferred.      Laboratory:     CBC:  Lab Results   Component Value Date    WBC 3.60 (L) 06/09/2019    RBC 3.18 (L) 06/09/2019    HGB 10.8 (L) 06/09/2019    HCT 32.5 (L) 06/09/2019     (H) 06/09/2019    MCH 33.8 (H) 06/09/2019    MCHC 33.1 06/09/2019    RDW 19.8 (H) 06/09/2019     (L) 06/09/2019    MPV 7.5 (L) 06/09/2019    GRAN 1.8 06/09/2019    GRAN 50.0 06/09/2019    LYMPH 1.4 06/09/2019    LYMPH 40.3 06/09/2019    MONO 0.3 06/09/2019    MONO 8.4 06/09/2019    EOS 0.0 06/09/2019    BASO 0.00 06/09/2019    EOSINOPHIL 0.6 06/09/2019    BASOPHIL 0.7 06/09/2019       BMP: BMP  Lab Results   Component Value Date     06/09/2019    K 3.8 06/09/2019     (H) 06/09/2019    CO2 22 (L) 06/09/2019    BUN 11 06/09/2019    CREATININE 0.7 06/09/2019    CALCIUM 9.3 06/09/2019    ANIONGAP 7 (L) 06/09/2019    ESTGFRAFRICA >60 06/09/2019    EGFRNONAA >60 06/09/2019       LFT:   Lab Results   Component Value Date    ALT 37 06/09/2019    AST 46 (H) 06/09/2019    ALKPHOS 160 (H) 06/09/2019    BILITOT 0.7 06/09/2019 December 2018 CT chest abdomen pelvis  Impression       Gallbladder mass with invasion of the hepatic parenchyma compatible with the patient's known gallbladder neoplasm, and moderately increased in size.  Possible extension to the hepatic flexure of the colon.  Unchanged mild the biliary dilatation.    Development a suspicious lymph node subjacent to the mass.    3 mm left upper lobe nodule which is nonspecific.  Attention on 6-12 month follow-up recommended.    Unchanged hepatic hypodensities, suggestive for cysts.  Continued attention on follow-up recommended     Impression 3/2019 CT       Moderate reduction in size of the patient's known gallbladder mass and associated hepatic parenchymal invasion.  Unchanged  scattered small hepatic hypodensities may simply represent cysts however continued attention on follow-up recommended.  No evidence for extrahepatic metastatic disease.       Ca19.9 at [facility] 3819, now 1mproving down to 445.0    Assessment/Plan:       Cont with FOLFIRINOX  Pt went to [facility] in may recs are for more treatments with FOLFIRINOX  For a total 4 more prior to sx evaluation   cont thyroid f/u  Resume eliquis plts have improved   scans done at [facility] ( see care everywhere)  Living Will  During past visit, I engaged the patient in the advance care planning process.  The patient and I reviewed the role for advance directives and their purpose in directing future healthcare if the patient's unable to speak for herself.  At this point in time, the patient does have full decision-making capacity.  We discussed different extreme health states that she could experience, and reviewed what kind of medical care she would want in those situations.  The patient communicated that if she were comatose and had little chance of a meaningful recovery, she does not want machines/life-sustaining treatments used.  The patient  Has not  completed a living will to reflect these preferences.  The patient has not already designated a healthcare power of  to make decisions on her behalf.   She has initaited the process already

## 2019-06-14 DIAGNOSIS — C22.9 MALIGNANT NEOPLASM OF LIVER, UNSPECIFIED LIVER MALIGNANCY TYPE: Primary | ICD-10-CM

## 2019-06-14 RX ORDER — LEVOFLOXACIN 500 MG/1
500 TABLET, FILM COATED ORAL DAILY
Qty: 10 TABLET | Refills: 0 | Status: SHIPPED | OUTPATIENT
Start: 2019-06-14 | End: 2019-06-24

## 2019-06-17 RX ORDER — SODIUM CHLORIDE 0.9 % (FLUSH) 0.9 %
10 SYRINGE (ML) INJECTION
OUTPATIENT
Start: 2019-06-18

## 2019-06-17 RX ORDER — HEPARIN 100 UNIT/ML
500 SYRINGE INTRAVENOUS
OUTPATIENT
Start: 2019-06-18

## 2019-06-23 ENCOUNTER — LAB VISIT (OUTPATIENT)
Dept: LAB | Facility: HOSPITAL | Age: 54
End: 2019-06-23
Attending: INTERNAL MEDICINE
Payer: COMMERCIAL

## 2019-06-23 DIAGNOSIS — C22.9 MALIGNANT NEOPLASM OF LIVER, UNSPECIFIED LIVER MALIGNANCY TYPE: ICD-10-CM

## 2019-06-23 LAB
ALBUMIN SERPL BCP-MCNC: 2.9 G/DL (ref 3.5–5.2)
ALP SERPL-CCNC: 166 U/L (ref 55–135)
ALT SERPL W/O P-5'-P-CCNC: 22 U/L (ref 10–44)
ANION GAP SERPL CALC-SCNC: 8 MMOL/L (ref 8–16)
AST SERPL-CCNC: 32 U/L (ref 10–40)
BASOPHILS # BLD AUTO: 0 K/UL (ref 0–0.2)
BASOPHILS NFR BLD: 0.2 % (ref 0–1.9)
BILIRUB SERPL-MCNC: 0.6 MG/DL (ref 0.1–1)
BUN SERPL-MCNC: 11 MG/DL (ref 6–20)
CALCIUM SERPL-MCNC: 9.3 MG/DL (ref 8.7–10.5)
CHLORIDE SERPL-SCNC: 107 MMOL/L (ref 95–110)
CO2 SERPL-SCNC: 24 MMOL/L (ref 23–29)
CREAT SERPL-MCNC: 0.7 MG/DL (ref 0.5–1.4)
DIFFERENTIAL METHOD: ABNORMAL
EOSINOPHIL # BLD AUTO: 0 K/UL (ref 0–0.5)
EOSINOPHIL NFR BLD: 0.6 % (ref 0–8)
ERYTHROCYTE [DISTWIDTH] IN BLOOD BY AUTOMATED COUNT: 16.9 % (ref 11.5–14.5)
EST. GFR  (AFRICAN AMERICAN): >60 ML/MIN/1.73 M^2
EST. GFR  (NON AFRICAN AMERICAN): >60 ML/MIN/1.73 M^2
GLUCOSE SERPL-MCNC: 109 MG/DL (ref 70–110)
HCT VFR BLD AUTO: 30.7 % (ref 37–48.5)
HGB BLD-MCNC: 10.2 G/DL (ref 12–16)
LYMPHOCYTES # BLD AUTO: 1.4 K/UL (ref 1–4.8)
LYMPHOCYTES NFR BLD: 26.9 % (ref 18–48)
MCH RBC QN AUTO: 33.8 PG (ref 27–31)
MCHC RBC AUTO-ENTMCNC: 33.3 G/DL (ref 32–36)
MCV RBC AUTO: 101 FL (ref 82–98)
MONOCYTES # BLD AUTO: 0.5 K/UL (ref 0.3–1)
MONOCYTES NFR BLD: 9.8 % (ref 4–15)
NEUTROPHILS # BLD AUTO: 3.2 K/UL (ref 1.8–7.7)
NEUTROPHILS NFR BLD: 62.5 % (ref 38–73)
PLATELET # BLD AUTO: 123 K/UL (ref 150–350)
PMV BLD AUTO: 6.9 FL (ref 9.2–12.9)
POTASSIUM SERPL-SCNC: 3.3 MMOL/L (ref 3.5–5.1)
PROT SERPL-MCNC: 6.3 G/DL (ref 6–8.4)
RBC # BLD AUTO: 3.03 M/UL (ref 4–5.4)
SODIUM SERPL-SCNC: 139 MMOL/L (ref 136–145)
WBC # BLD AUTO: 5.1 K/UL (ref 3.9–12.7)

## 2019-06-23 PROCEDURE — 80053 COMPREHEN METABOLIC PANEL: CPT

## 2019-06-23 PROCEDURE — 36415 COLL VENOUS BLD VENIPUNCTURE: CPT

## 2019-06-23 PROCEDURE — 85025 COMPLETE CBC W/AUTO DIFF WBC: CPT

## 2019-06-24 ENCOUNTER — OFFICE VISIT (OUTPATIENT)
Dept: HEMATOLOGY/ONCOLOGY | Facility: CLINIC | Age: 54
End: 2019-06-24
Payer: COMMERCIAL

## 2019-06-24 VITALS
OXYGEN SATURATION: 98 % | HEIGHT: 58 IN | WEIGHT: 136.25 LBS | HEART RATE: 81 BPM | TEMPERATURE: 98 F | BODY MASS INDEX: 28.6 KG/M2 | SYSTOLIC BLOOD PRESSURE: 113 MMHG | DIASTOLIC BLOOD PRESSURE: 63 MMHG | RESPIRATION RATE: 20 BRPM

## 2019-06-24 DIAGNOSIS — C23 GALLBLADDER CANCER: Primary | ICD-10-CM

## 2019-06-24 DIAGNOSIS — E02 SUBCLINICAL IODINE-DEFICIENCY HYPOTHYROIDISM: ICD-10-CM

## 2019-06-24 PROCEDURE — 99215 PR OFFICE/OUTPT VISIT, EST, LEVL V, 40-54 MIN: ICD-10-PCS | Mod: S$GLB,,, | Performed by: INTERNAL MEDICINE

## 2019-06-24 PROCEDURE — 99215 OFFICE O/P EST HI 40 MIN: CPT | Mod: S$GLB,,, | Performed by: INTERNAL MEDICINE

## 2019-06-24 PROCEDURE — 3008F PR BODY MASS INDEX (BMI) DOCUMENTED: ICD-10-PCS | Mod: CPTII,S$GLB,, | Performed by: INTERNAL MEDICINE

## 2019-06-24 PROCEDURE — 3008F BODY MASS INDEX DOCD: CPT | Mod: CPTII,S$GLB,, | Performed by: INTERNAL MEDICINE

## 2019-06-24 PROCEDURE — 99999 PR PBB SHADOW E&M-EST. PATIENT-LVL IV: ICD-10-PCS | Mod: PBBFAC,,, | Performed by: INTERNAL MEDICINE

## 2019-06-24 PROCEDURE — 99999 PR PBB SHADOW E&M-EST. PATIENT-LVL IV: CPT | Mod: PBBFAC,,, | Performed by: INTERNAL MEDICINE

## 2019-06-24 RX ORDER — HEPARIN 100 UNIT/ML
500 SYRINGE INTRAVENOUS
Status: CANCELLED | OUTPATIENT
Start: 2019-07-01

## 2019-06-24 RX ORDER — SODIUM CHLORIDE 0.9 % (FLUSH) 0.9 %
10 SYRINGE (ML) INJECTION
Status: CANCELLED | OUTPATIENT
Start: 2019-07-01

## 2019-06-24 RX ORDER — HEPARIN 100 UNIT/ML
500 SYRINGE INTRAVENOUS
Status: CANCELLED | OUTPATIENT
Start: 2019-07-19

## 2019-06-24 RX ORDER — SODIUM CHLORIDE 0.9 % (FLUSH) 0.9 %
10 SYRINGE (ML) INJECTION
Status: CANCELLED | OUTPATIENT
Start: 2019-07-19

## 2019-06-24 RX ORDER — LEVOTHYROXINE SODIUM 200 UG/1
CAPSULE ORAL
COMMUNITY
Start: 2019-05-13 | End: 2019-08-16

## 2019-06-24 NOTE — PROGRESS NOTES
Subjective:       Patient ID: Faith Byers is a 54 y.o. female.  Gall bladder ca , started modified FOLFIRINOX  HPI:   FINAL PATHOLOGIC DIAGNOSIS  GALLBLADDER/LIVER MASS, BIOPSY:  -Positive for malignancy, invasive squamous cell carcinoma, doing well pain has subsided also follows with [facility]. had a recent visit in 6/2019  recent scan showing progressive disease patient is being presented to tumor board plan is to going to second-line therapy or treat peritoneal disease and possibly even still considering surgery  Occasional nausea.  Continues to work.  No other complaints   rx delayed last week due to low plts  Social History     Socioeconomic History    Marital status:      Spouse name: Not on file    Number of children: Not on file    Years of education: Not on file    Highest education level: Not on file   Occupational History    Not on file   Social Needs    Financial resource strain: Not on file    Food insecurity:     Worry: Not on file     Inability: Not on file    Transportation needs:     Medical: Not on file     Non-medical: Not on file   Tobacco Use    Smoking status: Never Smoker    Smokeless tobacco: Never Used   Substance and Sexual Activity    Alcohol use: Yes     Comment: rarely    Drug use: No    Sexual activity: Not Currently   Lifestyle    Physical activity:     Days per week: Not on file     Minutes per session: Not on file    Stress: Not on file   Relationships    Social connections:     Talks on phone: Not on file     Gets together: Not on file     Attends Holiness service: Not on file     Active member of club or organization: Not on file     Attends meetings of clubs or organizations: Not on file     Relationship status: Not on file   Other Topics Concern    Not on file   Social History Narrative    Not on file     Family History   Problem Relation Age of Onset    Cancer Mother         breast and bone     Breast cancer Mother     Hyperlipidemia  Father     ADD / ADHD Daughter         autism    Crohn's disease Paternal Uncle     Heart disease Paternal Uncle         heart transplant     Past Surgical History:   Procedure Laterality Date    ARTHROSCOPY, KNEE, WITH Partial lateral MENISCECTOMY Right 7/12/2018    Performed by [facility]ue[facility] Kiran MD at [facility] OR    Dszmaf-povqje-ul N/A 11/16/2018    Performed by [facility] at [facility] OR 2ND FLR    BREAST BIOPSY      BREAST SURGERY  2001    right biopsy, benign    CHONDROPLASTY,KNEE Medial Femoral Right 7/12/2018    Performed by Huey Kiran MD at [facility] OR    COLONOSCOPY      COLONOSCOPY N/A 12/5/2014    Performed by Sixto Barrera MD at [facility] ENDO    dental implant      WJZTZUPLQ-PXSM-V-CATH N/A 5/31/2019    Performed by Hunter Botello MD at [facility] OR    YLDXWGYLY-FOPA-F-CATH Right 12/14/2018    Performed by Jurgen Toro MD at [facility] OR    KNEE ARTHROSCOPY Right     LIVER BIOPSY  11/16/2018    REMOVAL, CATHETER, CENTRAL VENOUS, TUNNELED, WITH PORT N/A 5/31/2019    Performed by Hunter Botello MD at [facility] OR    THYROIDECTOMY  2011    complete     Past Medical History:   Diagnosis Date    Arthritis     Cancer     gallbadder/ liver spots/biopsy    Thyroid disease        Current Outpatient Medications:     apixaban (ELIQUIS) 5 mg Tab, Take 1 tablet (5 mg total) by mouth 2 (two) times daily. To Start after 7 days on 10mg Twice daily., Disp: 60 tablet, Rfl: 3    apixaban (ELIQUIS) 5 mg Tab, as directed, Disp: , Rfl:     enoxaparin (LOVENOX) 100 mg/mL Syrg, Inject 1 mL (100 mg total) into the skin once daily., Disp: 5 mL, Rfl: 0    furosemide (LASIX) 40 MG tablet, Take 40 mg by mouth daily as needed., Disp: , Rfl: 2    HYDROcodone-acetaminophen (NORCO) 7.5-325 mg per tablet, Take 1 tablet by mouth every 4 (four) hours as needed for Pain., Disp: 20 tablet, Rfl: 0    levoFLOXacin (LEVAQUIN) 500 MG tablet, Take 1 tablet (500 mg total) by mouth once daily. for 10 days, Disp: 10  tablet, Rfl: 0    levothyroxine (TIROSINT-SOL) 200 mcg/mL Soln, twice daily., Disp: , Rfl:     lipase-protease-amylase (CREON) 36,000-114,000- 180,000 unit CpDR, Take 72,000-108,000 Units by mouth. , Disp: , Rfl:     magnesium oxide (MAG-OX) 400 mg (241.3 mg magnesium) tablet, Take 400 mg by mouth., Disp: , Rfl:     ondansetron (ZOFRAN-ODT) 8 MG TbDL, Take 8 mg by mouth every 12 (twelve) hours as needed (nausea). , Disp: , Rfl:     pantoprazole (PROTONIX) 40 MG tablet, Take 40 mg by mouth., Disp: , Rfl:     potassium chloride SA (K-DUR,KLOR-CON) 10 MEQ tablet, TAKE 1 TABLET BY MOUTH EVERY DAY WITH LASIX, Disp: , Rfl: 3    terbinafine HCl (LAMISIL) 1 % cream, Apply topically 2 (two) times daily., Disp: 24 g, Rfl: 0    venlafaxine (EFFEXOR-XR) 37.5 MG 24 hr capsule, TAKE 1 CAPSULE (37.5 MG TOTAL) BY MOUTH ONCE DAILY., Disp: 30 capsule, Rfl: 10    levothyroxine (TIROSINT) 200 mcg Cap, , Disp: , Rfl:   Review of patient's allergies indicates:  No Known Allergies      REVIEW OF SYSTEMS:     CONSTITUTIONAL:   Nausea improved eating better weight is increased she is overall much better    BREASTS: There is no swelling around breasts or nipple discharge.    EYES: Denies eye pain, blurred vision, swelling, redness or discharge.      ENT AND MOUTH: Denies runny nose, stuffiness, sinus trouble or sores. Denies    nosebleeds. Denies, hoarseness, change in voice or swelling in front of the    neck.      CARDIOVASCULAR: Denies chest pain, discomfort or palpitations. Denies neck    swelling or episodes of passing out.      RESPIRATORY: Denies cough, sputum production, blood in sputum, and denies    shortness of breath.      GI: Denies trouble swallowing, indigestion, heartburn, abdominal pain, nausea,    vomiting, diarrhea, has altered bowel habits, no blood in stool, discoloration of    stools, change in nature of stool, bloating, increased abdominal girth.      GENITOURINARY: No discharge. No pelvic pain or lumps. No  rash around groin or  lesions. No urinary frequency, hesitation, painful urination or blood in    urine. Denies incontinence. No problems with intercourse.      MUSCULOSKELETAL:  Some amount of knee pain    NEUROLOGICAL: Denies tingling, numbness, altered mentation changes to nerve    function in the face, weakness to one or both of the body. Denies changes to    gait and denies multiple falls or accidents.          PHYSICAL EXAM:     Wt Readings from Last 3 Encounters:   06/24/19 61.8 kg (136 lb 3.9 oz)   06/10/19 63.4 kg (139 lb 12.4 oz)   06/03/19 63.2 kg (139 lb 5.3 oz)     Temp Readings from Last 3 Encounters:   06/24/19 98.2 °F (36.8 °C)   06/10/19 98.6 °F (37 °C)   06/03/19 98.8 °F (37.1 °C)     BP Readings from Last 3 Encounters:   06/24/19 113/63   06/10/19 119/71   06/03/19 137/81     Pulse Readings from Last 3 Encounters:   06/24/19 81   06/10/19 95   06/03/19 73     GENERAL: Comfortable looking patient. Patient is in no distress.  Awake, alert and oriented to time, person and place.  No anxiety, or agitation.      HEENT: Normal conjunctivae and eyelids. WNL.  PERRLA 3 to 4 mm. No icterus, no pallor, no congestion, and no discharge noted.     NECK:  Supple. Trachea is central.  No crepitus.  No JVD or masses.    RESPIRATORY:  No intercostal retractions.  No dullness to percussion.  Chest is clear to auscultation.  No rales, rhonchi or wheezes.  No crepitus.  Good air entry bilaterally.    CARDIOVASCULAR:  S1 and S2 are normally heard without murmurs or gallops.  All peripheral pulses are present.    ABDOMEN:  Normal abdomen.  No hepatosplenomegaly.  No free fluid.  Bowel sounds are present.  No hernia noted. No masses.  No rebound or tenderness.  No guarding or rigidity.  Umbilicus is midline.    LYMPHATICS:  No axillary, cervical, supraclavicular, submental, or inguinal lymphadenopathy.    SKIN/MUSCULOSKELETAL:  There is no evidence of excoriation marks or ecchmosis.  No rashes.  No cyanosis.  No  clubbing.  No joint or skeletal deformities noted.  Normal range of motion.    NEUROLOGIC:  Higher functions are appropriate.  No cranial nerve deficits.  Normal jody.  Normal strength.  Motor and sensory functions are normal.  Deep tendon reflexes are normal.    GENITAL/RECTAL:  Exams are deferred.      Laboratory:     CBC:  Lab Results   Component Value Date    WBC 5.10 06/23/2019    RBC 3.03 (L) 06/23/2019    HGB 10.2 (L) 06/23/2019    HCT 30.7 (L) 06/23/2019     (H) 06/23/2019    MCH 33.8 (H) 06/23/2019    MCHC 33.3 06/23/2019    RDW 16.9 (H) 06/23/2019     (L) 06/23/2019    MPV 6.9 (L) 06/23/2019    GRAN 3.2 06/23/2019    GRAN 62.5 06/23/2019    LYMPH 1.4 06/23/2019    LYMPH 26.9 06/23/2019    MONO 0.5 06/23/2019    MONO 9.8 06/23/2019    EOS 0.0 06/23/2019    BASO 0.00 06/23/2019    EOSINOPHIL 0.6 06/23/2019    BASOPHIL 0.2 06/23/2019       BMP: BMP  Lab Results   Component Value Date     06/23/2019    K 3.3 (L) 06/23/2019     06/23/2019    CO2 24 06/23/2019    BUN 11 06/23/2019    CREATININE 0.7 06/23/2019    CALCIUM 9.3 06/23/2019    ANIONGAP 8 06/23/2019    ESTGFRAFRICA >60 06/23/2019    EGFRNONAA >60 06/23/2019       LFT:   Lab Results   Component Value Date    ALT 22 06/23/2019    AST 32 06/23/2019    ALKPHOS 166 (H) 06/23/2019    BILITOT 0.6 06/23/2019       IMPRESSION: CT C/A/P done 6/2-019 at [facility]  1. Interval increase in primary tumor in the gallbladder.  2. Interval increase in intrahepatic metastasis.  3. Findings suspicious for peritoneal disease.     ca19.9 129    Assessment/Plan:       D/c FOLFIRINOX , due to progression  Pt went to [facility]  Being discussed at tunor board this week\   possible intraperitonieal tharpy, vs gem/ abraxane/ vs sx?   I doubt sx is a possiblity considering her dz involved peritoneum   Will place  gem/ abraxane orders  Gemzar 500 mg/m2 and abraxane 100 mg/m2 day 1 and day 15 of 18 day cycle  and await MDA plans in the mean time  as well cont  thyroid f/u  Resume eliquis plts have improved   scans done at [facility] ( see care everywhere)  Living Will  During past visit, I engaged the patient in the advance care planning process.  The patient and I reviewed the role for advance directives and their purpose in directing future healthcare if the patient's unable to speak for herself.  At this point in time, the patient does have full decision-making capacity.  We discussed different extreme health states that she could experience, and reviewed what kind of medical care she would want in those situations.  The patient communicated that if she were comatose and had little chance of a meaningful recovery, she does not want machines/life-sustaining treatments used.  The patient  Has not  completed a living will to reflect these preferences.  The patient has not already designated a healthcare power of  to make decisions on her behalf.   She has initaited the process already

## 2019-06-24 NOTE — PLAN OF CARE
DISCONTINUE ON PATHWAY REGIMEN - Pancreatic    No Medical Intervention - Off Treatment.    REASON: Disease Progression  PRIOR TREATMENT: Off Treatment    START ON PATHWAY REGIMEN - Pancreatic    PANOS51        Nab-paclitaxel (protein bound) (Abraxane(R))       Gemcitabine (Gemzar(R))           Additional Orders: Hold therapy and notify physician if ANC < 1500.    **Always confirm dose/schedule in your pharmacy ordering system**    Patient Characteristics:  Adenocarcinoma, Metastatic Disease, Second Line, EDGAR/pMMR or MSI Unknown, If   FOLFIRINOX First Line  Histology: Adenocarcinoma  Current evidence of distant metastases<= Yes  AJCC T Category: TX  AJCC N Category: NX  AJCC M Category: M1  AJCC 8 Stage Grouping: IV  Line of Therapy: Second Line  Microsatellite/Mismatch Repair Status: EDGAR/pMMR  Intent of Therapy:  Non-Curative / Palliative Intent, Discussed with Patient

## 2019-06-26 ENCOUNTER — TELEPHONE (OUTPATIENT)
Dept: HEMATOLOGY/ONCOLOGY | Facility: CLINIC | Age: 54
End: 2019-06-26

## 2019-06-26 NOTE — TELEPHONE ENCOUNTER
Spoke with Shawn at [facility] Dr duff office and he will have Dr duff to call Dr Alarcon in regards to Tumor board discussion for a treatment plan.

## 2019-06-27 ENCOUNTER — PATIENT MESSAGE (OUTPATIENT)
Dept: HEMATOLOGY/ONCOLOGY | Facility: CLINIC | Age: 54
End: 2019-06-27

## 2019-06-28 ENCOUNTER — TELEPHONE (OUTPATIENT)
Dept: HEMATOLOGY/ONCOLOGY | Facility: CLINIC | Age: 54
End: 2019-06-28

## 2019-06-28 DIAGNOSIS — C23 GALLBLADDER CANCER: Primary | ICD-10-CM

## 2019-07-01 ENCOUNTER — CLINICAL SUPPORT (OUTPATIENT)
Dept: HEMATOLOGY/ONCOLOGY | Facility: CLINIC | Age: 54
End: 2019-07-01
Payer: COMMERCIAL

## 2019-07-01 NOTE — PROGRESS NOTES
Faith CerdaenMillie  [id number]    [facility]   Cancer Center    TITLE: PLAN OF CARE FOR THE CHEMOTHERAPY PATIENT / TEACHING PROTOCOL    PURPOSE: To involve the patient / significant other in the plan of care and to provide teaching to the significant other & patient receiving chemotherapy.    LEVEL: Independent.    CONTENT: The Plan of Care for the chemotherapy patient is individualized and appropriate to the patients needs, strengths, limitations, & goals.  Education includes information regarding chemotherapy side effects, the treatment itself, and self-care  Activities.    GOAL / OUTCOME STANDARDS    PHYSIOLOGIC: The client will remain free or experience minimal side effects or toxicities throughout the chemotherapy treatment period.     PSYCHOLOGIC: The client/significant others will demonstrate positive coping mechanisms in relation to chemotherapy and its side effects.      COGINITIVE: The client/significant others will verbalize understanding of self-care measure to avoid/minimize side effects of the chemotherapy regime.    EVALUATION / COMMENT KEY:    V = Audiovisual/Video  S = Successfully meets outcome  N = Needs further instruction  NA = Not applicable to the patient  P = Previous knowledge  U = Unable to comprehend  * = See progress notes          PLAN OF CARE  INFORMATION TO BE DELIVERED / NURSING INTERVENTIONS DATE EVALUATION   1. Assessment of client/caregiver,         knowledge of cancer diagnosis,         and chemotherapy as a treatment. 1a. Evaluate patient/caregiver learning ability    b. Plan teaching sessions with patient/caregiver according to needs and present anxiety level/ability to learn.    c. Provide Chemotherapy Education Packet,        Mouth Care Protocol,         Specific Patient Education Sheets. 07/01/2019 S   2. Individual chemotherapy treatment         plan. 2a. Review of Chemotherapy Education handout from [employer]            07/01/2019   S   3. Knowledge  Deficit & Self-Management of general side effects common to all chemotherapy:  a. Nausea/Vomiting  b.   Diarrhea  c. Mouth Care  d. Dental care  e. Constipation  f. Hair Loss  g. Potential for infection  h. Fatigue   3a. Reinforce that the majority of side effects from chemotherapy are reversible and are  controlled both in the hospital and at home        (blood counts recover, hair grows back).   b.  Refer to the following for reinforcement of         information post-treatment:  1. Mouth Care Protocol.  2. Bowel Protocol for constipation or diarrhea.  3.  Drug Specific Chemotherapy Information Sheets for each medication patient receiving.    07/01/2019     S     PLAN OF CARE  INFORMATION TO BE DELIVERED / NURSING INTERVENTIONS DATE EVALUATION   h. Potential for bleeding         i. Potential anemia/fatigue         j. Potential sunburn         k. Birth control measures  l. Safety measures post treatment 4.  Chemotherapy Home Care Instruction  and Safety Information Sheet.  A. patient/caregivers to thoroughly cook shellfish (shrimp, crab, etc) to decrease the chance of infection.    B.  Use sunscreen and protective clothing while in the sun.   07/01/2019      4. Knowledge deficit & Self Management of Drug Specific  Side Effects.    a. BLADDER EFFECTS        (Hemorrhagic Cystitis)                  Preventable with adequate hydration; occurs 2-3 days or more post treatment.   1.  Instruct patient to:  a.   Void at least every 2 hours; increase intake.  b.   DO NOT hold urine; go when urge is felt.  c.    Empty bladder at bedtime and on         awakening.  d.   Observe for color changes (red to tea           colored), amount and frequency changes.  e.   Notify oncologist of any abnormalities           in urine or voiding or if you cannot               drink adequate fluids.   07/01/2019   S   b.   CHANGES IN URINE        COLOR:      1.   Instruct patient:  a.   Most evident in first 2-3 voidings after            administration.  b. Lasts less than 24 hours.  c. If urine is discolored 2 or more days post- treatment, notify oncologist.      07/01/2019 S   c.    KIDNEY EFFECTS           (Nephrotoxicity)   1.  Instruct patient to:  a.   Drink 8-16 glasses of fluid/day the day   pre-treatment and 3-4 days post-treatment to maintain hydration; the best way to minimize kidney problems.  b.   Notify oncologist immediately if unable to drink fluids or if changes are noted in urinary elimination.     07/01/2019   S   a. PULMONARY TOXICITY    1. Instruct patient to report symptoms such as shortness of breath, chest pain, shallow breathing, or chest wall discomfort to physician.  2. Reinforce preventative measures used by the health care team.  a. Baseline and periodic PFT and chest x-ray.   07/01/2019   S     PLAN OF CARE INFORMATION TO BE DELIVERED / NURSING INTERVENTIONS DATE EVALUATION   b. NERVE & MUSCLE EFFECTS (neurotoxocity; neuropathy, possible visual/hearing changes)        3. Instruct patient to:    a. Report numbness or tingling of the hands/feet, loss of fine motor movement (buttoning shirt, tying shoelaces), or gait changes to your oncologist.  b. If numbness/tingling are present:  1. protect feet with shoes at all times.  2. Use gloves for washing dishes/gardening & potholders in kitchen.       07/01/2019   S   c. CARDIOTOXICITY  Decreased effectiveness of             cardiac function. Effective are                  cumulative and irreversible.                                    CARDIAC ARRYTHMIAS              4   Instruct:  a. Heart function may be tested before treatment and perdiocally during treatment.  b. Notify oncologist of irregular pulse, palpitations, shortness of breath, or swelling in lower extremities/feet.          Taxol and Taxotere can cause arrhythmias on infusion that resolve once infusion discontinued. Instruct nurse if any irregularity felt.    07/01/2019   S   d. EXTRAVASATION  Occurs when vesicants  leak outside of vein and cause damage to the skin and underlying tissues.   1. Reinforce preventive measures used to avoid complications.  a. Fresh IV site or central line monitored continuously with vesicant IVP.  b. Continuous infusion via central line site and blood return monitored periodically around the clock.  2. Instruct to:  a. Notify nurse of any discomfort, burning, stinging, etc. at IV site during chemotherapy administration.  b. Notify oncologist of any redness, pain, or swelling at IV site after discharge from hospital.   07/01/2019   S   e. HYPERSENSITIVITY can happen with any medication.   1. Instruct patient:  a. Nurse is with them during the initial part of treatment and will be close by to monitor.  b. Pre-medication ordered by the oncologist must be taken on time. If doses are missed, treatment will need to be re-scheduled.  c. Skin redness, itching, or hives appearing after discharge should be reported to oncologist. 07/01/2019   S       PLAN OF CARE INFORMATION TO BE DELIVERED / NURSING INTERVENTIONS DATE EVALUATION   f. FLU-LIKE SYNDROME      1. Instruct patient symptoms are hard to prevent and may include fever, shaking chills, muscle and body aches.  a. Taking prescribed medications from physician if needed.  b. Adequate fluids are important.    2. Reinforce the need to call if temperature is         elevated to 100.4 or more  07/01/2019   S   g. HAND-FOOT SYNDROME  causes painful, symmetric swelling and redness of palms and soles                  5. Instruct patient to report any numbness or tingling in the hands or feet.  6. Explain prevention techniques, such as     a. Use heavy moisturizers to lessen skin dryness and itching, but to avoid if skin is cracked or broken  b. Bathe in tepid water, use non-perfumed soap, and wash gently. Baths with oatmeal or diluted baking soda may be soothing.  c. Avoid tight fitting shoes and repetitive actions, such as rubbing hands or applying pressure to  hands/feet.  7. Review measures to take should syndrome occur:  a. Cold compresses and elevation for          edema  b. Pain medications and other measures as ordered by oncologist.   4.   Syndrome resolves few weeks after therapy. 07/01/2019   S   5. DISCHARGE PLANNING /        EDUCATION 1.    Explain importance of compliance with follow- up  tests (CBC, CMP).  2.    Verify patient/caregiver know:  a.    Oncologists office phone number.  b.    Dates of follow-up appointments.  c.    Prescriptions given for nausea  3.   Review side effects to monitor and notify          oncologist about.  4.   Reinforce the need for patient and caregivers to:  a.    Review information given.  b.    Call oncologists office with questions          or symptoms  5.   Provide [facility] Brochure make referrals if needed for financial or .   07/01/2019   S     PROGRESS NOTES: I met with the patient and her father today for chemotherapy education. she will be starting treatment with Gemzar / Abraxane. We discussed the mechanism of action, potential side effects of this treatment as well as ways she can manage them at home. Some of these side effects include but or not limited to fever, nausea, vomiting, decreased appetite, fatigue, weakness, cytopenias, myalgia/arthralgia, constipation, diarrhea, bleeding, headache, shortness of breath, taste change, hair thinning/loss, peripheral neuropathy, or edema. We also discussed dietary modifications she should make although this will be discussed in more detail with the dietician. she was provided with a copy of all of the information we discussed today as well as our contact information. She verbalized having antinausea medications previously prescribed to her with her former chemo regimen. she will be provided a schedule on her first day of treatment. The patient will follow-up with the physician for toxicity monitoring throughout treatment. A copy of the [facility]  document was given and explained to the pt. She verbalized understanding. All questions were answered and an informed consent was obtained. she was reminded to certainly contact us sooner if needed.

## 2019-07-01 NOTE — PROGRESS NOTES
I met with patient to update her distress screening; she indicated a rating of 3.  She did not request any supportive services at this time but has my contact information in the event she needs assistance in the future.

## 2019-07-08 ENCOUNTER — PATIENT MESSAGE (OUTPATIENT)
Dept: HEMATOLOGY/ONCOLOGY | Facility: CLINIC | Age: 54
End: 2019-07-08

## 2019-07-12 ENCOUNTER — LAB VISIT (OUTPATIENT)
Dept: LAB | Facility: HOSPITAL | Age: 54
End: 2019-07-12
Attending: INTERNAL MEDICINE
Payer: COMMERCIAL

## 2019-07-12 ENCOUNTER — PATIENT MESSAGE (OUTPATIENT)
Dept: HEMATOLOGY/ONCOLOGY | Facility: CLINIC | Age: 54
End: 2019-07-12

## 2019-07-12 DIAGNOSIS — K82.9 GALL BLADDER DISEASE: ICD-10-CM

## 2019-07-12 LAB
ALBUMIN SERPL BCP-MCNC: 2.7 G/DL (ref 3.5–5.2)
ALP SERPL-CCNC: 206 U/L (ref 55–135)
ALT SERPL W/O P-5'-P-CCNC: 19 U/L (ref 10–44)
ANION GAP SERPL CALC-SCNC: 8 MMOL/L (ref 8–16)
AST SERPL-CCNC: 30 U/L (ref 10–40)
BASOPHILS # BLD AUTO: 0.01 K/UL (ref 0–0.2)
BASOPHILS NFR BLD: 0.4 % (ref 0–1.9)
BILIRUB SERPL-MCNC: 0.4 MG/DL (ref 0.1–1)
BUN SERPL-MCNC: 10 MG/DL (ref 6–20)
CALCIUM SERPL-MCNC: 9.1 MG/DL (ref 8.7–10.5)
CHLORIDE SERPL-SCNC: 110 MMOL/L (ref 95–110)
CO2 SERPL-SCNC: 24 MMOL/L (ref 23–29)
CREAT SERPL-MCNC: 0.6 MG/DL (ref 0.5–1.4)
DIFFERENTIAL METHOD: ABNORMAL
EOSINOPHIL # BLD AUTO: 0 K/UL (ref 0–0.5)
EOSINOPHIL NFR BLD: 1.2 % (ref 0–8)
ERYTHROCYTE [DISTWIDTH] IN BLOOD BY AUTOMATED COUNT: 13.6 % (ref 11.5–14.5)
EST. GFR  (AFRICAN AMERICAN): >60 ML/MIN/1.73 M^2
EST. GFR  (NON AFRICAN AMERICAN): >60 ML/MIN/1.73 M^2
GLUCOSE SERPL-MCNC: 93 MG/DL (ref 70–110)
HCT VFR BLD AUTO: 29.4 % (ref 37–48.5)
HGB BLD-MCNC: 9.2 G/DL (ref 12–16)
IMM GRANULOCYTES # BLD AUTO: 0.01 K/UL (ref 0–0.04)
LYMPHOCYTES # BLD AUTO: 0.9 K/UL (ref 1–4.8)
LYMPHOCYTES NFR BLD: 38 % (ref 18–48)
MCH RBC QN AUTO: 31.7 PG (ref 27–31)
MCHC RBC AUTO-ENTMCNC: 31.3 G/DL (ref 32–36)
MCV RBC AUTO: 101 FL (ref 82–98)
MONOCYTES # BLD AUTO: 0.4 K/UL (ref 0.3–1)
MONOCYTES NFR BLD: 14.7 % (ref 4–15)
NEUTROPHILS # BLD AUTO: 1.1 K/UL (ref 1.8–7.7)
NEUTROPHILS NFR BLD: 45.3 % (ref 38–73)
NRBC BLD-RTO: 0 /100 WBC
PLATELET # BLD AUTO: 67 K/UL (ref 150–350)
PMV BLD AUTO: 10.8 FL (ref 9.2–12.9)
POTASSIUM SERPL-SCNC: 4.3 MMOL/L (ref 3.5–5.1)
PROT SERPL-MCNC: 6.3 G/DL (ref 6–8.4)
RBC # BLD AUTO: 2.9 M/UL (ref 4–5.4)
SODIUM SERPL-SCNC: 142 MMOL/L (ref 136–145)
WBC # BLD AUTO: 2.45 K/UL (ref 3.9–12.7)

## 2019-07-12 PROCEDURE — 80053 COMPREHEN METABOLIC PANEL: CPT

## 2019-07-12 PROCEDURE — 36415 COLL VENOUS BLD VENIPUNCTURE: CPT

## 2019-07-12 PROCEDURE — 85025 COMPLETE CBC W/AUTO DIFF WBC: CPT

## 2019-07-15 ENCOUNTER — PATIENT MESSAGE (OUTPATIENT)
Dept: HEMATOLOGY/ONCOLOGY | Facility: CLINIC | Age: 54
End: 2019-07-15

## 2019-07-18 ENCOUNTER — PATIENT MESSAGE (OUTPATIENT)
Dept: HEMATOLOGY/ONCOLOGY | Facility: CLINIC | Age: 54
End: 2019-07-18

## 2019-07-18 ENCOUNTER — LAB VISIT (OUTPATIENT)
Dept: LAB | Facility: HOSPITAL | Age: 54
End: 2019-07-18
Attending: INTERNAL MEDICINE
Payer: COMMERCIAL

## 2019-07-18 DIAGNOSIS — C22.9 MALIGNANT NEOPLASM OF LIVER, UNSPECIFIED LIVER MALIGNANCY TYPE: ICD-10-CM

## 2019-07-18 LAB
ALBUMIN SERPL BCP-MCNC: 2.9 G/DL (ref 3.5–5.2)
ALP SERPL-CCNC: 206 U/L (ref 55–135)
ALT SERPL W/O P-5'-P-CCNC: 17 U/L (ref 10–44)
ANION GAP SERPL CALC-SCNC: 9 MMOL/L (ref 8–16)
AST SERPL-CCNC: 31 U/L (ref 10–40)
BASOPHILS # BLD AUTO: 0.04 K/UL (ref 0–0.2)
BASOPHILS NFR BLD: 0.5 % (ref 0–1.9)
BILIRUB SERPL-MCNC: 0.4 MG/DL (ref 0.1–1)
BUN SERPL-MCNC: 16 MG/DL (ref 6–20)
CALCIUM SERPL-MCNC: 9.3 MG/DL (ref 8.7–10.5)
CHLORIDE SERPL-SCNC: 108 MMOL/L (ref 95–110)
CO2 SERPL-SCNC: 20 MMOL/L (ref 23–29)
CREAT SERPL-MCNC: 0.6 MG/DL (ref 0.5–1.4)
DIFFERENTIAL METHOD: ABNORMAL
EOSINOPHIL # BLD AUTO: 0 K/UL (ref 0–0.5)
EOSINOPHIL NFR BLD: 0.3 % (ref 0–8)
ERYTHROCYTE [DISTWIDTH] IN BLOOD BY AUTOMATED COUNT: 13.5 % (ref 11.5–14.5)
EST. GFR  (AFRICAN AMERICAN): >60 ML/MIN/1.73 M^2
EST. GFR  (NON AFRICAN AMERICAN): >60 ML/MIN/1.73 M^2
GLUCOSE SERPL-MCNC: 95 MG/DL (ref 70–110)
HCT VFR BLD AUTO: 31.3 % (ref 37–48.5)
HGB BLD-MCNC: 9.7 G/DL (ref 12–16)
IMM GRANULOCYTES # BLD AUTO: 0.02 K/UL (ref 0–0.04)
LYMPHOCYTES # BLD AUTO: 1.1 K/UL (ref 1–4.8)
LYMPHOCYTES NFR BLD: 14.9 % (ref 18–48)
MCH RBC QN AUTO: 31.4 PG (ref 27–31)
MCHC RBC AUTO-ENTMCNC: 31 G/DL (ref 32–36)
MCV RBC AUTO: 101 FL (ref 82–98)
MONOCYTES # BLD AUTO: 0.8 K/UL (ref 0.3–1)
MONOCYTES NFR BLD: 10.5 % (ref 4–15)
NEUTROPHILS # BLD AUTO: 5.4 K/UL (ref 1.8–7.7)
NEUTROPHILS NFR BLD: 73.5 % (ref 38–73)
NRBC BLD-RTO: 0 /100 WBC
PLATELET # BLD AUTO: 167 K/UL (ref 150–350)
PLATELET BLD QL SMEAR: ABNORMAL
PMV BLD AUTO: 10 FL (ref 9.2–12.9)
POTASSIUM SERPL-SCNC: 3.9 MMOL/L (ref 3.5–5.1)
PROT SERPL-MCNC: 6.6 G/DL (ref 6–8.4)
RBC # BLD AUTO: 3.09 M/UL (ref 4–5.4)
SODIUM SERPL-SCNC: 137 MMOL/L (ref 136–145)
WBC # BLD AUTO: 7.3 K/UL (ref 3.9–12.7)

## 2019-07-18 PROCEDURE — 80053 COMPREHEN METABOLIC PANEL: CPT

## 2019-07-18 PROCEDURE — 85025 COMPLETE CBC W/AUTO DIFF WBC: CPT

## 2019-07-18 PROCEDURE — 36415 COLL VENOUS BLD VENIPUNCTURE: CPT

## 2019-07-19 ENCOUNTER — OFFICE VISIT (OUTPATIENT)
Dept: HEMATOLOGY/ONCOLOGY | Facility: CLINIC | Age: 54
End: 2019-07-19
Payer: COMMERCIAL

## 2019-07-19 VITALS
WEIGHT: 128.31 LBS | DIASTOLIC BLOOD PRESSURE: 57 MMHG | HEART RATE: 92 BPM | BODY MASS INDEX: 26.93 KG/M2 | SYSTOLIC BLOOD PRESSURE: 121 MMHG | HEIGHT: 58 IN | RESPIRATION RATE: 20 BRPM | OXYGEN SATURATION: 100 % | TEMPERATURE: 98 F

## 2019-07-19 DIAGNOSIS — D69.6 THROMBOCYTOPENIA: ICD-10-CM

## 2019-07-19 DIAGNOSIS — C23 GALLBLADDER CANCER: ICD-10-CM

## 2019-07-19 DIAGNOSIS — C23 MALIGNANT NEOPLASM OF GALLBLADDER: Primary | ICD-10-CM

## 2019-07-19 DIAGNOSIS — Z51.11 ENCOUNTER FOR ANTINEOPLASTIC CHEMOTHERAPY: ICD-10-CM

## 2019-07-19 PROCEDURE — 99497 PR ADVNCD CARE PLAN 30 MIN: ICD-10-PCS | Mod: S$GLB,,, | Performed by: INTERNAL MEDICINE

## 2019-07-19 PROCEDURE — 99497 ADVNCD CARE PLAN 30 MIN: CPT | Mod: S$GLB,,, | Performed by: INTERNAL MEDICINE

## 2019-07-19 PROCEDURE — 3008F BODY MASS INDEX DOCD: CPT | Mod: CPTII,S$GLB,, | Performed by: INTERNAL MEDICINE

## 2019-07-19 PROCEDURE — 3008F PR BODY MASS INDEX (BMI) DOCUMENTED: ICD-10-PCS | Mod: CPTII,S$GLB,, | Performed by: INTERNAL MEDICINE

## 2019-07-19 PROCEDURE — 99215 PR OFFICE/OUTPT VISIT, EST, LEVL V, 40-54 MIN: ICD-10-PCS | Mod: S$GLB,,, | Performed by: INTERNAL MEDICINE

## 2019-07-19 PROCEDURE — 99999 PR PBB SHADOW E&M-EST. PATIENT-LVL III: ICD-10-PCS | Mod: PBBFAC,,, | Performed by: INTERNAL MEDICINE

## 2019-07-19 PROCEDURE — 99999 PR PBB SHADOW E&M-EST. PATIENT-LVL III: CPT | Mod: PBBFAC,,, | Performed by: INTERNAL MEDICINE

## 2019-07-19 PROCEDURE — 99215 OFFICE O/P EST HI 40 MIN: CPT | Mod: S$GLB,,, | Performed by: INTERNAL MEDICINE

## 2019-07-19 RX ORDER — HEPARIN 100 UNIT/ML
500 SYRINGE INTRAVENOUS
Status: CANCELLED | OUTPATIENT
Start: 2019-08-16

## 2019-07-19 RX ORDER — SODIUM CHLORIDE 0.9 % (FLUSH) 0.9 %
10 SYRINGE (ML) INJECTION
Status: CANCELLED | OUTPATIENT
Start: 2019-08-16

## 2019-07-19 RX ORDER — HEPARIN 100 UNIT/ML
500 SYRINGE INTRAVENOUS
Status: CANCELLED | OUTPATIENT
Start: 2019-08-02

## 2019-07-19 RX ORDER — SODIUM CHLORIDE 0.9 % (FLUSH) 0.9 %
10 SYRINGE (ML) INJECTION
Status: CANCELLED | OUTPATIENT
Start: 2019-08-02

## 2019-07-19 NOTE — PROGRESS NOTES
Subjective:       Patient ID: Faith Byers is a 54 y.o. female.  Gall bladder ca , started modified FOLFIRINOX progressed then changed to low dose [place]/ abraxane, neutropenic and hence rx was delayed  HPI:   FINAL PATHOLOGIC DIAGNOSIS  GALLBLADDER/LIVER MASS, BIOPSY:  -Positive for malignancy, invasive squamous cell carcinoma, doing well pain has subsided also follows with [facility]. had a recent visit in 6/2019  recent scan showing progressive disease patient is being presented to tumor board plan is to going to second-line therapy or treat peritoneal disease and possibly even still considering surgery  Occasional nausea.  Continues to work.  No other complaints   rx delayed last week due to low plts  Social History     Socioeconomic History    Marital status:      Spouse name: Not on file    Number of children: Not on file    Years of education: Not on file    Highest education level: Not on file   Occupational History    Not on file   Social Needs    Financial resource strain: Not on file    Food insecurity:     Worry: Not on file     Inability: Not on file    Transportation needs:     Medical: Not on file     Non-medical: Not on file   Tobacco Use    Smoking status: Never Smoker    Smokeless tobacco: Never Used   Substance and Sexual Activity    Alcohol use: Yes     Comment: rarely    Drug use: No    Sexual activity: Not Currently   Lifestyle    Physical activity:     Days per week: Not on file     Minutes per session: Not on file    Stress: Not on file   Relationships    Social connections:     Talks on phone: Not on file     Gets together: Not on file     Attends J[employer] service: Not on file     Active member of club or organization: Not on file     Attends meetings of clubs or organizations: Not on file     Relationship status: Not on file   Other Topics Concern    Not on file   Social History Narrative    Not on file     Family History   Problem Relation Age of Onset     Cancer Mother         breast and bone     Breast cancer Mother     Hyperlipidemia Father     ADD / ADHD Daughter         autism    Crohn's disease Paternal Uncle     Heart disease Paternal Uncle         heart transplant     Past Surgical History:   Procedure Laterality Date    ARTHROSCOPY, KNEE, WITH Partial lateral MENISCECTOMY Right 7/12/2018    [facility]    Emralc-pdevsg-qi N/A 11/16/2018    [facility] 2ND FLR    BREAST BIOPSY      BREAST SURGERY  2001    right biopsy, benign    CHONDROPLASTY,KNEE Medial Femoral Right 7/12/2018    [facility]    COLONOSCOPY      COLONOSCOPY N/A 12/5/2014    [facility] ENDO    dental implant      MLKPVPDHR-[id number]-K-CATH N/A 5/31/2019    [facility]    ZPWGTTMPJ-VGPY-E-CATH Right 12/14/2018    [facility]    KNEE ARTHROSCOPY Right     LIVER BIOPSY  11/16/2018    REMOVAL, CATHETER, CENTRAL VENOUS, TUNNELED, WITH PORT N/A 5/31/2019    [facility]    THYROIDECTOMY  2011    complete     Past Medical History:   Diagnosis Date    Arthritis     Cancer     gallbadder/ liver spots/biopsy    Thyroid disease        Current Outpatient Medications:     apixaban (ELIQUIS) 5 mg Tab, Take 1 tablet (5 mg total) by mouth 2 (two) times daily. To Start after 7 days on 10mg Twice daily., Disp: 60 tablet, Rfl: 3    apixaban (ELIQUIS) 5 mg Tab, as directed, Disp: , Rfl:     enoxaparin (LOVENOX) 100 mg/mL Syrg, Inject 1 mL (100 mg total) into the skin once daily., Disp: 5 mL, Rfl: 0    furosemide (LASIX) 40 MG tablet, Take 40 mg by mouth daily as needed., Disp: , Rfl: 2    HYDROcodone-acetaminophen (NORCO) 7.5-325 mg per tablet, Take 1 tablet by mouth every 4 (four) hours as needed for Pain., Disp: 20 tablet, Rfl: 0    levothyroxine (TIROSINT)  200 mcg Cap, , Disp: , Rfl:     levothyroxine (TIROSINT-SOL) 200 mcg/mL Soln, twice daily., Disp: , Rfl:     lipase-protease-amylase (CREON) 36,000-114,000- 180,000 unit CpDR, Take 72,000-108,000 Units by mouth. , Disp: , Rfl:     magnesium oxide (MAG-OX) 400 mg (241.3 mg magnesium) tablet, Take 400 mg by mouth., Disp: , Rfl:     ondansetron (ZOFRAN-ODT) 8 MG TbDL, Take 8 mg by mouth every 12 (twelve) hours as needed (nausea). , Disp: , Rfl:     pantoprazole (PROTONIX) 40 MG tablet, Take 40 mg by mouth., Disp: , Rfl:     potassium chloride SA (K-DUR,KLOR-CON) 10 MEQ tablet, TAKE 1 TABLET BY MOUTH EVERY DAY WITH LASIX, Disp: , Rfl: 3    terbinafine HCl (LAMISIL) 1 % cream, Apply topically 2 (two) times daily., Disp: 24 g, Rfl: 0    venlafaxine (EFFEXOR-XR) 37.5 MG 24 hr capsule, TAKE 1 CAPSULE (37.5 MG TOTAL) BY MOUTH ONCE DAILY., Disp: 30 capsule, Rfl: 10  Review of patient's allergies indicates:  No Known Allergies      REVIEW OF SYSTEMS:     CONSTITUTIONAL:   Nausea improved eating better weight is increased she is overall much better    BREASTS: There is no swelling around breasts or nipple discharge.    EYES: Denies eye pain, blurred vision, swelling, redness or discharge.      ENT AND MOUTH: Denies runny nose, stuffiness, sinus trouble or sores. Denies    nosebleeds. Denies, hoarseness, change in voice or swelling in front of the    neck.      CARDIOVASCULAR: Denies chest pain, discomfort or palpitations. Denies neck    swelling or episodes of passing out.      RESPIRATORY: Denies cough, sputum production, blood in sputum, and denies    shortness of breath.      GI: Denies trouble swallowing, indigestion, heartburn, abdominal pain, nausea,    vomiting, diarrhea, has altered bowel habits, no blood in stool, discoloration of    stools, change in nature of stool, bloating, increased abdominal girth.      GENITOURINARY: No discharge. No pelvic pain or lumps. No rash around groin or  lesions. No urinary  frequency, hesitation, painful urination or blood in    urine. Denies incontinence. No problems with intercourse.      MUSCULOSKELETAL:  Some amount of knee pain    NEUROLOGICAL: Denies tingling, numbness, altered mentation changes to nerve    function in the face, weakness to one or both of the body. Denies changes to    gait and denies multiple falls or accidents.          PHYSICAL EXAM:     Wt Readings from Last 3 Encounters:   06/24/19 61.8 kg (136 lb 3.9 oz)   06/10/19 63.4 kg (139 lb 12.4 oz)   06/03/19 63.2 kg (139 lb 5.3 oz)     Temp Readings from Last 3 Encounters:   06/24/19 98.2 °F (36.8 °C)   06/10/19 98.6 °F (37 °C)   06/03/19 98.8 °F (37.1 °C)     BP Readings from Last 3 Encounters:   06/24/19 113/63   06/10/19 119/71   06/03/19 137/81     Pulse Readings from Last 3 Encounters:   06/24/19 81   06/10/19 95   06/03/19 73     GENERAL: Comfortable looking patient. Patient is in no distress.  Awake, alert and oriented to time, person and place.  No anxiety, or agitation.      HEENT: Normal conjunctivae and eyelids. WNL.  PERRLA 3 to 4 mm. No icterus, no pallor, no congestion, and no discharge noted.     NECK:  Supple. Trachea is central.  No crepitus.  No JVD or masses.    RESPIRATORY:  No intercostal retractions.  No dullness to percussion.  Chest is clear to auscultation.  No rales, rhonchi or wheezes.  No crepitus.  Good air entry bilaterally.    CARDIOVASCULAR:  S1 and S2 are normally heard without murmurs or gallops.  All peripheral pulses are present.    ABDOMEN:  Normal abdomen.  No hepatosplenomegaly.  No free fluid.  Bowel sounds are present.  No hernia noted. No masses.  No rebound or tenderness.  No guarding or rigidity.  Umbilicus is midline.    LYMPHATICS:  No axillary, cervical, supraclavicular, submental, or inguinal lymphadenopathy.    SKIN/MUSCULOSKELETAL:  There is no evidence of excoriation marks or ecchmosis.  No rashes.  No cyanosis.  No clubbing.  No joint or skeletal deformities  noted.  Normal range of motion.    NEUROLOGIC:  Higher functions are appropriate.  No cranial nerve deficits.  Normal jody.  Normal strength.  Motor and sensory functions are normal.  Deep tendon reflexes are normal.    GENITAL/RECTAL:  Exams are deferred.      Laboratory:     CBC:  Lab Results   Component Value Date    WBC 7.30 07/18/2019    RBC 3.09 (L) 07/18/2019    HGB 9.7 (L) 07/18/2019    HCT 31.3 (L) 07/18/2019     (H) 07/18/2019    MCH 31.4 (H) 07/18/2019    MCHC 31.0 (L) 07/18/2019    RDW 13.5 07/18/2019     07/18/2019    MPV 10.0 07/18/2019    GRAN 5.4 07/18/2019    GRAN 73.5 (H) 07/18/2019    LYMPH 1.1 07/18/2019    LYMPH 14.9 (L) 07/18/2019    MONO 0.8 07/18/2019    MONO 10.5 07/18/2019    EOS 0.0 07/18/2019    BASO 0.04 07/18/2019    EOSINOPHIL 0.3 07/18/2019    BASOPHIL 0.5 07/18/2019       BMP: BMP  Lab Results   Component Value Date     07/18/2019    K 3.9 07/18/2019     07/18/2019    CO2 20 (L) 07/18/2019    BUN 16 07/18/2019    CREATININE 0.6 07/18/2019    CALCIUM 9.3 07/18/2019    ANIONGAP 9 07/18/2019    ESTGFRAFRICA >60 07/18/2019    EGFRNONAA >60 07/18/2019       LFT:   Lab Results   Component Value Date    ALT 17 07/18/2019    AST 31 07/18/2019    ALKPHOS 206 (H) 07/18/2019    BILITOT 0.4 07/18/2019       IMPRESSION: CT C/A/P done 6/2-019 at [facility]  1. Interval increase in primary tumor in the gallbladder.  2. Interval increase in intrahepatic metastasis.  3. Findings suspicious for peritoneal disease.     ca19.9 129    Assessment/Plan:     discontd  FOLFIRINOX , due to progression  Pt went to [facility]  Being discussed at tunor board this week\   possible intraperitonieal tharpy, vs gem/ abraxane/ vs sx?   I doubt sx is a possiblity considering her dz involved peritoneum   resume  gem/ abraxane orders  Gemzar 500 mg/m2 and abraxane 100 mg/m2 day 1 and day 15 of 18 day cycle  and await [facility] plans in the mean time  as well cont thyroid f/u  Resume eliquis plts have  improved   scans done at [facility] ( see care everywhere)   rtc for next zelda with cbc, cmp  Living Will  During past visit, to day I discussed it further wit Carlton engaged the patient in the advance care planning process.  The patient and I reviewed the role for advance directives and their purpose in directing future healthcare if the patient's unable to speak for herself.  At this point in time, the patient does have full decision-making capacity.  We discussed different extreme health states that she could experience, and reviewed what kind of medical care she would want in those situations.  The patient communicated that if she were comatose and had little chance of a meaningful recovery, she does not want machines/life-sustaining treatments used.  The patient  Has not  completed a living will to reflect these preferences.  The patient has not already designated a healthcare power of  to make decisions on her behalf.   She has initaited the process already. intotal about 25 minutes has been spent in these discussions

## 2019-07-30 ENCOUNTER — PATIENT MESSAGE (OUTPATIENT)
Dept: FAMILY MEDICINE | Facility: CLINIC | Age: 54
End: 2019-07-30

## 2019-07-31 ENCOUNTER — LAB VISIT (OUTPATIENT)
Dept: LAB | Facility: HOSPITAL | Age: 54
End: 2019-07-31
Attending: INTERNAL MEDICINE
Payer: COMMERCIAL

## 2019-07-31 ENCOUNTER — PATIENT MESSAGE (OUTPATIENT)
Dept: FAMILY MEDICINE | Facility: CLINIC | Age: 54
End: 2019-07-31

## 2019-07-31 DIAGNOSIS — C22.9 MALIGNANT NEOPLASM OF LIVER, UNSPECIFIED LIVER MALIGNANCY TYPE: ICD-10-CM

## 2019-07-31 LAB
ALBUMIN SERPL BCP-MCNC: 2.8 G/DL (ref 3.5–5.2)
ALP SERPL-CCNC: 203 U/L (ref 55–135)
ALT SERPL W/O P-5'-P-CCNC: 16 U/L (ref 10–44)
ANION GAP SERPL CALC-SCNC: 10 MMOL/L (ref 8–16)
AST SERPL-CCNC: 30 U/L (ref 10–40)
BASOPHILS # BLD AUTO: 0.03 K/UL (ref 0–0.2)
BASOPHILS NFR BLD: 0.3 % (ref 0–1.9)
BILIRUB SERPL-MCNC: 0.4 MG/DL (ref 0.1–1)
BUN SERPL-MCNC: 11 MG/DL (ref 6–20)
CALCIUM SERPL-MCNC: 9.2 MG/DL (ref 8.7–10.5)
CHLORIDE SERPL-SCNC: 104 MMOL/L (ref 95–110)
CO2 SERPL-SCNC: 24 MMOL/L (ref 23–29)
CREAT SERPL-MCNC: 0.6 MG/DL (ref 0.5–1.4)
DIFFERENTIAL METHOD: ABNORMAL
EOSINOPHIL # BLD AUTO: 0 K/UL (ref 0–0.5)
EOSINOPHIL NFR BLD: 0.3 % (ref 0–8)
ERYTHROCYTE [DISTWIDTH] IN BLOOD BY AUTOMATED COUNT: 14.7 % (ref 11.5–14.5)
EST. GFR  (AFRICAN AMERICAN): >60 ML/MIN/1.73 M^2
EST. GFR  (NON AFRICAN AMERICAN): >60 ML/MIN/1.73 M^2
GLUCOSE SERPL-MCNC: 90 MG/DL (ref 70–110)
HCT VFR BLD AUTO: 29.4 % (ref 37–48.5)
HGB BLD-MCNC: 9.1 G/DL (ref 12–16)
IMM GRANULOCYTES # BLD AUTO: 0.1 K/UL (ref 0–0.04)
LYMPHOCYTES # BLD AUTO: 1.6 K/UL (ref 1–4.8)
LYMPHOCYTES NFR BLD: 14.8 % (ref 18–48)
MCH RBC QN AUTO: 30.8 PG (ref 27–31)
MCHC RBC AUTO-ENTMCNC: 31 G/DL (ref 32–36)
MCV RBC AUTO: 100 FL (ref 82–98)
MONOCYTES # BLD AUTO: 0.6 K/UL (ref 0.3–1)
MONOCYTES NFR BLD: 5.3 % (ref 4–15)
NEUTROPHILS # BLD AUTO: 8.5 K/UL (ref 1.8–7.7)
NEUTROPHILS NFR BLD: 78.4 % (ref 38–73)
NRBC BLD-RTO: 0 /100 WBC
PLATELET # BLD AUTO: 122 K/UL (ref 150–350)
PMV BLD AUTO: 11.4 FL (ref 9.2–12.9)
POTASSIUM SERPL-SCNC: 3.3 MMOL/L (ref 3.5–5.1)
PROT SERPL-MCNC: 6.3 G/DL (ref 6–8.4)
RBC # BLD AUTO: 2.95 M/UL (ref 4–5.4)
SODIUM SERPL-SCNC: 138 MMOL/L (ref 136–145)
WBC # BLD AUTO: 10.77 K/UL (ref 3.9–12.7)

## 2019-07-31 PROCEDURE — 80053 COMPREHEN METABOLIC PANEL: CPT

## 2019-07-31 PROCEDURE — 85025 COMPLETE CBC W/AUTO DIFF WBC: CPT

## 2019-07-31 PROCEDURE — 36415 COLL VENOUS BLD VENIPUNCTURE: CPT

## 2019-07-31 NOTE — TELEPHONE ENCOUNTER
Patient requesting refill on pended medication via portal   Requesting eliquis 5mg BID     Last filled in hospital 1/26/19  Last seen in clinic 4/22/19 Dr Moreno

## 2019-08-02 ENCOUNTER — OFFICE VISIT (OUTPATIENT)
Dept: HEMATOLOGY/ONCOLOGY | Facility: CLINIC | Age: 54
End: 2019-08-02
Payer: COMMERCIAL

## 2019-08-02 ENCOUNTER — INFUSION (OUTPATIENT)
Dept: INFUSION THERAPY | Facility: HOSPITAL | Age: 54
End: 2019-08-02
Attending: INTERNAL MEDICINE
Payer: COMMERCIAL

## 2019-08-02 VITALS
DIASTOLIC BLOOD PRESSURE: 59 MMHG | SYSTOLIC BLOOD PRESSURE: 103 MMHG | HEIGHT: 59 IN | HEART RATE: 79 BPM | BODY MASS INDEX: 25.34 KG/M2 | TEMPERATURE: 98 F | RESPIRATION RATE: 18 BRPM | WEIGHT: 125.69 LBS

## 2019-08-02 VITALS
WEIGHT: 117.31 LBS | RESPIRATION RATE: 20 BRPM | HEIGHT: 58 IN | OXYGEN SATURATION: 99 % | HEART RATE: 87 BPM | BODY MASS INDEX: 24.62 KG/M2 | DIASTOLIC BLOOD PRESSURE: 58 MMHG | SYSTOLIC BLOOD PRESSURE: 109 MMHG | TEMPERATURE: 98 F

## 2019-08-02 DIAGNOSIS — C23 GALLBLADDER CANCER: Primary | ICD-10-CM

## 2019-08-02 DIAGNOSIS — D69.6 THROMBOCYTOPENIA: ICD-10-CM

## 2019-08-02 DIAGNOSIS — C23 MALIGNANT NEOPLASM OF GALLBLADDER: ICD-10-CM

## 2019-08-02 DIAGNOSIS — D64.9 ANEMIA, UNSPECIFIED TYPE: ICD-10-CM

## 2019-08-02 DIAGNOSIS — I26.99 BILATERAL PULMONARY EMBOLISM: ICD-10-CM

## 2019-08-02 PROCEDURE — 99215 PR OFFICE/OUTPT VISIT, EST, LEVL V, 40-54 MIN: ICD-10-PCS | Mod: S$GLB,,, | Performed by: INTERNAL MEDICINE

## 2019-08-02 PROCEDURE — 63600175 PHARM REV CODE 636 W HCPCS: Performed by: INTERNAL MEDICINE

## 2019-08-02 PROCEDURE — 96377 APPLICATON ON-BODY INJECTOR: CPT

## 2019-08-02 PROCEDURE — 99215 OFFICE O/P EST HI 40 MIN: CPT | Mod: S$GLB,,, | Performed by: INTERNAL MEDICINE

## 2019-08-02 PROCEDURE — 99999 PR PBB SHADOW E&M-EST. PATIENT-LVL III: ICD-10-PCS | Mod: PBBFAC,,, | Performed by: INTERNAL MEDICINE

## 2019-08-02 PROCEDURE — 96417 CHEMO IV INFUS EACH ADDL SEQ: CPT

## 2019-08-02 PROCEDURE — 96413 CHEMO IV INFUSION 1 HR: CPT

## 2019-08-02 PROCEDURE — 99999 PR PBB SHADOW E&M-EST. PATIENT-LVL III: CPT | Mod: PBBFAC,,, | Performed by: INTERNAL MEDICINE

## 2019-08-02 PROCEDURE — 3008F PR BODY MASS INDEX (BMI) DOCUMENTED: ICD-10-PCS | Mod: CPTII,S$GLB,, | Performed by: INTERNAL MEDICINE

## 2019-08-02 PROCEDURE — 96367 TX/PROPH/DG ADDL SEQ IV INF: CPT

## 2019-08-02 PROCEDURE — 3008F BODY MASS INDEX DOCD: CPT | Mod: CPTII,S$GLB,, | Performed by: INTERNAL MEDICINE

## 2019-08-02 RX ORDER — HEPARIN 100 UNIT/ML
500 SYRINGE INTRAVENOUS
Status: DISCONTINUED | OUTPATIENT
Start: 2019-08-02 | End: 2019-08-02 | Stop reason: HOSPADM

## 2019-08-02 RX ORDER — SODIUM CHLORIDE 0.9 % (FLUSH) 0.9 %
10 SYRINGE (ML) INJECTION
Status: DISCONTINUED | OUTPATIENT
Start: 2019-08-02 | End: 2019-08-02 | Stop reason: HOSPADM

## 2019-08-02 RX ADMIN — PEGFILGRASTIM 6 MG: KIT SUBCUTANEOUS at 12:08

## 2019-08-02 RX ADMIN — PACLITAXEL 160 MG: 100 INJECTION, POWDER, LYOPHILIZED, FOR SUSPENSION INTRAVENOUS at 11:08

## 2019-08-02 RX ADMIN — SODIUM CHLORIDE 795 MG: 900 INJECTION, SOLUTION INTRAVENOUS at 12:08

## 2019-08-02 RX ADMIN — SODIUM CHLORIDE 1 MG: 9 INJECTION, SOLUTION INTRAVENOUS at 11:08

## 2019-08-02 RX ADMIN — HEPARIN 500 UNITS: 100 SYRINGE at 12:08

## 2019-08-02 NOTE — PROGRESS NOTES
Subjective:       Patient ID: Faith Byers is a 54 y.o. female.  Gall bladder ca , started modified FOLFIRINOX progressed then changed to low dose [place]/ abraxane, neutropenic and hence rx was delayed  HPI:   FINAL PATHOLOGIC DIAGNOSIS  GALLBLADDER/LIVER MASS, BIOPSY:  -Positive for malignancy, invasive squamous cell carcinoma, doing well pain has subsided also follows with [facility]. had a recent visit in 6/2019  recent scan showing progressive disease patient is being presented to tumor board plan is to going to second-line therapy or treat peritoneal disease and possibly even still considering surgery  Occasional nausea.  Continues to work.  No other complaints   some delay during low plts  Social History     Socioeconomic History    Marital status:      Spouse name: Not on file    Number of children: Not on file    Years of education: Not on file    Highest education level: Not on file   Occupational History    Not on file   Social Needs    Financial resource strain: Not on file    Food insecurity:     Worry: Not on file     Inability: Not on file    Transportation needs:     Medical: Not on file     Non-medical: Not on file   Tobacco Use    Smoking status: Never Smoker    Smokeless tobacco: Never Used   Substance and Sexual Activity    Alcohol use: Yes     Comment: rarely    Drug use: No    Sexual activity: Not Currently   Lifestyle    Physical activity:     Days per week: Not on file     Minutes per session: Not on file    Stress: Not on file   Relationships    Social connections:     Talks on phone: Not on file     Gets together: Not on file     Attends [employer] service: Not on file     Active member of club or organization: Not on file     Attends meetings of clubs or organizations: Not on file     Relationship status: Not on file   Other Topics Concern    Not on file   Social History Narrative    Not on file     Family History   Problem Relation Age of Onset     Cancer Mother         breast and bone     Breast cancer Mother     Hyperlipidemia Father     ADD / ADHD Daughter         autism    Crohn's disease Paternal Uncle     Heart disease Paternal Uncle         heart transplant     Past Surgical History:   Procedure Laterality Date    ARTHROSCOPY, KNEE, WITH Partial lateral MENISCECTOMY Right 7/12/2018    [facility]    [id number]-[id number]-bf N/A 11/16/2018    [facility]LR    BREAST BIOPSY      BREAST SURGERY  2001    right biopsy, benign    CHONDROPLASTY,KNEE Medial Femoral Right 7/12/2018    [facility] OR    COLONOSCOPY      COLONOSCOPY N/A 12/5/2014    [facility] ENDO    dental implant      [id number]-Z-CATH N/A 5/31/2019    [facility] OR    BPZWANIJK-[id number]-G-CATH Right 12/14/2018    [facility]o [facility] OR    KNEE ARTHROSCOPY Right     LIVER BIOPSY  11/16/2018    REMOVAL, CATHETER, CENTRAL VENOUS, TUNNELED, WITH PORT N/A 5/31/2019    [facility] OR    THYROIDECTOMY  2011    complete     Past Medical History:   Diagnosis Date    Arthritis     Cancer     gallbadder/ liver spots/biopsy    Thyroid disease        Current Outpatient Medications:     apixaban (ELIQUIS) 5 mg Tab, as directed, Disp: , Rfl:     apixaban (ELIQUIS) 5 mg Tab, Take 1 tablet (5 mg total) by mouth 2 (two) times daily., Disp: 60 tablet, Rfl: 3    enoxaparin (LOVENOX) 100 mg/mL Syrg, Inject 1 mL (100 mg total) into the skin once daily., Disp: 5 mL, Rfl: 0    furosemide (LASIX) 40 MG tablet, Take 40 mg by mouth daily as needed., Disp: , Rfl: 2    HYDROcodone-acetaminophen (NORCO) 7.5-325 mg per tablet, Take 1 tablet by mouth every 4 (four) hours as needed for Pain., Disp: 20 tablet, Rfl: 0    levothyroxine (TIROSINT) 200 mcg Cap, , Disp: , Rfl:     levothyroxine  (TIROSINT-SOL) 200 mcg/mL Soln, twice daily., Disp: , Rfl:     lipase-protease-amylase (CREON) 36,000-114,000- 180,000 unit CpDR, Take 72,000-108,000 Units by mouth. , Disp: , Rfl:     magnesium oxide (MAG-OX) 400 mg (241.3 mg magnesium) tablet, Take 400 mg by mouth., Disp: , Rfl:     ondansetron (ZOFRAN-ODT) 8 MG TbDL, Take 8 mg by mouth every 12 (twelve) hours as needed (nausea). , Disp: , Rfl:     pantoprazole (PROTONIX) 40 MG tablet, Take 40 mg by mouth., Disp: , Rfl:     potassium chloride SA (K-DUR,KLOR-CON) 10 MEQ tablet, TAKE 1 TABLET BY MOUTH EVERY DAY WITH LASIX, Disp: , Rfl: 3    terbinafine HCl (LAMISIL) 1 % cream, Apply topically 2 (two) times daily., Disp: 24 g, Rfl: 0    venlafaxine (EFFEXOR-XR) 37.5 MG 24 hr capsule, TAKE 1 CAPSULE (37.5 MG TOTAL) BY MOUTH ONCE DAILY., Disp: 30 capsule, Rfl: 10  Review of patient's allergies indicates:  No Known Allergies      REVIEW OF SYSTEMS:     CONSTITUTIONAL:   Nausea improved eating better weight is increased she is overall much better    BREASTS: There is no swelling around breasts or nipple discharge.    EYES: Denies eye pain, blurred vision, swelling, redness or discharge.      ENT AND MOUTH: Denies runny nose, stuffiness, sinus trouble or sores. Denies    nosebleeds. Denies, hoarseness, change in voice or swelling in front of the    neck.      CARDIOVASCULAR: Denies chest pain, discomfort or palpitations. Denies neck    swelling or episodes of passing out.      RESPIRATORY: Denies cough, sputum production, blood in sputum, and denies    shortness of breath.      GI: Denies trouble swallowing, indigestion, heartburn, abdominal pain, nausea,    vomiting, diarrhea, has altered bowel habits, no blood in stool, discoloration of    stools, change in nature of stool, bloating, increased abdominal girth.      GENITOURINARY: No discharge. No pelvic pain or lumps. No rash around groin or  lesions. No urinary frequency, hesitation, painful urination or blood  in    urine. Denies incontinence. No problems with intercourse.      MUSCULOSKELETAL:  Some amount of knee pain    NEUROLOGICAL: Denies tingling, numbness, altered mentation changes to nerve    function in the face, weakness to one or both of the body. Denies changes to    gait and denies multiple falls or accidents.          PHYSICAL EXAM:     Wt Readings from Last 3 Encounters:   08/02/19 53.2 kg (117 lb 4.6 oz)   07/19/19 58.2 kg (128 lb 4.9 oz)   06/24/19 61.8 kg (136 lb 3.9 oz)     Temp Readings from Last 3 Encounters:   08/02/19 98.3 °F (36.8 °C)   07/19/19 97.8 °F (36.6 °C)   06/24/19 98.2 °F (36.8 °C)     BP Readings from Last 3 Encounters:   08/02/19 (!) 109/58   07/19/19 (!) 121/57   06/24/19 113/63     Pulse Readings from Last 3 Encounters:   08/02/19 87   07/19/19 92   06/24/19 81     GENERAL: Comfortable looking patient. Patient is in no distress.  Awake, alert and oriented to time, person and place.  No anxiety, or agitation.      HEENT: Normal conjunctivae and eyelids. WNL.  PERRLA 3 to 4 mm. No icterus, no pallor, no congestion, and no discharge noted.     NECK:  Supple. Trachea is central.  No crepitus.  No JVD or masses.    RESPIRATORY:  No intercostal retractions.  No dullness to percussion.  Chest is clear to auscultation.  No rales, rhonchi or wheezes.  No crepitus.  Good air entry bilaterally.    CARDIOVASCULAR:  S1 and S2 are normally heard without murmurs or gallops.  All peripheral pulses are present.    ABDOMEN:  Normal abdomen.  No hepatosplenomegaly.  No free fluid.  Bowel sounds are present.  No hernia noted. No masses.  No rebound or tenderness.  No guarding or rigidity.  Umbilicus is midline.    LYMPHATICS:  No axillary, cervical, supraclavicular, submental, or inguinal lymphadenopathy.    SKIN/MUSCULOSKELETAL:  There is no evidence of excoriation marks or ecchmosis.  No rashes.  No cyanosis.  No clubbing.  No joint or skeletal deformities noted.  Normal range of motion.    NEUROLOGIC:   Higher functions are appropriate.  No cranial nerve deficits.  Normal jody.  Normal strength.  Motor and sensory functions are normal.  Deep tendon reflexes are normal.    GENITAL/RECTAL:  Exams are deferred.      Laboratory:     CBC:  Lab Results   Component Value Date    WBC 10.77 07/31/2019    RBC 2.95 (L) 07/31/2019    HGB 9.1 (L) 07/31/2019    HCT 29.4 (L) 07/31/2019     (H) 07/31/2019    MCH 30.8 07/31/2019    MCHC 31.0 (L) 07/31/2019    RDW 14.7 (H) 07/31/2019     (L) 07/31/2019    MPV 11.4 07/31/2019    GRAN 8.5 (H) 07/31/2019    GRAN 78.4 (H) 07/31/2019    LYMPH 1.6 07/31/2019    LYMPH 14.8 (L) 07/31/2019    MONO 0.6 07/31/2019    MONO 5.3 07/31/2019    EOS 0.0 07/31/2019    BASO 0.03 07/31/2019    EOSINOPHIL 0.3 07/31/2019    BASOPHIL 0.3 07/31/2019       BMP: BMP  Lab Results   Component Value Date     07/31/2019    K 3.3 (L) 07/31/2019     07/31/2019    CO2 24 07/31/2019    BUN 11 07/31/2019    CREATININE 0.6 07/31/2019    CALCIUM 9.2 07/31/2019    ANIONGAP 10 07/31/2019    ESTGFRAFRICA >60 07/31/2019    EGFRNONAA >60 07/31/2019       LFT:   Lab Results   Component Value Date    ALT 16 07/31/2019    AST 30 07/31/2019    ALKPHOS 203 (H) 07/31/2019    BILITOT 0.4 07/31/2019       IMPRESSION: CT C/A/P done 6/2-019 at [facility]  1. Interval increase in primary tumor in the gallbladder.  2. Interval increase in intrahepatic metastasis.  3. Findings suspicious for peritoneal disease.     ca19.9 129    Assessment/Plan:     discontd  FOLFIRINOX , due to progression  Pt went to [facility]  Being discussed at tunor board this week\   possible intraperitonieal tharpy, vs gem/ abraxane/ vs sx?   I doubt sx is a possiblity considering her dz involved peritoneum   resume  gem/ abraxane orders  Gemzar 500 mg/m2 and abraxane 100 mg/m2 day 1 and day 15 of  day cycle  and await [facility] plans in the mean time  as well cont thyroid f/u  Resume eliquis plts have improved   scans done at [facility] ( see care  everywhere)   rtc for next zelda with cbc, cmp 2 weeks changed to 28 day cycle with neulasta support as she has cytopenisa with usual regimen  Living Will  During past visit, to day I discussed it further wit Carlton engaged the patient in the advance care planning process.  The patient and I reviewed the role for advance directives and their purpose in directing future healthcare if the patient's unable to speak for herself.  At this point in time, the patient does have full decision-making capacity.  We discussed different extreme health states that she could experience, and reviewed what kind of medical care she would want in those situations.  The patient communicated that if she were comatose and had little chance of a meaningful recovery, she does not want machines/life-sustaining treatments used.  The patient  Has not  completed a living will to reflect these preferences.  The patient has not already designated a healthcare power of  to make decisions on her behalf.   She has initaited the process already. intotal about 25 minutes has been spent in these discussions

## 2019-08-14 ENCOUNTER — LAB VISIT (OUTPATIENT)
Dept: LAB | Facility: HOSPITAL | Age: 54
End: 2019-08-14
Attending: INTERNAL MEDICINE
Payer: COMMERCIAL

## 2019-08-14 DIAGNOSIS — I26.99 BILATERAL PULMONARY EMBOLISM: ICD-10-CM

## 2019-08-14 DIAGNOSIS — C23 GALLBLADDER CANCER: ICD-10-CM

## 2019-08-14 DIAGNOSIS — C23 MALIGNANT NEOPLASM OF GALLBLADDER: ICD-10-CM

## 2019-08-14 LAB
ALBUMIN SERPL BCP-MCNC: 3 G/DL (ref 3.5–5.2)
ALP SERPL-CCNC: 220 U/L (ref 55–135)
ALT SERPL W/O P-5'-P-CCNC: 13 U/L (ref 10–44)
ANION GAP SERPL CALC-SCNC: 9 MMOL/L (ref 8–16)
AST SERPL-CCNC: 29 U/L (ref 10–40)
BASOPHILS # BLD AUTO: 0.05 K/UL (ref 0–0.2)
BASOPHILS NFR BLD: 0.3 % (ref 0–1.9)
BILIRUB SERPL-MCNC: 0.4 MG/DL (ref 0.1–1)
BUN SERPL-MCNC: 9 MG/DL (ref 6–20)
CALCIUM SERPL-MCNC: 9.4 MG/DL (ref 8.7–10.5)
CANCER AG19-9 SERPL-ACNC: 340 U/ML (ref 2–40)
CHLORIDE SERPL-SCNC: 107 MMOL/L (ref 95–110)
CO2 SERPL-SCNC: 22 MMOL/L (ref 23–29)
CREAT SERPL-MCNC: 0.7 MG/DL (ref 0.5–1.4)
DIFFERENTIAL METHOD: ABNORMAL
EOSINOPHIL # BLD AUTO: 0.1 K/UL (ref 0–0.5)
EOSINOPHIL NFR BLD: 0.4 % (ref 0–8)
ERYTHROCYTE [DISTWIDTH] IN BLOOD BY AUTOMATED COUNT: 16 % (ref 11.5–14.5)
EST. GFR  (AFRICAN AMERICAN): >60 ML/MIN/1.73 M^2
EST. GFR  (NON AFRICAN AMERICAN): >60 ML/MIN/1.73 M^2
GLUCOSE SERPL-MCNC: 96 MG/DL (ref 70–110)
HCT VFR BLD AUTO: 22.8 % (ref 37–48.5)
HGB BLD-MCNC: 6.9 G/DL (ref 12–16)
IMM GRANULOCYTES # BLD AUTO: 0.44 K/UL (ref 0–0.04)
LYMPHOCYTES # BLD AUTO: 2 K/UL (ref 1–4.8)
LYMPHOCYTES NFR BLD: 10.9 % (ref 18–48)
MCH RBC QN AUTO: 30.3 PG (ref 27–31)
MCHC RBC AUTO-ENTMCNC: 30.3 G/DL (ref 32–36)
MCV RBC AUTO: 100 FL (ref 82–98)
MONOCYTES # BLD AUTO: 0.8 K/UL (ref 0.3–1)
MONOCYTES NFR BLD: 4.4 % (ref 4–15)
NEUTROPHILS # BLD AUTO: 15.2 K/UL (ref 1.8–7.7)
NEUTROPHILS NFR BLD: 81.6 % (ref 38–73)
NRBC BLD-RTO: 0 /100 WBC
PLATELET # BLD AUTO: 150 K/UL (ref 150–350)
PMV BLD AUTO: 10.9 FL (ref 9.2–12.9)
POTASSIUM SERPL-SCNC: 3.8 MMOL/L (ref 3.5–5.1)
PROT SERPL-MCNC: 6.7 G/DL (ref 6–8.4)
RBC # BLD AUTO: 2.28 M/UL (ref 4–5.4)
SODIUM SERPL-SCNC: 138 MMOL/L (ref 136–145)
WBC # BLD AUTO: 18.58 K/UL (ref 3.9–12.7)

## 2019-08-14 PROCEDURE — 85025 COMPLETE CBC W/AUTO DIFF WBC: CPT

## 2019-08-14 PROCEDURE — 80053 COMPREHEN METABOLIC PANEL: CPT

## 2019-08-14 PROCEDURE — 86301 IMMUNOASSAY TUMOR CA 19-9: CPT

## 2019-08-14 PROCEDURE — 36415 COLL VENOUS BLD VENIPUNCTURE: CPT

## 2019-08-15 ENCOUNTER — LAB VISIT (OUTPATIENT)
Dept: LAB | Facility: HOSPITAL | Age: 54
End: 2019-08-15
Attending: INTERNAL MEDICINE
Payer: COMMERCIAL

## 2019-08-15 ENCOUNTER — TELEPHONE (OUTPATIENT)
Dept: HEMATOLOGY/ONCOLOGY | Facility: CLINIC | Age: 54
End: 2019-08-15

## 2019-08-15 ENCOUNTER — PATIENT MESSAGE (OUTPATIENT)
Dept: HEMATOLOGY/ONCOLOGY | Facility: CLINIC | Age: 54
End: 2019-08-15

## 2019-08-15 DIAGNOSIS — D64.9 ANEMIA, UNSPECIFIED TYPE: ICD-10-CM

## 2019-08-15 DIAGNOSIS — C23 GALLBLADDER CANCER: Primary | ICD-10-CM

## 2019-08-15 DIAGNOSIS — C23 GALLBLADDER CANCER: ICD-10-CM

## 2019-08-15 LAB
ABO + RH BLD: NORMAL
BLD GP AB SCN CELLS X3 SERPL QL: NORMAL

## 2019-08-15 PROCEDURE — 86850 RBC ANTIBODY SCREEN: CPT

## 2019-08-15 PROCEDURE — 36415 COLL VENOUS BLD VENIPUNCTURE: CPT

## 2019-08-15 RX ORDER — HYDROCODONE BITARTRATE AND ACETAMINOPHEN 500; 5 MG/1; MG/1
TABLET ORAL ONCE
Status: CANCELLED | OUTPATIENT
Start: 2019-08-15 | End: 2019-08-15

## 2019-08-15 RX ORDER — DIPHENHYDRAMINE HCL 25 MG
25 CAPSULE ORAL
Status: CANCELLED | OUTPATIENT
Start: 2019-08-15

## 2019-08-15 RX ORDER — ACETAMINOPHEN 325 MG/1
650 TABLET ORAL
Status: CANCELLED | OUTPATIENT
Start: 2019-08-15

## 2019-08-15 NOTE — TELEPHONE ENCOUNTER
----- Message from Genevieve Alarcon MD sent at 8/14/2019  4:35 PM CDT -----  She is very anemic, see if she needs blood

## 2019-08-15 NOTE — TELEPHONE ENCOUNTER
Spoke to pt to inform her that she is scheduled for 1 unit of blood at 2pm tomorrow after her tx. Pt confirmed that she will get her type screen today.

## 2019-08-15 NOTE — TELEPHONE ENCOUNTER
Spoke to pt to see how she is feeling and if she feels like she needs to get blood due to being severely anemic per dr moulton. Pt stated she is feeling fine and does not feel like she needs blood, pt stated she is nauseous but that has noting to do with the blood. Pt has a f/u apt with dr moulton tomorrow.

## 2019-08-15 NOTE — TELEPHONE ENCOUNTER
Spoke to pt in regards to needing blood. I called pt earlier this morning and pt decided that she did not need blood. Pt called back stating that since she will be going to [facility] next week she feels that it would be in her best interest to get blood anyway. I informed pt that I will speak with dr moulton to see if it would be ok to get blood set up for after her tx tomorrow. Pt verbalized understanding.

## 2019-08-15 NOTE — TELEPHONE ENCOUNTER
----- Message from RT Donald sent at 8/15/2019  9:44 AM CDT -----  Contact: pt   pt , requesting a call back soon concerning the blood transfusion, thanks.

## 2019-08-16 ENCOUNTER — INFUSION (OUTPATIENT)
Dept: INFUSION THERAPY | Facility: HOSPITAL | Age: 54
End: 2019-08-16
Attending: INTERNAL MEDICINE
Payer: COMMERCIAL

## 2019-08-16 ENCOUNTER — OFFICE VISIT (OUTPATIENT)
Dept: HEMATOLOGY/ONCOLOGY | Facility: CLINIC | Age: 54
End: 2019-08-16
Payer: COMMERCIAL

## 2019-08-16 VITALS
DIASTOLIC BLOOD PRESSURE: 56 MMHG | HEIGHT: 59 IN | HEART RATE: 76 BPM | WEIGHT: 125.44 LBS | OXYGEN SATURATION: 99 % | RESPIRATION RATE: 16 BRPM | TEMPERATURE: 98 F | BODY MASS INDEX: 25.29 KG/M2 | SYSTOLIC BLOOD PRESSURE: 110 MMHG

## 2019-08-16 VITALS
SYSTOLIC BLOOD PRESSURE: 109 MMHG | OXYGEN SATURATION: 98 % | DIASTOLIC BLOOD PRESSURE: 61 MMHG | HEART RATE: 97 BPM | HEIGHT: 59 IN | BODY MASS INDEX: 25.25 KG/M2 | WEIGHT: 125.25 LBS | TEMPERATURE: 98 F | RESPIRATION RATE: 17 BRPM

## 2019-08-16 VITALS
TEMPERATURE: 100 F | HEART RATE: 101 BPM | OXYGEN SATURATION: 99 % | RESPIRATION RATE: 20 BRPM | DIASTOLIC BLOOD PRESSURE: 73 MMHG | SYSTOLIC BLOOD PRESSURE: 129 MMHG

## 2019-08-16 DIAGNOSIS — C23 GALLBLADDER CANCER: Primary | ICD-10-CM

## 2019-08-16 DIAGNOSIS — D64.9 ANEMIA, UNSPECIFIED TYPE: ICD-10-CM

## 2019-08-16 DIAGNOSIS — D69.6 THROMBOCYTOPENIA: ICD-10-CM

## 2019-08-16 DIAGNOSIS — C23 GALLBLADDER CANCER: ICD-10-CM

## 2019-08-16 LAB
BLD PROD TYP BPU: NORMAL
BLOOD UNIT EXPIRATION DATE: NORMAL
BLOOD UNIT TYPE CODE: 6200
BLOOD UNIT TYPE: NORMAL
CODING SYSTEM: NORMAL
DISPENSE STATUS: NORMAL
NUM UNITS TRANS PACKED RBC: NORMAL

## 2019-08-16 PROCEDURE — 36430 TRANSFUSION BLD/BLD COMPNT: CPT

## 2019-08-16 PROCEDURE — 63600175 PHARM REV CODE 636 W HCPCS: Mod: JG | Performed by: INTERNAL MEDICINE

## 2019-08-16 PROCEDURE — 96417 CHEMO IV INFUS EACH ADDL SEQ: CPT

## 2019-08-16 PROCEDURE — 96367 TX/PROPH/DG ADDL SEQ IV INF: CPT

## 2019-08-16 PROCEDURE — 25000003 PHARM REV CODE 250: Performed by: INTERNAL MEDICINE

## 2019-08-16 PROCEDURE — 3008F PR BODY MASS INDEX (BMI) DOCUMENTED: ICD-10-PCS | Mod: CPTII,S$GLB,, | Performed by: INTERNAL MEDICINE

## 2019-08-16 PROCEDURE — 96377 APPLICATON ON-BODY INJECTOR: CPT

## 2019-08-16 PROCEDURE — 99215 PR OFFICE/OUTPT VISIT, EST, LEVL V, 40-54 MIN: ICD-10-PCS | Mod: S$GLB,,, | Performed by: INTERNAL MEDICINE

## 2019-08-16 PROCEDURE — P9016 RBC LEUKOCYTES REDUCED: HCPCS

## 2019-08-16 PROCEDURE — 99215 OFFICE O/P EST HI 40 MIN: CPT | Mod: S$GLB,,, | Performed by: INTERNAL MEDICINE

## 2019-08-16 PROCEDURE — 3008F BODY MASS INDEX DOCD: CPT | Mod: CPTII,S$GLB,, | Performed by: INTERNAL MEDICINE

## 2019-08-16 PROCEDURE — A4216 STERILE WATER/SALINE, 10 ML: HCPCS | Performed by: INTERNAL MEDICINE

## 2019-08-16 PROCEDURE — 99999 PR PBB SHADOW E&M-EST. PATIENT-LVL IV: CPT | Mod: PBBFAC,,, | Performed by: INTERNAL MEDICINE

## 2019-08-16 PROCEDURE — 96413 CHEMO IV INFUSION 1 HR: CPT

## 2019-08-16 PROCEDURE — 99999 PR PBB SHADOW E&M-EST. PATIENT-LVL IV: ICD-10-PCS | Mod: PBBFAC,,, | Performed by: INTERNAL MEDICINE

## 2019-08-16 PROCEDURE — 63600175 PHARM REV CODE 636 W HCPCS: Performed by: INTERNAL MEDICINE

## 2019-08-16 PROCEDURE — 86920 COMPATIBILITY TEST SPIN: CPT

## 2019-08-16 RX ORDER — HYDROCODONE BITARTRATE AND ACETAMINOPHEN 500; 5 MG/1; MG/1
TABLET ORAL ONCE
Status: DISCONTINUED | OUTPATIENT
Start: 2019-08-16 | End: 2019-08-16 | Stop reason: SDUPTHER

## 2019-08-16 RX ORDER — HEPARIN 100 UNIT/ML
500 SYRINGE INTRAVENOUS
Status: CANCELLED | OUTPATIENT
Start: 2019-09-13

## 2019-08-16 RX ORDER — HEPARIN 100 UNIT/ML
500 SYRINGE INTRAVENOUS
Status: CANCELLED | OUTPATIENT
Start: 2019-08-30

## 2019-08-16 RX ORDER — HEPARIN 100 UNIT/ML
500 SYRINGE INTRAVENOUS
Status: COMPLETED | OUTPATIENT
Start: 2019-08-16 | End: 2019-08-16

## 2019-08-16 RX ORDER — ACETAMINOPHEN 325 MG/1
650 TABLET ORAL
Status: COMPLETED | OUTPATIENT
Start: 2019-08-16 | End: 2019-08-16

## 2019-08-16 RX ORDER — SODIUM CHLORIDE 0.9 % (FLUSH) 0.9 %
10 SYRINGE (ML) INJECTION
Status: CANCELLED | OUTPATIENT
Start: 2019-09-13

## 2019-08-16 RX ORDER — SODIUM CHLORIDE 0.9 % (FLUSH) 0.9 %
10 SYRINGE (ML) INJECTION
Status: DISCONTINUED | OUTPATIENT
Start: 2019-08-16 | End: 2019-08-16 | Stop reason: HOSPADM

## 2019-08-16 RX ORDER — DIPHENHYDRAMINE HCL 25 MG
25 CAPSULE ORAL
Status: DISCONTINUED | OUTPATIENT
Start: 2019-08-16 | End: 2019-08-16 | Stop reason: SDUPTHER

## 2019-08-16 RX ORDER — ACETAMINOPHEN 325 MG/1
650 TABLET ORAL
Status: DISCONTINUED | OUTPATIENT
Start: 2019-08-16 | End: 2019-08-16 | Stop reason: SDUPTHER

## 2019-08-16 RX ORDER — DIPHENHYDRAMINE HCL 25 MG
25 CAPSULE ORAL
Status: COMPLETED | OUTPATIENT
Start: 2019-08-16 | End: 2019-08-16

## 2019-08-16 RX ORDER — SODIUM CHLORIDE 0.9 % (FLUSH) 0.9 %
10 SYRINGE (ML) INJECTION
Status: CANCELLED | OUTPATIENT
Start: 2019-08-30

## 2019-08-16 RX ORDER — HEPARIN 100 UNIT/ML
500 SYRINGE INTRAVENOUS
Status: DISCONTINUED | OUTPATIENT
Start: 2019-08-16 | End: 2019-08-16 | Stop reason: HOSPADM

## 2019-08-16 RX ORDER — HYDROCODONE BITARTRATE AND ACETAMINOPHEN 500; 5 MG/1; MG/1
TABLET ORAL ONCE
Status: DISCONTINUED | OUTPATIENT
Start: 2019-08-16 | End: 2019-08-16 | Stop reason: HOSPADM

## 2019-08-16 RX ADMIN — Medication 500 UNITS: at 05:08

## 2019-08-16 RX ADMIN — GEMCITABINE HYDROCHLORIDE 795 MG: 2 INJECTION, SOLUTION INTRAVENOUS at 12:08

## 2019-08-16 RX ADMIN — SODIUM CHLORIDE: 0.9 INJECTION, SOLUTION INTRAVENOUS at 02:08

## 2019-08-16 RX ADMIN — HEPARIN 500 UNITS: 100 SYRINGE at 01:08

## 2019-08-16 RX ADMIN — PACLITAXEL 160 MG: 100 INJECTION, POWDER, LYOPHILIZED, FOR SUSPENSION INTRAVENOUS at 11:08

## 2019-08-16 RX ADMIN — SODIUM CHLORIDE: 0.9 INJECTION, SOLUTION INTRAVENOUS at 11:08

## 2019-08-16 RX ADMIN — PEGFILGRASTIM 6 MG: KIT SUBCUTANEOUS at 01:08

## 2019-08-16 RX ADMIN — ACETAMINOPHEN 650 MG: 325 TABLET, FILM COATED ORAL at 02:08

## 2019-08-16 RX ADMIN — GRANISETRON HYDROCHLORIDE 1 MG: 1 INJECTION, SOLUTION INTRAVENOUS at 11:08

## 2019-08-16 RX ADMIN — DIPHENHYDRAMINE HYDROCHLORIDE 25 MG: 25 CAPSULE ORAL at 02:08

## 2019-08-16 RX ADMIN — SODIUM CHLORIDE, PRESERVATIVE FREE 10 ML: 5 INJECTION INTRAVENOUS at 01:08

## 2019-08-16 NOTE — NURSING
Arrived to room via wheelchair for blood transfusion No distress Respirations even and unlabored Denies pain. Good skin color noted. Left chest wall port accessed upon arrival, flushed with 10ml NS, good blood return noted. Dressing clean dry intact. Father present. Oriented to room Side rails x 2 Call light in reach

## 2019-08-16 NOTE — PROGRESS NOTES
Subjective:       Patient ID: Faith Byers is a 54 y.o. female.  Gall bladder ca , started modified FOLFIRINOX progressed then changed to low dose [place]/ abraxane, neutropenic and hence rx was delayed  HPI:   FINAL PATHOLOGIC DIAGNOSIS  GALLBLADDER/LIVER MASS, BIOPSY:  -Positive for malignancy, invasive squamous cell carcinoma, doing well pain has subsided also follows with [facility]. had a recent visit in 6/2019  recent scan showing progressive disease patient is being presented to tumor board plan is to going to second-line therapy or treat peritoneal disease and possibly even still considering surgery  Occasional nausea.  Continues to work.  No other complaints   some delay during low plts  Social History     Socioeconomic History    Marital status:      Spouse name: Not on file    Number of children: Not on file    Years of education: Not on file    Highest education level: Not on file   Occupational History    Not on file   Social Needs    Financial resource strain: Not on file    Food insecurity:     Worry: Not on file     Inability: Not on file    Transportation needs:     Medical: Not on file     Non-medical: Not on file   Tobacco Use    Smoking status: Never Smoker    Smokeless tobacco: Never Used   Substance and Sexual Activity    Alcohol use: Yes     Comment: rarely    Drug use: No    Sexual activity: Not Currently   Lifestyle    Physical activity:     Days per week: Not on file     Minutes per session: Not on file    Stress: Not on file   Relationships    Social connections:     Talks on phone: Not on file     Gets together: Not on file     Attends Sabianist service: Not on file     Active member of club or organization: Not on file     Attends meetings of clubs or organizations: Not on file     Relationship status: Not on file   Other Topics Concern    Not on file   Social History Narrative    Not on file     Family History   Problem Relation Age of Onset     Cancer Mother         breast and bone     Breast cancer Mother     Hyperlipidemia Father     ADD / ADHD Daughter         autism    Crohn's disease Paternal Uncle     Heart disease Paternal Uncle         heart transplant     Past Surgical History:   Procedure Laterality Date    ARTHROSCOPY, KNEE, WITH Partial lateral MENISCECTOMY Right 7/12/2018    Performed by [facility]an [facility] at [facility] OR    Dbeqym-dalrmi-vi N/A 11/16/2018    [facility]formed [facility] 2ND FLR    BREAST BIOPSY      BREAST SURGERY  2001    right biopsy, benign    CHONDROPLASTY,KNEE Medial Femoral Right 7/12/2018    [facility]formed by [facility]an [facility] OR    COLONOSCOPY      COLONOSCOPY N/A 12/5/2014    Performed by Sixto Barrera MD at [facility] ENDO    dental implant      QRJFCLJBG-ESPR-K-CATH N/A 5/31/2019    Performed by Hunter Botello MD at [facility] OR    PGDARASAH-SFNS-O-CATH Right 12/14/2018    Performed by Jurgen Toro [facility] at [facility] OR    KNEE ARTHROSCOPY Right     LIVER BIOPSY  11/16/2018    REMOVAL, CATHETER, CENTRAL VENOUS, TUNNELED, WITH PORT N/A 5/31/2019    Performed by Hunter Botello [facility] OR    THYROIDECTOMY  2011    complete     Past Medical History:   Diagnosis Date    Arthritis     Cancer     gallbadder/ liver spots/biopsy    Thyroid disease        Current Outpatient Medications:     apixaban (ELIQUIS) 5 mg Tab, as directed, Disp: , Rfl:     HYDROcodone-acetaminophen (NORCO) 7.5-325 mg per tablet, Take 1 tablet by mouth every 4 (four) hours as needed for Pain., Disp: 20 tablet, Rfl: 0    levothyroxine (TIROSINT-SOL) 200 mcg/mL Soln, twice daily., Disp: , Rfl:     lipase-protease-amylase (CREON) 36,000-114,000- 180,000 unit CpDR, Take 72,000-108,000 Units by mouth. , Disp: , Rfl:     magnesium oxide (MAG-OX) 400 mg (241.3 mg magnesium) tablet, Take 400 mg by mouth., Disp: , Rfl:     terbinafine HCl (LAMISIL) 1 % cream, Apply topically 2 (two) times daily., Disp: 24 g,  Rfl: 0    venlafaxine (EFFEXOR-XR) 37.5 MG 24 hr capsule, TAKE 1 CAPSULE (37.5 MG TOTAL) BY MOUTH ONCE DAILY., Disp: 30 capsule, Rfl: 10  No current facility-administered medications for this visit.     Facility-Administered Medications Ordered in Other Visits:     alteplase injection 2 mg, 2 mg, Intra-Catheter, PRN, Genevieve Alarcon MD    heparin, porcine (PF) 100 unit/mL injection flush 500 Units, 500 Units, Intravenous, PRN, Genevieve Alarcon MD    pegfilgrastim (NEULASTA (ON BODY INJECTOR)) injection 6 mg, 6 mg, Subcutaneous, Once, Genevieve Alarcon MD    sodium chloride 0.9% flush 10 mL, 10 mL, Intravenous, PRN, Genevieve Alarcon MD  Review of patient's allergies indicates:  No Known Allergies      REVIEW OF SYSTEMS:     CONSTITUTIONAL:   Nausea improved eating better weight is increased she is overall much better does have lots of fatigue    BREASTS: There is no swelling around breasts or nipple discharge.    EYES: Denies eye pain, blurred vision, swelling, redness or discharge.      ENT AND MOUTH: Denies runny nose, stuffiness, sinus trouble or sores. Denies    nosebleeds. Denies, hoarseness, change in voice or swelling in front of the    neck.      CARDIOVASCULAR: Denies chest pain, discomfort or palpitations. Denies neck    swelling or episodes of passing out.      RESPIRATORY: Denies cough, sputum production, blood in sputum, and denies    shortness of breath.      GI: Denies trouble swallowing, indigestion, heartburn, abdominal pain, nausea,    vomiting, diarrhea, has altered bowel habits, no blood in stool, discoloration of    stools, change in nature of stool, bloating, increased abdominal girth.      GENITOURINARY: No discharge. No pelvic pain or lumps. No rash around groin or  lesions. No urinary frequency, hesitation, painful urination or blood in    urine. Denies incontinence. No problems with intercourse.      MUSCULOSKELETAL:  Some amount of knee pain    NEUROLOGICAL: Denies tingling,  numbness, altered mentation changes to nerve    function in the face, weakness to one or both of the body. Denies changes to    gait and denies multiple falls or accidents.          PHYSICAL EXAM:     Wt Readings from Last 3 Encounters:   08/16/19 56.8 kg (125 lb 3.5 oz)   08/16/19 56.9 kg (125 lb 7.1 oz)   08/02/19 57 kg (125 lb 10.6 oz)     Temp Readings from Last 3 Encounters:   08/16/19 97.9 °F (36.6 °C)   08/16/19 98.3 °F (36.8 °C)   08/02/19 98 °F (36.7 °C)     BP Readings from Last 3 Encounters:   08/16/19 109/61   08/16/19 (!) 110/56   08/02/19 (!) 103/59     Pulse Readings from Last 3 Encounters:   08/16/19 97   08/16/19 76   08/02/19 79     GENERAL: Comfortable looking patient. Patient is in no distress.  Awake, alert and oriented to time, person and place.  No anxiety, or agitation.      HEENT: Normal conjunctivae and eyelids. WNL.  PERRLA 3 to 4 mm. No icterus, no pallor, no congestion, and no discharge noted.     NECK:  Supple. Trachea is central.  No crepitus.  No JVD or masses.    RESPIRATORY:  No intercostal retractions.  No dullness to percussion.  Chest is clear to auscultation.  No rales, rhonchi or wheezes.  No crepitus.  Good air entry bilaterally.    CARDIOVASCULAR:  S1 and S2 are normally heard without murmurs or gallops.  All peripheral pulses are present.    ABDOMEN:  Normal abdomen.  No hepatosplenomegaly.  No free fluid.  Bowel sounds are present.  No hernia noted. No masses.  No rebound or tenderness.  No guarding or rigidity.  Umbilicus is midline.    LYMPHATICS:  No axillary, cervical, supraclavicular, submental, or inguinal lymphadenopathy.    SKIN/MUSCULOSKELETAL:  There is no evidence of excoriation marks or ecchmosis.  No rashes.  No cyanosis.  No clubbing.  No joint or skeletal deformities noted.  Normal range of motion.    NEUROLOGIC:  Higher functions are appropriate.  No cranial nerve deficits.  Normal jody.  Normal strength.  Motor and sensory functions are normal.  Deep tendon  reflexes are normal.    GENITAL/RECTAL:  Exams are deferred.      Laboratory:     CBC:  Lab Results   Component Value Date    WBC 18.58 (H) 08/14/2019    RBC 2.28 (L) 08/14/2019    HGB 6.9 (L) 08/14/2019    HCT 22.8 (L) 08/14/2019     (H) 08/14/2019    MCH 30.3 08/14/2019    MCHC 30.3 (L) 08/14/2019    RDW 16.0 (H) 08/14/2019     08/14/2019    MPV 10.9 08/14/2019    GRAN 15.2 (H) 08/14/2019    GRAN 81.6 (H) 08/14/2019    LYMPH 2.0 08/14/2019    LYMPH 10.9 (L) 08/14/2019    MONO 0.8 08/14/2019    MONO 4.4 08/14/2019    EOS 0.1 08/14/2019    BASO 0.05 08/14/2019    EOSINOPHIL 0.4 08/14/2019    BASOPHIL 0.3 08/14/2019       BMP: BMP  Lab Results   Component Value Date     08/14/2019    K 3.8 08/14/2019     08/14/2019    CO2 22 (L) 08/14/2019    BUN 9 08/14/2019    CREATININE 0.7 08/14/2019    CALCIUM 9.4 08/14/2019    ANIONGAP 9 08/14/2019    ESTGFRAFRICA >60 08/14/2019    EGFRNONAA >60 08/14/2019       LFT:   Lab Results   Component Value Date    ALT 13 08/14/2019    AST 29 08/14/2019    ALKPHOS 220 (H) 08/14/2019    BILITOT 0.4 08/14/2019       IMPRESSION: CT C/A/P done 6/2-019 at [facility]  1. Interval increase in primary tumor in the gallbladder.  2. Interval increase in intrahepatic metastasis.  3. Findings suspicious for peritoneal disease.     ca19.9 129    Assessment/Plan:     discontd  FOLFIRINOX , due to progression  Pt going back to [facility] this week possible intraperitonieal therapy versus further treatment versus clinical trial   vs sx?   I doubt sx is a possiblity considering her dz involved peritoneum   Labs reviewed proceed  gem/ abraxane orders  Gemzar 500 mg/m2 and abraxane 100 mg/m2 day 1 and day 15 of  day cycle  and await MDA plans in the mean time patient will contact me soon after she receives instructions  as well cont thyroid f/u  Resumed eliquis plts have improved patient admits that she sometimes stops Eliquis to improve her platelet count have  advised her not to do  so without MD guide     rtc for next zelda with cbc, cmp 2 weeks off with new recommendations from [facility] upon her return.  changed to 28 day cycle with neulasta support as she has cytopenisa with usual regimen  Living Will  Documented previously

## 2019-08-16 NOTE — NURSING
Blood transfusion complete, uneventful. Tolerated well. Port a cath to left chest wall flushed with 10 ml NS followed with Heparin 100 Units/5ml per protocol and deacessed, covered with bandaid. Father present. Transferred via wheelchair for discharge home per PCT.

## 2019-08-19 ENCOUNTER — DOCUMENTATION ONLY (OUTPATIENT)
Dept: HEMATOLOGY/ONCOLOGY | Facility: CLINIC | Age: 54
End: 2019-08-19

## 2019-08-19 DIAGNOSIS — D64.9 ANEMIA, UNSPECIFIED TYPE: Primary | ICD-10-CM

## 2019-08-20 ENCOUNTER — PATIENT MESSAGE (OUTPATIENT)
Dept: HEMATOLOGY/ONCOLOGY | Facility: CLINIC | Age: 54
End: 2019-08-20

## 2019-08-23 ENCOUNTER — PATIENT MESSAGE (OUTPATIENT)
Dept: HEMATOLOGY/ONCOLOGY | Facility: CLINIC | Age: 54
End: 2019-08-23

## 2019-08-28 ENCOUNTER — DOCUMENTATION ONLY (OUTPATIENT)
Dept: FAMILY MEDICINE | Facility: CLINIC | Age: 54
End: 2019-08-28

## 2019-08-28 NOTE — PROGRESS NOTES
Pre-Visit Chart Review  For Appointment Scheduled on 8-29-19    Health Maintenance Due   Topic Date Due    High Dose Statin  06[birth year]/1985

## 2019-08-29 ENCOUNTER — OFFICE VISIT (OUTPATIENT)
Dept: FAMILY MEDICINE | Facility: CLINIC | Age: 54
End: 2019-08-29
Payer: COMMERCIAL

## 2019-08-29 ENCOUNTER — LAB VISIT (OUTPATIENT)
Dept: LAB | Facility: HOSPITAL | Age: 54
End: 2019-08-29
Attending: NURSE PRACTITIONER
Payer: COMMERCIAL

## 2019-08-29 VITALS
DIASTOLIC BLOOD PRESSURE: 73 MMHG | BODY MASS INDEX: 24.85 KG/M2 | WEIGHT: 123.25 LBS | TEMPERATURE: 98 F | HEIGHT: 59 IN | HEART RATE: 88 BPM | SYSTOLIC BLOOD PRESSURE: 108 MMHG

## 2019-08-29 DIAGNOSIS — Z00.00 ANNUAL PHYSICAL EXAM: ICD-10-CM

## 2019-08-29 DIAGNOSIS — C23 GALLBLADDER CANCER: ICD-10-CM

## 2019-08-29 DIAGNOSIS — Z28.39 IMMUNIZATION DEFICIENCY: ICD-10-CM

## 2019-08-29 DIAGNOSIS — Z00.00 ANNUAL PHYSICAL EXAM: Primary | ICD-10-CM

## 2019-08-29 DIAGNOSIS — C22.9 MALIGNANT NEOPLASM OF LIVER, UNSPECIFIED LIVER MALIGNANCY TYPE: ICD-10-CM

## 2019-08-29 LAB
ALBUMIN SERPL BCP-MCNC: 3 G/DL (ref 3.5–5.2)
ALP SERPL-CCNC: 283 U/L (ref 55–135)
ALT SERPL W/O P-5'-P-CCNC: 17 U/L (ref 10–44)
ANION GAP SERPL CALC-SCNC: 10 MMOL/L (ref 8–16)
AST SERPL-CCNC: 36 U/L (ref 10–40)
BILIRUB SERPL-MCNC: 0.6 MG/DL (ref 0.1–1)
BUN SERPL-MCNC: 10 MG/DL (ref 6–20)
CALCIUM SERPL-MCNC: 9.5 MG/DL (ref 8.7–10.5)
CHLORIDE SERPL-SCNC: 105 MMOL/L (ref 95–110)
CHOLEST SERPL-MCNC: 158 MG/DL (ref 120–199)
CHOLEST/HDLC SERPL: 3.9 {RATIO} (ref 2–5)
CO2 SERPL-SCNC: 22 MMOL/L (ref 23–29)
CREAT SERPL-MCNC: 0.6 MG/DL (ref 0.5–1.4)
EST. GFR  (AFRICAN AMERICAN): >60 ML/MIN/1.73 M^2
EST. GFR  (NON AFRICAN AMERICAN): >60 ML/MIN/1.73 M^2
ESTIMATED AVG GLUCOSE: 100 MG/DL (ref 68–131)
GLUCOSE SERPL-MCNC: 90 MG/DL (ref 70–110)
HBA1C MFR BLD HPLC: 5.1 % (ref 4–5.6)
HDLC SERPL-MCNC: 41 MG/DL (ref 40–75)
HDLC SERPL: 25.9 % (ref 20–50)
LDLC SERPL CALC-MCNC: 97.6 MG/DL (ref 63–159)
NONHDLC SERPL-MCNC: 117 MG/DL
POTASSIUM SERPL-SCNC: 3.9 MMOL/L (ref 3.5–5.1)
PROT SERPL-MCNC: 7 G/DL (ref 6–8.4)
SODIUM SERPL-SCNC: 137 MMOL/L (ref 136–145)
TRIGL SERPL-MCNC: 97 MG/DL (ref 30–150)

## 2019-08-29 PROCEDURE — 99396 PR PREVENTIVE VISIT,EST,40-64: ICD-10-PCS | Mod: 25,S$GLB,, | Performed by: NURSE PRACTITIONER

## 2019-08-29 PROCEDURE — 99396 PREV VISIT EST AGE 40-64: CPT | Mod: 25,S$GLB,, | Performed by: NURSE PRACTITIONER

## 2019-08-29 PROCEDURE — 36415 COLL VENOUS BLD VENIPUNCTURE: CPT | Mod: PO

## 2019-08-29 PROCEDURE — 90471 FLU VACCINE (QUAD) GREATER THAN OR EQUAL TO 3YO PRESERVATIVE FREE IM: ICD-10-PCS | Mod: S$GLB,,, | Performed by: NURSE PRACTITIONER

## 2019-08-29 PROCEDURE — 90471 IMMUNIZATION ADMIN: CPT | Mod: S$GLB,,, | Performed by: NURSE PRACTITIONER

## 2019-08-29 PROCEDURE — 99999 PR PBB SHADOW E&M-EST. PATIENT-LVL IV: ICD-10-PCS | Mod: PBBFAC,,, | Performed by: NURSE PRACTITIONER

## 2019-08-29 PROCEDURE — 90686 FLU VACCINE (QUAD) GREATER THAN OR EQUAL TO 3YO PRESERVATIVE FREE IM: ICD-10-PCS | Mod: S$GLB,,, | Performed by: NURSE PRACTITIONER

## 2019-08-29 PROCEDURE — 80053 COMPREHEN METABOLIC PANEL: CPT

## 2019-08-29 PROCEDURE — 90686 IIV4 VACC NO PRSV 0.5 ML IM: CPT | Mod: S$GLB,,, | Performed by: NURSE PRACTITIONER

## 2019-08-29 PROCEDURE — 83036 HEMOGLOBIN GLYCOSYLATED A1C: CPT

## 2019-08-29 PROCEDURE — 99999 PR PBB SHADOW E&M-EST. PATIENT-LVL IV: CPT | Mod: PBBFAC,,, | Performed by: NURSE PRACTITIONER

## 2019-08-29 PROCEDURE — 80061 LIPID PANEL: CPT

## 2019-08-29 NOTE — PROGRESS NOTES
Subjective:       Patient ID: Faith Byers is a 54 y.o. female.    Chief Complaint: Annual Exam    Ms. Byers presents to the clinic today for annual exam. She has diagnosis of gallbladder cancer and liver cancer.  She is seeing Dr. Alarcon and also goes to [facility].  She had 11 rounds of chemo and then had to switch to a different chemo drug, had 4 rounds of that but treatment failed and she is now considering clinical trial.  She has new finding of peritoneal metastasis.  Her pain is manageable and she is still working. She is eating three meals per day although her sense of taste is poor.  She has frequent nausea.  States her family and social support is good.  Daughter is 19 y/o.  She has hypothyroidism and sees Dr. Lyons; she has appointment with her today.      Review of Systems   Constitutional: Negative for chills and fever.   HENT: Negative for congestion, ear pain and sinus pressure.    Respiratory: Negative for cough and shortness of breath.    Cardiovascular: Negative for chest pain, palpitations and leg swelling.   Gastrointestinal: Positive for abdominal pain (manageable with pain meds) and nausea. Negative for constipation and diarrhea.   Skin: Negative for rash and wound.   Neurological: Negative for dizziness and headaches.   Psychiatric/Behavioral: Negative for confusion and dysphoric mood.       Objective:      Physical Exam   Constitutional: She is oriented to person, place, and time. She appears well-nourished. No distress.   HENT:   Head: Normocephalic and atraumatic.   Right Ear: External ear normal.   Left Ear: External ear normal.   Mouth/Throat: Oropharynx is clear and moist. No oropharyngeal exudate.   Eyes: Pupils are equal, round, and reactive to light. Right eye exhibits no discharge. Left eye exhibits no discharge.   Neck: Neck supple. No thyromegaly present.   Cardiovascular: Normal rate and regular rhythm. Exam reveals no gallop and no friction rub.   No murmur  heard.  Pulmonary/Chest: Effort normal and breath sounds normal. No respiratory distress. She has no wheezes. She has no rales.   Abdominal: Soft. Bowel sounds are normal. She exhibits no distension. There is tenderness in the right upper quadrant and left upper quadrant.   Lymphadenopathy:     She has no cervical adenopathy.   Neurological: She is alert and oriented to person, place, and time. Coordination normal.   Skin: Skin is warm and dry.   Psychiatric: She has a normal mood and affect. Her behavior is normal. Thought content normal.   Vitals reviewed.      Assessment:       1. Annual physical exam    2. Gallbladder cancer    3. Malignant neoplasm of liver, unspecified liver malignancy type    4. Immunization deficiency        Plan:       Faith was seen today for annual exam.    Diagnoses and all orders for this visit:    Annual physical exam  -     Hemoglobin A1c; Future  -     Lipid panel; Future  -     Comprehensive metabolic panel; Future    Gallbladder cancer  Follow up Dr. Alarcon and [facility]    Malignant neoplasm of liver, unspecified liver malignancy type  Follow up Dr. Alarcon and [facility]    Immunization deficiency  Clinic is out of [employer].  Offered to send Rx to pharmacy and pt declines, would like to receive at next appt.  -     Influenza - Quadrivalent (6 months+) (PF)  -     Cancel: (In Office Administered) Zoster Recombinant Vaccine        Pt to establish care with PCP 2 mos

## 2019-08-30 ENCOUNTER — OFFICE VISIT (OUTPATIENT)
Dept: HEMATOLOGY/ONCOLOGY | Facility: CLINIC | Age: 54
End: 2019-08-30
Payer: COMMERCIAL

## 2019-08-30 ENCOUNTER — LAB VISIT (OUTPATIENT)
Dept: LAB | Facility: HOSPITAL | Age: 54
End: 2019-08-30
Attending: INTERNAL MEDICINE
Payer: COMMERCIAL

## 2019-08-30 VITALS
HEIGHT: 59 IN | DIASTOLIC BLOOD PRESSURE: 58 MMHG | BODY MASS INDEX: 25.06 KG/M2 | RESPIRATION RATE: 20 BRPM | HEART RATE: 83 BPM | OXYGEN SATURATION: 100 % | WEIGHT: 124.31 LBS | SYSTOLIC BLOOD PRESSURE: 113 MMHG | TEMPERATURE: 98 F

## 2019-08-30 DIAGNOSIS — I26.99 BILATERAL PULMONARY EMBOLISM: ICD-10-CM

## 2019-08-30 DIAGNOSIS — Z51.11 ENCOUNTER FOR ANTINEOPLASTIC CHEMOTHERAPY: Primary | ICD-10-CM

## 2019-08-30 DIAGNOSIS — C23 MALIGNANT NEOPLASM OF GALLBLADDER: ICD-10-CM

## 2019-08-30 DIAGNOSIS — C23 GALLBLADDER CANCER: ICD-10-CM

## 2019-08-30 DIAGNOSIS — Z51.11 ENCOUNTER FOR ANTINEOPLASTIC CHEMOTHERAPY: ICD-10-CM

## 2019-08-30 LAB
ALBUMIN SERPL BCP-MCNC: 3 G/DL (ref 3.5–5.2)
ALP SERPL-CCNC: 268 U/L (ref 55–135)
ALT SERPL W/O P-5'-P-CCNC: 19 U/L (ref 10–44)
ANION GAP SERPL CALC-SCNC: 9 MMOL/L (ref 8–16)
AST SERPL-CCNC: 33 U/L (ref 10–40)
BASOPHILS # BLD AUTO: 0.05 K/UL (ref 0–0.2)
BASOPHILS NFR BLD: 0.3 % (ref 0–1.9)
BILIRUB SERPL-MCNC: 0.8 MG/DL (ref 0.1–1)
BUN SERPL-MCNC: 8 MG/DL (ref 6–20)
CALCIUM SERPL-MCNC: 9.3 MG/DL (ref 8.7–10.5)
CHLORIDE SERPL-SCNC: 105 MMOL/L (ref 95–110)
CO2 SERPL-SCNC: 23 MMOL/L (ref 23–29)
CREAT SERPL-MCNC: 0.6 MG/DL (ref 0.5–1.4)
DIFFERENTIAL METHOD: ABNORMAL
EOSINOPHIL # BLD AUTO: 0 K/UL (ref 0–0.5)
EOSINOPHIL NFR BLD: 0.2 % (ref 0–8)
ERYTHROCYTE [DISTWIDTH] IN BLOOD BY AUTOMATED COUNT: 17 % (ref 11.5–14.5)
EST. GFR  (AFRICAN AMERICAN): >60 ML/MIN/1.73 M^2
EST. GFR  (NON AFRICAN AMERICAN): >60 ML/MIN/1.73 M^2
GLUCOSE SERPL-MCNC: 91 MG/DL (ref 70–110)
HCT VFR BLD AUTO: 29.4 % (ref 37–48.5)
HGB BLD-MCNC: 9 G/DL (ref 12–16)
IMM GRANULOCYTES # BLD AUTO: 0.13 K/UL (ref 0–0.04)
LYMPHOCYTES # BLD AUTO: 1.3 K/UL (ref 1–4.8)
LYMPHOCYTES NFR BLD: 8.5 % (ref 18–48)
MCH RBC QN AUTO: 30.2 PG (ref 27–31)
MCHC RBC AUTO-ENTMCNC: 30.6 G/DL (ref 32–36)
MCV RBC AUTO: 99 FL (ref 82–98)
MONOCYTES # BLD AUTO: 0.7 K/UL (ref 0.3–1)
MONOCYTES NFR BLD: 4.6 % (ref 4–15)
NEUTROPHILS # BLD AUTO: 13.1 K/UL (ref 1.8–7.7)
NEUTROPHILS NFR BLD: 85.6 % (ref 38–73)
NRBC BLD-RTO: 0 /100 WBC
PLATELET # BLD AUTO: 215 K/UL (ref 150–350)
PMV BLD AUTO: 10.3 FL (ref 9.2–12.9)
POTASSIUM SERPL-SCNC: 3.5 MMOL/L (ref 3.5–5.1)
PROT SERPL-MCNC: 6.6 G/DL (ref 6–8.4)
RBC # BLD AUTO: 2.98 M/UL (ref 4–5.4)
SODIUM SERPL-SCNC: 137 MMOL/L (ref 136–145)
WBC # BLD AUTO: 15.36 K/UL (ref 3.9–12.7)

## 2019-08-30 PROCEDURE — 99214 PR OFFICE/OUTPT VISIT, EST, LEVL IV, 30-39 MIN: ICD-10-PCS | Mod: S$GLB,,, | Performed by: INTERNAL MEDICINE

## 2019-08-30 PROCEDURE — 99214 OFFICE O/P EST MOD 30 MIN: CPT | Mod: S$GLB,,, | Performed by: INTERNAL MEDICINE

## 2019-08-30 PROCEDURE — 99999 PR PBB SHADOW E&M-EST. PATIENT-LVL IV: ICD-10-PCS | Mod: PBBFAC,,, | Performed by: INTERNAL MEDICINE

## 2019-08-30 PROCEDURE — 99999 PR PBB SHADOW E&M-EST. PATIENT-LVL IV: CPT | Mod: PBBFAC,,, | Performed by: INTERNAL MEDICINE

## 2019-08-30 PROCEDURE — 85025 COMPLETE CBC W/AUTO DIFF WBC: CPT

## 2019-08-30 PROCEDURE — 80053 COMPREHEN METABOLIC PANEL: CPT

## 2019-08-30 PROCEDURE — 3008F BODY MASS INDEX DOCD: CPT | Mod: CPTII,S$GLB,, | Performed by: INTERNAL MEDICINE

## 2019-08-30 PROCEDURE — 36415 COLL VENOUS BLD VENIPUNCTURE: CPT

## 2019-08-30 PROCEDURE — 3008F PR BODY MASS INDEX (BMI) DOCUMENTED: ICD-10-PCS | Mod: CPTII,S$GLB,, | Performed by: INTERNAL MEDICINE

## 2019-08-30 NOTE — PROGRESS NOTES
Subjective:       Patient ID: Faith Byers is a 54 y.o. female.  Gall bladder ca , started modified FOLFIRINOX progressed then changed to low dose [place]/ abraxane, neutropenic and hence rx was delayed  HPI:   FINAL PATHOLOGIC DIAGNOSIS  GALLBLADDER/LIVER MASS, BIOPSY:  -Positive for malignancy, invasive squamous cell carcinoma, doing well pain has subsided also follows with [facility]. had a recent visit in 6/2019  recent scan showing progressive disease patient is being presented to tumor board plan is to going to second-line therapy or treat peritoneal disease and possibly even still considering surgery  Occasional nausea.  Continues to work.  No other complaints   some delay during low plts  Social History     Socioeconomic History    Marital status:      Spouse name: Not on file    Number of children: Not on file    Years of education: Not on file    Highest education level: Not on file   Occupational History    Not on file   Social Needs    Financial resource strain: Not very hard    Food insecurity:     Worry: Never true     Inability: Never true    Transportation needs:     Medical: No     Non-medical: No   Tobacco Use    Smoking status: Never Smoker    Smokeless tobacco: Never Used   Substance and Sexual Activity    Alcohol use: Yes     Frequency: Monthly or less     Drinks per session: 1 or 2     Binge frequency: Never     Comment: rarely    Drug use: No    Sexual activity: Not Currently   Lifestyle    Physical activity:     Days per week: 0 days     Minutes per session: 0 min    Stress: To some extent   Relationships    Social connections:     Talks on phone: More than three times a week     Gets together: Once a week     Attends Anabaptist service: Not on file     Active member of club or organization: Yes     Attends meetings of clubs or organizations: More than 4 times per year     Relationship status:    Other Topics Concern    Not on file   Social History  Narrative    Not on file     Family History   Problem Relation Age of Onset    Cancer Mother         breast and bone     Breast cancer Mother     Hyperlipidemia Father     ADD / ADHD Daughter         autism    Crohn's disease Paternal Uncle     Heart disease Paternal Uncle         heart transplant     Past Surgical History:   Procedure Laterality Date    ARTHROSCOPY, KNEE, WITH Partial lateral MENISCECTOMY Right 7/12/2018    Performed by [facility] Kiran [facility] at [facility] OR    Ykplsq-avjurr-vo N/A 11/16/2018    Performed by [facility] Provider at [facility] OR [facility] FLR    BREAST BIOPSY      BREAST SURGERY  2001    right biopsy, benign    CHONDROPLASTY,KNEE Medial Femoral Right 7/12/2018    [facility]formed by [facility]an [facility] at [facility] OR    COLONOSCOPY      COLONOSCOPY N/A 12/5/2014    Performed by Sixto Barrera MD at [facility] ENDO    dental implant      NBZUEXFDZ-XIRO-D-CATH N/A 5/31/2019    Performed by Hunter Botello MD at [facility] OR    UJOXGLYNX-QRTG-V-CATH Right 12/14/2018    Performed by Jurgen Toro MD at [facility] OR    KNEE ARTHROSCOPY Right     LIVER BIOPSY  11/16/2018    REMOVAL, CATHETER, CENTRAL VENOUS, TUNNELED, WITH PORT N/A 5/31/2019    Performed by Hunter Botello [facility] at [facility] OR    THYROIDECTOMY  2011    complete     Past Medical History:   Diagnosis Date    Arthritis     Cancer     gallbadder/ liver spots/biopsy    Thyroid disease        Current Outpatient Medications:     apixaban (ELIQUIS) 5 mg Tab, as directed, Disp: , Rfl:     HYDROcodone-acetaminophen (NORCO) 7.5-325 mg per tablet, Take 1 tablet by mouth every 4 (four) hours as needed for Pain., Disp: 20 tablet, Rfl: 0    levothyroxine (TIROSINT-SOL) 200 mcg/mL Soln, twice daily., Disp: , Rfl:     lipase-protease-amylase (CREON) 36,000-114,000- 180,000 unit CpDR, Take 72,000-108,000 Units by mouth. , Disp: , Rfl:     terbinafine HCl (LAMISIL) 1 % cream, Apply topically 2 (two) times daily., Disp: 24 g, Rfl: 0    venlafaxine  (EFFEXOR-XR) 37.5 MG 24 hr capsule, TAKE 1 CAPSULE (37.5 MG TOTAL) BY MOUTH ONCE DAILY., Disp: 30 capsule, Rfl: 10  Review of patient's allergies indicates:  No Known Allergies      REVIEW OF SYSTEMS:     CONSTITUTIONAL:   Nausea improved eating better weight is increased she is overall much better does have lots of fatigue    BREASTS: There is no swelling around breasts or nipple discharge.    EYES: Denies eye pain, blurred vision, swelling, redness or discharge.      ENT AND MOUTH: Denies runny nose, stuffiness, sinus trouble or sores. Denies    nosebleeds. Denies, hoarseness, change in voice or swelling in front of the    neck.      CARDIOVASCULAR: Denies chest pain, discomfort or palpitations. Denies neck    swelling or episodes of passing out.      RESPIRATORY: Denies cough, sputum production, blood in sputum, and denies    shortness of breath.      GI: Denies trouble swallowing, indigestion, heartburn, abdominal pain, nausea,    vomiting, diarrhea, has altered bowel habits, no blood in stool, discoloration of    stools, change in nature of stool, bloating, increased abdominal girth.      GENITOURINARY: No discharge. No pelvic pain or lumps. No rash around groin or  lesions. No urinary frequency, hesitation, painful urination or blood in    urine. Denies incontinence. No problems with intercourse.      MUSCULOSKELETAL:  Some amount of knee pain    NEUROLOGICAL: Denies tingling, numbness, altered mentation changes to nerve    function in the face, weakness to one or both of the body. Denies changes to    gait and denies multiple falls or accidents.          PHYSICAL EXAM:     Wt Readings from Last 3 Encounters:   08/30/19 56.4 kg (124 lb 5.4 oz)   08/29/19 55.9 kg (123 lb 3.8 oz)   08/16/19 56.8 kg (125 lb 3.5 oz)     Temp Readings from Last 3 Encounters:   08/30/19 98.2 °F (36.8 °C)   08/29/19 98 °F (36.7 °C)   08/16/19 100 °F (37.8 °C)     BP Readings from Last 3 Encounters:   08/30/19 (!) 113/58   08/29/19  108/73   08/16/19 129/73     Pulse Readings from Last 3 Encounters:   08/30/19 83   08/29/19 88   08/16/19 101     GENERAL: Comfortable looking patient. Patient is in no distress.  Awake, alert and oriented to time, person and place.  No anxiety, or agitation.      HEENT: Normal conjunctivae and eyelids. WNL.  PERRLA 3 to 4 mm. No icterus, no pallor, no congestion, and no discharge noted.     NECK:  Supple. Trachea is central.  No crepitus.  No JVD or masses.    RESPIRATORY:  No intercostal retractions.  No dullness to percussion.  Chest is clear to auscultation.  No rales, rhonchi or wheezes.  No crepitus.  Good air entry bilaterally.    CARDIOVASCULAR:  S1 and S2 are normally heard without murmurs or gallops.  All peripheral pulses are present.    ABDOMEN:  Normal abdomen.  No hepatosplenomegaly.  No free fluid.  Bowel sounds are present.  No hernia noted. No masses.  No rebound or tenderness.  No guarding or rigidity.  Umbilicus is midline.    LYMPHATICS:  No axillary, cervical, supraclavicular, submental, or inguinal lymphadenopathy.    SKIN/MUSCULOSKELETAL:  There is no evidence of excoriation marks or ecchmosis.  No rashes.  No cyanosis.  No clubbing.  No joint or skeletal deformities noted.  Normal range of motion.    NEUROLOGIC:  Higher functions are appropriate.  No cranial nerve deficits.  Normal jody.  Normal strength.  Motor and sensory functions are normal.  Deep tendon reflexes are normal.    GENITAL/RECTAL:  Exams are deferred.      Laboratory:     CBC:  Lab Results   Component Value Date    WBC 18.58 (H) 08/14/2019    RBC 2.28 (L) 08/14/2019    HGB 6.9 (L) 08/14/2019    HCT 22.8 (L) 08/14/2019     (H) 08/14/2019    MCH 30.3 08/14/2019    MCHC 30.3 (L) 08/14/2019    RDW 16.0 (H) 08/14/2019     08/14/2019    MPV 10.9 08/14/2019    GRAN 15.2 (H) 08/14/2019    GRAN 81.6 (H) 08/14/2019    LYMPH 2.0 08/14/2019    LYMPH 10.9 (L) 08/14/2019    MONO 0.8 08/14/2019    MONO 4.4 08/14/2019    EOS  0.1 08/14/2019    BASO 0.05 08/14/2019    EOSINOPHIL 0.4 08/14/2019    BASOPHIL 0.3 08/14/2019       BMP: BMP  Lab Results   Component Value Date     08/29/2019    K 3.9 08/29/2019     08/29/2019    CO2 22 (L) 08/29/2019    BUN 10 08/29/2019    CREATININE 0.6 08/29/2019    CALCIUM 9.5 08/29/2019    ANIONGAP 10 08/29/2019    ESTGFRAFRICA >60.0 08/29/2019    EGFRNONAA >60.0 08/29/2019       LFT:   Lab Results   Component Value Date    ALT 17 08/29/2019    AST 36 08/29/2019    ALKPHOS 283 (H) 08/29/2019    BILITOT 0.6 08/29/2019     IMPRESSION: from CT at [facility]    Progression of infiltrative gallbladder mass involving the liver with enlarging peritoneal implants and mild interval increased distention of the gallbladder, cystic duct and intrahepatic biliary system.    Thickened appearance of the hepatic flexure colon is more conspicuous with local amol appearance of the fat, presumably related to enlarging adjacent gallbladder tumor and peritoneal disease, to be assessed clinically for superimposed inflammatory process.    See discussion above.  IMPRESSION: CT C/A/P done 6/2-019 at [facility]  1. Interval increase in primary tumor in the gallbladder.  2. Interval increase in intrahepatic metastasis.  3. Findings suspicious for peritoneal disease.     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   Further progression on gemcitabine Abraxane low-dose due to tolerance  her dz involved peritoneum   as well cont thyroid f/u  Resumed eliquis plts have improved patient admits that she sometimes stops Eliquis to improve her platelet count have  advised her not to do so without MD guide  I have  applied to seek clinical trial assistance here as well   Living Will  Documented previously

## 2019-08-30 NOTE — PLAN OF CARE
DISCONTINUE ON PATHWAY REGIMEN - Pancreatic    PANOS51        Nab-paclitaxel (protein bound) (Abraxane(R))       Gemcitabine (Gemzar(R))           Additional Orders: Hold therapy and notify physician if ANC < 1500.    **Always confirm dose/schedule in your pharmacy ordering system**    REASON: Disease Progression  PRIOR TREATMENT: PANOS51: Nab-Paclitaxel (Abraxane(R)) 125 mg/m2 D1, 8, 15 +   Gemcitabine 1,000 mg/m2 D1, 8, 15 q28 Days Until Progression or Toxicity  TREATMENT RESPONSE: Progressive Disease (PD)    CLINICAL TRIAL SELECTED - Pancreatic    Trial 2017.370.A: PCTP CBP 17-01 Phase Ib Clinical Study of GZS896, Cisplatin   and Nivolumab Administered Every 3 Weeks in Patients with Advanced Refractory   Tumors (CanBas)    **Trial eligibility and accrual should be confirmed by your research team**    Patient Characteristics:  Adenocarcinoma, Metastatic Disease, Third Line and Beyond, EDGAR/pMMR or MSI   Unknown  Histology: Adenocarcinoma  Current evidence of distant metastases<= Yes  AJCC T Category: TX  AJCC N Category: NX  AJCC M Category: M1  AJCC 8 Stage Grouping: IV  Line of Therapy: Third Line and Beyond  Microsatellite/Mismatch Repair Status: Unknown  Intent of Therapy:  Non-Curative / Palliative Intent, Discussed with Patient

## 2019-09-06 ENCOUNTER — TELEPHONE (OUTPATIENT)
Dept: HEMATOLOGY/ONCOLOGY | Facility: CLINIC | Age: 54
End: 2019-09-06

## 2019-09-06 NOTE — TELEPHONE ENCOUNTER
Patient notified that Dr Alarcon would like her to see Dr Dueñas on the [facility] for recommendations or clinical trial that will start in October. Patient verbalized understanding in that someone from Dr Dueñas office will call her to schedule.

## 2019-09-09 ENCOUNTER — TELEPHONE (OUTPATIENT)
Dept: HEMATOLOGY/ONCOLOGY | Facility: CLINIC | Age: 54
End: 2019-09-09

## 2019-09-10 ENCOUNTER — TELEPHONE (OUTPATIENT)
Dept: HEMATOLOGY/ONCOLOGY | Facility: CLINIC | Age: 54
End: 2019-09-10

## 2019-09-10 ENCOUNTER — PATIENT MESSAGE (OUTPATIENT)
Dept: HEMATOLOGY/ONCOLOGY | Facility: CLINIC | Age: 54
End: 2019-09-10

## 2019-09-10 NOTE — TELEPHONE ENCOUNTER
"----- Message from Luz Maria Guerrero sent at 9/10/2019  1:54 PM CDT -----  Contact: Faith   Scheduling Request    Patient Status: new /existing   Scheduling Appt : consult / lab   Time/Date Preference: morning   MyChart Active User?: Yes   Relationship to Patient?: self   Contact Preference?: [phone]   Treating Provider:   Med Mccloud MD   Pt wants to change location and see Dr. Dueñas per discussion with a MA.   Do you feel you need to be seen today?    Additional Notes:  "Thank you for all that you do for our patients'"      "

## 2019-09-13 ENCOUNTER — TELEPHONE (OUTPATIENT)
Dept: HEMATOLOGY/ONCOLOGY | Facility: CLINIC | Age: 54
End: 2019-09-13

## 2019-09-23 ENCOUNTER — OFFICE VISIT (OUTPATIENT)
Dept: HEMATOLOGY/ONCOLOGY | Facility: CLINIC | Age: 54
End: 2019-09-23
Payer: COMMERCIAL

## 2019-09-23 VITALS
DIASTOLIC BLOOD PRESSURE: 52 MMHG | BODY MASS INDEX: 23.33 KG/M2 | WEIGHT: 111.13 LBS | TEMPERATURE: 98 F | OXYGEN SATURATION: 100 % | HEIGHT: 58 IN | HEART RATE: 120 BPM | RESPIRATION RATE: 18 BRPM | SYSTOLIC BLOOD PRESSURE: 110 MMHG

## 2019-09-23 DIAGNOSIS — C23 GALLBLADDER CANCER: Primary | ICD-10-CM

## 2019-09-23 DIAGNOSIS — G89.3 NEOPLASM RELATED PAIN: ICD-10-CM

## 2019-09-23 DIAGNOSIS — F41.9 ANXIETY: ICD-10-CM

## 2019-09-23 DIAGNOSIS — C78.6 SECONDARY MALIGNANT PERITONEAL DEPOSIT: ICD-10-CM

## 2019-09-23 DIAGNOSIS — C22.9 MALIGNANT NEOPLASM OF LIVER, UNSPECIFIED LIVER MALIGNANCY TYPE: ICD-10-CM

## 2019-09-23 PROCEDURE — 99215 OFFICE O/P EST HI 40 MIN: CPT | Mod: S$GLB,,, | Performed by: INTERNAL MEDICINE

## 2019-09-23 PROCEDURE — 3008F PR BODY MASS INDEX (BMI) DOCUMENTED: ICD-10-PCS | Mod: CPTII,S$GLB,, | Performed by: INTERNAL MEDICINE

## 2019-09-23 PROCEDURE — 99999 PR PBB SHADOW E&M-EST. PATIENT-LVL IV: ICD-10-PCS | Mod: PBBFAC,,, | Performed by: INTERNAL MEDICINE

## 2019-09-23 PROCEDURE — 99999 PR PBB SHADOW E&M-EST. PATIENT-LVL IV: CPT | Mod: PBBFAC,,, | Performed by: INTERNAL MEDICINE

## 2019-09-23 PROCEDURE — 3008F BODY MASS INDEX DOCD: CPT | Mod: CPTII,S$GLB,, | Performed by: INTERNAL MEDICINE

## 2019-09-23 PROCEDURE — 99215 PR OFFICE/OUTPT VISIT, EST, LEVL V, 40-54 MIN: ICD-10-PCS | Mod: S$GLB,,, | Performed by: INTERNAL MEDICINE

## 2019-09-23 RX ORDER — ALPRAZOLAM 0.5 MG/1
0.5 TABLET ORAL 3 TIMES DAILY PRN
Qty: 30 TABLET | Refills: 0 | Status: SHIPPED | OUTPATIENT
Start: 2019-09-23 | End: 2020-09-22

## 2019-09-23 RX ORDER — HYDROCODONE BITARTRATE AND ACETAMINOPHEN 7.5; 325 MG/1; MG/1
1 TABLET ORAL EVERY 6 HOURS PRN
Qty: 60 TABLET | Refills: 0 | Status: SHIPPED | OUTPATIENT
Start: 2019-09-23

## 2019-09-23 RX ORDER — LEVOTHYROXINE SODIUM 200 UG/1
200 TABLET ORAL
COMMUNITY

## 2019-09-23 NOTE — PROGRESS NOTES
REASON FOR VISIT:  Gallbladder cancer to discuss trial options.    HISTORY OF PRESENT ILLNESS:  Ms. Faith Byers is a 54-year-old female   who comes in today for evaluation for clinical trials.  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  She   was in her usual state of health in approximately October 2018 when she noted   onset of epigastric discomfort.  She saw her PCP who suspected GERD and she was   given pantoprazole with minimal benefit.  She then underwent an abdominal   ultrasound on 10/25/2018 that revealed multiple small stones within the   gallbladder of 5.4 x 5.1 x 4.6 cm ill-defined mass in the fundus of the   gallbladder, common bile duct measuring 4.6 mm, normal liver, mild intrahepatic   biliary duct dilatation and a normal spleen measuring 4.1 cm.  She then   underwent CT scan of the abdomen and pelvis on 10/31/2018 that revealed a 5.2 x   5.1 x 4.9 cm heterogeneous mass in the mid body, fundus of the gallbladder with   no distinct margins between the mass and hepatic segment 5.  There was subtle   ill-defined hypoattenuation within the hepatic parenchyma adjacent to the mass   concerning for direct invasion of the hepatic parenchyma.  There was fat   stranding along the inferior margin of the mass extending 2 cm along the margin   of the hepatic flexure of the colon.  The cystic duct was dilated at 15 mm and   the CBD was mildly dilated at 7 mm.  There is very mild intrahepatic biliary   duct dilatation.  There are several subcentimeter hypodensities scattered   throughout the liver.  She then underwent a percutaneous CT-guided biopsy of the   gallbladder mass on 11/16/2018.  Pathology revealed squamous cell carcinoma.    Immunohistochemical stains are positive for CK5, 6, 7 and P63, but negative for   CK20, CDX2.  She had a port placement and then was treated with   FOLFIRINOX starting on 12/18/2019.  Her repeat CT scan prior to  starting   revealed a mass measuring 9.1 x 6.3 cm involving the gallbladder body and fundus   of the hepatic parenchyma, hazy fat stranding interposed between the   gallbladder mass and hepatic flexure of the colon and other findings as above.    She also received three fractions of radiation on 01/09/2019 through 01/11/2019,   but RT was terminated in favor of chemotherapy.  She got four cycles of   FOLFIRINOX up until 01/14/2019.  She was diagnosed with bilateral lower   extremity DVT on 01/25/2019 along with multiple PE.  She also developed tongue   swelling and slurred speech with irinotecan following course followed.  On   02/25/2019, she was subsequently admitted and underwent CT scan and MRI brain   and MRI angiogram and an EEG and an echo.  An echo revealed an ejection fraction   of 60%.  She then received seven cycles of FOLFOX between March 2019 to June 2019 with initial responsive disease, but eventual progression at dose limiting   thrombocytopenia.  She was then started on Gemzar and nab-paclitaxel between   June 2019 through August 2019 and then that resulted in disease progression.    She was seen at [facility] on end of August and was recommended to try some   clinical trials; however, she has decided to return back and she saw Dr. Alarcon   on August 30th and has been referred to [facility] for clinical trials.    She currently has severe abdominal pain, currently not on any pain medicines   as well as anxiety.  She has insomnia secondary to the anxiety.  She has   continued to lose a significant amount of weight and has loss of appetite.  ECOG   performance status is 1.  She is accompanied to the clinic today with her   sister.    REVIEW OF SYSTEMS:  CONSTITUTIONAL:  Reports fatigue, loss of appetite and weight loss.  HEENT:  Negative for mouth sores.  EYES:  Negative for visual disturbance.  RESPIRATORY:  Negative for cough and shortness of breath.  CARDIOVASCULAR:  Negative for chest  pain.  GASTROINTESTINAL:  Negative for diarrhea, but reports right upper quadrant   abdominal pain, which radiates to the middle.  GENITOURINARY:  Negative for frequency.  MUSCULOSKELETAL:  Negative for back pain.  SKIN:  Negative for rash.  NEUROLOGIC:  Negative for headaches.  HEMATOLOGIC:  Negative for adenopathy.  PSYCHIATRIC AND BEHAVIORAL:  She is extremely nervous and anxious.    COMORBID MEDICAL CONDITIONS:  Include arthritis, depression, stroke, thyroid   problems.    PAST SURGICAL HISTORY:  Thyroidectomy, breast surgery, colonoscopy, knee   arthroscopy, meniscectomy.    FAMILY HISTORY:  Mother  with breast cancer.  Paternal uncle with Crohn   disease and heart disease.    SOCIAL HISTORY:  Does not smoke, does not drink alcohol.    PHYSICAL EXAMINATION:  VITAL SIGNS:  Reviewed in EPIC.  GENERAL:  The patient is oriented to person, place and time, appears cachectic,   poorly nourished, no acute distress.  HEENT:  Right and left ears are normal.  Oropharynx is clear and moist, no   oropharyngeal exudate.  Teeth, gums and lips are normal.  No sinus tenderness.    Palate, tongue, posterior pharynx are normal.  EYES:  Conjunctivae and lids are normal.  NECK:  Supple.  No JVD.  No thyromegaly.  CARDIOVASCULAR:  Normal rate, regular rhythm.  Normal heart sounds.  Intact   distal pulses.  No edema or tenderness in the extremities.  PULMONARY AND CHEST:  Bilateral breath sounds heard, no rales, rhonchi or   wheezes.  ABDOMEN:  Soft, nontender, nondistended.  Bowel sounds are normal.  No   hepatomegaly.  MUSCULOSKELETAL:  Normal range of motion.  Gait is normal.  No clubbing or   cyanosis.  LYMPHADENOPATHY:  No submental, submandibular or supraclavicular lymph nodes   noted.  SKIN:  Warm, dry and intact.  No bruising, no lesions, no rashes.  NEUROLOGIC:  Alert and oriented to normal person, place and time.  PSYCHIATRIC:  Normal mood and affect.  Speech, behavior, judgment, thought   content, cognition and  memory are normal.    LABORATORY DATA:  From 08/30/2019 reveals a WBC count of 15.36, hemoglobin 9,   hematocrit 29.4, platelets 215.  CMP, albumin of 3, alkaline phosphatase of 268.    ASSESSMENT AND PLAN:  Ms. Faith Byers is a 54-year-old with metastatic   squamous gallbladder cancer.  She has been treated on the [place] with   FOLFIRINOX four cycles and then FOLFOX for seven cycles.  Following that, she   received Gemzar and Abraxane with disease progression.  She has been followed at   the same time at [facility].  She is scheduled to return back to [facility]   on October 2nd for clinical trial discussion.  I reviewed that there are no   trials available here.  Also, discussed with the phase 1 program and they do not   have any clinical trials available for her current diagnosis.  We did discuss   sending strata; however, we will wait for her appointment at [facility].  For   neoplasm related pain, I started her on Norco and for anxiety I started her on   Xanax and I sent a message to her primary oncologist, Dr. Alarcon about this   treatment plan, so she can get further prescriptions with Dr. Alarcon.  All of the   patient's questions were answered to her satisfaction.  Distress score is 6, no   intervention is indicated.        SPS/HN  dd: 09/23/2019 13:50:56 (CDT)  td: 09/24/2019 02:29:44 (CDT)  Doc ID   #[id number]  Job ID #[id number]    CC:       Distress Score    Distress Score: 6        Practical Problems Physical Problems   : No Appearance: No   Housing: No Bathing / Dressing: No   Insurance / Financial: No Breathing: No    Transportation: No  Changes in Urination: No    Work / School: No  Constipation: No   Treatment Decisions: No  Diarrhea: No     Eating: No    Family Problems Fatigue: No    Dealing with Children: No Feeling Swollen: No    Dealing with Partner: No Fevers: No    Ability to Have Children: No  Getting Around: No    Family Health Issues: No  Indigestion: No     Memory /  Concentration: No   Emotional Problems Mouth Sores: No    Depression: No  Nausea: No    Fears: No  Nose Dry / Congested: No    Nervousness: No  Pain: No    Sadness: No Sexual: No    Worry: No Skin Dry / Itchy: No    Loss of Interest in Usual Activities: No Sleep: No     Substance Abuse: No    Spiritual/Religions Concerns Tingling in Hands / Feet: No   Spritual / Congregation Concerns: No         Other Problems            Dictated# [id number]

## 2019-09-23 NOTE — Clinical Note
Clay Vallejo,We do not have any trials for her, spoke with phase I as well. She is going to [facility] on 10/2/19., I started her on Norco and Xanax. So she will call you when she needs refills. Poor thing. Thanks

## 2019-09-23 NOTE — LETTER
September 23, 2019      Genevieve Alarcon MD  [address]  [address] [address]           [facility]  [address]  [place] [address]  Phone: [phone]          Patient: Faith Byers   MR Number: [id number]   YOB: 1965   Date of Visit: 9/23/2019       Dear Dr. Genevieve Alarcon:    Thank you for referring Faith Byers to me for evaluation. Attached you will find relevant portions of my assessment and plan of care.    If you have questions, please do not hesitate to call me. I look forward to following Faith Byers along with you.    Sincerely,    Rosetta Dueñas MD    Enclosure  CC:  No Recipients    If you would like to receive this communication electronically, please contact [email or web address] or [phone] to request more information on [employer] access.    For providers and/or their staff who would like to refer a patient to [facility], please contact us through our one-stop-shop provider referral line, [facility], at [phone].    If you feel you have received this communication in error or would no longer like to receive these types of communications, please e-mail [email or web address]

## 2019-10-08 ENCOUNTER — PATIENT OUTREACH (OUTPATIENT)
Dept: ADMINISTRATIVE | Facility: HOSPITAL | Age: 54
End: 2019-10-08

## 2021-03-30 ENCOUNTER — PATIENT OUTREACH (OUTPATIENT)
Dept: ADMINISTRATIVE | Facility: HOSPITAL | Age: 56
End: 2021-03-30

## 2021-03-31 NOTE — PROGRESS NOTES
Order for Transfuse today on 8/19 erroneous, was assisting LAb in troubleshooting order from Friday . Please disregard     
Admitted

## 2021-05-28 NOTE — PROGRESS NOTES
NUTRITION NOTE - CHEMO SCHOOL    Faith is a 53 year old female with cancer of the gallbladder getting Carbo and Gemzar.  Weight: 164#.  She states her appetite is hit or miss, but she forces herself to eat because she is aware of the need for nutrition.  If she eats too much at one time, she will have abdominal pain.  She states her stools are daily, but describes them as being tan in color and slimy.  She complias of having some hypogeusia which she attributes to some of the meds she is taking.  She does not have a stent.  Plan: Discussed importance of nutrition and hydration during cancer treatment.  2. Gave eating tips for taste changes and advised she use baking soda/salt rinses before meals to cleanse palate.  3. Gave eating tips for nausea.  4. Educated on diet for diarrhea and constipation.  Reviewed Senokot-S protocol for constipation.  5. Discussed food safety and discouraged use of vitamin and herbal supplements.  6. RD contact information given.     pt is to get Tramadol refills from PCP now as Dr Gilmore discussed with pt at last ov.
